# Patient Record
Sex: MALE | Race: BLACK OR AFRICAN AMERICAN | Employment: OTHER | URBAN - METROPOLITAN AREA
[De-identification: names, ages, dates, MRNs, and addresses within clinical notes are randomized per-mention and may not be internally consistent; named-entity substitution may affect disease eponyms.]

---

## 2017-12-13 ENCOUNTER — OFFICE VISIT (OUTPATIENT)
Dept: CARDIOLOGY CLINIC | Age: 77
End: 2017-12-13

## 2017-12-13 VITALS
OXYGEN SATURATION: 98 % | SYSTOLIC BLOOD PRESSURE: 135 MMHG | HEART RATE: 55 BPM | WEIGHT: 118 LBS | BODY MASS INDEX: 19.66 KG/M2 | HEIGHT: 65 IN | DIASTOLIC BLOOD PRESSURE: 75 MMHG

## 2017-12-13 DIAGNOSIS — Z95.1 S/P CABG X 5: ICD-10-CM

## 2017-12-13 DIAGNOSIS — I25.10 CORONARY ARTERY DISEASE INVOLVING NATIVE CORONARY ARTERY OF NATIVE HEART WITHOUT ANGINA PECTORIS: Primary | ICD-10-CM

## 2017-12-13 DIAGNOSIS — E78.00 HYPERCHOLESTEROLEMIA: ICD-10-CM

## 2017-12-13 DIAGNOSIS — I11.9 HYPERTENSIVE HEART DISEASE WITHOUT HEART FAILURE: ICD-10-CM

## 2017-12-13 RX ORDER — ATORVASTATIN CALCIUM 20 MG/1
20 TABLET, FILM COATED ORAL DAILY
COMMUNITY
Start: 2017-11-17 | End: 2019-09-24

## 2017-12-13 RX ORDER — LISINOPRIL 30 MG/1
TABLET ORAL
COMMUNITY
Start: 2017-10-30 | End: 2018-12-17

## 2017-12-13 RX ORDER — AMLODIPINE BESYLATE 5 MG/1
TABLET ORAL
COMMUNITY
Start: 2017-09-07 | End: 2019-01-17

## 2017-12-13 RX ORDER — METOPROLOL SUCCINATE 25 MG/1
25 TABLET, EXTENDED RELEASE ORAL DAILY
COMMUNITY
Start: 2017-09-13 | End: 2021-07-19 | Stop reason: SDUPTHER

## 2017-12-13 RX ORDER — CYANOCOBALAMIN/FOLIC AC/VIT B6 1-2.5-25MG
TABLET ORAL
Status: ON HOLD | COMMUNITY
Start: 2017-09-24 | End: 2019-02-01

## 2017-12-13 NOTE — PROGRESS NOTES
Review of Systems   Constitutional: Negative for chills, fever, malaise/fatigue and weight loss. Respiratory: Negative for cough, hemoptysis, shortness of breath and wheezing. Cardiovascular: Negative for chest pain, palpitations, orthopnea and leg swelling. Gastrointestinal: Negative. Musculoskeletal: Negative for falls, joint pain and myalgias. Neurological: Negative for dizziness.

## 2017-12-13 NOTE — PROGRESS NOTES
1. Have you been to the ER, urgent care clinic since your last visit? Hospitalized since your last visit?no    2. Have you seen or consulted any other health care providers outside of the [address] [place] [address] since your last visit? Include any pap smears or colon screening.  no

## 2017-12-13 NOTE — MR AVS SNAPSHOT
Visit Information Date & Time Provider Department Dept. Phone Encounter #  
 12/13/2017  2:20 PM Adore Martinez [facility] [id number] Follow-up Instructions Return in about 6 months (around 6/13/2018), or if symptoms worsen or fail to improve. Upcoming Health Maintenance Date Due DTaP/Tdap/Td series (1 - Tdap) 10/10/1962 ZOSTER VACCINE AGE 60> 8/10/2001 GLAUCOMA SCREENING Q2Y 10/10/2006 Pneumococcal 65+ Low/Medium Risk (1 of 2 - PCV13) 10/10/2006 MEDICARE YEARLY EXAM 10/10/2006 Influenza Age 5 to Adult 8/1/2017 Allergies as of 12/13/2017  Review Complete On: 12/13/2017 By: Adore Martinez DO Severity Noted Reaction Type Reactions Vicodin [Hydrocodone-acetaminophen]    Rash Current Immunizations  Never Reviewed No immunizations on file. Not reviewed this visit You Were Diagnosed With   
  
 Codes Comments Coronary artery disease involving native coronary artery of native heart without angina pectoris    -  Primary ICD-10-CM: I25.10 ICD-9-CM: 414.01 S/P CABG x 5     ICD-10-CM: Z95.1 ICD-9-CM: V45.81 Diastolic CHF, chronic (HCC)     ICD-10-CM: I50.32 
ICD-9-CM: 428.32, 428.0 Hypertensive heart disease with CHF ([facility] [address]     ICD-10-CM: I11.0 ICD-9-CM: 402.91, 428.0 Hypercholesterolemia     ICD-10-CM: E78.00 ICD-9-CM: 272.0 Vitals BP Pulse Height(growth percentile) Weight(growth percentile) SpO2 BMI  
 142/82 (!) 55 5' 5\" (1.651 m) 118 lb (53.5 kg) 98% 19.64 kg/m2 Smoking Status Former Smoker Vitals History BMI and BSA Data Body Mass Index Body Surface Area 19.64 kg/m 2 1.57 m 2 Preferred Pharmacy Pharmacy Name Phone [address], [address] [place] [address] [phone] Your Updated Medication List  
  
   
This list is accurate as of: 12/13/17  3:10 PM.  Always use your most recent med list. ADVIL -38 mg Tab Generic drug:  Ibuprofen-diphenhydrAMINE Take  by mouth as needed. ALLERGY AM-PM PO Take  by mouth. amLODIPine 5 mg tablet Commonly known as:  NORVASC  
  
 atorvastatin 20 mg tablet Commonly known as:  LIPITOR * FOLBEE PO Take 1 Tab by mouth daily. * FOLBEE 2.5-25-1 mg tablet Generic drug:  folic acid-vit R9-YWR Z52  
  
 lisinopril 30 mg tablet Commonly known as:  PRINIVIL, ZESTRIL  
  
 metoprolol succinate 25 mg XL tablet Commonly known as:  TOPROL-XL  
  
 multivitamin tablet Commonly known as:  ONE A DAY Take 1 Tab by mouth daily. SPIRIVA WITH HANDIHALER 18 mcg inhalation capsule Generic drug:  tiotropium Take 1 Cap by inhalation as needed. VENTOLIN HFA 90 mcg/actuation inhaler Generic drug:  albuterol Take 1 Puff by inhalation as needed. VITAMIN B-1 50 mg tablet Generic drug:  thiamine Take 50 mg by mouth daily. VITAMIN D 5,000 unit Tab tablet Generic drug:  cholecalciferol (VITAMIN D3) Take 1 Cap by mouth daily. 1,000 iu qd * Notice: This list has 2 medication(s) that are the same as other medications prescribed for you. Read the directions carefully, and ask your doctor or other care provider to review them with you. We Performed the Following AMB POC EKG ROUTINE W/ 12 LEADS, INTER & REP [39847 CPT(R)] Follow-up Instructions Return in about 6 months (around 6/13/2018), or if symptoms worsen or fail to improve. Introducing [facility]! Blanche Hutson introduces [employer] patient portal. Now you can access parts of your medical record, email your doctor's office, and request medication refills online. 1. In your internet browser, go to [email or web address] Paper. [employer]/[email or web address] Paper 2. Click on the First Time User? Click Here link in the Sign In box. You will see the New Member Sign Up page. 3. Enter your [employer] Access Code exactly as it appears below. You will not need to use this code after youve completed the sign-up process. If you do not sign up before the expiration date, you must request a new code. · [employer] Access Code: [email or web address] Expires: 3/13/2018  3:10 PM 
 
4. Enter the last four digits of your Social Security Number (xxxx) and Date of Birth (mm/dd/yyyy) as indicated and click Submit. You will be taken to the next sign-up page. 5. Create a [employer] ID. This will be your [employer] login ID and cannot be changed, so think of one that is secure and easy to remember. 6. Create a [employer] password. You can change your password at any time. 7. Enter your Password Reset Question and Answer. This can be used at a later time if you forget your password. 8. Enter your e-mail address. You will receive e-mail notification when new information is available in [address] 9. Click Sign Up. You can now view and download portions of your medical record. 10. Click the Download Summary menu link to download a portable copy of your medical information. If you have questions, please visit the Frequently Asked Questions section of the [employer] website. Remember, [employer] is NOT to be used for urgent needs. For medical emergencies, dial 911. Now available from your iPhone and Android! Please provide this summary of care documentation to your next provider. Your primary care clinician is listed as [address] [place] [address]. If you have any questions after today's visit, please call [phone].

## 2017-12-13 NOTE — PROGRESS NOTES
HPI:  I saw Marine Balbuena in my office today in cardiovascular evaluation regarding his chronic coronary artery disease. Mr. Ioana Snow is very pleasant small 26-year-old black male with history of hypertensive heart disease, hypercholesterolemia, chronic diastolic heart failure, and coronary artery disease status coronary bypass grafting surgery ×5 in April 2014 with the following bypasses:     1. LIMA to the LAD  2. Sequential saphenous vein graft to the diagonal and ramus intermedius. 3. Sequential saphenous vein graft to the right posterior descending artery and the right posterior left ventricular branch. He comes in today and relates that he is continued to do quite well. He is remaining quite active and denies any problems with chest pain, shortness of breath or any other cardiovascular complaints. He is unfortunately continuing to smoke about 8 or 9 cigarettes per day. Encounter Diagnoses   Name Primary?  Coronary artery disease involving native coronary artery of native heart without angina pectoris Yes    S/P CABG x 5 in April of 2014     Hypertensive heart disease without heart failure     Hypercholesterolemia        Discussion: This patient appears to be doing about as well as we could expect and I really have no recommendations for change at this time. He is not have any symptoms to suggest development of recurrent symptomatic obstructive coronary disease or any heart failure symptomatology. He is having his cholesterol checked through Dr. Robert Tran office and he tells me that his Lipitor was recently increased for better control and I am going to get a copy of his lipid profile for my records but I will leave management of that problem to Dr. Latosha Lipscomb. Certainly would like to see the patient's LDL under 70 and preferably under 60 moving forward.     His blood pressure was a little elevated when he came into the office with a little bit above 314 systolic and I checked it again and got 135/75 and since his EKG is stable and he is otherwise doing well I will simply plan to see him again in a year. PCP: [address], [place]      Past Medical History:   Diagnosis Date    Alcohol abuse     quit February 2011    CAD (coronary artery disease)     CAD (coronary artery disease), native coronary artery 1/3/05    LAD 70-80% mid; Cx-diffuse 80%; OM1-70% prox; RCA 40-50% mid; 100% RPLB with collaterals    Cardiac cath 08/19/2013    Severe, diffuse calcification. dLM 30%. mLAD 80%. o/pD1 80%. o/pRamus 80%. dCX 85%. dRCA 80%.  m-dRPLB 75%. EF 50%. Mod to severe inferobasal hypk.  Cardiac echocardiogram 01/19/2011    EF 50-55%. Gr 1 DDfx. RVSP 30 mmHg.  Cardiac nuclear imaging test 06/27/2011    No evidence of ischemia or previous infarction. EF 60%. Neg EKG on max EST. Ex time 6 mins. 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[facility])     PFTs March 2009; mild-mode obstruction, + bronchodil response; decreased DLCO    GERD (gastroesophageal reflux disease)     with erosive esophagitis history    Heart failure ([address]     History of renal failure     2 years ago, which completely resolved    Hypercholesterolemia     Hypertension     Hypertensive heart disease with CHF ([address]     MI (myocardial infarction)     Inferior wall    Tobacco abuse        Past Surgical History:   Procedure Laterality Date    HX HEART CATHETERIZATION  11/2013       Current Outpatient Prescriptions   Medication Sig    atorvastatin (LIPITOR) 20 mg tablet     lisinopril (PRINIVIL, ZESTRIL) 30 mg tablet     FOLBEE 2.5-25-1 mg tablet     metoprolol succinate (TOPROL-XL) 25 mg XL tablet     amLODIPine (NORVASC) 5 mg tablet     thiamine (VITAMIN B-1) 50 mg tablet Take 50 mg by mouth daily.  PSEUDOEPHEDRINE/CPM/METHYLSCOP (ALLERGY AM-PM PO) Take  by mouth.     Cholecalciferol, Vitamin D3, (VITAMIN D) 5,000 unit Tab Take 1 Cap by mouth daily. 1,000 iu qd    tiotropium (SPIRIVA WITH HANDIHALER) 18 mcg inhalation capsule Take 1 Cap by inhalation as needed.  Ibuprofen-Diphenhydramine (ADVIL PM) 200-38 mg Tab Take  by mouth as needed.  albuterol (VENTOLIN HFA) 90 mcg/Actuation inhaler Take 1 Puff by inhalation as needed.  CYANOCOBALAMIN/FA/PYRIDOXINE (FOLBEE PO) Take 1 Tab by mouth daily.  multivitamin (ONE A DAY) tablet Take 1 Tab by mouth daily. No current facility-administered medications for this visit. Allergies   Allergen Reactions    Vicodin [Hydrocodone-Acetaminophen] Rash       Social History:   Social History   Substance Use Topics    Smoking status: Former Smoker     Packs/day: 0.50     Years: 55.00     Start date: 3/13/2014     Quit date: 3/17/2014    Smokeless tobacco: Never Used      Comment: wearing nicotine patch    Alcohol use No      Comment: History of alcohol use, quit drinking Jan 2011         Family history: family history includes Diabetes in his father; Heart Disease in his maternal uncle; Hypertension in his father. Review of Systems:   Constitutional: Negative for chills, fever, malaise/fatigue and weight loss. Respiratory: Negative for cough, hemoptysis, shortness of breath and wheezing. Cardiovascular: Negative for chest pain, palpitations, orthopnea and leg swelling. Gastrointestinal: Negative. Musculoskeletal: Negative for falls, joint pain and myalgias. Neurological: Negative for dizziness. Physical Exam:   The patient is an alert, oriented, well developed, well nourished 69 y.o. male who was in no acute distress at the time of my examination.   Visit Vitals    /75 (BP 1 Location: Left arm, BP Patient Position: Sitting)    Pulse (!) 55    Ht 5' 5\" (1.651 m)    Wt 53.5 kg (118 lb)    SpO2 98%    BMI 19.64 kg/m2      BP Readings from Last 3 Encounters:   12/13/17 135/75   11/30/16 134/86   11/11/15 126/68      Wt Readings from Last 3 Encounters:   12/13/17 53.5 kg (118 lb)   11/30/16 54.3 kg (119 lb 12.8 oz)   11/11/15 58.1 kg (128 lb)     HEENT: Conjuctiva white, mucosa moist, no pallor or cyanosis. NECK: Supple without masses, tenderness or thyromegaly. There was no jugular venous distention. Carotid are full bilaterally without bruits. CHEST: Symmetrical with good excursion. Well healed mid sternotomy scar with some keloid formation on lower portion. LUNGS: Clear to auscultation in all fields. HEART: Regular rate and rhythm. The apex is not displaced. There were no lifts, thrills or heaves. There is a normal S1 and S2 without appreciable murmurs, rubs, clicks, or gallops auscultated. ABDOMEN: Soft without masses, tenderness or organomegaly. EXTREMITIES: 1 + peripheral pulses without peripheral edema. INTEGUMENT: Skin is warm and dry   NEUROLOGICAL: The patient was oriented x3 with motor function grossly intact. Review of Data: See PMH and Cardiology and Imaging sections for cardiac testing  Lab Results   Component Value Date/Time    Cholesterol, total 134 04/04/2014 02:38 PM    HDL Cholesterol 55 04/04/2014 02:38 PM    LDL, calculated 62 04/04/2014 02:38 PM    Triglyceride 85 04/04/2014 02:38 PM    CHOL/HDL Ratio 2.4 04/04/2014 02:38 PM       Results for orders placed or performed in visit on 12/13/17   AMB POC EKG ROUTINE W/ 12 LEADS, INTER & REP     Status: None    Narrative    Sinus bradycardia, rate 55. Old inferior wall myocardial infarction. Left ventricular hypertrophy by precordial voltage criteria. Compared to the EKG of November 30, 2016 there was little interval change. Capo Miller D.O., F.A.C.C. [facility]  [facility]  [address] [facility]  [phone]    PLEASE NOTE:  This document has been produced using voice recognition software.  Unrecognized errors in transcription may be present.

## 2017-12-22 ENCOUNTER — TELEPHONE (OUTPATIENT)
Dept: CARDIOLOGY CLINIC | Age: 77
End: 2017-12-22

## 2017-12-22 NOTE — TELEPHONE ENCOUNTER
Dr Opal Bueno reviewed lab form Dr Merino Carvajal office     Verbal order and read back per Taiwo Thacker, DO  See if he is taking lipitor consistently     Spoke with patient he is not taking Lipitior consistently he is going to do that now

## 2018-12-17 ENCOUNTER — OFFICE VISIT (OUTPATIENT)
Dept: CARDIOLOGY CLINIC | Age: 78
End: 2018-12-17

## 2018-12-17 VITALS
SYSTOLIC BLOOD PRESSURE: 166 MMHG | OXYGEN SATURATION: 98 % | BODY MASS INDEX: 18.78 KG/M2 | HEIGHT: 64 IN | HEART RATE: 62 BPM | DIASTOLIC BLOOD PRESSURE: 88 MMHG | WEIGHT: 110 LBS

## 2018-12-17 DIAGNOSIS — I25.10 CORONARY ARTERY DISEASE INVOLVING NATIVE CORONARY ARTERY OF NATIVE HEART WITHOUT ANGINA PECTORIS: Primary | ICD-10-CM

## 2018-12-17 DIAGNOSIS — E78.00 HYPERCHOLESTEROLEMIA: ICD-10-CM

## 2018-12-17 DIAGNOSIS — I11.9 HYPERTENSIVE HEART DISEASE WITHOUT HEART FAILURE: ICD-10-CM

## 2018-12-17 DIAGNOSIS — Z95.1 S/P CABG X 5: Chronic | ICD-10-CM

## 2018-12-17 RX ORDER — LOSARTAN POTASSIUM 50 MG/1
50 TABLET ORAL DAILY
Status: ON HOLD | COMMUNITY
Start: 2018-12-04 | End: 2019-10-16 | Stop reason: SDUPTHER

## 2018-12-17 NOTE — PROGRESS NOTES
HPI:   I saw Ray Marcelino in my office today in cardiovascular evaluation regarding his chronic coronary artery disease.  Mr. Micky Chan is very pleasant small 67-year-old black male with history of hypertensive heart disease, hypercholesterolemia, chronic diastolic heart failure, and coronary artery disease status coronary bypass grafting surgery × 5 in April 2014 with the following bypasses:     1.  LIMA to the LAD  2. Sequential saphenous vein graft to the diagonal and ramus intermedius. 3. Sequential saphenous vein graft to the right posterior descending artery and the right posterior left ventricular branch.     He comes to the office today and relates that he is doing reasonably well. He continues to remain quite active and denies any cardiovascular symptomatology at this time. Encounter Diagnoses   Name Primary?  Coronary artery disease involving native coronary artery of native heart without angina pectoris Yes    S/P CABG x 5     Hypertensive heart disease without heart failure     Hypercholesterolemia        Discussion: This gentleman appears to be doing about as well as we could expect and really have no recommendations for change at this time. However it has been 4-1/2 years since he had his bypass surgery and I do think a screening nuclear myocardial perfusion study should be considered I am going to get that ordered on him. He does have hypercholesterolemia for which he takes Lipitor 20 mg daily and he is going to be getting a lipid profile with Dr. Makayla Butler which I am going to get a copy of for my records. I would certainly like to see his total cholesterol under 150 and his LDL under 70 and preferably under 60 and if it significantly above those levels I would recommend that we increase his Lipitor or add Zetia. His blood pressure was elevated today at 166/88 no checked again myself and got 175/80.   He tells me he has not taken any of his medications for a couple days because he has been quite busy attending a sick friend is going to go home and take the medication now on he will be following up with Dr. Erasto Betancourt in the next month or so and I will leave it to Dr. Erasto Betancourt to adjust his blood pressure medication if his blood pressure is remaining elevated. Since the patient is otherwise doing well I will plan to see him again in several months. PCP: Jose Miguel Tsang MD      Past Medical History:   Diagnosis Date    Alcohol abuse     quit February 2011    CAD (coronary artery disease)     CAD (coronary artery disease), native coronary artery 1/3/05    LAD 70-80% mid; Cx-diffuse 80%; OM1-70% prox; RCA 40-50% mid; 100% RPLB with collaterals    Cardiac cath 08/19/2013    Severe, diffuse calcification. dLM 30%. mLAD 80%. o/pD1 80%. o/pRamus 80%. dCX 85%. dRCA 80%.  m-dRPLB 75%. EF 50%. Mod to severe inferobasal hypk.  Cardiac echocardiogram 01/19/2011    EF 50-55%. Gr 1 DDfx. RVSP 30 mmHg.  Cardiac nuclear imaging test 06/27/2011    No evidence of ischemia or previous infarction. EF 60%. Neg EKG on max EST. Ex time 6 mins.    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[facility])     PFTs March 2009; mild-mode obstruction, + bronchodil response; decreased DLCO    GERD (gastroesophageal reflux disease)     with erosive esophagitis history    Heart failure ([address]     History of renal failure     2 years ago, which completely resolved    Hypercholesterolemia     Hypertension     Hypertensive heart disease [facility]F ([facility] [address]     MI (myocardial infarction) ([facility] [address]     Inferior wall    Tobacco abuse        Past Surgical History:   Procedure Laterality Date    HX HEART CATHETERIZATION  11/2013     Current Outpatient Medications   Medication Sig    losartan (COZAAR) 50 mg tablet     atorvastatin (LIPITOR) 20 mg tablet     FOLBEE 2.5-25-1 mg tablet     metoprolol succinate (TOPROL-XL) 25 mg XL tablet     amLODIPine (NORVASC) 5 mg tablet     thiamine (VITAMIN B-1) 50 mg tablet Take 50 mg by mouth daily.  PSEUDOEPHEDRINE/CPM/METHYLSCOP (ALLERGY AM-PM PO) Take  by mouth.  Cholecalciferol, Vitamin D3, (VITAMIN D) 5,000 unit Tab Take 1 Cap by mouth daily. 1,000 iu qd    tiotropium (SPIRIVA WITH HANDIHALER) 18 mcg inhalation capsule Take 1 Cap by inhalation as needed.  Ibuprofen-Diphenhydramine (ADVIL PM) 200-38 mg Tab Take  by mouth as needed.  albuterol (VENTOLIN HFA) 90 mcg/Actuation inhaler Take 1 Puff by inhalation as needed.  CYANOCOBALAMIN/FA/PYRIDOXINE (FOLBEE PO) Take 1 Tab by mouth daily.  multivitamin (ONE A DAY) tablet Take 1 Tab by mouth daily. No current facility-administered medications for this visit. Allergies   Allergen Reactions    Vicodin [Hydrocodone-Acetaminophen] Rash       Social History:   Social History     Tobacco Use    Smoking status: Former Smoker     Packs/day: 0.50     Years: 55.00     Pack years: 27.50     Start date: 3/13/2014     Last attempt to quit: 3/17/2014     Years since quittin.7    Smokeless tobacco: Never Used    Tobacco comment: wearing nicotine patch   Substance Use Topics    Alcohol use: No     Comment: History of alcohol use, quit drinking 2011         Family history: family history includes Diabetes in his father; Heart Disease in his maternal uncle; Hypertension in his father. Review of Systems:   Constitutional: Negative for chills, fever, malaise/fatigue and weight loss. Respiratory: Negative for cough, hemoptysis, shortness of breath and wheezing. Cardiovascular: Negative for chest pain, palpitations, orthopnea and leg swelling. Gastrointestinal: Negative. Musculoskeletal: Negative for falls, joint pain and myalgias. Neurological: Negative for dizziness.      Physical Exam:   The patient is an alert, oriented, well developed, well nourished 69 y.o. male who was in no acute distress at the time of my examination. Visit Vitals  /88   Pulse 62   Ht 5' 4\" (1.626 m)   Wt 49.9 kg (110 lb)   SpO2 98%   BMI 18.88 kg/m²      BP Readings from Last 3 Encounters:   12/17/18 166/88   12/13/17 135/75   11/30/16 134/86      Wt Readings from Last 3 Encounters:   12/17/18 49.9 kg (110 lb)   12/13/17 53.5 kg (118 lb)   11/30/16 54.3 kg (119 lb 12.8 oz)     HEENT: Conjuctiva white, mucosa moist, no pallor or cyanosis. NECK: Supple without masses, tenderness or thyromegaly. There was no jugular venous distention. Carotid are full bilaterally without bruits. CHEST: Symmetrical with good excursion. Well healed mid sternotomy scar with some keloid formation on lower portion. LUNGS: Clear to auscultation in all fields. HEART: Regular rate and rhythm. The apex is not displaced. There were no lifts, thrills or heaves. There is a normal S1 and S2 without appreciable murmurs, rubs, clicks, or gallops auscultated. ABDOMEN: Soft without masses, tenderness or organomegaly. EXTREMITIES: 1 + peripheral pulses without peripheral edema. INTEGUMENT: Skin is warm and dry   NEUROLOGICAL: The patient was oriented x3 with motor function grossly intact. Review of Data: See PMH and Cardiology and Imaging sections for cardiac testing  Lab Results   Component Value Date/Time    Cholesterol, total 134 04/04/2014 02:38 PM    HDL Cholesterol 55 04/04/2014 02:38 PM    LDL, calculated 62 04/04/2014 02:38 PM    Triglyceride 85 04/04/2014 02:38 PM    CHOL/HDL Ratio 2.4 04/04/2014 02:38 PM       Results for orders placed or performed in visit on 12/17/18   AMB POC EKG ROUTINE W/ 12 LEADS, INTER & REP     Status: None    Narrative    Normal sinus rhythm, rate 62. Old inferior wall myocardial infarction. Left ventricular hypertrophy by precordial voltage criteria. Compared to the EKG of December 13, 2017 there was no significant interval change. [facility] ELENA Casiano., F.A.C.C.   [facility]  [facility] [place]  [address] [place] [address]. [address] [place]  [phone]    PLEASE NOTE:  This document has been produced using voice recognition software. Unrecognized errors in transcription may be present.

## 2018-12-18 ENCOUNTER — NURSE NAVIGATOR (OUTPATIENT)
Dept: OTHER | Age: 78
End: 2018-12-18

## 2018-12-18 NOTE — NURSE NAVIGATOR
Referring Provider: Freida Vazquez MD      Lung Cancer Risk Profile:   Age: 69  Gender: Male  Height: 65\"  Weight: 110#    Smoking History:  Smoking Status: current use  # years smokin  # years quit: 0  Packs/day: 0.75  Pack years: 50    Patient discussed smoking cessation with PCP: Yes, per patient report    Patient participated in shared decision making process with PCP: Yes, per provider report    Patient is currently experiencing symptoms: No, per patient report    If yes what symptoms:     Co-Morbidities:  CAD, CHF    Cancer History:      Additional Risk Factors:    Exposure to second hand smoke      Patient's smoking history discussed via phone. Patient meets LDCT lung cancer screening criteria.  Call transferred to central scheduling to schedule exam.      SYD YousifN, RN, [address] [facility] Nurse Navigator

## 2018-12-20 ENCOUNTER — HOSPITAL ENCOUNTER (OUTPATIENT)
Dept: NON INVASIVE DIAGNOSTICS | Age: 78
Discharge: HOME | End: 2018-12-20
Attending: INTERNAL MEDICINE
Payer: MEDICARE

## 2018-12-20 VITALS
DIASTOLIC BLOOD PRESSURE: 60 MMHG | HEIGHT: 64 IN | SYSTOLIC BLOOD PRESSURE: 140 MMHG | BODY MASS INDEX: 18.78 KG/M2 | WEIGHT: 110 LBS

## 2018-12-20 DIAGNOSIS — I25.10 CORONARY ARTERY DISEASE INVOLVING NATIVE CORONARY ARTERY OF NATIVE HEART WITHOUT ANGINA PECTORIS: ICD-10-CM

## 2018-12-20 LAB
STRESS BASELINE DIAS BP: 60 MMHG
STRESS BASELINE HR: 49 BPM
STRESS BASELINE SYS BP: 140 MMHG
STRESS ESTIMATED WORKLOAD: 1 METS
STRESS EXERCISE DUR MIN: NORMAL
STRESS PEAK DIAS BP: 60 MMHG
STRESS PEAK SYS BP: 120 MMHG
STRESS PERCENT HR ACHIEVED: 63 %
STRESS POST PEAK HR: 77 BPM
STRESS RATE PRESSURE PRODUCT: 9240 BPM*MMHG
STRESS ST DEPRESSION: 0 MM
STRESS ST ELEVATION: 0 MM
STRESS TARGET HR: 122 BPM

## 2018-12-20 PROCEDURE — 74011250636 HC RX REV CODE- 250/636: Performed by: INTERNAL MEDICINE

## 2018-12-20 PROCEDURE — A9500 TC99M SESTAMIBI: HCPCS

## 2018-12-20 RX ORDER — SODIUM CHLORIDE 9 MG/ML
250 INJECTION, SOLUTION INTRAVENOUS ONCE
Status: COMPLETED | OUTPATIENT
Start: 2018-12-20 | End: 2018-12-20

## 2018-12-20 RX ADMIN — REGADENOSON 0.4 MG: 0.08 INJECTION, SOLUTION INTRAVENOUS at 09:45

## 2018-12-20 RX ADMIN — SODIUM CHLORIDE 250 ML: 900 INJECTION, SOLUTION INTRAVENOUS at 09:45

## 2018-12-27 ENCOUNTER — HOSPITAL ENCOUNTER (OUTPATIENT)
Dept: CT IMAGING | Age: 78
Discharge: HOME | End: 2018-12-27
Attending: INTERNAL MEDICINE
Payer: MEDICARE

## 2018-12-27 VITALS — BODY MASS INDEX: 19.57 KG/M2 | WEIGHT: 114 LBS

## 2018-12-27 DIAGNOSIS — Z87.891 PERSONAL HISTORY OF NICOTINE DEPENDENCE: ICD-10-CM

## 2018-12-27 PROCEDURE — G0297 LDCT FOR LUNG CA SCREEN: HCPCS

## 2018-12-27 NOTE — PROGRESS NOTES
Per your last note \" This gentleman appears to be doing about as well as we could expect and really have no recommendations for change at this time. However it has been 4-1/2 years since he had his bypass surgery and I do think a screening nuclear myocardial perfusion study should be considered I am going to get that ordered on him.

## 2018-12-28 ENCOUNTER — TELEPHONE (OUTPATIENT)
Dept: CARDIOLOGY CLINIC | Age: 78
End: 2018-12-28

## 2018-12-28 NOTE — TELEPHONE ENCOUNTER
----- Message from Jama Ramos RN sent at 12/27/2018  8:51 AM EST ----- Per your last note \" This gentleman appears to be doing about as well as we could expect and really have no recommendations for change at this time. However it has been 4-1/2 years since he had his bypass surgery and I do think a screening nuclear myocardial perfusion study should be considered I am going to get that ordered on him.

## 2018-12-28 NOTE — TELEPHONE ENCOUNTER
I called and placed a message on the patient's answering machine for him to call the office for the results of his nuclear myocardial perfusion study.   His nuclear myocardial perfusion study is significantly abnormal and I am really going to need to speak to him to talk to him about exactly what we found and I think were going to need to consider doing a cardiac catheterization on this gentleman in the next couple of weeks as the study is quite abnormal. ES

## 2019-01-07 NOTE — TELEPHONE ENCOUNTER
I called returned the patient's call and got no answer so I left a message for him that I would call him early in the morning to go over the results of  his nuclear myocardial perfusion study with him.

## 2019-01-08 ENCOUNTER — TELEPHONE (OUTPATIENT)
Dept: CARDIOLOGY CLINIC | Age: 79
End: 2019-01-08

## 2019-01-08 NOTE — TELEPHONE ENCOUNTER
The patient returned my call and I discussed his nuclear myocardial perfusion study. His nuclear myocardial perfusion study is quite abnormal and talked to the patient it does not sound as if he is having much for symptomatology. I think I need to have him come into the office as an add-on sometime in the next week or so so that we can review everything and discuss a plan for his management moving forward.  ES

## 2019-01-08 NOTE — TELEPHONE ENCOUNTER
I called the patient again to see if I could discuss his nuclear myocardial perfusion study with him which was quite abnormal.  There was no answer and I left another message for the patient to call the office, so I can discuss it with him. Marcus Breaux  ES

## 2019-01-17 ENCOUNTER — OFFICE VISIT (OUTPATIENT)
Dept: CARDIOLOGY CLINIC | Age: 79
End: 2019-01-17

## 2019-01-17 ENCOUNTER — TELEPHONE (OUTPATIENT)
Dept: CARDIOLOGY CLINIC | Age: 79
End: 2019-01-17

## 2019-01-17 VITALS
SYSTOLIC BLOOD PRESSURE: 82 MMHG | WEIGHT: 108 LBS | OXYGEN SATURATION: 95 % | HEIGHT: 64 IN | BODY MASS INDEX: 18.44 KG/M2 | DIASTOLIC BLOOD PRESSURE: 48 MMHG | HEART RATE: 75 BPM

## 2019-01-17 DIAGNOSIS — Z95.1 S/P CABG X 5: ICD-10-CM

## 2019-01-17 DIAGNOSIS — I25.10 CORONARY ARTERY DISEASE INVOLVING NATIVE CORONARY ARTERY OF NATIVE HEART WITHOUT ANGINA PECTORIS: ICD-10-CM

## 2019-01-17 DIAGNOSIS — R94.30 ABNORMAL PHARMACOLOGIC MYOCARDIAL PERFUSION STUDY: ICD-10-CM

## 2019-01-17 DIAGNOSIS — I50.32 DIASTOLIC CHF, CHRONIC (HCC): Primary | ICD-10-CM

## 2019-01-17 DIAGNOSIS — R07.9 CHEST PAIN, UNSPECIFIED TYPE: ICD-10-CM

## 2019-01-17 DIAGNOSIS — R06.09 DOE (DYSPNEA ON EXERTION): ICD-10-CM

## 2019-01-17 NOTE — PATIENT INSTRUCTIONS
Instructions Patients Name:  Britt Allen 1. You are scheduled to have a LEFT HEART CATH on 2/1/2019  at 9:15AM Please check in at 8:15AM  . 2. Please go to [facility] and park in the outpatient parking lot that is located around to the back of the hospital and enter through the [facility] building. Once you enter through the [facility] check in with the  there. The  will either give you directions or assist you in getting to the cath holding area. 3. You are not to eat or drink anything after midnight the night before your procedure. Small sips of water to take your medications is ok. 4. If you are diabetic, do not take your insulin/sugar pill the morning of the procedure. 5. MEDICATION INSTRUCTIONS:   Please take your morning medications with the following special instructions: 
 
[x]          Please make sure to take your Blood pressure medication. 6. We encourage families to wait in the waiting room on the first floor while the procedure is being done. The Doctor will come out and talk with you as soon as the procedure is over. 7. There is the possibility that you may spend the night in the hospital, depending on the results of the procedure. This will be determined after the procedure is done. If angioplasty or stent is planned, you will stay at least one day. 8. If you or your family have any questions, please call our office Monday Friday, 9:00 a. m.4:30 p.m.,  At [phone], and ask to speak to one of the nurses.

## 2019-01-17 NOTE — PROGRESS NOTES
HPI:    I saw Tomasa Dill my office today in cardiovascular evaluation regarding his chronic coronary artery disease.  Mr. Mayte Nowak is very pleasant small 67-year-old black male with history of hypertensive heart disease, hypercholesterolemia, chronic diastolic heart failure, and coronary artery disease status coronary bypass grafting surgery × 5 in April 2014 with the following bypasses: 
  
1.  LIMA to the LAD 2. Sequential saphenous vein graft to the diagonal and ramus intermedius. 3. Sequential saphenous vein graft to the right posterior descending artery and the right posterior left ventricular branch. When I saw him last in the office on December 17, 2018 he was having some mild shortness of breath with exertion and some vague chest discomfort with exertion from time to time as well and we decided to do a screening nuclear myocardial perfusion study. That nuclear myocardial perfusion study was markedly abnormal showing 2 areas of possible ischemia as well as a positive transient ischemic dilatation at 1.28 and a calculated ejection fraction 33% making this a very high risk study. The patient comes in today and relates that he continues to have shortness of breath with exertion which is mild and some occasional chest discomfort with exertion such as mowing his lawn. Encounter Diagnoses Name Primary?  Coronary artery disease involving native coronary artery of native heart without angina pectoris  S/P CABG x 5  Chest pain on exertion, probably anginal   
 Abnormal pharmacologic myocardial perfusion study  Diastolic CHF, chronic (HCC) Yes  STEWARD (dyspnea on exertion) Discussion: In view of the above findings I am going to schedule him for an echocardiogram to evaluate his LV function and see if indeed his LV function is depressed as well as a cardiac catheterization to clarify his anatomy and help us to better assess how best to optimize therapy with possible angioplasty and stenting if it appears to be warranted angiographically. He was hypotensive today and is complaining of some dizziness so I am going to discontinue his amlodipine, but we will continue his Cozaar and Toprol as well as his Lipitor. His medications at the time of my dictation did not appear to include aspirin so were going to call him and be sure he is on aspirin 81 mg daily and we will plan to get his cardiac catheterization completed in the next few weeks. PCP: Kacey Car MD 
 
 
Past Medical History:  
Diagnosis Date  Alcohol abuse   
 quit February 2011  CAD (coronary artery disease)  CAD (coronary artery disease), native coronary artery 1/3/05 LAD 70-80% mid; Cx-diffuse 80%; OM1-70% prox; RCA 40-50% mid; 100% RPLB with collaterals  Cardiac cath 08/19/2013 Severe, diffuse calcification. dLM 30%. mLAD 80%. o/pD1 80%. o/pRamus 80%. dCX 85%. dRCA 80%.  m-dRPLB 75%. EF 50%. Mod to severe inferobasal hypk.  Cardiac echocardiogram 01/19/2011 EF 50-55%. Gr 1 DDfx. RVSP 30 mmHg.  Cardiac nuclear imaging test 06/27/2011 No evidence of ischemia or previous infarction. EF 60%. Neg EKG on max EST. Ex time 6 mins.  Chronic kidney disease  Chronic obstructive pulmonary disease ([address]  Diastolic CHF, chronic ([address] LV EF 55% (Echo Jan 2011)  Emphysematous COPD ([address] PFTs March 2009; mild-mode obstruction, + bronchodil response; decreased DLCO  
 GERD (gastroesophageal reflux disease)   
 with erosive esophagitis history  Heart failure ([address]  History of renal failure 2 years ago, which completely resolved  Hypercholesterolemia  Hypertension  Hypertensive heart disease with CHF ([address]  MI (myocardial infarction) ([address] Inferior wall  Tobacco abuse Past Surgical History:  
Procedure Laterality Date  HX HEART CATHETERIZATION  11/2013 Current Outpatient Medications Medication Sig  
  losartan (COZAAR) 50 mg tablet  atorvastatin (LIPITOR) 20 mg tablet  FOLBEE 2.5-25-1 mg tablet  metoprolol succinate (TOPROL-XL) 25 mg XL tablet  thiamine (VITAMIN B-1) 50 mg tablet Take 50 mg by mouth daily.  PSEUDOEPHEDRINE/CPM/METHYLSCOP (ALLERGY AM-PM PO) Take  by mouth.  Cholecalciferol, Vitamin D3, (VITAMIN D) 5,000 unit Tab Take 1 Cap by mouth daily. 1,000 iu qd  
 tiotropium (SPIRIVA WITH HANDIHALER) 18 mcg inhalation capsule Take 1 Cap by inhalation as needed.  Ibuprofen-Diphenhydramine (ADVIL PM) 200-38 mg Tab Take  by mouth as needed.  albuterol (VENTOLIN HFA) 90 mcg/Actuation inhaler Take 1 Puff by inhalation as needed.  CYANOCOBALAMIN/FA/PYRIDOXINE (FOLBEE PO) Take 1 Tab by mouth daily.  multivitamin (ONE A DAY) tablet Take 1 Tab by mouth daily. No current facility-administered medications for this visit. Allergies Allergen Reactions  Vicodin [Hydrocodone-Acetaminophen] Rash Social History:  
Social History Tobacco Use  Smoking status: Former Smoker Packs/day: 0.50 Years: 55.00 Pack years: 27.50 Start date: 3/13/2014 Last attempt to quit: 3/17/2014 Years since quittin.8  Smokeless tobacco: Never Used  Tobacco comment: wearing nicotine patch Substance Use Topics  Alcohol use: No  
  Comment: History of alcohol use, quit drinking 2011 Family history: family history includes Diabetes in his father; Heart Disease in his maternal uncle; Hypertension in his father. Review of Systems:  
Constitutional: Negative. Respiratory: Positive for shortness of breath. Negative for cough, hemoptysis, sputum production and wheezing. Cardiovascular: Negative. Gastrointestinal: Negative. Musculoskeletal: Negative for falls, joint pain and myalgias. Neurological: Negative for dizziness. Physical Exam:  
The patient is an alert, oriented, well developed, well nourished 69 y.o. male who was in no acute distress at the time of my examination. Visit Vitals BP (!) 82/48 Pulse 75 Ht 5' 4\" (1.626 m) Wt 49 kg (108 lb) SpO2 95% BMI 18.54 kg/m² BP Readings from Last 3 Encounters:  
01/17/19 (!) 82/48  
12/20/18 140/60  
12/17/18 166/88 Wt Readings from Last 3 Encounters:  
01/17/19 49 kg (108 lb) 12/27/18 51.7 kg (114 lb) 12/20/18 49.9 kg (110 lb) HEENT: Conjuctiva white, mucosa moist, no pallor or cyanosis. NECK: Supple without masses, tenderness or thyromegaly. There was no jugular venous distention. Carotid are full bilaterally without bruits. CHEST: Symmetrical with good excursion. Well healed mid sternotomy scar with some keloid formation on lower portion. LUNGS: Clear to auscultation in all fields. HEART: Regular rate and rhythm. The apex is not displaced. There were no lifts, thrills or heaves. There is a normal S1 and S2 without appreciable murmurs, rubs, clicks, or gallops auscultated. ABDOMEN: Soft without masses, tenderness or organomegaly. EXTREMITIES: 1 + peripheral pulses without peripheral edema. INTEGUMENT: Skin is warm and dry NEUROLOGICAL: The patient was oriented x3 with motor function grossly intact. Review of Data: See PMH and Cardiology and Imaging sections for cardiac testing Lab Results Component Value Date/Time Cholesterol, total 134 04/04/2014 02:38 PM  
 HDL Cholesterol 55 04/04/2014 02:38 PM  
 LDL, calculated 62 04/04/2014 02:38 PM  
 Triglyceride 85 04/04/2014 02:38 PM  
 CHOL/HDL Ratio 2.4 04/04/2014 02:38 PM  
 
 
Results for orders placed or performed in visit on 01/17/19 AMB POC EKG ROUTINE W/ 12 LEADS, INTER & REP     Status: None Narrative Normal sinus rhythm rate 75. Left atrial enlargement. Old inferior wall myocardial infarction. Some T wave inversions inferolaterally in the high lateral leads which could potentially suggest ischemia.   This EKG is different from the EKG of December 17, 2018 with some new T wave inversions anterolaterally. Margarita Moe D.O., FTerezaA.C.C. [facility] [place] [address] [place] [address] Josse Sidhu [address] [address] [facility] [phone] B  [phone] PLEASE NOTE:  This document has been produced using voice recognition software. Unrecognized errors in transcription may be present.

## 2019-01-17 NOTE — TELEPHONE ENCOUNTER
I called and talked to the patient about his medical therapy. We did stop amlodipine today but in reviewing his medications I do not see that he is on daily aspirin and I want to be sure that he is and if he is not please get him to start aspirin 81 mg daily. He should actually take a 4 initially and then 81 mg daily thereafter if he is not on that medication.  ES

## 2019-01-17 NOTE — LETTER
2019 11:49 AM 
 
 
 
Roverto Jackson 
[id number] 
1940 Insurance: Medicare/ For Life                  Auth # No Auth Needed Proc Date:                 Proc Time:  9:15am 
Performing MD : Dr. Edward Patel                      Procedure:Left Cath Hospital:  [facility]                                            PCP Dr. William Bowers Scheduled with:  Poly/EMail                                                        Date:2019 HP:       EK/17    Labs:______  CXR: _______  Orders:   Special Instructions:  _____________________________________________________ 
______________________________________________________________________ 
______________________________________________________________________ Date Faxed:   ______________   Pages Faxed: ___________________ The materials enclosed with this facsimile transmission are private and confidential and are the property of the sender. If you are not the intended recipient, be advised that any unauthorized use, disclosure, copying, distribution, or the taking of any action in reliance on the contents of this telecopied information is strictly prohibited. If you have received this in error, please immediately notify the sender via telephone to arrange for return of the forwarded documentation.

## 2019-01-18 RX ORDER — ISOSORBIDE MONONITRATE 30 MG/1
TABLET, EXTENDED RELEASE ORAL DAILY
COMMUNITY
End: 2019-05-10

## 2019-01-18 RX ORDER — ASPIRIN 81 MG/1
81 TABLET ORAL DAILY
COMMUNITY
End: 2021-07-19 | Stop reason: SDUPTHER

## 2019-01-22 ENCOUNTER — OFFICE VISIT (OUTPATIENT)
Dept: PULMONOLOGY | Age: 79
End: 2019-01-22

## 2019-01-22 VITALS
DIASTOLIC BLOOD PRESSURE: 66 MMHG | SYSTOLIC BLOOD PRESSURE: 120 MMHG | OXYGEN SATURATION: 98 % | HEIGHT: 64 IN | BODY MASS INDEX: 18.1 KG/M2 | HEART RATE: 81 BPM | RESPIRATION RATE: 16 BRPM | TEMPERATURE: 98.4 F | WEIGHT: 106 LBS

## 2019-01-22 DIAGNOSIS — J44.9 PULMONARY CACHEXIA DUE TO COPD (HCC): ICD-10-CM

## 2019-01-22 DIAGNOSIS — J47.9 BRONCHIECTASIS WITHOUT COMPLICATION (HCC): ICD-10-CM

## 2019-01-22 DIAGNOSIS — J43.2 CENTRILOBULAR EMPHYSEMA (HCC): ICD-10-CM

## 2019-01-22 DIAGNOSIS — R64 PULMONARY CACHEXIA DUE TO COPD (HCC): ICD-10-CM

## 2019-01-22 DIAGNOSIS — R91.1 LUNG NODULE: Primary | ICD-10-CM

## 2019-01-22 DIAGNOSIS — R06.09 DYSPNEA ON EXERTION: ICD-10-CM

## 2019-01-22 DIAGNOSIS — R05.9 COUGH: ICD-10-CM

## 2019-01-22 DIAGNOSIS — F17.210 CIGARETTE NICOTINE DEPENDENCE WITHOUT COMPLICATION: ICD-10-CM

## 2019-01-22 NOTE — PROGRESS NOTES
Mr. Beverly Chávez has a reminder for a \"due or due soon\" health maintenance. I have asked that he contact his primary care provider for follow-up on this health maintenance. Chief Complaint Patient presents with  Results   Low Dose Chest CT

## 2019-01-22 NOTE — PROGRESS NOTES
[facility] 
  [address] [facility], [address] [place], [address] [address] SIMPLE PULMONARY STRESS TEST - 6 MINUTE WALK PATIENT NAME: Omar Cruz    DATE: 1/22/2019 YOB: 1940     AGE: 69 y.o. DIAGNOSIS:  
Encounter Diagnoses Name Primary?  Lung nodule Yes  Centrilobular emphysema ([address]  Dyspnea on exertion  Cough  Bronchiectasis without complication ([address] TECHNICIAN: Irena Corrigan LPN      
 
PHYSICIAN: Dr Shon Alex Visit Vitals /66 (BP 1 Location: Left arm, BP Patient Position: Sitting) Pulse 81 Temp 98.4 °F (36.9 °C) (Oral) Resp 16 Ht 5' 4\" (1.626 m) Wt 48.1 kg (106 lb) SpO2 98% Comment: RA Rest  
BMI 18.19 kg/m² RESTING DATA:  Dyspnea Scale (1-10):    1 SOB EXERCISE DATA:   6 MINUTE WALK - HALLWAY (34 METERS) 1   RA    98 % SAT   65 HR   1 SOB    1 Laps x 34m = 34m 
2   RA    96 % SAT   66 HR   1 SOB    1 Laps x 34m = 34m 
3   RA    96 % SAT   71 HR   1 SOB    1 Laps x 34m = 34m 
4   RA    92 % SAT   71 HR   1 SOB    1 Laps x 34m = 34m 
5   RA    92 % SAT   72 HR   1 SOB    1 Laps x 34m = 34m 
6   RA    91 % SAT   76 HR   1 SOB    1 Laps x 34m = 34m TOTAL DISTANCE:   204M 
 
RECOVERY DATA:   
 
1   RA    98 % SAT   72 HR   18 RR   124/70 BP   1 SOB 
 
 
 
TECHNICIAN COMMENTS: Patient tolerated 6 minute walk very well. SOB remained 1/10 Scale.

## 2019-01-22 NOTE — LETTER
1/23/2019 6:24 PM 
 
Patient:  Shira Gatica YOB: 1940 Date of Visit: 1/22/2019 Dear Koffi Clemons, [address] [place] [address] VIA Facsimile: [phone] 
 : Thank you for referring Mr. Malena Genao to me for evaluation/treatment. Below are the relevant portions of my assessment and plan of care. If you have questions, please do not hesitate to call me. I look forward to following Mr. Linda William along with you. Sincerely, Juli Quezada MD/MPH Pulmonary, Critical Care Medicine [facility]

## 2019-01-23 ENCOUNTER — HOSPITAL ENCOUNTER (OUTPATIENT)
Dept: PREADMISSION TESTING | Age: 79
Discharge: HOME | End: 2019-01-23
Payer: MEDICARE

## 2019-01-23 ENCOUNTER — HOSPITAL ENCOUNTER (OUTPATIENT)
Dept: LAB | Age: 79
Discharge: HOME | End: 2019-01-23
Payer: MEDICARE

## 2019-01-23 ENCOUNTER — HOSPITAL ENCOUNTER (OUTPATIENT)
Dept: GENERAL RADIOLOGY | Age: 79
Discharge: HOME | End: 2019-01-23
Payer: MEDICARE

## 2019-01-23 DIAGNOSIS — I50.32 DIASTOLIC CHF, CHRONIC (HCC): ICD-10-CM

## 2019-01-23 DIAGNOSIS — R06.09 DOE (DYSPNEA ON EXERTION): ICD-10-CM

## 2019-01-23 DIAGNOSIS — R94.30 ABNORMAL PHARMACOLOGIC MYOCARDIAL PERFUSION STUDY: ICD-10-CM

## 2019-01-23 DIAGNOSIS — I25.10 CORONARY ARTERY DISEASE INVOLVING NATIVE CORONARY ARTERY OF NATIVE HEART WITHOUT ANGINA PECTORIS: ICD-10-CM

## 2019-01-23 DIAGNOSIS — R07.9 CHEST PAIN, UNSPECIFIED TYPE: ICD-10-CM

## 2019-01-23 LAB
ALBUMIN SERPL-MCNC: 2.9 G/DL (ref 3.4–5)
ALBUMIN/GLOB SERPL: 0.9 {RATIO} (ref 0.8–1.7)
ALP SERPL-CCNC: 106 U/L (ref 45–117)
ALT SERPL-CCNC: 35 U/L (ref 16–61)
ANION GAP SERPL CALC-SCNC: 5 MMOL/L (ref 3–18)
AST SERPL-CCNC: 29 U/L (ref 15–37)
BASOPHILS # BLD: 0 K/UL (ref 0–0.1)
BASOPHILS NFR BLD: 1 % (ref 0–2)
BILIRUB SERPL-MCNC: 0.5 MG/DL (ref 0.2–1)
BUN SERPL-MCNC: 17 MG/DL (ref 7–18)
BUN/CREAT SERPL: 15 (ref 12–20)
CALCIUM SERPL-MCNC: 8.7 MG/DL (ref 8.5–10.1)
CHLORIDE SERPL-SCNC: 102 MMOL/L (ref 100–108)
CO2 SERPL-SCNC: 31 MMOL/L (ref 21–32)
CREAT SERPL-MCNC: 1.12 MG/DL (ref 0.6–1.3)
DIFFERENTIAL METHOD BLD: ABNORMAL
EOSINOPHIL # BLD: 0.3 K/UL (ref 0–0.4)
EOSINOPHIL NFR BLD: 5 % (ref 0–5)
ERYTHROCYTE [DISTWIDTH] IN BLOOD BY AUTOMATED COUNT: 13.4 % (ref 11.6–14.5)
GLOBULIN SER CALC-MCNC: 3.4 G/DL (ref 2–4)
GLUCOSE SERPL-MCNC: 94 MG/DL (ref 74–99)
HCT VFR BLD AUTO: 33.9 % (ref 36–48)
HGB BLD-MCNC: 11.9 G/DL (ref 13–16)
INR PPP: 0.9 (ref 0.8–1.2)
LYMPHOCYTES # BLD: 2 K/UL (ref 0.9–3.6)
LYMPHOCYTES NFR BLD: 37 % (ref 21–52)
MCH RBC QN AUTO: 33.4 PG (ref 24–34)
MCHC RBC AUTO-ENTMCNC: 35.1 G/DL (ref 31–37)
MCV RBC AUTO: 95.2 FL (ref 74–97)
MONOCYTES # BLD: 0.6 K/UL (ref 0.05–1.2)
MONOCYTES NFR BLD: 11 % (ref 3–10)
NEUTS SEG # BLD: 2.5 K/UL (ref 1.8–8)
NEUTS SEG NFR BLD: 46 % (ref 40–73)
PLATELET # BLD AUTO: 238 K/UL (ref 135–420)
PMV BLD AUTO: 9.3 FL (ref 9.2–11.8)
POTASSIUM SERPL-SCNC: 4.7 MMOL/L (ref 3.5–5.5)
PROT SERPL-MCNC: 6.3 G/DL (ref 6.4–8.2)
PROTHROMBIN TIME: 12.3 SEC (ref 11.5–15.2)
RBC # BLD AUTO: 3.56 M/UL (ref 4.7–5.5)
SODIUM SERPL-SCNC: 138 MMOL/L (ref 136–145)
WBC # BLD AUTO: 5.4 K/UL (ref 4.6–13.2)

## 2019-01-23 PROCEDURE — 36415 COLL VENOUS BLD VENIPUNCTURE: CPT

## 2019-01-23 PROCEDURE — 71046 X-RAY EXAM CHEST 2 VIEWS: CPT

## 2019-01-23 PROCEDURE — 85610 PROTHROMBIN TIME: CPT

## 2019-01-23 PROCEDURE — 85025 COMPLETE CBC W/AUTO DIFF WBC: CPT

## 2019-01-23 PROCEDURE — 80053 COMPREHEN METABOLIC PANEL: CPT

## 2019-01-24 ENCOUNTER — HOSPITAL ENCOUNTER (OUTPATIENT)
Dept: NON INVASIVE DIAGNOSTICS | Age: 79
Discharge: HOME | End: 2019-01-24
Attending: INTERNAL MEDICINE
Payer: MEDICARE

## 2019-01-24 VITALS
SYSTOLIC BLOOD PRESSURE: 140 MMHG | HEIGHT: 64 IN | WEIGHT: 106 LBS | DIASTOLIC BLOOD PRESSURE: 60 MMHG | BODY MASS INDEX: 18.1 KG/M2

## 2019-01-24 DIAGNOSIS — R94.30 ABNORMAL PHARMACOLOGIC MYOCARDIAL PERFUSION STUDY: ICD-10-CM

## 2019-01-24 DIAGNOSIS — I25.10 CORONARY ARTERY DISEASE INVOLVING NATIVE CORONARY ARTERY OF NATIVE HEART WITHOUT ANGINA PECTORIS: ICD-10-CM

## 2019-01-24 DIAGNOSIS — R06.09 DOE (DYSPNEA ON EXERTION): ICD-10-CM

## 2019-01-24 DIAGNOSIS — R07.9 CHEST PAIN, UNSPECIFIED TYPE: ICD-10-CM

## 2019-01-24 DIAGNOSIS — I50.32 DIASTOLIC CHF, CHRONIC (HCC): ICD-10-CM

## 2019-01-24 LAB
ECHO AO ROOT DIAM: 3.07 CM
ECHO LA AREA 4C: 14.6 CM2
ECHO LA VOL 2C: 45.21 ML (ref 18–58)
ECHO LA VOL 4C: 42.15 ML (ref 18–58)
ECHO LA VOL BP: 49.11 ML (ref 18–58)
ECHO LA VOL/BSA BIPLANE: 32.88 ML/M2 (ref 16–28)
ECHO LA VOLUME INDEX A2C: 30.27 ML/M2 (ref 16–28)
ECHO LA VOLUME INDEX A4C: 28.22 ML/M2 (ref 16–28)
ECHO LV E' LATERAL VELOCITY: 8.05 CM/S
ECHO LV E' SEPTAL VELOCITY: 3.72 CM/S
ECHO LV INTERNAL DIMENSION DIASTOLIC: 4.58 CM (ref 4.2–5.9)
ECHO LV INTERNAL DIMENSION SYSTOLIC: 3.65 CM
ECHO LV IVSD: 0.87 CM (ref 0.6–1)
ECHO LV MASS 2D: 152.2 G (ref 88–224)
ECHO LV MASS INDEX 2D: 101.9 G/M2 (ref 49–115)
ECHO LV POSTERIOR WALL DIASTOLIC: 0.9 CM (ref 0.6–1)
ECHO LVOT DIAM: 1.91 CM
ECHO LVOT PEAK GRADIENT: 2.7 MMHG
ECHO LVOT PEAK VELOCITY: 81.4 CM/S
ECHO LVOT VTI: 17.66 CM
ECHO MV A VELOCITY: 70 CM/S
ECHO MV E DECELERATION TIME (DT): 170.7 MS
ECHO MV E VELOCITY: 1.1 CM/S
ECHO MV E/A RATIO: 0.02
ECHO MV E/E' LATERAL: 0.14
ECHO MV E/E' RATIO (AVERAGED): 0.22
ECHO MV E/E' SEPTAL: 0.3
ECHO RV TAPSE: 1.29 CM (ref 1.5–2)

## 2019-01-24 PROCEDURE — 93306 TTE W/DOPPLER COMPLETE: CPT

## 2019-01-28 NOTE — PROGRESS NOTES
Per your last note  In view of the above findings I am going to schedule him for an echocardiogram to evaluate his LV function and see if indeed his LV function is depressed as well as a cardiac catheterization to clarify his anatomy and help us to better assess how best to optimize therapy with possible angioplasty and stenting if it appears to be warranted angiographically

## 2019-02-01 ENCOUNTER — HOSPITAL ENCOUNTER (OUTPATIENT)
Age: 79
Setting detail: OUTPATIENT SURGERY
Discharge: HOME | End: 2019-02-01
Attending: INTERNAL MEDICINE | Admitting: INTERNAL MEDICINE
Payer: MEDICARE

## 2019-02-01 VITALS
OXYGEN SATURATION: 99 % | SYSTOLIC BLOOD PRESSURE: 144 MMHG | HEART RATE: 68 BPM | HEIGHT: 65 IN | DIASTOLIC BLOOD PRESSURE: 83 MMHG | WEIGHT: 105 LBS | BODY MASS INDEX: 17.49 KG/M2 | RESPIRATION RATE: 20 BRPM

## 2019-02-01 DIAGNOSIS — R93.1 ABNORMAL ECHOCARDIOGRAM: ICD-10-CM

## 2019-02-01 DIAGNOSIS — R06.09 DYSPNEA ON EXERTION: ICD-10-CM

## 2019-02-01 DIAGNOSIS — R07.9 CHEST PAIN, UNSPECIFIED: ICD-10-CM

## 2019-02-01 PROCEDURE — 77030013797 HC KT TRNSDUC PRSSR EDWD -A: Performed by: INTERNAL MEDICINE

## 2019-02-01 PROCEDURE — 74011250637 HC RX REV CODE- 250/637: Performed by: INTERNAL MEDICINE

## 2019-02-01 PROCEDURE — 99152 MOD SED SAME PHYS/QHP 5/>YRS: CPT | Performed by: INTERNAL MEDICINE

## 2019-02-01 PROCEDURE — 74011250636 HC RX REV CODE- 250/636: Performed by: INTERNAL MEDICINE

## 2019-02-01 PROCEDURE — 74011636320 HC RX REV CODE- 636/320: Performed by: INTERNAL MEDICINE

## 2019-02-01 PROCEDURE — 93455 CORONARY ART/GRFT ANGIO S&I: CPT | Performed by: INTERNAL MEDICINE

## 2019-02-01 PROCEDURE — 74011250636 HC RX REV CODE- 250/636

## 2019-02-01 PROCEDURE — 77030004558 HC CATH ANGI DX SUPR TORQ CARD -A: Performed by: INTERNAL MEDICINE

## 2019-02-01 PROCEDURE — C1894 INTRO/SHEATH, NON-LASER: HCPCS | Performed by: INTERNAL MEDICINE

## 2019-02-01 PROCEDURE — 99153 MOD SED SAME PHYS/QHP EA: CPT | Performed by: INTERNAL MEDICINE

## 2019-02-01 PROCEDURE — 74011000250 HC RX REV CODE- 250: Performed by: INTERNAL MEDICINE

## 2019-02-01 RX ORDER — NITROGLYCERIN 40 MG/100ML
INJECTION INTRAVENOUS
Status: COMPLETED | OUTPATIENT
Start: 2019-02-01 | End: 2019-02-01

## 2019-02-01 RX ORDER — SODIUM CHLORIDE 0.9 % (FLUSH) 0.9 %
5-40 SYRINGE (ML) INJECTION EVERY 8 HOURS
Status: DISCONTINUED | OUTPATIENT
Start: 2019-02-01 | End: 2019-02-01 | Stop reason: HOSPADM

## 2019-02-01 RX ORDER — IODIXANOL 320 MG/ML
INJECTION, SOLUTION INTRAVASCULAR AS NEEDED
Status: DISCONTINUED | OUTPATIENT
Start: 2019-02-01 | End: 2019-02-01 | Stop reason: HOSPADM

## 2019-02-01 RX ORDER — GUAIFENESIN 100 MG/5ML
81 LIQUID (ML) ORAL ONCE
Status: COMPLETED | OUTPATIENT
Start: 2019-02-01 | End: 2019-02-01

## 2019-02-01 RX ORDER — MIDAZOLAM HYDROCHLORIDE 1 MG/ML
INJECTION, SOLUTION INTRAMUSCULAR; INTRAVENOUS AS NEEDED
Status: DISCONTINUED | OUTPATIENT
Start: 2019-02-01 | End: 2019-02-01 | Stop reason: HOSPADM

## 2019-02-01 RX ORDER — SODIUM CHLORIDE 0.9 % (FLUSH) 0.9 %
5-40 SYRINGE (ML) INJECTION AS NEEDED
Status: DISCONTINUED | OUTPATIENT
Start: 2019-02-01 | End: 2019-02-01 | Stop reason: HOSPADM

## 2019-02-01 RX ORDER — FENTANYL CITRATE 50 UG/ML
INJECTION, SOLUTION INTRAMUSCULAR; INTRAVENOUS AS NEEDED
Status: DISCONTINUED | OUTPATIENT
Start: 2019-02-01 | End: 2019-02-01 | Stop reason: HOSPADM

## 2019-02-01 RX ORDER — LIDOCAINE HYDROCHLORIDE 10 MG/ML
INJECTION, SOLUTION EPIDURAL; INFILTRATION; INTRACAUDAL; PERINEURAL AS NEEDED
Status: DISCONTINUED | OUTPATIENT
Start: 2019-02-01 | End: 2019-02-01 | Stop reason: HOSPADM

## 2019-02-01 RX ORDER — HYDRALAZINE HYDROCHLORIDE 20 MG/ML
20 INJECTION INTRAMUSCULAR; INTRAVENOUS ONCE
Status: COMPLETED | OUTPATIENT
Start: 2019-02-01 | End: 2019-02-01

## 2019-02-01 RX ADMIN — HYDRALAZINE HYDROCHLORIDE 10 MG: 20 INJECTION INTRAMUSCULAR; INTRAVENOUS at 10:11

## 2019-02-01 RX ADMIN — ASPIRIN 81 MG 81 MG: 81 TABLET ORAL at 08:37

## 2019-02-01 RX ADMIN — HYDRALAZINE HYDROCHLORIDE 10 MG: 20 INJECTION INTRAMUSCULAR; INTRAVENOUS at 10:02

## 2019-02-01 NOTE — Clinical Note
TRANSFER - IN REPORT:  
 
Verbal report received from: Roseline Mann RN. Daiana Rebollar Report consisted of patient's Situation, Background, Assessment and  
Recommendations(SBAR). Opportunity for questions and clarification was provided. Assessment completed upon patient's arrival to unit and care assumed. Patient transported with a Cardiac Cath Tech / Patient Care Tech.

## 2019-02-01 NOTE — Clinical Note
Contrast Dose Calculator:  
Patient's age: 69.  
Patient's sex: Male. Patient weight (kg) = 47.6. Creatinine level (mg/dL) = 1.12. Creatinine clearance (mL/min): 37.19. Contrast concentration (mg/mL) = 320. MACD = 199.22 mL. Max Contrast dose per Creatinine Cl calculator = 83.67 mL.

## 2019-02-01 NOTE — Clinical Note
TRANSFER - OUT REPORT:  
 
Verbal report given to: Flaquita Bartlett RN. Justin Munoz Report consisted of patient's Situation, Background, Assessment and  
Recommendations(SBAR). Opportunity for questions and clarification was provided. Patient transported with a Cardiac Cath Tech / Patient Care Tech. Patient transported to: [address]

## 2019-02-01 NOTE — H&P
Addendum: 
 
Patient with known history of CAD status post bypass surgery in 2014 now having increased dyspnea on exertion and chest discomfort with abnormal nuclear scan as follows: 
December 17, 2018  nuclear myocardial perfusion study was markedly abnormal showing 2 areas of possible ischemia as well as a positive transient ischemic dilatation at 1.28 and a calculated ejection fraction 33% making this a very high risk study. We will proceed with coronary angiography. [facility] 
2/1/19 HPI:    I saw Elizabeth Olivo my office today in cardiovascular evaluation regarding his chronic coronary artery disease.  Mr. Beverly Chávez is very pleasant small 67-year-old black male with history of hypertensive heart disease, hypercholesterolemia, chronic diastolic heart failure, and coronary artery disease status coronary bypass grafting surgery × 5 in April 2014 with the following bypasses: 
  
1.  LIMA to the LAD 2. Sequential saphenous vein graft to the diagonal and ramus intermedius. 3. Sequential saphenous vein graft to the right posterior descending artery and the right posterior left ventricular branch. 
  
When I saw him last in the office on December 17, 2018 he was having some mild shortness of breath with exertion and some vague chest discomfort with exertion from time to time as well and we decided to do a screening nuclear myocardial perfusion study. That nuclear myocardial perfusion study was markedly abnormal showing 2 areas of possible ischemia as well as a positive transient ischemic dilatation at 1.28 and a calculated ejection fraction 33% making this a very high risk study. 
  
The patient comes in today and relates that he continues to have shortness of breath with exertion which is mild and some occasional chest discomfort with exertion such as mowing his lawn. 
  
    
Encounter Diagnoses Name Primary?   
 Coronary artery disease involving native coronary artery of native heart without angina pectoris    
 S/P CABG x 5    
 Chest pain on exertion, probably anginal    
 Abnormal pharmacologic myocardial perfusion study    
 Diastolic CHF, chronic (HCC) Yes  STEWARD (dyspnea on exertion)    
  
Discussion: In view of the above findings I am going to schedule him for an echocardiogram to evaluate his LV function and see if indeed his LV function is depressed as well as a cardiac catheterization to clarify his anatomy and help us to better assess how best to optimize therapy with possible angioplasty and stenting if it appears to be warranted angiographically. 
  
He was hypotensive today and is complaining of some dizziness so I am going to discontinue his amlodipine, but we will continue his Cozaar and Toprol as well as his Lipitor. His medications at the time of my dictation did not appear to include aspirin so were going to call him and be sure he is on aspirin 81 mg daily and we will plan to get his cardiac catheterization completed in the next few weeks. 
  
PCP: Juliet Goldman MD 
  
  
    
Past Medical History:  
Diagnosis Date  Alcohol abuse    
  quit February 2011  CAD (coronary artery disease)    
 CAD (coronary artery disease), native coronary artery 1/3/05  
  LAD 70-80% mid; Cx-diffuse 80%; OM1-70% prox; RCA 40-50% mid; 100% RPLB with collaterals  Cardiac cath 08/19/2013  
  Severe, diffuse calcification. dLM 30%. mLAD 80%. o/pD1 80%. o/pRamus 80%. dCX 85%. dRCA 80%.  m-dRPLB 75%. EF 50%. Mod to severe inferobasal hypk.  Cardiac echocardiogram 01/19/2011  
  EF 50-55%. Gr 1 DDfx. RVSP 30 mmHg.  Cardiac nuclear imaging test 06/27/2011  
  No evidence of ischemia or previous infarction. EF 60%. Neg EKG on max EST. Ex time 6 mins.  Chronic kidney disease    
 Chronic obstructive pulmonary disease (HCC)    
 Diastolic CHF, chronic (HCC)    
  LV EF 55% (Echo Jan 2011)  Emphysematous COPD ([address]    
   PFTs 2009; mild-mode obstruction, + bronchodil response; decreased DLCO  
 GERD (gastroesophageal reflux disease)    
  with erosive esophagitis history  Heart failure ([facility]    
 History of renal failure    
  2 years ago, which completely resolved  Hypercholesterolemia    
 Hypertension    
 Hypertensive heart disease with CHF ([facility]    
 MI (myocardial infarction) ([facility]    
  Inferior wall  Tobacco abuse    
  
  
     
Past Surgical History:  
Procedure Laterality Date  HX HEART CATHETERIZATION   2013  
  
    
Current Outpatient Medications Medication Sig  
 losartan (COZAAR) 50 mg tablet    
 atorvastatin (LIPITOR) 20 mg tablet    
 FOLBEE 2.5-25-1 mg tablet    
 metoprolol succinate (TOPROL-XL) 25 mg XL tablet    
 thiamine (VITAMIN B-1) 50 mg tablet Take 50 mg by mouth daily.  PSEUDOEPHEDRINE/CPM/METHYLSCOP (ALLERGY AM-PM PO) Take  by mouth.  Cholecalciferol, Vitamin D3, (VITAMIN D) 5,000 unit Tab Take 1 Cap by mouth daily. 1,000 iu qd  
 tiotropium (SPIRIVA WITH HANDIHALER) 18 mcg inhalation capsule Take 1 Cap by inhalation as needed.  Ibuprofen-Diphenhydramine (ADVIL PM) 200-38 mg Tab Take  by mouth as needed.  albuterol (VENTOLIN HFA) 90 mcg/Actuation inhaler Take 1 Puff by inhalation as needed.  CYANOCOBALAMIN/FA/PYRIDOXINE (FOLBEE PO) Take 1 Tab by mouth daily.  multivitamin (ONE A DAY) tablet Take 1 Tab by mouth daily.  
  
No current facility-administered medications for this visit.   
  
  
  
  
    
Allergies Allergen Reactions  Vicodin [Hydrocodone-Acetaminophen] Rash  
  
  
Social History:  
Social History  
  
     
Tobacco Use  Smoking status: Former Smoker  
    Packs/day: 0.50  
    Years: 55.00  
    Pack years: 27.50  
    Start date: 3/13/2014  
    Last attempt to quit: 3/17/2014  
    Years since quittin.8  Smokeless tobacco: Never Used  Tobacco comment: wearing nicotine patch Substance Use Topics  Alcohol use: No  
    Comment: History of alcohol use, quit drinking Jan 2011  
   
  
Family history: family history includes Diabetes in his father; Heart Disease in his maternal uncle; Hypertension in his father.   
  
Review of Systems:  
Constitutional: Negative. Respiratory: Positive for shortness of breath. Negative for cough, hemoptysis, sputum production and wheezing. Cardiovascular: Negative. Gastrointestinal: Negative. Musculoskeletal: Negative for falls, joint pain and myalgias. Neurological: Negative for dizziness.  
  
  
  
  
Physical Exam:  
The patient is an alert, oriented, well developed, well nourished 69 y.o. male who was in no acute distress at the time of my examination. Visit Vitals BP (!) 82/48 Pulse 75 Ht 5' 4\" (1.626 m) Wt 49 kg (108 lb) SpO2 95% BMI 18.54 kg/m² BP Readings from Last 3 Encounters:  
01/17/19 (!) 82/48  
12/20/18 140/60  
12/17/18 166/88 Wt Readings from Last 3 Encounters:  
01/17/19 49 kg (108 lb) 12/27/18 51.7 kg (114 lb) 12/20/18 49.9 kg (110 lb) HEENT: Conjuctiva white, mucosa moist, no pallor or cyanosis. NECK: Supple without masses, tenderness or thyromegaly. There was no jugular venous distention. Carotid are full bilaterally without bruits. CHEST: Symmetrical with good excursion. Well healed mid sternotomy scar with some keloid formation on lower portion. LUNGS: Clear to auscultation in all fields. HEART: Regular rate and rhythm. The apex is not displaced. There were no lifts, thrills or heaves. There is a normal S1 and S2 without appreciable murmurs, rubs, clicks, or gallops auscultated. ABDOMEN: Soft without masses, tenderness or organomegaly. EXTREMITIES: 1 + peripheral pulses without peripheral edema. INTEGUMENT: Skin is warm and dry NEUROLOGICAL: The patient was oriented x3 with motor function grossly intact. 
  
Review of Data: See PMH and Cardiology and Imaging sections for cardiac testing Lab Results Component Value Date/Time  
  Cholesterol, total 134 04/04/2014 02:38 PM  
  HDL Cholesterol 55 04/04/2014 02:38 PM  
  LDL, calculated 62 04/04/2014 02:38 PM  
  Triglyceride 85 04/04/2014 02:38 PM  
  CHOL/HDL Ratio 2.4 04/04/2014 02:38 PM  
  
  
   
Results for orders placed or performed in visit on 01/17/19 AMB POC EKG ROUTINE W/ 12 LEADS, INTER & REP     Status: None  
  Narrative  
  Normal sinus rhythm rate 75. Left atrial enlargement. Old inferior wall myocardial infarction. Some T wave inversions inferolaterally in the high lateral leads which could potentially suggest ischemia.   This EKG is different from the EKG of December 17, 2018 with some new T wave inversions anterolaterally.

## 2019-02-01 NOTE — Clinical Note
Aspirin Registry Question:  
Aspirin was given and discussed with physician prior to the procedure. PT RECEIVED 81MG ASA IN [facility], SEE MAR.

## 2019-02-01 NOTE — PROGRESS NOTES
02/01/19 1004 Vital Signs Pulse (Heart Rate) 64 Cardiac Rhythm NSR Resp Rate 17  
O2 Sat (%) 96 % Level of Consciousness Alert  
BP (!) 194/98 MAP (Monitor) 150 Oxygen Therapy Pulse via Oximetry 64 beats per minute Neuro Neuro (WDL) WDL Post-Procedure Site Assessment (1) Site Assessment No bleeding; No hematoma;Dry/intact  
received patient from cath lab. Pt stable. New orders for bp meds for manual pull.

## 2019-02-01 NOTE — PROGRESS NOTES
This was done along with cath and probably reviewed with him already, but we can review with cath findings when he has follow up which should be within a couple of weeks.  ES

## 2019-02-01 NOTE — Clinical Note
Multiple views of the internal mammary artery to the left anterior descending obtained using hand injection.

## 2019-02-07 NOTE — PROGRESS NOTES
HPI:   I saw Low Ribeiro my office today in cardiovascular evaluation regarding his chronic coronary artery disease.  Mr. Sheila Back is very pleasant small 71-year-old black male with history of hypertensive heart disease, hypercholesterolemia, chronic diastolic heart failure, and coronary artery disease status coronary bypass grafting surgery × 5 in April 2014 with the following bypasses: 
  
1.  LIMA to the LAD 2. Sequential saphenous vein graft to the diagonal and ramus intermedius. 3. Sequential saphenous vein graft to the right posterior descending artery and the right posterior left ventricular branch. 
  
When I saw him last in the office on December 17, 2018 he was having some mild shortness of breath with exertion and some vague chest discomfort with exertion from time to time as well and we decided to do a screening nuclear myocardial perfusion study. That nuclear myocardial perfusion study was markedly abnormal showing 2 areas of possible ischemia as well as a positive transient ischemic dilatation at 1.28 and a calculated ejection fraction 33% making this a very high risk study. 
  
In view of the above study I referred him to my associate Dr. Cricket Del Angel for cardiac catheterization 
    
· Severe native CAD with distal left main 90%, heavily calcified. · LIMA to LAD is patent. · 2 sequential vein grafts (All 4 vein grafts to diagonal, OM, RPDA, RPL branch) occluded at aortotomy site. · Subtotal mid RCA occlusion with NICO 0-I flow, some collaterals from left coronary system. · Native diagonal 85% stenosis, native OM 85% stenosis. It was felt by Dr. Cricket Del Angel that due to his severe diffuse native coronary artery disease and the only bypass remaining patent being the left internal mammary artery to the LAD that the best therapy was difficult to determine.   He could be considered simply for continued medical therapy, redo bypass surgery, or selective angioplasty although this would be high risk in view of his left main obstruction, heavy calcification, and the diffuseness of his disease. He comes in today and relates that he is doing reasonably well. He does get shortness of breath with overactivity but is having only some vague left-sided chest discomfort which is atypical for angina once in a while he is not having any rest or nocturnal episodes of chest discomfort or any other symptoms to suggest that his anginal discomfort is unstable. Encounter Diagnoses Name Primary?  Coronary artery disease involving native coronary artery of native heart without clear cut angina pectoris  Chest pain, unspecified type  S/P CABG x 5 with 4/5 grafts occluded at the time of recent cardiac catheterization with LIMA to LAD patent  Hypertensive heart disease without heart failure  Hypercholesterolemia Yes Discussion: This gentleman has been having some shortness of breath with exertion and some vague chest discomfort at a lot of which is a little atypical for angina, but he certainly had a very abnormal nuclear myocardial perfusion study but the reason for which was well documented in the cardiac catheterization as described above. I did talk with him about the potential for coronary bypass grafting surgery which I think could be considered although I do think he has poor targets and trying to do angioplasty in this gentleman is really problematic because he cannot be adequately revascularized with angioplasty. Consequently, despite the fact that he has a significant left main trunk obstruction he does have a patent left internal mammary artery to the LAD which is his most important vessel and I do not think is unreasonable to consider continued medical therapy which is what the patient would prefer to do.  
 
I am going to start him on Plavix 75 mg daily along with the aspirin 81 mg daily I am also going to increase his Toprol-XL from 25 mg a day to 50 mg a day and add isosorbide Mono 30 mg a day as well as be sure he has fresh sublingual nitroglycerin for administration should he have chest discomfort. If his chest discomforts become more frequent, more prolonged, or begin to occur at rest or nocturnally that I think were going to have to re-consider him for either high risk coronary bypass grafting surgery or high risk angioplasty and stenting. I am going to also get a lipid profile on him to see how well his Lipitor is working and plan to aggressively treat his cholesterol to be sure we get his LDL under 60 moving forward. I will plan to see him again in about 2 months or sooner if any new cardiovascular symptoms surface. PCP: Kerri Guerrero MD 
 
 
Past Medical History:  
Diagnosis Date  Alcohol abuse   
 quit February 2011  CAD (coronary artery disease)  CAD (coronary artery disease), native coronary artery 1/3/05 LAD 70-80% mid; Cx-diffuse 80%; OM1-70% prox; RCA 40-50% mid; 100% RPLB with collaterals  Cardiac cath 08/19/2013 Severe, diffuse calcification. dLM 30%. mLAD 80%. o/pD1 80%. o/pRamus 80%. dCX 85%. dRCA 80%.  m-dRPLB 75%. EF 50%. Mod to severe inferobasal hypk.  Cardiac echocardiogram 01/19/2011 EF 50-55%. Gr 1 DDfx. RVSP 30 mmHg.  Cardiac nuclear imaging test 06/27/2011 No evidence of ischemia or previous infarction. EF 60%. Neg EKG on max EST. Ex time 6 mins.  Chronic kidney disease  Chronic obstructive pulmonary disease ([address]  Diastolic CHF, chronic ([address] LV EF 55% (Echo Jan 2011)  Emphysematous COPD ([address] PFTs March 2009; mild-mode obstruction, + bronchodil response; decreased DLCO  
 GERD (gastroesophageal reflux disease)   
 with erosive esophagitis history  Heart failure ([address]  History of renal failure 2 years ago, which completely resolved  Hypercholesterolemia  Hypertension  Hypertensive heart disease with CHF ([address]  MI (myocardial infarction) ([facility] Inferior wall  Tobacco abuse Past Surgical History:  
Procedure Laterality Date  HX CORONARY ARTERY BYPASS GRAFT  about 2013  HX HEART CATHETERIZATION  11/2013 Current Outpatient Medications Medication Sig  clopidogrel (PLAVIX) 75 mg tab Take 1 Tab by mouth daily.  isosorbide mononitrate ER (IMDUR) 30 mg tablet Take 1 Tab by mouth every morning.  nitroglycerin (NITROSTAT) 0.4 mg SL tablet 1 Tab by SubLINGual route every five (5) minutes as needed for Chest Pain. Up to 3 doses.  fluticasone-umeclidinium-vilanterol (TRELEGY ELLIPTA) 100-62.5-25 mcg inhaler Take 1 Puff by inhalation daily. Rinse mouth and gargle thoroughly after use  aspirin delayed-release 81 mg tablet Take  by mouth daily.  isosorbide mononitrate ER (IMDUR) 30 mg tablet Take  by mouth daily.  losartan (COZAAR) 50 mg tablet 50 mg daily.  atorvastatin (LIPITOR) 20 mg tablet  metoprolol succinate (TOPROL-XL) 25 mg XL tablet 25 mg daily.  thiamine (VITAMIN B-1) 50 mg tablet Take 50 mg by mouth daily.  PSEUDOEPHEDRINE/CPM/METHYLSCOP (ALLERGY AM-PM PO) Take  by mouth.  Cholecalciferol, Vitamin D3, (VITAMIN D) 5,000 unit Tab Take 1 Cap by mouth daily. 1,000 iu qd  
 tiotropium (SPIRIVA WITH HANDIHALER) 18 mcg inhalation capsule Take 1 Cap by inhalation as needed.  Ibuprofen-Diphenhydramine (ADVIL PM) 200-38 mg Tab Take  by mouth as needed.  albuterol (VENTOLIN HFA) 90 mcg/Actuation inhaler Take 1 Puff by inhalation as needed.  CYANOCOBALAMIN/FA/PYRIDOXINE (FOLBEE PO) Take 1 Tab by mouth daily.  multivitamin (ONE A DAY) tablet Take 1 Tab by mouth daily. No current facility-administered medications for this visit. Allergies Allergen Reactions  Vicodin [Hydrocodone-Acetaminophen] Rash Social History:  
Social History Tobacco Use  Smoking status: Current Every Day Smoker Packs/day: 0.50   Years: 55.00  
 Pack years: 27.50 Types: Cigarettes Start date: 3/13/2014  Smokeless tobacco: Never Used  Tobacco comment: wearing nicotine patch Substance Use Topics  Alcohol use: No  
  Comment: History of alcohol use, quit drinking Jan 2011 Family history: family history includes Diabetes in his father; Heart Disease in his maternal uncle; Hypertension in his father. Review of Systems:  
Constitutional: Negative. Respiratory: Negative. Cardiovascular: Negative. Gastrointestinal: Positive for constipation and diarrhea. Negative for abdominal pain, blood in stool, heartburn, melena, nausea and vomiting. Musculoskeletal: Negative. Physical Exam:  
The patient is an alert, oriented, well developed, well nourished 69 y.o. male who was in no acute distress at the time of my examination. Visit Vitals /66 Pulse 68 Ht 5' 5\" (1.651 m) Wt 49.9 kg (110 lb) SpO2 96% BMI 18.30 kg/m² BP Readings from Last 3 Encounters:  
02/08/19 130/66  
02/01/19 144/83  
01/24/19 140/60 Wt Readings from Last 3 Encounters:  
02/08/19 49.9 kg (110 lb) 02/01/19 47.6 kg (105 lb)  
01/24/19 48.1 kg (106 lb) HEENT: Conjuctiva white, mucosa moist, no pallor or cyanosis. NECK: Supple without masses, tenderness or thyromegaly. There was no jugular venous distention. Carotid are full bilaterally without bruits. CHEST: Symmetrical with good excursion. Well healed mid sternotomy scar with some keloid formation on lower portion. LUNGS: Clear to auscultation in all fields. HEART: Regular rate and rhythm. The apex is not displaced. There were no lifts, thrills or heaves. There is a normal S1 and S2 without appreciable murmurs, rubs, clicks, or gallops auscultated. ABDOMEN: Soft without masses, tenderness or organomegaly. EXTREMITIES: 1 + peripheral pulses without peripheral edema. INTEGUMENT: Skin is warm and dry NEUROLOGICAL: The patient was oriented x3 with motor function grossly intact. Review of Data: See PMH and Cardiology and Imaging sections for cardiac testing Lab Results Component Value Date/Time Cholesterol, total 134 04/04/2014 02:38 PM  
 HDL Cholesterol 55 04/04/2014 02:38 PM  
 LDL, calculated 62 04/04/2014 02:38 PM  
 Triglyceride 85 04/04/2014 02:38 PM  
 CHOL/HDL Ratio 2.4 04/04/2014 02:38 PM  
 
 
Results for orders placed or performed in visit on 02/08/19 AMB POC EKG ROUTINE W/ 12 LEADS, INTER & REP     Status: None Narrative Normal sinus rhythm rate 68. Left atrial enlargement. Incomplete left bundle branch block. Old inferior wall myocardial infarction. Some mild T wave inversions inferolaterally which could reflect ischemia. Compared to the EKG of January 17, 2019 there was little interval change. Yamilet Awad D.O., F.A.C.C. [facility] [address] [facility] [place] [address] [place] [address] [address] [facility] [id number] Hernan Garsia [phone] JOSÉ MIGUEL  [phone] PLEASE NOTE:  This document has been produced using voice recognition software. Unrecognized errors in transcription may be present.

## 2019-02-08 ENCOUNTER — OFFICE VISIT (OUTPATIENT)
Dept: CARDIOLOGY CLINIC | Age: 79
End: 2019-02-08

## 2019-02-08 VITALS
DIASTOLIC BLOOD PRESSURE: 66 MMHG | SYSTOLIC BLOOD PRESSURE: 130 MMHG | OXYGEN SATURATION: 96 % | BODY MASS INDEX: 18.33 KG/M2 | HEART RATE: 68 BPM | HEIGHT: 65 IN | WEIGHT: 110 LBS

## 2019-02-08 DIAGNOSIS — I11.9 HYPERTENSIVE HEART DISEASE WITHOUT HEART FAILURE: ICD-10-CM

## 2019-02-08 DIAGNOSIS — E78.00 HYPERCHOLESTEROLEMIA: Primary | ICD-10-CM

## 2019-02-08 DIAGNOSIS — Z95.1 S/P CABG X 5: ICD-10-CM

## 2019-02-08 DIAGNOSIS — R07.9 CHEST PAIN, UNSPECIFIED TYPE: ICD-10-CM

## 2019-02-08 DIAGNOSIS — I25.10 CORONARY ARTERY DISEASE INVOLVING NATIVE CORONARY ARTERY OF NATIVE HEART WITHOUT ANGINA PECTORIS: ICD-10-CM

## 2019-02-08 RX ORDER — CLOPIDOGREL BISULFATE 75 MG/1
75 TABLET ORAL DAILY
Qty: 30 TAB | Refills: 3 | Status: SHIPPED | OUTPATIENT
Start: 2019-02-08

## 2019-02-08 RX ORDER — ISOSORBIDE MONONITRATE 30 MG/1
30 TABLET, EXTENDED RELEASE ORAL
Qty: 30 TAB | Refills: 3 | Status: SHIPPED | OUTPATIENT
Start: 2019-02-08 | End: 2021-07-19 | Stop reason: ALTCHOICE

## 2019-02-08 RX ORDER — NITROGLYCERIN 0.4 MG/1
0.4 TABLET SUBLINGUAL
Qty: 1 BOTTLE | Refills: 3 | Status: SHIPPED | OUTPATIENT
Start: 2019-02-08 | End: 2021-07-19 | Stop reason: SDUPTHER

## 2019-02-08 NOTE — PROGRESS NOTES
Review of Systems Constitutional: Negative. Respiratory: Negative. Cardiovascular: Negative. Gastrointestinal: Positive for constipation and diarrhea. Negative for abdominal pain, blood in stool, heartburn, melena, nausea and vomiting. Musculoskeletal: Negative.

## 2019-02-08 NOTE — PROGRESS NOTES
Meme Calloway presents today for Chief Complaint Patient presents with  Follow-up 10-day F/U Post coronary angiography - no cardiac complaints Meme Calloway preferred language for health care discussion is english/other. Is someone accompanying this pt? No  
 
Is the patient using any DME equipment during OV? No  
 
Depression Screening: PHQ over the last two weeks 12/17/2018 Little interest or pleasure in doing things Several days Feeling down, depressed, irritable, or hopeless Several days Total Score PHQ 2 2 Learning Assessment: 
Learning Assessment 3/12/2014 PRIMARY LEARNER Patient HIGHEST LEVEL OF EDUCATION - PRIMARY LEARNER  SOME COLLEGE  
CO-LEARNER CAREGIVER No  
PRIMARY LANGUAGE ENGLISH  NEED No  
LEARNER PREFERENCE PRIMARY OTHER (COMMENT) ANSWERED BY Patient RELATIONSHIP SELF Abuse Screening: No flowsheet data found. Fall Risk Fall Risk Assessment, last 12 mths 12/17/2018 Able to walk? Yes Fall in past 12 months? No  
 
 
Pt currently taking Antiplatelet therapy? ASA Coordination of Care: 1. Have you been to the ER, urgent care clinic since your last visit? Hospitalized since your last visit? No  
 
2. Have you seen or consulted any other health care providers outside of the [address] [place] [address] since your last visit? Include any pap smears or colon screening.  No

## 2019-05-03 ENCOUNTER — HOSPITAL ENCOUNTER (OUTPATIENT)
Dept: LAB | Age: 79
Discharge: HOME | End: 2019-05-03
Payer: MEDICARE

## 2019-05-03 DIAGNOSIS — E78.00 HYPERCHOLESTEROLEMIA: ICD-10-CM

## 2019-05-03 LAB
ALBUMIN SERPL-MCNC: 2.7 G/DL (ref 3.4–5)
ALBUMIN/GLOB SERPL: 0.8 {RATIO} (ref 0.8–1.7)
ALP SERPL-CCNC: 139 U/L (ref 45–117)
ALT SERPL-CCNC: 36 U/L (ref 16–61)
AST SERPL-CCNC: 44 U/L (ref 15–37)
BILIRUB DIRECT SERPL-MCNC: 0.2 MG/DL (ref 0–0.2)
BILIRUB SERPL-MCNC: 0.4 MG/DL (ref 0.2–1)
CHOLEST SERPL-MCNC: 144 MG/DL
GLOBULIN SER CALC-MCNC: 3.5 G/DL (ref 2–4)
HDLC SERPL-MCNC: 86 MG/DL (ref 40–60)
HDLC SERPL: 1.7 {RATIO} (ref 0–5)
LDLC SERPL CALC-MCNC: 18 MG/DL (ref 0–100)
LIPID PROFILE,FLP: ABNORMAL
PROT SERPL-MCNC: 6.2 G/DL (ref 6.4–8.2)
TRIGL SERPL-MCNC: 200 MG/DL (ref ?–150)
VLDLC SERPL CALC-MCNC: 40 MG/DL

## 2019-05-03 PROCEDURE — 80061 LIPID PANEL: CPT

## 2019-05-03 PROCEDURE — 80076 HEPATIC FUNCTION PANEL: CPT

## 2019-05-03 PROCEDURE — 36415 COLL VENOUS BLD VENIPUNCTURE: CPT

## 2019-05-06 NOTE — PROGRESS NOTES
Chely Chiu presents today for a 2 month check-up. He was last seen by Dr. Vira Quinteros on 2/8/19. During that visit, Dr. Vira Quinteros increase his Toprol-XL to 50 mg daily and added isosorbide mononitrate 30 mg daily and clopidogrel 75 mg daily. He is a 69year old male with history of HCVD, hypercholesterolemia, chronic diastolic heart failure, and CAD (s/p CABG x 5 in April 2014). His last echo was done in Jan. 2019 and it showed an EF of 46-50%, abnormal wall motion, and grade 2 diastolic dysfunction. His last stress test was performed in December 2018 and it was markedly abnormal.  It showed 2 areas of possible ischemia and he had positive TID at 1.28. His calculated ejection fraction at that time was 33%. He underwent cardiac catheterization on February 1, 2019 and it demonstrated the following: ·  Severe native CAD with distal left main 90%, heavily calcified. · LIMA to LAD is patent. · 2 sequential vein grafts (All 4 vein grafts to diagonal, OM, RPDA, RPL branch) occluded at aortotomy site. · Subtotal mid RCA occlusion with NICO 0-I flow, some collaterals from left coronary system. Native diagonal 85% stenosis, native OM 85% stenosis. Possible options included continued medical therapy, redo bypass surgery, or selective angioplasty. Angioplasty and stent will be difficult in light of diffuse disease, left main, and heavy calcification. Overall, he states that he has been feeling well. He has not had any chest pain or shortness of breath at rest.  He has mild occasional dyspnea on exertion. Denies chest pain, tightness, heaviness, and palpitations. Denies shortness of breath at rest, dyspnea on exertion, orthopnea and PND. Denies abdominal bloating. Denies lightheadedness, dizziness, and syncope. Denies lower extremity edema and claudication. Denies nausea, vomiting, diarrhea, melena, hematochezia. Denies hematuria, urgency, frequency. Denies fever, chills.    
 
 
PMH: 
 Past Medical History:  
Diagnosis Date  Alcohol abuse   
 quit February 2011  CAD (coronary artery disease)  CAD (coronary artery disease), native coronary artery 1/3/05 LAD 70-80% mid; Cx-diffuse 80%; OM1-70% prox; RCA 40-50% mid; 100% RPLB with collaterals  Cardiac cath 08/19/2013 Severe, diffuse calcification. dLM 30%. mLAD 80%. o/pD1 80%. o/pRamus 80%. dCX 85%. dRCA 80%.  m-dRPLB 75%. EF 50%. Mod to severe inferobasal hypk.  Cardiac echocardiogram 01/19/2011 EF 50-55%. Gr 1 DDfx. RVSP 30 mmHg.  Cardiac nuclear imaging test 06/27/2011 No evidence of ischemia or previous infarction. EF 60%. Neg EKG on max EST. Ex time 6 mins.  Chronic kidney disease  Chronic obstructive pulmonary disease ([address]  Diastolic CHF, chronic ([address] LV EF 55% (Echo Jan 2011)  Emphysematous COPD ([address] PFTs March 2009; mild-mode obstruction, + bronchodil response; decreased DLCO  
 GERD (gastroesophageal reflux disease)   
 with erosive esophagitis history  Heart failure ([address]  History of renal failure 2 years ago, which completely resolved  Hypercholesterolemia  Hypertension  Hypertensive heart disease with CHF ([address]  MI (myocardial infarction) ([address] Inferior wall  Tobacco abuse PSH: 
Past Surgical History:  
Procedure Laterality Date  HX CORONARY ARTERY BYPASS GRAFT  about 2013  HX HEART CATHETERIZATION  11/2013 MEDS: 
Current Outpatient Medications Medication Sig  cyanocobalamin 1,000 mcg tablet Take 1,000 mcg by mouth daily.  clopidogrel (PLAVIX) 75 mg tab Take 1 Tab by mouth daily.  isosorbide mononitrate ER (IMDUR) 30 mg tablet Take 1 Tab by mouth every morning.  nitroglycerin (NITROSTAT) 0.4 mg SL tablet 1 Tab by SubLINGual route every five (5) minutes as needed for Chest Pain. Up to 3 doses.  aspirin delayed-release 81 mg tablet Take  by mouth daily.  losartan (COZAAR) 50 mg tablet 50 mg daily.  atorvastatin (LIPITOR) 20 mg tablet  metoprolol succinate (TOPROL-XL) 25 mg XL tablet 25 mg daily.  thiamine (VITAMIN B-1) 50 mg tablet Take 50 mg by mouth daily.  PSEUDOEPHEDRINE/CPM/METHYLSCOP (ALLERGY AM-PM PO) Take  by mouth.  Cholecalciferol, Vitamin D3, (VITAMIN D) 5,000 unit Tab Take 1 Cap by mouth daily. 1,000 iu qd  
 tiotropium (SPIRIVA WITH HANDIHALER) 18 mcg inhalation capsule Take 1 Cap by inhalation as needed.  Ibuprofen-Diphenhydramine (ADVIL PM) 200-38 mg Tab Take  by mouth as needed.  albuterol (VENTOLIN HFA) 90 mcg/Actuation inhaler Take 1 Puff by inhalation as needed.  CYANOCOBALAMIN/FA/PYRIDOXINE (FOLBEE PO) Take 1 Tab by mouth daily.  multivitamin (ONE A DAY) tablet Take 1 Tab by mouth daily. No current facility-administered medications for this visit. Allergies and Sensitivities: 
Allergies Allergen Reactions  Vicodin [Hydrocodone-Acetaminophen] Rash Family History: 
Family History Problem Relation Age of Onset  Heart Disease Maternal Uncle  Diabetes Father  Hypertension Father Social History: He  reports that he has been smoking cigarettes. He started smoking about 5 years ago. He has a 27.50 pack-year smoking history. He has never used smokeless tobacco.  He  reports that he does not drink alcohol. Physical: 
Visit Vitals /82 Pulse 73 Ht 5' 5\" (1.651 m) Wt 46.7 kg (103 lb) SpO2 98% BMI 17.14 kg/m² He states that he has not taken his medications yet today. Exam: 
Neck:  Supple, no JVD, no carotid bruits CV:  Normal S1 and  S2, no murmurs, rubs, or gallops noted Lungs:  Clear to ausculation throughout, no wheezes or rales Abd:  Soft, non-tender, non-distended with good bowel sounds. No hepatosplenomegaly Extremities:  No edema Data: 
EKG:  Not done today LABS: 
Lab Results Component Value Date/Time Sodium 138 01/23/2019 10:34 AM  
 Potassium 4.7 01/23/2019 10:34 AM  
 Chloride 102 01/23/2019 10:34 AM  
 CO2 31 01/23/2019 10:34 AM  
 Glucose 94 01/23/2019 10:34 AM  
 BUN 17 01/23/2019 10:34 AM  
 Creatinine 1.12 01/23/2019 10:34 AM  
 
Lab Results Component Value Date/Time Cholesterol, total 144 05/03/2019 10:26 AM  
 HDL Cholesterol 86 (H) 05/03/2019 10:26 AM  
 LDL, calculated 18 05/03/2019 10:26 AM  
 Triglyceride 200 (H) 05/03/2019 10:26 AM  
 CHOL/HDL Ratio 1.7 05/03/2019 10:26 AM  
 
Lab Results Component Value Date/Time ALT (SGPT) 36 05/03/2019 10:26 AM  
 
 
 
Impression/Plan: 1.  CAD, s/p CABG x 5 in April 2014 2. Hypercholesterolemia, on atorvastatin 20 mg 
3. Chronic diastolic heart failure, appears compensated 4. HCVD, blood pressure elevated but has not taken medications yet today Mr. Mar England was seen today for a 2 month check-up. Medication changes were made during his last visit with Dr. Scott Menard in February. His Toprol-XL was increased to 50 mg daily and clopidogrel 75 mg and isosorbide mononitrate 30 mg daily was added. Since his last visit, he states that he has been feeling well. He has mild occasional dyspnea on exertion but denies any chest pain. His blood pressure is elevated today at 164/82 and he states that he has not taken any of his medications yet today as he has been \"running around. \"  He was advised to take his medications at least 2 hours prior to coming to our office for any appointments so that we can evaluate the effectiveness of his medications. At this time, no changes were made. He has follow-up with his primary care physician in the near future and his blood pressure can be be reevaluated at that time. He also states that he monitors his blood pressures at home and when compared to today's blood pressure reading in the office, he states that his blood pressure is much better controlled. He knows to contact us if his blood pressures are consistently above 140/80. He will follow-up with Dr. Ember Vidal as scheduled and as needed. Ricardo Saucedo MSN, FNP-BC Please note:  Portions of this chart were created with Dragon medical speech to text program.  Unrecognized errors may be present.

## 2019-05-08 ENCOUNTER — TELEPHONE (OUTPATIENT)
Dept: CARDIOLOGY CLINIC | Age: 79
End: 2019-05-08

## 2019-05-08 NOTE — TELEPHONE ENCOUNTER
----- Message from Capo Miller DO sent at 5/7/2019  9:17 AM EDT ----- This gentlemen's cholesterol looks fine. Please let him know.  ES

## 2019-05-10 ENCOUNTER — OFFICE VISIT (OUTPATIENT)
Dept: CARDIOLOGY CLINIC | Age: 79
End: 2019-05-10

## 2019-05-10 VITALS
DIASTOLIC BLOOD PRESSURE: 82 MMHG | BODY MASS INDEX: 17.16 KG/M2 | HEIGHT: 65 IN | SYSTOLIC BLOOD PRESSURE: 164 MMHG | OXYGEN SATURATION: 98 % | WEIGHT: 103 LBS | HEART RATE: 73 BPM

## 2019-05-10 DIAGNOSIS — I50.32 DIASTOLIC CHF, CHRONIC (HCC): ICD-10-CM

## 2019-05-10 DIAGNOSIS — I25.10 CORONARY ARTERY DISEASE INVOLVING NATIVE CORONARY ARTERY OF NATIVE HEART WITHOUT ANGINA PECTORIS: ICD-10-CM

## 2019-05-10 DIAGNOSIS — R06.09 DOE (DYSPNEA ON EXERTION): ICD-10-CM

## 2019-05-10 DIAGNOSIS — I11.9 HYPERTENSIVE HEART DISEASE WITHOUT HEART FAILURE: Primary | ICD-10-CM

## 2019-05-10 DIAGNOSIS — Z95.1 S/P CABG X 5: ICD-10-CM

## 2019-05-10 RX ORDER — LANOLIN ALCOHOL/MO/W.PET/CERES
1000 CREAM (GRAM) TOPICAL DAILY
COMMUNITY
End: 2019-10-13

## 2019-05-10 NOTE — PATIENT INSTRUCTIONS
Continue present medication regimen Please make sure that you take your medications at least 2 hours prior to coming to our office for appointments so that we can evaluate the effectiveness of your medications. Follow-up with Dr Luca Fan as scheduled and as needed. Please continue monitoring your blood pressures at home and call if your blood pressures are consistently elevated.

## 2019-05-14 ENCOUNTER — TELEPHONE (OUTPATIENT)
Dept: CARDIOLOGY CLINIC | Age: 79
End: 2019-05-14

## 2019-05-14 NOTE — TELEPHONE ENCOUNTER
Per Dr. Fuentes Dove, patient is high risk for GI procedure and d/t patient extensive cardiac history will not approve to hold Plavix at this time. Tried to call Dr. Wendy Young office, but office is closed.

## 2019-05-14 NOTE — TELEPHONE ENCOUNTER
Cherish from [facility] called, patient is having colonoscopy and endoscopy on 5/16/2019 and they want to know can he hold plavix starting today and proceed with procedure as scheduled? Will consult with Dr. Negar Kaba when he arrives in office this afternoon and forward to his nurse.

## 2019-05-15 ENCOUNTER — OFFICE VISIT (OUTPATIENT)
Dept: PULMONOLOGY | Age: 79
End: 2019-05-15

## 2019-05-15 VITALS
BODY MASS INDEX: 17.16 KG/M2 | SYSTOLIC BLOOD PRESSURE: 110 MMHG | RESPIRATION RATE: 20 BRPM | DIASTOLIC BLOOD PRESSURE: 60 MMHG | HEIGHT: 65 IN | OXYGEN SATURATION: 99 % | HEART RATE: 71 BPM | WEIGHT: 103 LBS | TEMPERATURE: 98 F

## 2019-05-15 DIAGNOSIS — J47.9 BRONCHIECTASIS WITHOUT COMPLICATION (HCC): ICD-10-CM

## 2019-05-15 DIAGNOSIS — R91.1 LUNG NODULE: ICD-10-CM

## 2019-05-15 DIAGNOSIS — J44.9 PULMONARY CACHEXIA DUE TO COPD (HCC): Primary | ICD-10-CM

## 2019-05-15 DIAGNOSIS — R64 PULMONARY CACHEXIA DUE TO COPD (HCC): Primary | ICD-10-CM

## 2019-05-15 DIAGNOSIS — R05.9 COUGH: ICD-10-CM

## 2019-05-15 DIAGNOSIS — R06.09 DYSPNEA ON EXERTION: ICD-10-CM

## 2019-05-15 DIAGNOSIS — J43.2 CENTRILOBULAR EMPHYSEMA (HCC): ICD-10-CM

## 2019-05-15 NOTE — PROGRESS NOTES
Saturnino Andersen presents today for Chief Complaint Patient presents with  COPD  Bronchiectasis  Cough  Lung Nodule Is someone accompanying this pt? No  
 
Is the patient using any DME equipment during OV? No   
 -DME Company n/a Depression Screening: 
3 most recent PHQ Screens 5/15/2019 Little interest or pleasure in doing things Not at all Feeling down, depressed, irritable, or hopeless Not at all Total Score PHQ 2 0 Learning Assessment: 
Learning Assessment 3/12/2014 PRIMARY LEARNER Patient HIGHEST LEVEL OF EDUCATION - PRIMARY LEARNER  SOME COLLEGE  
CO-LEARNER CAREGIVER No  
PRIMARY LANGUAGE ENGLISH  NEED No  
LEARNER PREFERENCE PRIMARY OTHER (COMMENT) ANSWERED BY Patient RELATIONSHIP SELF Abuse Screening: No flowsheet data found. Fall Risk Fall Risk Assessment, last 12 mths 5/15/2019 Able to walk? Yes Fall in past 12 months? Yes Fall with injury? No  
Number of falls in past 12 months 1 Fall Risk Score 1 Coordination of Care: 1. Have you been to the ER, urgent care clinic since your last visit? Hospitalized since your last visit? No 
 
2. Have you seen or consulted any other health care providers outside of the [address] [place] [address] since your last visit? Include any pap smears or colon screening.  Dr Moises Echols PCP

## 2019-05-15 NOTE — PROGRESS NOTES
[address] [place] [address], [address] [place] Alberto [address] 
[phone] [facility] Pulmonary, Critical Care, and Sleep Medicine Pulmonary Office Initial Consultation Name: Geovanni Flanagan 69 y.o. male MRN: [id number] : 1940 Service Date: 05/15/19 Referring Provider: No referring provider defined for this encounter. Chief Complaint:  
Chief Complaint Patient presents with  COPD History of Present Illness: 
Geovanni Flanagan is a 69 y.o. male, who presents to Pulmonary clinic for followup of COPD. Pt was last seen on 19. referred for abnormal CT scan by his PCP-Dr. Joanna Chow. Pt has a hx of COPD in the past.  Pt saw Dr. Teodoro Corral in , was diagnosed at that time. Patient was reporting dyspnea that was progressively worsening, occurring when he walked less than a quarter of a mile or when walking 2 flights of steps. 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 Pt uses albuterol PRN about 1-2x per month. Pt uses Spiriva \"as needed\" - about 1-2x month. Pt reports dyspnea on moderate exertion -- about 2-3 blocks. Pt able to go up 2 flights of steps before stopping. Pt reports it occurs with pain in his legs. Pt reports a chronic cough, dry, intermittent, no modifying factors. Pt reports hx of heartburn - occurs 2-3x per month. Pt is seeing Dr. Jackie Jc on . Pt denies any wt loss, appetite changes, hemoptysis, chest pain/angina, night sweats, N/V/D Smoking Hx:  3/4PPD, started in his teens -- likely 61 years of smoking - 50pack year hx Occ Hx: Past 22 years in the [employer] (supply [employer]), retired, no occupational exposures to coal/silica/asbestos Past Medical Hx: I have personally reviewed medical hx Past Medical History:  
Diagnosis Date  Alcohol abuse quit February 2011  CAD (coronary artery disease)  CAD (coronary artery disease), native coronary artery 1/3/05 LAD 70-80% mid; Cx-diffuse 80%; OM1-70% prox; RCA 40-50% mid; 100% RPLB with collaterals  Cardiac cath 08/19/2013 Severe, diffuse calcification. dLM 30%. mLAD 80%. o/pD1 80%. o/pRamus 80%. dCX 85%. dRCA 80%.  m-dRPLB 75%. EF 50%. Mod to severe inferobasal hypk.  Cardiac echocardiogram 01/19/2011 EF 50-55%. Gr 1 DDfx. RVSP 30 mmHg.  Cardiac nuclear imaging test 06/27/2011 No evidence of ischemia or previous infarction. EF 60%. Neg EKG on max EST. Ex time 6 mins.  Chronic kidney disease  Chronic lung disease  Chronic obstructive pulmonary disease ([address]  Coronary artery disease  Diastolic CHF, chronic ([address] LV EF 55% (Echo Jan 2011)  Emphysematous COPD ([address] PFTs March 2009; mild-mode obstruction, + bronchodil response; decreased DLCO  
 GERD (gastroesophageal reflux disease)   
 with erosive esophagitis history  Heart failure ([address]  History of renal failure 2 years ago, which completely resolved  Hypercholesterolemia  Hypertension  Hypertensive heart disease with CHF ([address]  MI (myocardial infarction) ([address] Inferior wall  Tobacco abuse Past Surgical Hx: I have personally reviewed surgical hx Past Surgical History:  
Procedure Laterality Date  HX CORONARY ARTERY BYPASS GRAFT  about 2013  HX HEART CATHETERIZATION  11/2013 Family Hx: I have personally reviewed the family hx. No family hx of cancer or hereditary lung disease with immediate family. Family History Problem Relation Age of Onset  Heart Disease Maternal Uncle  Diabetes Father  Hypertension Father Social Hx: I have personally reviewed the social hx. Social History Socioeconomic History  Marital status:  Spouse name: Not on file  Number of children: Not on file  Years of education: Not on file  Highest education level: Not on file Occupational History  Not on file Social Needs  Financial resource strain: Not on file  Food insecurity:  
  Worry: Not on file Inability: Not on file  Transportation needs:  
  Medical: Not on file Non-medical: Not on file Tobacco Use  Smoking status: Current Every Day Smoker Packs/day: 0.50 Years: 55.00 Pack years: 27.50 Types: Cigarettes Start date: 3/13/2014  Smokeless tobacco: Never Used Substance and Sexual Activity  Alcohol use: Yes Comment: every other day drinker  Drug use: No  
 Sexual activity: Not Currently Partners: Female Lifestyle  Physical activity:  
  Days per week: Not on file Minutes per session: Not on file  Stress: Not on file Relationships  Social connections:  
  Talks on phone: Not on file Gets together: Not on file Attends Advent service: Not on file Active member of club or organization: Not on file Attends meetings of clubs or organizations: Not on file Relationship status: Not on file  Intimate partner violence:  
  Fear of current or ex partner: Not on file Emotionally abused: Not on file Physically abused: Not on file Forced sexual activity: Not on file Other Topics Concern  Not on file Social History Narrative  Not on file Allergies: I have reviewed the allergy hx Allergies Allergen Reactions  Vicodin [Hydrocodone-Acetaminophen] Rash Medications:  I have reviewed the patient's medications Prior to Admission medications Medication Sig Start Date End Date Taking? Authorizing Provider  
doxycycline (ADOXA) 100 mg tablet Take 100 mg by mouth daily. Provider, Historical  
folic acid (FOLVITE) 1 mg tablet Take 1 mg by mouth two (2) times a day. Provider, Historical  
cyanocobalamin 1,000 mcg tablet Take 1,000 mcg by mouth daily.     Provider, Historical  
 clopidogrel (PLAVIX) 75 mg tab Take 1 Tab by mouth daily. 2/8/19   Dionisio Be, DO  
isosorbide mononitrate ER (IMDUR) 30 mg tablet Take 1 Tab by mouth every morning. 2/8/19   Dionisio Be, DO  
nitroglycerin (NITROSTAT) 0.4 mg SL tablet 1 Tab by SubLINGual route every five (5) minutes as needed for Chest Pain. Up to 3 doses. 2/8/19   Dionisio Be, DO  
aspirin delayed-release 81 mg tablet Take  by mouth daily. Provider, Historical  
losartan (COZAAR) 50 mg tablet 50 mg daily. 12/4/18   Provider, Historical  
atorvastatin (LIPITOR) 20 mg tablet 20 mg daily. 11/17/17   Provider, Historical  
metoprolol succinate (TOPROL-XL) 25 mg XL tablet 25 mg daily. 9/13/17   Provider, Historical  
thiamine (VITAMIN B-1) 50 mg tablet Take 50 mg by mouth daily. Provider, Historical  
PSEUDOEPHEDRINE/CPM/METHYLSCOP (ALLERGY AM-PM PO) Take  by mouth daily as needed. Provider, Historical  
Cholecalciferol, Vitamin D3, (VITAMIN D) 5,000 unit Tab Take 1 Cap by mouth daily. 1,000 iu qd    Provider, Historical  
Ibuprofen-Diphenhydramine (ADVIL PM) 200-38 mg Tab Take  by mouth as needed. 6/22/11   Provider, Historical  
albuterol (VENTOLIN HFA) 90 mcg/Actuation inhaler Take 1 Puff by inhalation as needed. Provider, Historical  
CYANOCOBALAMIN/FA/PYRIDOXINE (FOLBEE PO) Take 1 Tab by mouth daily. 3/23/11   Provider, Historical  
multivitamin (ONE A DAY) tablet Take 1 Tab by mouth daily. Provider, Historical  
 
 
Immunizations:  I have reviewed the patient's immunizations There is no immunization history on file for this patient. Review of Systems: A complete review of systems was performed as stated in the HPI, all others are negative. Objective: 
 
Physical Exam: 
/60 (BP 1 Location: Left arm, BP Patient Position: At rest)   Pulse 71   Temp 98 °F (36.7 °C) (Oral)   Resp 20   Ht 5' 5\" (1.651 m)   Wt 46.7 kg (103 lb)   SpO2 99%   BMI 17.14 kg/m² Vitals were personally reviewed Gen: no acute distress, thin, pleasant and cooperative, sitting up in chair, able to climb to exam table w/o difficulty HEENT: normocephalic/atraumatic, PERRLA, EOMI, no scleral icterus, nasal turbinates have no erythema, no nasal polyps, no oral lesions, poor dentition, Mallampati II Neck: supple, trachea midline, no JVD, no cervical and supraclavicular adenopathy Chest: no lesions, with even rise and fall, no pectus excavatum or flail chest 
CVS: regular rate rhythm, S1/S2, no murmurs/rubs/gallops Lungs: Distant breath sounds bilaterally, CTABL, no wheezes/rales/rhonchi Back: no kyphosis or scoliosis Abdomen: soft, nontender, bowel sounds present, no hepatosplenomegaly Ext: no pitting edema B/L, no peripheral cyanosis or clubbing Neuro: grossly normal, AAOx3, normal strength and coordination grossly, no focal deficits Skin: no rashes, erythema, lesions Psych: normal memory, thought content, and processing Labs: I have reviewed the patient's available labs Lab Results Component Value Date/Time WBC 5.4 01/23/2019 10:34 AM  
 Hemoglobin, POC 8.8 (L) 04/10/2014 03:35 AM  
 HGB 11.9 (L) 01/23/2019 10:34 AM  
 Hematocrit, POC 26 (L) 04/10/2014 03:35 AM  
 HCT 33.9 (L) 01/23/2019 10:34 AM  
 PLATELET 708 54/69/3676 10:34 AM  
 MCV 95.2 01/23/2019 10:34 AM  
 
Lab Results Component Value Date/Time Sodium 138 01/23/2019 10:34 AM  
 Potassium 4.7 01/23/2019 10:34 AM  
 Chloride 102 01/23/2019 10:34 AM  
 CO2 31 01/23/2019 10:34 AM  
 Anion gap 5 01/23/2019 10:34 AM  
 Glucose 94 01/23/2019 10:34 AM  
 BUN 17 01/23/2019 10:34 AM  
 Creatinine 1.12 01/23/2019 10:34 AM  
 BUN/Creatinine ratio 15 01/23/2019 10:34 AM  
 GFR est AA >60 01/23/2019 10:34 AM  
 GFR est non-AA >60 01/23/2019 10:34 AM  
 Calcium 8.7 01/23/2019 10:34 AM  
 Bilirubin, total 0.4 05/03/2019 10:26 AM  
 AST (SGOT) 44 (H) 05/03/2019 10:26 AM  
 Alk.  phosphatase 139 (H) 05/03/2019 10:26 AM  
 Protein, total 6.2 (L) 05/03/2019 10:26 AM  
 Albumin 2.7 (L) 05/03/2019 10:26 AM  
 Globulin 3.5 05/03/2019 10:26 AM  
 A-G Ratio 0.8 05/03/2019 10:26 AM  
 ALT (SGPT) 36 05/03/2019 10:26 AM  
 
 
Outside records reviewed as follows received from PCP-Dr. Deena Hooks, date of last visit 1/9/19, patient presented for recheck of COPD. Patient reported dyspnea on exertion which was stable, intermittent cough, cough was nonproductive. Patient reported that his symptoms were unchanged. Patient also had GERD. Patient reportedly had a CT scan was noted to have a lung nodule and referred to pulmonary medicine on low-dose CT from 12/27/18, patient was also counseled on smoking cessation Lab/checked on 12/26/18, shows CBC within normal limits with white blood count of 4.6, hemoglobin 14.3, CMP shows creatinine of 1.18 with normal electrolytes, mildly elevated AST at 62. Imaging:  I have personally reviewed patient's imaging as follows, low-dose CT chest for lung cancer screening from 12/27/18 shows diffuse emphysematous changes, worse in the bases and in the left lower lobe along with bronchiectasis and increased bronchial secretions/bronchitis as well as scattered nodules, subcentimeter. Patient has no lymphadenopathy or masses or effusions Official report per radiology: CT Results (most recent): 
[place] [facility] [place]18 CT LOW DOSE LUNG CANCER SCREENING Narrative EXAM:  CT Chest without Contrast - Low Dose Screening CT. CLINICAL INDICATION:  
 
- Screening. 
- Meets the criteria, i.e. 30 pack year smoking history. Current every day 
smoker. Asymptomatic. COMPARISON:  02/14/11, 03/31/14. TECHNIQUE:  Low dose unenhanced multislice helical CT is performed from the 
thoracic inlet to the adrenal glands without intravenous contrast 
administration. Contiguous axial images were reconstructed and lung and soft tissue windows were generated. Coronal and sagittal reformations were also 
generated. - Radiation dose:   mGy-cm 
- Dose reduction techniques are used, including automated exposure control, 
adjustment of the mAs and/or kVp according to patient size, standardized 
low-dose protocol, and/or iterative reconstruction technique. FINDINGS: 
 
Technique Assessment:  The overall quality of the study is adequate for 
diagnostic review. Lung Nodules: - Tiny relatively dense appearing 3 mm nodule is identified in the left upper 
lobe (axial #96). - Multiple foci of scarring are identified, especially in the left upper lobe, 
with associated bronchiectatic changes. - The above findings are stable dating back to at least 02/14/11. 
- In the right lung base region, a focal area of nodular density is observed 
measuring 4 mm (axial #175, sagittal #43). This density is not as clearly 
stable as the other findings. 
- A questionable groundglass density is noted in the superior segment of the 
left lower lobe measuring 4 mm (axial #32, sagittal #153). Again this density 
was not as clearly demonstrated on the previous studies. Chest: 
 
- Emphysematous changes bilaterally, most pronounced at the left lung base 
region. Stable pattern. - No airspace opacities. - No significant pleural effusion. 
- Pronounced atheromatous plaque calcifications in the coronary arteries. - Possible calcifications involving the aortic valve leaflets (axial #131, 
coronal #69). - Shotty mediastinal nodes. No definite pathologic enlargement. Miscellaneous: 
 
- No supraclavicular or axillary adenopathy. 
- Mild distal esophageal thickening just above the small hiatal hernia. 
- No acute findings in the visualized portions of the upper abdomen. Mild 
enlargement of both adrenal glands. Impression IMPRESSION:  
 
1.   Questionable irregular 4 mm nodular density at the right lower lobe near the 
 basal region. Not apparent on prior studies. Left lower lobe superior segment 
extremely subtle ground glass density 2. Stable densities in the left upper lobe. 3.  Pronounced coronary artery calcifications. 4.  Distal esophageal thickening. Recommend barium swallow esophagram for 
further delineation. 
 
- Lung-RADS Category:  2. 
- Management recommendation:  12 month low dose scan. - Modifier S:  Recommend barium swallow esophagram or endoscopy. PFTs:  I have personally reviewed the patient's PFTs from 3/31/14 as follows: Spirometry shows possible mild obstructive defect but likely normal based on lower limit of normal given advanced age; lung volumes are normal, diffusion capacity is moderately reduced TTE:  I have reviewed the patient's TTE results No results found for this or any previous visit. Assessment and Plan: 
69 y.o. male with: 
 
Impression: 1. Lung nodules: Seen on lung cancer screening CT from 12/27/18, subcentimeter, largest was noted to be 4 mm. Patient is an active smoker and is thus high risk 2. COPD/emphysema: Likely Gold risk category B. Emphysema is extensive in the lower zones which is a little uncommon given extensive smoking history 3. Dyspnea on exertion/shortness of breath: Etiology most likely due to above 4. Chronic cough etiology most likely due to above but also has a component of bronchiectasis 5. Non-CF bronchiectasis 6. COPD cachexia: Body mass index is 17.14 kg/m². with albumin of 2.9 Plan: 
-Spent time going over recent CT scan with the patient including showing him the images. I showed the patient the lung nodule as well as his emphysema and chronic bronchitis with bronchiectasis. Advised patient per Lung RADS criteria patient will need repeat CT chest in 12 months 
-Discontinue Spiriva 
-Start patient on Trelegy Ellipta 1 puff once daily (counseled patient to use daily instead of as needed), samples given in clinic.   Advised patient to rinse mouth thoroughly after each use. Prescription handed to patient since he did not know which pharmacy he would like to use, he stated he would contact the [facility] pharmacy himself 
-Continue albuterol HFA as needed 
-Full PFTs at next visit -6-minute walk test performed in clinic today, patient ambulated 204 m, tolerated well. Patient had significant desaturation from 98% at rest on room air down to 91% at 6 minutes of walking. Due to lowest SPO2 of 91% patient does not qualify for supplemental oxygen at this time per Medicare criteria 
-Advised patient to remain active 
-Advised patient to continue to eat, may follow-up with PCP for nutrition referral 
-Immunizations reviewed 
-I spent 6 minutes with patient regarding cessation of smoking cigarettes. I reviewed health risks of tobacco use including increased risk of MI, stroke, cancer, etc.  We reviewed various approaches to cessation including pills, patches, inhaler, gum, weaning self, \"cold turkey\", and smoking cessation classes. Pt declined cessation assistance at this time. RTC:  
 
 
No orders of the defined types were placed in this encounter. Kamilla Carrillo MD/MPH Pulmonary, Critical Care Medicine [facility]

## 2019-05-15 NOTE — PROGRESS NOTES
[address] [place] [address], [address] [facility] [address] 
[phone] [facility] Pulmonary, Critical Care, and Sleep Medicine Pulmonary Office Follow-Up Name: Aviva Lozano 69 y.o. male MRN: [id number] : 1940 Service Date: 05/15/19 Chief Complaint:  
Chief Complaint Patient presents with  COPD  Bronchiectasis  Cough  Lung Nodule History of Present Illness: 
Aviva Lozano is a 69 y.o. male, who presents to Pulmonary clinic for follow-up of COPD. Patient was last seen in our clinic on 2019. In the interval, pt used Trelegy for about 5 days and stopped - complaining that he felt like \"stuff was getting caught in the back of my throat and in my chest\". Pt reports no exacerbations Pt using PRN albuterol 2-3x per week. Pt reports continued chronic cough, unchanged. No modifying factors Patient reports no change to his chronic dyspnea, occurs with moderate exertion about 2-3 blocks. Pt able to go up 2 flights of steps before stopping. Pt reports it occurs with pain in his legs. Patient reports only alleviating factors are rest and PRN albuterol. No other modifying factors. Pt saw Gi in the interval, was planning an EGD and colonscopy, but was cancelled because the pt was not cleared by Cardiology. Pt smoking 3/4PPD currently. Pt denies any wt loss, appetite changes, hemoptysis, chest pain/angina, night sweats, N/V/D Past Medical History:  
Diagnosis Date  Alcohol abuse   
 quit 2011  CAD (coronary artery disease)  CAD (coronary artery disease), native coronary artery 1/3/05 LAD 70-80% mid; Cx-diffuse 80%; OM1-70% prox; RCA 40-50% mid; 100% RPLB with collaterals  Cardiac cath 2013 Severe, diffuse calcification. dLM 30%. mLAD 80%. o/pD1 80%. o/pRamus 80%. dCX 85%. dRCA 80%.  m-dRPLB 75%. EF 50%. Mod to severe inferobasal hypk.  Cardiac echocardiogram 2011 EF 50-55%. Gr 1 DDfx. RVSP 30 mmHg.  Cardiac nuclear imaging test 06/27/2011 No evidence of ischemia or previous infarction. EF 60%. Neg EKG on max EST. Ex time 6 mins.  Chronic kidney disease  Chronic lung disease  Chronic obstructive pulmonary disease ([facility]  Coronary artery disease  Diastolic CHF, chronic ([facility] LV EF 55% (Echo Jan 2011)  Emphysematous COPD ([facility] PFTs March 2009; mild-mode obstruction, + bronchodil response; decreased DLCO  
 GERD (gastroesophageal reflux disease)   
 with erosive esophagitis history  Heart failure ([facility]  History of renal failure 2 years ago, which completely resolved  Hypercholesterolemia  Hypertension  Hypertensive heart disease with CHF ([facility]  [facility] infarction[facility] Inferior wall  Tobacco abuse Past Surgical History:  
Procedure Laterality Date  HX CORONARY ARTERY BYPASS GRAFT  about 2013  HX HEART CATHETERIZATION  11/2013 Family History Problem Relation Age of Onset  Heart Disease Maternal Uncle  Diabetes Father  Hypertension Father Social History Socioeconomic History  Marital status:  Spouse name: Not on file  Number of children: Not on file  Years of education: Not on file  Highest education level: Not on file Occupational History  Not on file Social Needs  Financial resource strain: Not on file  Food insecurity:  
  Worry: Not on file Inability: Not on file  Transportation needs:  
  Medical: Not on file Non-medical: Not on file Tobacco Use  Smoking status: Current Every Day Smoker Packs/day: 0.50 Years: 55.00 Pack years: 27.50 Types: Cigarettes Start date: 3/13/2014  Smokeless tobacco: Never Used Substance and Sexual Activity  Alcohol use: Yes Comment: every other day drinker  Drug use: No  
 Sexual activity: Not Currently Partners: Female Lifestyle  Physical activity:  
  Days per week: Not on file Minutes per session: Not on file  Stress: Not on file Relationships  Social connections:  
  Talks on phone: Not on file Gets together: Not on file Attends [employer] service: Not on file Active member of club or organization: Not on file Attends meetings of clubs or organizations: Not on file Relationship status: Not on file  Intimate partner violence:  
  Fear of current or ex partner: Not on file Emotionally abused: Not on file Physically abused: Not on file Forced sexual activity: Not on file Other Topics Concern  Not on file Social History Narrative  Not on file Allergies: I have reviewed the allergy hx Allergies Allergen Reactions  Vicodin [Hydrocodone-Acetaminophen] Rash Medications:  I have reviewed the patient's medications Prior to Admission medications Medication Sig Start Date End Date Taking? Authorizing Provider  
doxycycline (ADOXA) 100 mg tablet Take 100 mg by mouth daily. Yes Provider, Historical  
folic acid (FOLVITE) 1 mg tablet Take 1 mg by mouth two (2) times a day. Yes Provider, Historical  
cyanocobalamin 1,000 mcg tablet Take 1,000 mcg by mouth daily. Yes Provider, Historical  
clopidogrel (PLAVIX) 75 mg tab Take 1 Tab by mouth daily. 2/8/19  Yes Lillian Redmond, DO  
isosorbide mononitrate ER (IMDUR) 30 mg tablet Take 1 Tab by mouth every morning. 2/8/19  Yes Lillian Redmond, DO  
nitroglycerin (NITROSTAT) 0.4 mg SL tablet 1 Tab by SubLINGual route every five (5) minutes as needed for Chest Pain. Up to 3 doses. 2/8/19  Yes Lillian Redmond, DO  
aspirin delayed-release 81 mg tablet Take  by mouth daily. Yes Provider, Historical  
losartan (COZAAR) 50 mg tablet 50 mg daily. 12/4/18  Yes Provider, Historical  
atorvastatin (LIPITOR) 20 mg tablet 20 mg daily.  11/17/17  Yes Provider, Historical  
 metoprolol succinate (TOPROL-XL) 25 mg XL tablet 25 mg daily. 9/13/17  Yes Provider, Historical  
thiamine (VITAMIN B-1) 50 mg tablet Take 50 mg by mouth daily. Yes Provider, Historical  
PSEUDOEPHEDRINE/CPM/METHYLSCOP (ALLERGY AM-PM PO) Take  by mouth daily as needed. Yes Provider, Historical  
Cholecalciferol, Vitamin D3, (VITAMIN D) 5,000 unit Tab Take 1 Cap by mouth daily. 1,000 iu qd   Yes Provider, Historical  
Ibuprofen-Diphenhydramine (ADVIL PM) 200-38 mg Tab Take  by mouth as needed. 6/22/11  Yes Provider, Historical  
albuterol (VENTOLIN HFA) 90 mcg/Actuation inhaler Take 1 Puff by inhalation as needed. Yes Provider, Historical  
CYANOCOBALAMIN/FA/PYRIDOXINE (FOLBEE PO) Take 1 Tab by mouth daily. 3/23/11  Yes Provider, Historical  
multivitamin (ONE A DAY) tablet Take 1 Tab by mouth daily. Yes Provider, Historical  
 
 
Immunizations:  I have reviewed the patient's immunizations There is no immunization history on file for this patient. Review of Systems: A complete review of systems was performed as stated in the HPI, all others are negative. Objective: 
 
Physical Exam: 
/60 (BP 1 Location: Left arm, BP Patient Position: At rest)   Pulse 71   Temp 98 °F (36.7 °C) (Oral)   Resp 20   Ht 5' 5\" (1.651 m)   Wt 46.7 kg (103 lb)   SpO2 99%   BMI 17.14 kg/m² Vitals were personally reviewed Gen: no acute distress, thin, pleasant and cooperative, sitting up in chair, able to climb to exam table w/o difficulty HEENT: normocephalic/atraumatic, PERRLA, EOMI, no scleral icterus, no oral lesions, poor dentition, Mallampati II Neck: supple, trachea midline, no JVD, no cervical and supraclavicular adenopathy Chest: no lesions, with even rise and fall, no pectus excavatum or flail chest 
CVS: regular rate rhythm, S1/S2, no murmurs/rubs/gallops Lungs: Distant breath sounds bilaterally, CTABL, no wheezes/rales/rhonchi Back: no kyphosis or scoliosis Abdomen: soft, nontender, bowel sounds present, no hepatosplenomegaly Ext: no pitting edema B/L, no peripheral cyanosis or clubbing Neuro: grossly normal, AAOx3, normal strength and coordination grossly, no focal deficits Skin: no rashes, erythema, lesions Psych: normal memory, thought content, and processing Labs: I have reviewed the patient's available labs Lab Results Component Value Date/Time WBC 5.4 01/23/2019 10:34 AM  
 Hemoglobin, POC 8.8 (L) 04/10/2014 03:35 AM  
 HGB 11.9 (L) 01/23/2019 10:34 AM  
 Hematocrit, POC 26 (L) 04/10/2014 03:35 AM  
 HCT 33.9 (L) 01/23/2019 10:34 AM  
 PLATELET 161 62/50/6152 10:34 AM  
 MCV 95.2 01/23/2019 10:34 AM  
 
Lab Results Component Value Date/Time Sodium 138 01/23/2019 10:34 AM  
 Potassium 4.7 01/23/2019 10:34 AM  
 Chloride 102 01/23/2019 10:34 AM  
 CO2 31 01/23/2019 10:34 AM  
 Anion gap 5 01/23/2019 10:34 AM  
 Glucose 94 01/23/2019 10:34 AM  
 BUN 17 01/23/2019 10:34 AM  
 Creatinine 1.12 01/23/2019 10:34 AM  
 BUN/Creatinine ratio 15 01/23/2019 10:34 AM  
 GFR est AA >60 01/23/2019 10:34 AM  
 GFR est non-AA >60 01/23/2019 10:34 AM  
 Calcium 8.7 01/23/2019 10:34 AM  
 Bilirubin, total 0.4 05/03/2019 10:26 AM  
 AST (SGOT) 44 (H) 05/03/2019 10:26 AM  
 Alk. phosphatase 139 (H) 05/03/2019 10:26 AM  
 Protein, total 6.2 (L) 05/03/2019 10:26 AM  
 Albumin 2.7 (L) 05/03/2019 10:26 AM  
 Globulin 3.5 05/03/2019 10:26 AM  
 A-G Ratio 0.8 05/03/2019 10:26 AM  
 ALT (SGPT) 36 05/03/2019 10:26 AM  
 
 
Imaging:  I have personally reviewed patient's imaging --- no new imaging in the interval 
CT Results (most recent): 
Results from Hospital Encounter encounter on 12/27/18 CT LOW DOSE LUNG CANCER SCREENING Narrative EXAM:  CT Chest without Contrast - Low Dose Screening CT. CLINICAL INDICATION:  
 
- Screening. 
- Meets the criteria, i.e. 30 pack year smoking history. Current every day 
smoker. Asymptomatic. COMPARISON:  02/14/11, 03/31/14. TECHNIQUE:  Low dose unenhanced multislice helical CT is performed from the 
thoracic inlet to the adrenal glands without intravenous contrast 
administration. Contiguous axial images were reconstructed and lung and soft 
tissue windows were generated. Coronal and sagittal reformations were also 
generated. - Radiation dose:   mGy-cm 
- Dose reduction techniques are used, including automated exposure control, 
adjustment of the mAs and/or kVp according to patient size, standardized 
low-dose protocol, and/or iterative reconstruction technique. FINDINGS: 
 
Technique Assessment:  The overall quality of the study is adequate for 
diagnostic review. Lung Nodules: - Tiny relatively dense appearing 3 mm nodule is identified in the left upper 
lobe (axial #96). - Multiple foci of scarring are identified, especially in the left upper lobe, 
with associated bronchiectatic changes. - The above findings are stable dating back to at least 02/14/11. 
- In the right lung base region, a focal area of nodular density is observed 
measuring 4 mm (axial #175, sagittal #43). This density is not as clearly 
stable as the other findings. 
- A questionable groundglass density is noted in the superior segment of the 
left lower lobe measuring 4 mm (axial #32, sagittal #153). Again this density 
was not as clearly demonstrated on the previous studies. Chest: 
 
- Emphysematous changes bilaterally, most pronounced at the left lung base 
region. Stable pattern. - No airspace opacities. - No significant pleural effusion. 
- Pronounced atheromatous plaque calcifications in the coronary arteries. - Possible calcifications involving the aortic valve leaflets (axial #131, 
coronal #69). - Shotty mediastinal nodes. No definite pathologic enlargement. Miscellaneous: 
 
- No supraclavicular or axillary adenopathy. 
- Mild distal esophageal thickening just above the small hiatal hernia. - No acute findings in the visualized portions of the upper abdomen. Mild 
enlargement of both adrenal glands. Impression IMPRESSION:  
 
1. Questionable irregular 4 mm nodular density at the right lower lobe near the 
basal region. Not apparent on prior studies. Left lower lobe superior segment 
extremely subtle ground glass density 2. Stable densities in the left upper lobe. 3.  Pronounced coronary artery calcifications. 4.  Distal esophageal thickening. Recommend barium swallow esophagram for 
further delineation. 
 
- Lung-RADS Category:  2. 
- Management recommendation:  12 month low dose scan. - Modifier S:  Recommend barium swallow esophagram or endoscopy. PFTs: Personally reviewed from today, spirometry is normal based on age, lung volumes show mild restriction, diffusion capacity is moderately reduced. Of note, patient had limited effort. TTE:  I have reviewed the patient's TTE results No results found for this or any previous visit. Assessment and Plan: 
69 y.o. male with: 
 
Impression: 1. COPD, gold risk category B, gold stage I: No exacerbations in the interval 
2. Lung nodules: Seen on lung cancer screening CT from 12/27/18, subcentimeter, largest was noted to be 4 mm. Patient is an active smoker and is thus high risk. Repeat CT scan planned for 12/2019 3. Non-CF bronchiectasis 4  Chronic cough etiology most likely due to above but also has a component of bronchiectasis 5. Dyspnea on exertion/shortness of breath: Etiology most likely due to above 6. COPD cachexia: Body mass index is 17.14 kg/m². with albumin of 2.7 7. Hx of CAD s/p CABG 8. Chronic GERD with esophageal thickening on last CT scan:  Pt seen by GI, planned for EGD but cancelled since pt not cleared to come off of Plavix per Cardiology Plan: 
-Repeat CT scan planned for December 2019 
-Patient unable to tolerate Trelegy Ellipta, will switch to [employer] Respimat 2 puffs once daily, samples given in clinic. Checked patient's formulary, this is on patient's  formulary. 
-Continue albuterol HFA as needed 
-Advised patient to remain active 
-Advised patient to continue to eat, may follow-up with PCP for nutrition referral 
-Immunizations reviewed -Management CAD per Cardiology 
-Management of GERD and esophageal narrowing per GI 
-Counseled the patient regarding cessation of smoking. I reviewed health risks of tobacco use including increased risk of MI, stroke, cancer, etc.  We reviewed various approaches to cessation. Pt declined cessation assistance at this time. Follow-up and Dispositions · Return in about 5 months (around 10/15/2019). Orders Placed This Encounter  tiotropium-olodaterol (STIOLTO RESPIMAT) 2.5-2.5 mcg/actuation inhaler  tiotropium-olodaterol (STIOLTO RESPIMAT) 2.5-2.5 mcg/actuation inhaler Rai Frey MD/MP[facility] Pulmonary, Critical Care Medicine [facility]

## 2019-05-24 ENCOUNTER — HOSPITAL ENCOUNTER (OUTPATIENT)
Dept: GENERAL RADIOLOGY | Age: 79
Discharge: HOME | End: 2019-05-24
Attending: INTERNAL MEDICINE
Payer: MEDICARE

## 2019-05-24 DIAGNOSIS — K21.00 GASTROESOPHAGEAL REFLUX DISEASE WITH ESOPHAGITIS: ICD-10-CM

## 2019-05-24 DIAGNOSIS — R63.4 WEIGHT LOSS: ICD-10-CM

## 2019-05-24 DIAGNOSIS — R93.89 ABNORMAL CT SCAN: ICD-10-CM

## 2019-05-24 DIAGNOSIS — F17.200 SMOKER: ICD-10-CM

## 2019-05-24 PROCEDURE — 74011000255 HC RX REV CODE- 255: Performed by: INTERNAL MEDICINE

## 2019-05-24 PROCEDURE — 74220 X-RAY XM ESOPHAGUS 1CNTRST: CPT

## 2019-05-24 PROCEDURE — 74011000250 HC RX REV CODE- 250: Performed by: INTERNAL MEDICINE

## 2019-05-24 RX ADMIN — ANTACID/ANTIFLATULENT 4 G: 380; 550; 10; 10 GRANULE, EFFERVESCENT ORAL at 08:58

## 2019-05-24 RX ADMIN — BARIUM SULFATE 700 MG: 700 TABLET ORAL at 08:58

## 2019-05-24 RX ADMIN — BARIUM SULFATE 176 G: 960 POWDER, FOR SUSPENSION ORAL at 08:58

## 2019-05-24 RX ADMIN — BARIUM SULFATE 135 ML: 980 POWDER, FOR SUSPENSION ORAL at 08:58

## 2019-09-24 ENCOUNTER — HOSPITAL ENCOUNTER (INPATIENT)
Age: 79
LOS: 7 days | Discharge: HOME HEALTH | DRG: 226 | End: 2019-10-01
Attending: EMERGENCY MEDICINE | Admitting: INTERNAL MEDICINE
Payer: MEDICARE

## 2019-09-24 ENCOUNTER — APPOINTMENT (OUTPATIENT)
Dept: GENERAL RADIOLOGY | Age: 79
DRG: 226 | End: 2019-09-24
Attending: EMERGENCY MEDICINE
Payer: MEDICARE

## 2019-09-24 ENCOUNTER — APPOINTMENT (OUTPATIENT)
Dept: CT IMAGING | Age: 79
DRG: 226 | End: 2019-09-24
Attending: EMERGENCY MEDICINE
Payer: MEDICARE

## 2019-09-24 DIAGNOSIS — J44.1 CHRONIC OBSTRUCTIVE PULMONARY DISEASE WITH ACUTE EXACERBATION (HCC): ICD-10-CM

## 2019-09-24 DIAGNOSIS — I50.811 ACUTE RIGHT-SIDED CONGESTIVE HEART FAILURE (HCC): Primary | ICD-10-CM

## 2019-09-24 DIAGNOSIS — I50.9 HEART FAILURE, UNSPECIFIED (HCC): ICD-10-CM

## 2019-09-24 DIAGNOSIS — I50.9 ACUTE ON CHRONIC CONGESTIVE HEART FAILURE, UNSPECIFIED HEART FAILURE TYPE (HCC): ICD-10-CM

## 2019-09-24 DIAGNOSIS — I25.10 CORONARY ARTERY DISEASE INVOLVING NATIVE CORONARY ARTERY OF NATIVE HEART WITHOUT ANGINA PECTORIS: ICD-10-CM

## 2019-09-24 DIAGNOSIS — Z71.89 GOALS OF CARE, COUNSELING/DISCUSSION: ICD-10-CM

## 2019-09-24 PROBLEM — I50.43 ACUTE ON CHRONIC COMBINED SYSTOLIC AND DIASTOLIC ACC/AHA STAGE C CONGESTIVE HEART FAILURE (HCC): Status: ACTIVE | Noted: 2019-09-24

## 2019-09-24 PROBLEM — R00.0 WIDE-COMPLEX TACHYCARDIA: Status: ACTIVE | Noted: 2019-09-24

## 2019-09-24 LAB
ALBUMIN SERPL-MCNC: 2.5 G/DL (ref 3.4–5)
ALBUMIN/GLOB SERPL: 0.7 {RATIO} (ref 0.8–1.7)
ALP SERPL-CCNC: 246 U/L (ref 45–117)
ALT SERPL-CCNC: 41 U/L (ref 16–61)
ANION GAP SERPL CALC-SCNC: 9 MMOL/L (ref 3–18)
ARTERIAL PATENCY WRIST A: YES
AST SERPL-CCNC: 170 U/L (ref 10–38)
ATRIAL RATE: 107 BPM
ATRIAL RATE: 110 BPM
ATRIAL RATE: 115 BPM
ATRIAL RATE: 151 BPM
BASE DEFICIT BLD-SCNC: 10 MMOL/L
BASOPHILS # BLD: 0 K/UL (ref 0–0.1)
BASOPHILS NFR BLD: 1 % (ref 0–2)
BDY SITE: ABNORMAL
BILIRUB DIRECT SERPL-MCNC: 0.2 MG/DL (ref 0–0.2)
BILIRUB SERPL-MCNC: 0.3 MG/DL (ref 0.2–1)
BNP SERPL-MCNC: 5612 PG/ML (ref 0–1800)
BODY TEMPERATURE: 37.2
BUN SERPL-MCNC: 13 MG/DL (ref 7–18)
BUN/CREAT SERPL: 11 (ref 12–20)
CALCIUM SERPL-MCNC: 7.5 MG/DL (ref 8.5–10.1)
CALCULATED P AXIS, ECG09: 67 DEGREES
CALCULATED P AXIS, ECG09: 69 DEGREES
CALCULATED P AXIS, ECG09: 71 DEGREES
CALCULATED R AXIS, ECG10: -16 DEGREES
CALCULATED R AXIS, ECG10: -19 DEGREES
CALCULATED R AXIS, ECG10: -57 DEGREES
CALCULATED R AXIS, ECG10: -8 DEGREES
CALCULATED T AXIS, ECG11: 111 DEGREES
CALCULATED T AXIS, ECG11: 118 DEGREES
CALCULATED T AXIS, ECG11: 133 DEGREES
CALCULATED T AXIS, ECG11: 154 DEGREES
CHLORIDE SERPL-SCNC: 110 MMOL/L (ref 100–111)
CK MB CFR SERPL CALC: 7.1 % (ref 0–4)
CK MB SERPL-MCNC: 5.5 NG/ML (ref 5–25)
CK SERPL-CCNC: 78 U/L (ref 39–308)
CO2 SERPL-SCNC: 22 MMOL/L (ref 21–32)
CREAT SERPL-MCNC: 1.15 MG/DL (ref 0.6–1.3)
D DIMER PPP FEU-MCNC: 0.82 UG/ML(FEU)
DIAGNOSIS, 93000: NORMAL
DIFFERENTIAL METHOD BLD: ABNORMAL
EOSINOPHIL # BLD: 0.1 K/UL (ref 0–0.4)
EOSINOPHIL NFR BLD: 1 % (ref 0–5)
ERYTHROCYTE [DISTWIDTH] IN BLOOD BY AUTOMATED COUNT: 15.1 % (ref 11.6–14.5)
ETHANOL SERPL-MCNC: 25 MG/DL (ref 0–3)
GAS FLOW.O2 O2 DELIVERY SYS: ABNORMAL L/MIN
GLOBULIN SER CALC-MCNC: 3.8 G/DL (ref 2–4)
GLUCOSE SERPL-MCNC: 229 MG/DL (ref 74–99)
HCO3 BLD-SCNC: 14.4 MMOL/L (ref 22–26)
HCT VFR BLD AUTO: 30.4 % (ref 36–48)
HGB BLD-MCNC: 10.4 G/DL (ref 13–16)
LYMPHOCYTES # BLD: 3.6 K/UL (ref 0.9–3.6)
LYMPHOCYTES NFR BLD: 50 % (ref 21–52)
MAGNESIUM SERPL-MCNC: 1.3 MG/DL (ref 1.6–2.6)
MCH RBC QN AUTO: 35.6 PG (ref 24–34)
MCHC RBC AUTO-ENTMCNC: 34.2 G/DL (ref 31–37)
MCV RBC AUTO: 104.1 FL (ref 74–97)
MONOCYTES # BLD: 0.4 K/UL (ref 0.05–1.2)
MONOCYTES NFR BLD: 6 % (ref 3–10)
NEUTS SEG # BLD: 3.1 K/UL (ref 1.8–8)
NEUTS SEG NFR BLD: 42 % (ref 40–73)
O2/TOTAL GAS SETTING VFR VENT: 21 %
P-R INTERVAL, ECG05: 166 MS
P-R INTERVAL, ECG05: 174 MS
P-R INTERVAL, ECG05: 174 MS
PCO2 BLD: 28.1 MMHG (ref 35–45)
PH BLD: 7.32 [PH] (ref 7.35–7.45)
PLATELET # BLD AUTO: 306 K/UL (ref 135–420)
PMV BLD AUTO: 9 FL (ref 9.2–11.8)
PO2 BLD: 84 MMHG (ref 80–100)
POTASSIUM SERPL-SCNC: 3.5 MMOL/L (ref 3.5–5.5)
PROT SERPL-MCNC: 6.3 G/DL (ref 6.4–8.2)
Q-T INTERVAL, ECG07: 272 MS
Q-T INTERVAL, ECG07: 338 MS
Q-T INTERVAL, ECG07: 340 MS
Q-T INTERVAL, ECG07: 352 MS
QRS DURATION, ECG06: 106 MS
QRS DURATION, ECG06: 110 MS
QRS DURATION, ECG06: 112 MS
QRS DURATION, ECG06: 122 MS
QTC CALCULATION (BEZET), ECG08: 457 MS
QTC CALCULATION (BEZET), ECG08: 469 MS
QTC CALCULATION (BEZET), ECG08: 470 MS
QTC CALCULATION (BEZET), ECG08: 477 MS
RBC # BLD AUTO: 2.92 M/UL (ref 4.7–5.5)
SAO2 % BLD: 95 % (ref 92–97)
SERVICE CMNT-IMP: ABNORMAL
SODIUM SERPL-SCNC: 141 MMOL/L (ref 136–145)
SPECIMEN TYPE: ABNORMAL
TOTAL RESP. RATE, ITRR: 41
TROPONIN I SERPL-MCNC: 0.02 NG/ML (ref 0–0.04)
TROPONIN I SERPL-MCNC: 0.39 NG/ML (ref 0–0.04)
TSH SERPL DL<=0.05 MIU/L-ACNC: 4.31 UIU/ML (ref 0.36–3.74)
VENTRICULAR RATE, ECG03: 107 BPM
VENTRICULAR RATE, ECG03: 110 BPM
VENTRICULAR RATE, ECG03: 115 BPM
VENTRICULAR RATE, ECG03: 185 BPM
WBC # BLD AUTO: 7.3 K/UL (ref 4.6–13.2)

## 2019-09-24 PROCEDURE — 93005 ELECTROCARDIOGRAM TRACING: CPT

## 2019-09-24 PROCEDURE — 71275 CT ANGIOGRAPHY CHEST: CPT

## 2019-09-24 PROCEDURE — 74011000250 HC RX REV CODE- 250: Performed by: STUDENT IN AN ORGANIZED HEALTH CARE EDUCATION/TRAINING PROGRAM

## 2019-09-24 PROCEDURE — 74011250637 HC RX REV CODE- 250/637: Performed by: INTERNAL MEDICINE

## 2019-09-24 PROCEDURE — 74011250636 HC RX REV CODE- 250/636: Performed by: PHYSICIAN ASSISTANT

## 2019-09-24 PROCEDURE — 36600 WITHDRAWAL OF ARTERIAL BLOOD: CPT

## 2019-09-24 PROCEDURE — 84484 ASSAY OF TROPONIN QUANT: CPT

## 2019-09-24 PROCEDURE — 99285 EMERGENCY DEPT VISIT HI MDM: CPT

## 2019-09-24 PROCEDURE — 96374 THER/PROPH/DIAG INJ IV PUSH: CPT

## 2019-09-24 PROCEDURE — 36415 COLL VENOUS BLD VENIPUNCTURE: CPT

## 2019-09-24 PROCEDURE — 74011250636 HC RX REV CODE- 250/636: Performed by: STUDENT IN AN ORGANIZED HEALTH CARE EDUCATION/TRAINING PROGRAM

## 2019-09-24 PROCEDURE — 71045 X-RAY EXAM CHEST 1 VIEW: CPT

## 2019-09-24 PROCEDURE — 85025 COMPLETE CBC W/AUTO DIFF WBC: CPT

## 2019-09-24 PROCEDURE — 74011000250 HC RX REV CODE- 250: Performed by: EMERGENCY MEDICINE

## 2019-09-24 PROCEDURE — 82550 ASSAY OF CK (CPK): CPT

## 2019-09-24 PROCEDURE — 77030010545

## 2019-09-24 PROCEDURE — 80048 BASIC METABOLIC PNL TOTAL CA: CPT

## 2019-09-24 PROCEDURE — 85379 FIBRIN DEGRADATION QUANT: CPT

## 2019-09-24 PROCEDURE — 82803 BLOOD GASES ANY COMBINATION: CPT

## 2019-09-24 PROCEDURE — 84443 ASSAY THYROID STIM HORMONE: CPT

## 2019-09-24 PROCEDURE — 96375 TX/PRO/DX INJ NEW DRUG ADDON: CPT

## 2019-09-24 PROCEDURE — 65660000000 HC RM CCU STEPDOWN

## 2019-09-24 PROCEDURE — 83880 ASSAY OF NATRIURETIC PEPTIDE: CPT

## 2019-09-24 PROCEDURE — 77010033678 HC OXYGEN DAILY

## 2019-09-24 PROCEDURE — 74011000250 HC RX REV CODE- 250

## 2019-09-24 PROCEDURE — 74011000250 HC RX REV CODE- 250: Performed by: INTERNAL MEDICINE

## 2019-09-24 PROCEDURE — 83735 ASSAY OF MAGNESIUM: CPT

## 2019-09-24 PROCEDURE — 80307 DRUG TEST PRSMV CHEM ANLYZR: CPT

## 2019-09-24 PROCEDURE — 94762 N-INVAS EAR/PLS OXIMTRY CONT: CPT

## 2019-09-24 PROCEDURE — 74011636320 HC RX REV CODE- 636/320: Performed by: EMERGENCY MEDICINE

## 2019-09-24 PROCEDURE — 94640 AIRWAY INHALATION TREATMENT: CPT

## 2019-09-24 PROCEDURE — 80076 HEPATIC FUNCTION PANEL: CPT

## 2019-09-24 PROCEDURE — 96365 THER/PROPH/DIAG IV INF INIT: CPT

## 2019-09-24 PROCEDURE — 74011250637 HC RX REV CODE- 250/637: Performed by: EMERGENCY MEDICINE

## 2019-09-24 RX ORDER — ATORVASTATIN CALCIUM 40 MG/1
40 TABLET, FILM COATED ORAL
COMMUNITY
End: 2021-07-19 | Stop reason: SDUPTHER

## 2019-09-24 RX ORDER — LORAZEPAM 1 MG/1
2 TABLET ORAL
Status: DISCONTINUED | OUTPATIENT
Start: 2019-09-24 | End: 2019-09-27

## 2019-09-24 RX ORDER — FUROSEMIDE 10 MG/ML
20 INJECTION INTRAMUSCULAR; INTRAVENOUS 2 TIMES DAILY
Status: DISCONTINUED | OUTPATIENT
Start: 2019-09-24 | End: 2019-09-26

## 2019-09-24 RX ORDER — ASPIRIN 81 MG/1
81 TABLET ORAL DAILY
Status: DISCONTINUED | OUTPATIENT
Start: 2019-09-25 | End: 2019-10-01 | Stop reason: HOSPADM

## 2019-09-24 RX ORDER — SODIUM CHLORIDE 0.9 % (FLUSH) 0.9 %
5-40 SYRINGE (ML) INJECTION EVERY 8 HOURS
Status: DISCONTINUED | OUTPATIENT
Start: 2019-09-24 | End: 2019-10-01 | Stop reason: HOSPADM

## 2019-09-24 RX ORDER — LANOLIN ALCOHOL/MO/W.PET/CERES
100 CREAM (GRAM) TOPICAL DAILY
Status: DISCONTINUED | OUTPATIENT
Start: 2019-09-25 | End: 2019-10-01 | Stop reason: HOSPADM

## 2019-09-24 RX ORDER — MAGNESIUM SULFATE HEPTAHYDRATE 40 MG/ML
2 INJECTION, SOLUTION INTRAVENOUS
Status: COMPLETED | OUTPATIENT
Start: 2019-09-24 | End: 2019-09-24

## 2019-09-24 RX ORDER — BUDESONIDE 0.5 MG/2ML
500 INHALANT ORAL
Status: DISCONTINUED | OUTPATIENT
Start: 2019-09-24 | End: 2019-10-01 | Stop reason: HOSPADM

## 2019-09-24 RX ORDER — LOSARTAN POTASSIUM 50 MG/1
25 TABLET ORAL DAILY
Status: DISCONTINUED | OUTPATIENT
Start: 2019-09-25 | End: 2019-09-26

## 2019-09-24 RX ORDER — SODIUM CHLORIDE 0.9 % (FLUSH) 0.9 %
5-40 SYRINGE (ML) INJECTION AS NEEDED
Status: DISCONTINUED | OUTPATIENT
Start: 2019-09-24 | End: 2019-10-01 | Stop reason: HOSPADM

## 2019-09-24 RX ORDER — CLOPIDOGREL BISULFATE 75 MG/1
75 TABLET ORAL DAILY
Status: DISCONTINUED | OUTPATIENT
Start: 2019-09-25 | End: 2019-10-01 | Stop reason: HOSPADM

## 2019-09-24 RX ORDER — FUROSEMIDE 10 MG/ML
20 INJECTION INTRAMUSCULAR; INTRAVENOUS
Status: COMPLETED | OUTPATIENT
Start: 2019-09-24 | End: 2019-09-24

## 2019-09-24 RX ORDER — ISOSORBIDE MONONITRATE 30 MG/1
30 TABLET, EXTENDED RELEASE ORAL
Status: DISCONTINUED | OUTPATIENT
Start: 2019-09-25 | End: 2019-10-01 | Stop reason: HOSPADM

## 2019-09-24 RX ORDER — ASPIRIN 325 MG/1
100 TABLET, FILM COATED ORAL DAILY
Status: DISCONTINUED | OUTPATIENT
Start: 2019-09-25 | End: 2019-09-24 | Stop reason: CLARIF

## 2019-09-24 RX ORDER — NITROGLYCERIN 0.4 MG/1
0.4 TABLET SUBLINGUAL
Status: COMPLETED | OUTPATIENT
Start: 2019-09-24 | End: 2019-09-24

## 2019-09-24 RX ORDER — THERA TABS 400 MCG
1 TAB ORAL DAILY
Status: DISCONTINUED | OUTPATIENT
Start: 2019-09-25 | End: 2019-10-01 | Stop reason: HOSPADM

## 2019-09-24 RX ORDER — ALBUTEROL SULFATE 1.25 MG/3ML
1.25 SOLUTION RESPIRATORY (INHALATION)
Status: DISCONTINUED | OUTPATIENT
Start: 2019-09-24 | End: 2019-09-25

## 2019-09-24 RX ORDER — DOXYCYCLINE 100 MG/1
100 TABLET ORAL DAILY
Status: DISCONTINUED | OUTPATIENT
Start: 2019-09-25 | End: 2019-09-24 | Stop reason: RX

## 2019-09-24 RX ORDER — DOXYCYCLINE 100 MG/1
100 CAPSULE ORAL DAILY
Status: DISCONTINUED | OUTPATIENT
Start: 2019-09-25 | End: 2019-09-25

## 2019-09-24 RX ORDER — METOPROLOL SUCCINATE 25 MG/1
25 TABLET, EXTENDED RELEASE ORAL DAILY
Status: DISCONTINUED | OUTPATIENT
Start: 2019-09-25 | End: 2019-10-01 | Stop reason: HOSPADM

## 2019-09-24 RX ORDER — FOLIC ACID 1 MG/1
1 TABLET ORAL DAILY
Status: DISCONTINUED | OUTPATIENT
Start: 2019-09-25 | End: 2019-10-01 | Stop reason: HOSPADM

## 2019-09-24 RX ORDER — IPRATROPIUM BROMIDE AND ALBUTEROL SULFATE 2.5; .5 MG/3ML; MG/3ML
3 SOLUTION RESPIRATORY (INHALATION)
Status: COMPLETED | OUTPATIENT
Start: 2019-09-24 | End: 2019-09-24

## 2019-09-24 RX ORDER — LORAZEPAM 2 MG/ML
1 INJECTION INTRAMUSCULAR
Status: DISCONTINUED | OUTPATIENT
Start: 2019-09-24 | End: 2019-09-27

## 2019-09-24 RX ORDER — LORAZEPAM 2 MG/ML
2 INJECTION INTRAMUSCULAR
Status: DISCONTINUED | OUTPATIENT
Start: 2019-09-24 | End: 2019-09-27

## 2019-09-24 RX ORDER — ALBUTEROL SULFATE 2.5 MG/.5ML
1.25 SOLUTION RESPIRATORY (INHALATION)
Status: COMPLETED | OUTPATIENT
Start: 2019-09-24 | End: 2019-09-24

## 2019-09-24 RX ORDER — METOPROLOL TARTRATE 5 MG/5ML
INJECTION INTRAVENOUS
Status: COMPLETED
Start: 2019-09-24 | End: 2019-09-24

## 2019-09-24 RX ORDER — METOPROLOL TARTRATE 5 MG/5ML
5 INJECTION INTRAVENOUS
Status: COMPLETED | OUTPATIENT
Start: 2019-09-24 | End: 2019-09-24

## 2019-09-24 RX ORDER — LORAZEPAM 1 MG/1
1 TABLET ORAL
Status: DISCONTINUED | OUTPATIENT
Start: 2019-09-24 | End: 2019-09-27

## 2019-09-24 RX ORDER — ATORVASTATIN CALCIUM 40 MG/1
40 TABLET, FILM COATED ORAL
Status: DISCONTINUED | OUTPATIENT
Start: 2019-09-24 | End: 2019-10-01 | Stop reason: HOSPADM

## 2019-09-24 RX ADMIN — METOPROLOL TARTRATE 5 MG: 5 INJECTION INTRAVENOUS at 11:50

## 2019-09-24 RX ADMIN — ALBUTEROL SULFATE 1.25 MG: 2.5 SOLUTION RESPIRATORY (INHALATION) at 11:31

## 2019-09-24 RX ADMIN — IPRATROPIUM BROMIDE AND ALBUTEROL SULFATE 3 ML: .5; 3 SOLUTION RESPIRATORY (INHALATION) at 10:04

## 2019-09-24 RX ADMIN — IPRATROPIUM BROMIDE AND ALBUTEROL SULFATE 3 ML: .5; 3 SOLUTION RESPIRATORY (INHALATION) at 10:40

## 2019-09-24 RX ADMIN — Medication 10 ML: at 18:36

## 2019-09-24 RX ADMIN — MAGNESIUM SULFATE 2 G: 2 INJECTION INTRAVENOUS at 11:31

## 2019-09-24 RX ADMIN — AMIODARONE HYDROCHLORIDE 150 MG: 1.5 INJECTION, SOLUTION INTRAVENOUS at 18:28

## 2019-09-24 RX ADMIN — BUDESONIDE 500 MCG: 0.5 INHALANT RESPIRATORY (INHALATION) at 20:00

## 2019-09-24 RX ADMIN — FUROSEMIDE 20 MG: 10 INJECTION, SOLUTION INTRAMUSCULAR; INTRAVENOUS at 11:26

## 2019-09-24 RX ADMIN — NITROGLYCERIN 0.4 MG: 0.4 TABLET SUBLINGUAL at 10:40

## 2019-09-24 RX ADMIN — IOPAMIDOL 80 ML: 755 INJECTION, SOLUTION INTRAVENOUS at 11:05

## 2019-09-24 RX ADMIN — ALBUTEROL SULFATE 1.25 MG: 1.25 SOLUTION RESPIRATORY (INHALATION) at 20:12

## 2019-09-24 RX ADMIN — METHYLPREDNISOLONE SODIUM SUCCINATE 125 MG: 125 INJECTION, POWDER, FOR SOLUTION INTRAMUSCULAR; INTRAVENOUS at 10:04

## 2019-09-24 RX ADMIN — AMIODARONE HYDROCHLORIDE 1 MG/MIN: 1.8 INJECTION, SOLUTION INTRAVENOUS at 18:45

## 2019-09-24 RX ADMIN — METOPROLOL TARTRATE 5 MG: 5 INJECTION, SOLUTION INTRAVENOUS at 11:50

## 2019-09-24 RX ADMIN — ATORVASTATIN CALCIUM 40 MG: 40 TABLET, FILM COATED ORAL at 23:29

## 2019-09-24 RX ADMIN — FUROSEMIDE 20 MG: 10 INJECTION, SOLUTION INTRAMUSCULAR; INTRAVENOUS at 17:50

## 2019-09-24 RX ADMIN — Medication 10 ML: at 23:32

## 2019-09-24 NOTE — Clinical Note
TRANSFER - OUT REPORT:  
 
Verbal report given to: Emma Farmer RN. Report consisted of patient's Situation, Background, Assessment and  
Recommendations(SBAR). Opportunity for questions and clarification was provided. Patient transported with a Cardiac Cath Tech / Patient Care Tech. Patient transported to: [facility].   
With anesthesia

## 2019-09-24 NOTE — ED NOTES
Returned from [address] [place] [address] via stretcher, accompanied by hospital transport, in stable condition.

## 2019-09-24 NOTE — H&P
History & Physical    Patient: Sedalia Goltz MRN: [id number]  CSN: [id number]    YOB: 1940  Age: 69 y.o. Sex: male      DOA: 9/24/2019  CC: worsened shortness of breath and right-sided chest pressure     PCP: Brooks Harper MD       HPI:     Sedalia Goltz is a 69 y.o. male with medical co-morbidities including HTN, CAD with CABG, chronic systolic, diastolic heart failure, COPD, active tobacco smoker, hyperlipidemia, chronic alcoholism, on chronic doxycycline for eye infection, presented with shortness of breath and right-sided chest pressure. He has had this symptom for a few days and worsened this AM when he ambulated to the restroom. He felt he could not take a deep breath. No chest palpitation, no dizziness. No nausea/vomiting. No fever. He has tried respiratory treatment at home without much help. In the ER, he was found to have pulmonary congestion on chest xray with elevated proBNP, he was given neb treatment with lasix with some relief. He was also observed to have SVT vs VT on EKG, he received dosing of bblocker to help with rate control. Cardiac enzyme was negative. Since he had mild elevated D-dimer, CTA chest was done to show no PE. Cardiology evaluated and suspected that he had wide-complex tachycardia with bundle branch VT in the setting of severe CAD and COPD. He expressed that if he heart stopped, he does not want to be resuscitated or to be intubated when he can no longer breath. His sons/family at bedside agreed to his signing DNR form . Review of Systems  GENERAL: No fever, No chill, + malaise   HEENT: No change in vision,  no sore throat or sinus congestion. NECK: No pain or stiffness. PULMONARY: ++ shortness of breath, no cough, +mild wheeze. Cardiovascular: no pnd / orthopnea, + Chest Pain/pressure   GASTROINTESTINAL: No abd pain, No nausea/vomiting, No diarrhea, No  bright red blood per rectum.    GENITOURINARY: No urinary frequency, No urgency or pain with urination. MUSCULOSKELETAL: No joint or muscle pain, no back pain, no recent trauma. DERMATOLOGIC: No rash, no itching, no lesions. ENDOCRINE: No polyuria, polydipsia, NO heat or cold intolerance. No recent change in weight. HEMATOLOGICAL: No easy bruising or bleeding. NEUROLOGIC: No headache, No seizures, No generalized weakness         Past Medical History:   Diagnosis Date    Alcohol abuse     quit February 2011    Binocular visual disturbance 10/31/2013    CAD (coronary artery disease)     CAD (coronary artery disease), native coronary artery 1/3/05    LAD 70-80% mid; Cx-diffuse 80%; OM1-70% prox; RCA 40-50% mid; 100% RPLB with collaterals    Cardiac cath 08/19/2013    Severe, diffuse calcification. dLM 30%. mLAD 80%. o/pD1 80%. o/pRamus 80%. dCX 85%. dRCA 80%.  m-dRPLB 75%. EF 50%. Mod to severe inferobasal hypk.  Cardiac echocardiogram 01/19/2011    EF 50-55%. Gr 1 DDfx. RVSP 30 mmHg.  Cardiac nuclear imaging test 06/27/2011    No evidence of ischemia or previous infarction. EF 60%. Neg EKG on max EST. Ex time 6 mins.     Chronic kidney disease     Chronic lung disease     Chronic obstructive pulmonary disease (HCC)     Coronary artery disease     Diastolic CHF, chronic (HCC)     LV EF 55% (Echo Jan 2011)    Diverticula of colon 12/17/2013    Emphysematous COPD ([address]     PFTs March 2009; mild-mode obstruction, + bronchodil response; decreased DLCO    GERD (gastroesophageal reflux disease)     with erosive esophagitis history    Heart failure (HCC)     History of renal failure     2 years ago, which completely resolved    Hypercholesterolemia     Hypertension     Hypertensive heart disease with CHF ([address]     MI (myocardial infarction) ([address]     Inferior wall    Paresthesia and pain of both upper extremities 10/31/2013    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 Jackie Miller, 4/9/14.  Tobacco abuse     Unspecified hereditary and idiopathic peripheral neuropathy 12/4/2013       Past Surgical History:   Procedure Laterality Date    HX CORONARY ARTERY BYPASS GRAFT  about 2013    HX HEART CATHETERIZATION  11/2013       Family History   Problem Relation Age of Onset    Heart Disease Maternal Uncle     Diabetes Father     Hypertension Father        Social History     Socioeconomic History    Marital status:      Spouse name: Not on file    Number of children: Not on file    Years of education: Not on file    Highest education level: Not on file   Tobacco Use    Smoking status: Current Every Day Smoker     Packs/day: 0.50     Years: 55.00     Pack years: 27.50     Types: Cigarettes     Start date: 3/13/2014    Smokeless tobacco: Never Used   Substance and Sexual Activity    Alcohol use: Yes     Comment: every other day drinker    Drug use: No    Sexual activity: Not Currently     Partners: Female       Prior to Admission medications    Medication Sig Start Date End Date Taking? Authorizing Provider   atorvastatin (LIPITOR) 40 mg tablet Take 40 mg by mouth nightly. Yes Provider, Historical   tiotropium-olodaterol (STIOLTO RESPIMAT) 2.5-2.5 mcg/actuation inhaler Take 2 Puffs by inhalation daily. 5/15/19  Yes Monroe Feldman MD   doxycycline (ADOXA) 100 mg tablet Take 100 mg by mouth daily. Yes Provider, Historical   folic acid (FOLVITE) 1 mg tablet Take 1 mg by mouth two (2) times a day. Yes Provider, Historical   cyanocobalamin 1,000 mcg tablet Take 1,000 mcg by mouth daily. Yes Provider, Historical   clopidogrel (PLAVIX) 75 mg tab Take 1 Tab by mouth daily. 2/8/19  Yes Jin Ferreira,    isosorbide mononitrate ER (IMDUR) 30 mg tablet Take 1 Tab by mouth every morning.  2/8/19  Yes Jin Ferreira DO aspirin delayed-release 81 mg tablet Take  by mouth daily. Yes Provider, Historical   losartan (COZAAR) 50 mg tablet 50 mg daily. 12/4/18  Yes Provider, Historical   metoprolol succinate (TOPROL-XL) 25 mg XL tablet 25 mg daily. 9/13/17  Yes Provider, Historical   thiamine (VITAMIN B-1) 50 mg tablet Take 50 mg by mouth daily. Yes Provider, Historical   PSEUDOEPHEDRINE/CPM/METHYLSCOP (ALLERGY AM-PM PO) Take  by mouth daily as needed. Yes Provider, Historical   Cholecalciferol, Vitamin D3, (VITAMIN D) 5,000 unit Tab Take 1 Cap by mouth daily. 1,000 iu qd   Yes Provider, Historical   Ibuprofen-Diphenhydramine (ADVIL PM) 200-38 mg Tab Take  by mouth as needed. 6/22/11  Yes Provider, Historical   albuterol (VENTOLIN HFA) 90 mcg/Actuation inhaler Take 1 Puff by inhalation as needed. Yes Provider, Historical   CYANOCOBALAMIN/FA/PYRIDOXINE (FOLBEE PO) Take 1 Tab by mouth daily. 3/23/11  Yes Provider, Historical   multivitamin (ONE A DAY) tablet Take 1 Tab by mouth daily. Yes Provider, Historical   nitroglycerin (NITROSTAT) 0.4 mg SL tablet 1 Tab by SubLINGual route every five (5) minutes as needed for Chest Pain. Up to 3 doses.  2/8/19   Squire Vivienne, DO       Allergies   Allergen Reactions    Vicodin [Hydrocodone-Acetaminophen] Rash              Physical Exam:      Visit Vitals  BP (!) 152/98 (BP 1 Location: Right arm, BP Patient Position: At rest)   Pulse 88   Temp 97.7 °F (36.5 °C)   Resp 22   Ht 5' 5\" (1.651 m)   Wt 46 kg (101 lb 8 oz)   SpO2 98%   BMI 16.89 kg/m²       Physical Exam:  Tele: tachycardia   General:  Cooperative, in acute distress, speaks in short sentence while in bed  HEENT: PERRL, EOMI, supple neck, no JVD, dry oral mucosa  Cardiovascular: S1S2 tachycardia, no rub/gallop   Pulmonary: air entry bilaterally, no wheezing, ++ crackle  GI:  Soft, non tender, non distended, +bs, no guarding   Extremities:  No pedal edema, +distal pulses appreciated   Neuro: AOx3, moving all extremities, no gross deficit. Lab/Data Review:  Labs: Results:       Chemistry Recent Labs     09/24/19  0957   *      K 3.5      CO2 22   BUN 13   CREA 1.15   CA 7.5*   AGAP 9   BUCR 11*   *   TP 6.3*   ALB 2.5*   GLOB 3.8   AGRAT 0.7*      CBC w/Diff Recent Labs     09/24/19  0957   WBC 7.3   RBC 2.92*   HGB 10.4*   HCT 30.4*      GRANS 42   LYMPH 50   EOS 1      Coagulation No results for input(s): PTP, INR, APTT, INREXT, INREXT in the last 72 hours. Iron/Ferritin No results for input(s): IRON in the last 72 hours. No lab exists for component: TIBCCALC   BNP No results for input(s): BNPP in the last 72 hours. Cardiac Enzymes Recent Labs     09/24/19  1827   CPK 78   CKND1 7.1*      Liver Enzymes Recent Labs     09/24/19  0957   TP 6.3*   ALB 2.5*   *   SGOT 170*      Thyroid Studies Lab Results   Component Value Date/Time    TSH 4.31 (H) 09/24/2019 09:57 AM          All Micro Results     None          Imaging Reviewed:  CT Results (most recent):  Results from [place] encounter on 09/24/19   CTA CHEST W OR W WO CONT    Narrative CTA CHEST PULMONARY EMBOLISM PROTOCOL      INDICATION: Shortness of breath, chest pain. Question pulmonary embolism. TECHNIQUE: Thin collimation axial images obtained through the level of the  pulmonary arteries with additional imaging through the chest following the  uneventful administration of 80 cc Isovue-370 nonionic intravenous contrast.   Images reconstructed into three dimensional coronal and sagittal projections for  complete evaluation of the tortuous and overlapping pulmonary vascular  structures and to reduce patient radiation dose.      All CT scans at this facility are performed using dose optimization technique as  appropriate to a performed exam, to include automated exposure control,  adjustment of the mA and/or kV according to patient size (including appropriate  matching first site-specific examinations), or use of iterative reconstruction  technique. COMPARISON: 12/27/2018. FINDINGS:    No filling defects are appreciated within the main, left, right, lobar or  visualized segmental pulmonary arteries to suggest embolism. Thyroid: Unremarkable in its visualized aspects. Pericardium/ Heart: Increased multichamber cardiac enlargement. Severe coronary  arteriosclerosis throughout but most significant in the LAD. Delayed  opacification of aorta which is suboptimally evaluated. Moderate  atherosclerosis. There is mediastinal edema. Lymph Nodes: Unremarkable. .    Lungs: Lungs are hypoinflated with respiratory motion which limits assessment. Mild to moderate bronchial wall thickening. Mild bronchovascular and dependent  groundglass. Unchanged spiculated scar in the left upper lobe with architectural  distortion. Similar cystic changes greatest dependently with honeycombing. Pleura: New small Bilateral pleural effusions, mildly loculated on the left. No  pneumothorax. Upper Abdomen: Reflux of contrast into the IVC and hepatic veins. Similar left  greater than right adrenal thickening. Possible esophageal wall thickening. Periportal edema. Similar gastrohepatic lymph nodes which measure up to 0.8 cm. Tight stenosis at the origin of the celiac and SMA. Bones/soft tissues: Mild anasarca. Mild symmetric bilateral gynecomastia. Osteopenia. Status post median sternotomy. Impression IMPRESSION:  1. No evidence for pulmonary emboli. 2.  Likely heart failure with increased multichamber cardiac enlargement, new  small bilateral pleural effusions, mildly loculated on the left and suspected  pulmonary edema. 3.   Unchanged scarring at the left lung apex. 4.  Unchanged cystic changes left greater than right lung bases, favoring UIP. 5.  Possible esophageal wall thickening.      EKG Results     Procedure 720 Value Units Date/Time    EKG, 12 LEAD, INITIAL [[id number]] Collected:  09/24/19 1141    Order Status: Completed Updated:  09/24/19 1634     Ventricular Rate 185 BPM      Atrial Rate 151 BPM      QRS Duration 106 ms      Q-T Interval 272 ms      QTC Calculation (Bezet) 477 ms      Calculated R Axis -57 degrees      Calculated T Axis 118 degrees      Diagnosis --     Supraventricular tachycardia  vs Sinus tachycardia  Left axis deviation  Left ventricular hypertrophy with repolarization abnormality  Inferior-posterior infarct Lateral infarct (cited on or before 20-JAN-2011)  ** ** ACUTE MI / STEMI ** **  Consider right ventricular involvement in acute inferior infarct  Abnormal ECG  When compared with ECG of 24-SEP-2019 10:34,  Significant changes have occurred  Confirmed by Dali Barnhart MD, ----- ([id number]) on 9/24/2019 4:34:02 PM      EKG, 12 LEAD, REPEAT [[id number]] Collected:  09/24/19 1149    Order Status:  Completed Updated:  09/24/19 1630     Ventricular Rate 107 BPM      Atrial Rate 107 BPM      P-R Interval 174 ms      QRS Duration 122 ms      Q-T Interval 352 ms      QTC Calculation (Bezet) 469 ms      Calculated P Axis 69 degrees      Calculated R Axis -19 degrees      Calculated T Axis 154 degrees      Diagnosis --     Sinus tachycardia  Possible Inferior infarct (cited on or before 20-JAN-2011)  Anterior infarct (cited on or before 20-JAN-2011)  ST & T wave abnormality, consider lateral ischemia  Abnormal ECG  When compared with ECG of 24-SEP-2019 11:41,  Significant changes have occurred  Confirmed by Dali Barnhart MD, ----- [id number]) on 9/24/2019 4:30:23 PM      EKG, 12 LEAD, INITIAL [[id number]] Collected:  09/24/19 0954    Order Status:  Completed Updated:  09/24/19 1127     Ventricular Rate 110 BPM      Atrial Rate 110 BPM      P-R Interval 174 ms      QRS Duration 110 ms      Q-T Interval 338 ms      QTC Calculation (Bezet) 457 ms      Calculated P Axis 67 degrees      Calculated R Axis -8 degrees      Calculated T Axis 133 degrees      Diagnosis --     Sinus tachycardia  Possible Left atrial enlargement  Possible Inferior infarct (cited on or before 20-JAN-2011)  Cannot rule out Anterior infarct , age undetermined  ST & T wave abnormality, consider lateral ischemia  Abnormal ECG  When compared with ECG of 20-DEC-2018 10:17,  Significant changes have occurred  Confirmed by Bret Valencia MD, ----- [id number]) on 9/24/2019 11:27:44 AM      EKG, 12 LEAD, SUBSEQUENT [[id number]] Collected:  09/24/19 1034    Order Status:  Completed Updated:  09/24/19 1126     Ventricular Rate 115 BPM      Atrial Rate 115 BPM      P-R Interval 166 ms      QRS Duration 112 ms      Q-T Interval 340 ms      QTC Calculation (Bezet) 470 ms      Calculated P Axis 71 degrees      Calculated R Axis -16 degrees      Calculated T Axis 111 degrees      Diagnosis --     Sinus tachycardia  Possible Left atrial enlargement  Cannot rule out Inferior infarct (cited on or before 20-JAN-2011)  Cannot rule out Anterior infarct (cited on or before 20-JAN-2011)  ST & T wave abnormality, consider lateral ischemia  Abnormal ECG  When compared with ECG of 24-SEP-2019 09:54,  No significant change was found  Confirmed by Bret Valencia MD, ----- ([id number]) on 9/24/2019 11:26:08 AM                Assessment:   Active Problems:  1) Acute on chronic combined systolic and diastolic ACC/AHA stage C congestive heart failure   2) Wide-complex tachycardia   3) CAD (coronary artery disease), with ELEVATED TROPONIN (demand ischemia)       S/p CABG x5 (LIMA->LAD, Seq SVG-> D &ramus, Seq SVG-> RPDA & RPLB; April 2014)     S/P CABG x 5 (4/10/2014)      Overview: LIMA to LAD, and a sequential saphenous vein graft to the 1st diagonal of       the LAD and to the ramus intermedius, and another sequential saphenous       vein graft to the posterior descending branch and the posterolateral       branch of the RCA, Dr. Herman Sidhu, 4/9/14.         4) Alcohol abuse, last drink was this morning   5) Hypomagnesemia   6) Tobacco abuse, still smoking half pack daily  7) Emphysematous COPD       Overview: PFTs March 2009: mild-moderate obstruction; + bronchodil. Response;       decreased DLCO  8)  Hypoalbuminemia due to moderate protein-calorie malnutrition, cachexia  9)  Elevated LFT, chronic alcoholism     10)  Chronic macrocytic anemia, alcoholism related. Plan:     Admitted to telemetry with IV lasix BID, I&O monitoring, limited salt and fluid  Cardiology has started on Amiodarone gtt  Check Echo. Cardiology follow up. Electrolyte replacement and BMP check daily. Resume his cardiac medications, ASA, statin. Will follow up with his bronchodilator RX, continue oxygen as needed   Educated to quit smoking  Educated to quit alcohol abuse. Replace thiamine, folate, multivitamin. Needs CIWA protocol for persistent distress. DVT prophylaxis: scd    Discussed with him and his family for advance directive. Continue with DNR/DNI for now.        Nanette Reid MD  9/24/2019, 3:14 PM

## 2019-09-24 NOTE — Clinical Note
Left chest prepped with ChloraPrep Wet prep solution applied at: 1306. Wet prep solution dried at: 1309. Wet prep elapsed drying time: 3 mins.

## 2019-09-24 NOTE — ROUTINE PROCESS
[facility] Report included SBAR, Kardex, MAR, Recent Results, ED Summary, and Cardiac Rhythm (Sinus with PVCs). Pt in bed and family member at bedside. Pt complained of SOB while trying to stand to use the urinal.  and 02 sat at 98% on 2L 02 via NC. Will apply condom cath and have respiratory give nebulizer treatments. Will continue to monitor Pt for any more changes.

## 2019-09-24 NOTE — Clinical Note
A Bovie was used. Blend setting: low. Mode: monopolar. Coagulation Settin. Cut Settin. Site (pad location): lateral thigh. Laterality: right.

## 2019-09-24 NOTE — CONSULTS
[facility] - Consult Note    Consultation request by Dr. Faraz Marquez for advice/opinion related to evaluating CHF    Date of  Admission: 9/24/2019  9:44 AM   Primary Care Physician:  Cecile Greer MD     Assessment:     -Presented with shortness of breath, likely primarilly CHF exacerbation with BNP 5612 and CT with small bilateral pleural effusions and suspected pulmonary edema but also with history of COPD as well  -Echo 1/24/2019: EF 46-50% with hypokinetic basal inferior, basal inferolateral, mid inferior, mid inferolateral and apical inferior wall segments, grade 2 diastolic dysfunction  -CAD, Cardiac cath 2/1/19 with following findings:   · Severe native CAD with distal left main heavily calcified lesion   · -Ppatent LIMA-LAD  · 2 sequential vein grafts (all 4 vein grafts to diagonal, OM, RPDA, RPL branch) occluded at aortotomy site  · Subtotal mid RCA occludion with NICO 0-I flow, some collaterals from left coronary system  · -Nnative diagonal 85% stenosis  · Native OM 85% stenosis  -Wide-complex tachycardia, most consistent with bundle branch VT  -Hypomagnesemia, Mg 1.3 on presentation, given 2 gm Mg in ER  -HTN  -Hypercholesterolemia  -Hx tobacco and alcohol abuse  -DNR status    Primary cardiologist is Dr. Macho Melgoza:     -Will start on Amiodarone protocol with 150 mg Amiodarone bolus and Amiodarone infusion starting at 1 mg/min infusion x 6 hours then 0.5 mg/min.  -Will check limited echocardiogram.  -Will start on scheduled Lasix. Will need strict I/O's (ordered) and close monitoring of renal indices.  -Mg 1.3 on presentation, given 2 gm IV Mg in ER. Would recommend to keep Mg > 2 and K > 4.  -Will continue to follow along. Further recommendations to follow based on test results and hospital course. History of Present Illness: This is a 69 y.o. male admitted for Congestive heart failure (CHF) ([facility] [address] [I50.9].     Patient complains of:  Shortness of breath    Brando Dryer Ca Butler is a 69 y.o. male with PMHx as described above, who presented to the hospital due to shortness of breath. Pt reports chronic shortness of breath with exertion that became worse today. He states that when he got up today he became short of breath but states that it did not improve with rest as it usually does. He states he is most comfortable when lying down. He also noted some right-sided chest \"discomfort\" today as well. He denies palpitations. No lower extremity edema. He denies any bleeding or BRBPR. Cardiac risk factors: smoking/ tobacco exposure, dyslipidemia, male gender, hypertension, known cardiomyopathy and CAD      Review of Symptoms:  Except as stated above include:  Constitutional:  negative  Respiratory:  negative  Cardiovascular:  Exertional dyspnea, right-sided chest \"discomfort\"  Gastrointestinal: negative  Genitourinary:  negative  Musculoskeletal:  Negative  Neurological:  Negative  Dermatological:  Negative  Endocrinological: Negative  Psychological:  Negative       Past Medical History:     Past Medical History:   Diagnosis Date    Alcohol abuse     quit February 2011    Binocular visual disturbance 10/31/2013    CAD (coronary artery disease)     CAD (coronary artery disease), native coronary artery 1/3/05    LAD 70-80% mid; Cx-diffuse 80%; OM1-70% prox; RCA 40-50% mid; 100% RPLB with collaterals    Cardiac cath 08/19/2013    Severe, diffuse calcification. dLM 30%. mLAD 80%. o/pD1 80%. o/pRamus 80%. dCX 85%. dRCA 80%.  m-dRPLB 75%. EF 50%. Mod to severe inferobasal hypk.  Cardiac echocardiogram 01/19/2011    EF 50-55%. Gr 1 DDfx. RVSP 30 mmHg.  Cardiac nuclear imaging test 06/27/2011    No evidence of ischemia or previous infarction. EF 60%. Neg EKG on max EST. Ex time 6 mins.     Chronic kidney disease     Chronic lung disease     Chronic obstructive pulmonary disease (HCC)     Coronary artery disease     Diastolic CHF, chronic (HCC)     LV EF 55% (Echo Jan 2011)    Diverticula of colon 12/17/2013    Emphysematous COPD ([facility] [address]     PFTs March 2009; mild-mode obstruction, + bronchodil response; decreased DLCO    GERD (gastroesophageal reflux disease)     with erosive esophagitis history    Heart failure ([address]     History of renal failure     2 years ago, which completely resolved    Hypercholesterolemia     Hypertension     Hypertensive heart disease with CHF ([facility] [address]     MI (myocardial infarction) ([facility] [address]     Inferior wall    Paresthesia and pain of both upper extremities 10/31/2013    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 Jonn Ruiz, 4/9/14.  Tobacco abuse     Unspecified hereditary and idiopathic peripheral neuropathy 12/4/2013         Social History:     Social History     Socioeconomic History    Marital status:      Spouse name: Not on file    Number of children: Not on file    Years of education: Not on file    Highest education level: Not on file   Tobacco Use    Smoking status: Current Every Day Smoker     Packs/day: 0.50     Years: 55.00     Pack years: 27.50     Types: Cigarettes     Start date: 3/13/2014    Smokeless tobacco: Never Used   Substance and Sexual Activity    Alcohol use: Yes     Comment: every other day drinker    Drug use: No    Sexual activity: Not Currently     Partners: Female        Family History:     Family History   Problem Relation Age of Onset    Heart Disease Maternal Uncle     Diabetes Father     Hypertension Father         Medications: Allergies   Allergen Reactions    Vicodin [Hydrocodone-Acetaminophen] Rash        No current facility-administered medications for this encounter.       Current Outpatient Medications   Medication Sig    tiotropium-olodaterol (STIOLTO RESPIMAT) 2.5-2.5 mcg/actuation inhaler Take 2 Puffs by inhalation daily.  tiotropium-olodaterol (STIOLTO RESPIMAT) 2.5-2.5 mcg/actuation inhaler Take 2 Puffs by inhalation daily.  doxycycline (ADOXA) 100 mg tablet Take 100 mg by mouth daily.  folic acid (FOLVITE) 1 mg tablet Take 1 mg by mouth two (2) times a day.  cyanocobalamin 1,000 mcg tablet Take 1,000 mcg by mouth daily.  clopidogrel (PLAVIX) 75 mg tab Take 1 Tab by mouth daily.  isosorbide mononitrate ER (IMDUR) 30 mg tablet Take 1 Tab by mouth every morning.  nitroglycerin (NITROSTAT) 0.4 mg SL tablet 1 Tab by SubLINGual route every five (5) minutes as needed for Chest Pain. Up to 3 doses.  aspirin delayed-release 81 mg tablet Take  by mouth daily.  losartan (COZAAR) 50 mg tablet 50 mg daily.  atorvastatin (LIPITOR) 20 mg tablet 20 mg daily.  metoprolol succinate (TOPROL-XL) 25 mg XL tablet 25 mg daily.  thiamine (VITAMIN B-1) 50 mg tablet Take 50 mg by mouth daily.  PSEUDOEPHEDRINE/CPM/METHYLSCOP (ALLERGY AM-PM PO) Take  by mouth daily as needed.  Cholecalciferol, Vitamin D3, (VITAMIN D) 5,000 unit Tab Take 1 Cap by mouth daily. 1,000 iu qd    Ibuprofen-Diphenhydramine (ADVIL PM) 200-38 mg Tab Take  by mouth as needed.  albuterol (VENTOLIN HFA) 90 mcg/Actuation inhaler Take 1 Puff by inhalation as needed.  CYANOCOBALAMIN/FA/PYRIDOXINE (FOLBEE PO) Take 1 Tab by mouth daily.  multivitamin (ONE A DAY) tablet Take 1 Tab by mouth daily.          Physical Exam:     Visit Vitals  /82   Pulse (!) 103   Temp 99 °F (37.2 °C)   Resp 28   Ht 5' 5\" (1.651 m)   Wt 125 lb (56.7 kg)   SpO2 97%   BMI 20.80 kg/m²     BP Readings from Last 3 Encounters:   09/24/19 122/82   05/15/19 110/60   05/10/19 164/82     Pulse Readings from Last 3 Encounters:   09/24/19 (!) 103   05/15/19 71   05/10/19 73     Wt Readings from Last 3 Encounters:   09/24/19 125 lb (56.7 kg)   05/15/19 103 lb (46.7 kg)   05/10/19 103 lb (46.7 kg) General:  alert, cooperative, no distress, appears stated age  Neck:  supple  Lungs:  Expiratory rhonchi  Heart:  Tachycardic regular rhythm  Abdomen:  abdomen is soft   Extremities:  Atraumatic, no edema  Skin: Warm and dry. Neuro: alert, oriented x3, affect appropriate, no focal neurological deficits, moves all extremities well, no involuntary movements  Psych: non focal     Data Review:     Recent Labs     09/24/19  0957   WBC 7.3   HGB 10.4*   HCT 30.4*        Recent Labs     09/24/19  0957      K 3.5      CO2 22   *   BUN 13   CREA 1.15   CA 7.5*   MG 1.3*   ALB 2.5*   SGOT 170*   ALT 41       Results for orders placed or performed during the hospital encounter of 09/24/19   EKG, 12 LEAD, INITIAL   Result Value Ref Range    Ventricular Rate 185 BPM    Atrial Rate 151 BPM    QRS Duration 106 ms    Q-T Interval 272 ms    QTC Calculation (Bezet) 477 ms    Calculated R Axis -57 degrees    Calculated T Axis 118 degrees    Diagnosis       Supraventricular tachycardia  Left axis deviation  Left ventricular hypertrophy with repolarization abnormality  Inferior-posterior infarct (cited on or before 20-JAN-2011)  Anterolateral infarct (cited on or before 20-JAN-2011)  ** ** ACUTE MI / STEMI ** **  Consider right ventricular involvement in acute inferior infarct  Abnormal ECG  When compared with ECG of 24-SEP-2019 10:34,  Significant changes have occurred     Results for orders placed or performed in visit on 02/08/19   AMB POC EKG ROUTINE W/ 12 LEADS, INTER & REP    Narrative    Normal sinus rhythm rate 68. Left atrial enlargement. Incomplete left bundle branch block. Old inferior wall myocardial infarction. Some mild T wave inversions inferolaterally which could reflect ischemia. Compared to the EKG of January 17, 2019 there was little interval change.        All Cardiac Markers in the last 24 hours:    Lab Results   Component Value Date/Time    TROIQ 0.02 09/24/2019 09:57 AM Last Lipid:    Lab Results   Component Value Date/Time    Cholesterol, total 144 05/03/2019 10:26 AM    HDL Cholesterol 86 (H) 05/03/2019 10:26 AM    LDL, calculated 18 05/03/2019 10:26 AM    Triglyceride 200 (H) 05/03/2019 10:26 AM    CHOL/HDL Ratio 1.7 05/03/2019 10:26 AM       Signed By: Fouzia Muñoz PA-C     September 24, 2019

## 2019-09-24 NOTE — ED PROVIDER NOTES
10:43 AM   Pt is a 68 y/o M W/ history of COPD and Quadruple bypass with prior inferior MI who who presents with tachycardia and difficulty breathing that began suddenly this morning. Patient states that he was in his usual state of health until this morning when he began to have shortness of breath that began suddenly. He called EMS and took his home albuterol and inhaled steroids. He denies any history of fevers or chills, endorses some chest discomfort without radiation, but denies any abdominal pain, nausea, vomiting diarrhea. Past Medical History:   Diagnosis Date    Alcohol abuse     quit February 2011    CAD (coronary artery disease)     CAD (coronary artery disease), native coronary artery 1/3/05    LAD 70-80% mid; Cx-diffuse 80%; OM1-70% prox; RCA 40-50% mid; 100% RPLB with collaterals    Cardiac cath 08/19/2013    Severe, diffuse calcification. dLM 30%. mLAD 80%. o/pD1 80%. o/pRamus 80%. dCX 85%. dRCA 80%.  m-dRPLB 75%. EF 50%. Mod to severe inferobasal hypk.  Cardiac echocardiogram 01/19/2011    EF 50-55%. Gr 1 DDfx. RVSP 30 mmHg.  Cardiac nuclear imaging test 06/27/2011    No evidence of ischemia or previous infarction. EF 60%. Neg EKG on max EST. Ex time 6 mins.     Chronic kidney disease     Chronic lung disease     Chronic obstructive pulmonary disease ([facility] [address]     Coronary artery disease     Diastolic CHF, chronic (HCC)     LV EF 55% (Echo Jan 2011)    Emphysematous COPD [facility])     PFTs March 2009; mild-mode obstruction, + bronchodil response; decreased DLCO    GERD (gastroesophageal reflux disease)     with erosive esophagitis history    Heart failure (HCC)     History of renal failure     2 years ago, which completely resolved    Hypercholesterolemia     Hypertension     Hypertensive heart disease with CHF ([facility] [address]     MI (myocardial infarction) ([facility] [address]     Inferior wall    Tobacco abuse        Past Surgical History:   Procedure Laterality Date    HX CORONARY ARTERY BYPASS GRAFT  about 2013    HX HEART CATHETERIZATION  11/2013         Family History:   Problem Relation Age of Onset    Heart Disease Maternal Uncle     Diabetes Father     Hypertension Father        Social History     Socioeconomic History    Marital status:      Spouse name: Not on file    Number of children: Not on file    Years of education: Not on file    Highest education level: Not on file   Occupational History    Not on file   Social Needs    Financial resource strain: Not on file    Food insecurity:     Worry: Not on file     Inability: Not on file    Transportation needs:     Medical: Not on file     Non-medical: Not on file   Tobacco Use    Smoking status: Current Every Day Smoker     Packs/day: 0.50     Years: 55.00     Pack years: 27.50     Types: Cigarettes     Start date: 3/13/2014    Smokeless tobacco: Never Used   Substance and Sexual Activity    Alcohol use: Yes     Comment: every other day drinker    Drug use: No    Sexual activity: Not Currently     Partners: Female   Lifestyle    Physical activity:     Days per week: Not on file     Minutes per session: Not on file    Stress: Not on file   Relationships    Social connections:     Talks on phone: Not on file     Gets together: Not on file     Attends Gnosticist service: Not on file     Active member of club or organization: Not on file     Attends meetings of clubs or organizations: Not on file     Relationship status: Not on file    Intimate partner violence:     Fear of current or ex partner: Not on file     Emotionally abused: Not on file     Physically abused: Not on file     Forced sexual activity: Not on file   Other Topics Concern    Not on file   Social History Narrative    Not on file         ALLERGIES: Vicodin [hydrocodone-acetaminophen]    Review of Systems   Constitutional: Negative for chills, diaphoresis and fatigue. HENT: Negative for sinus pressure.     Eyes: Negative for photophobia. Respiratory: Positive for chest tightness, shortness of breath and wheezing. Negative for cough. Cardiovascular: Positive for chest pain. Negative for palpitations. Gastrointestinal: Negative for abdominal pain, constipation, diarrhea and nausea. Genitourinary: Negative for hematuria. Musculoskeletal: Negative for back pain. Skin: Negative for rash. Neurological: Negative for syncope, light-headedness and headaches. Psychiatric/Behavioral: Negative for confusion. Vitals:    09/24/19 0950 09/24/19 1000 09/24/19 1040   BP: (!) 164/110 (!) 138/99 (!) 158/13   Pulse: (!) 119 (!) 107 (!) 114   Resp: (!) 32 30    Temp: 99 °F (37.2 °C)     SpO2: 96% 100%    Weight: 56.7 kg (125 lb)     Height: 5' 5\" (1.651 m)              Physical Exam   Constitutional: He is oriented to person, place, and time. He appears well-developed and well-nourished. HENT:   Head: Normocephalic and atraumatic. Mouth/Throat: Oropharynx is clear and moist.   Eyes: Pupils are equal, round, and reactive to light. EOM are normal.   Neck: Normal range of motion. Neck supple. Cardiovascular: Normal rate and normal heart sounds. Tachycardic    Pulmonary/Chest: Tachypnea noted. He is in respiratory distress. He has wheezes in the right lower field and the left lower field. He has no rhonchi. He has no rales. Mild respiratory distress, tachypneic to RR 25, Bilateral wheezes on exam.    Abdominal: Soft. Bowel sounds are normal. He exhibits no distension. There is no rebound and no guarding. Musculoskeletal: Normal range of motion. Right lower leg: Normal.        Left lower leg: Normal.   Neurological: He is alert and oriented to person, place, and time. Skin: Skin is warm and dry. Capillary refill takes less than 2 seconds.         MDM  Number of Diagnoses or Management Options  Diagnosis management comments: 11:33 AM  Pt is a 70 y/o M with COPD, prior inferior MI with quadruple bypass who presents with new onset shortness of breath and tachypnea. Elevated BNP, bedside ultrasound shows B-line and Left>Right pleural effusion. No prior diagnosis of CHF, could be new diagnosis. PE is also on the differential given slightly elevated d-dimer, but patient refused CT scan because he didn't want to lie flat without another breathing treatment. EKG shows no ischemic changes and troponin has been negative. 2:34 PM  Pt has significant symptomatic improvement, however still dyspnic with exertion when he ambulates to use the restroom. Patient's tachycardia is downtrending, now 112 (of note patient does endorse 4 small 0.5 oz shots a day. ). CTA negative for PE, further supports CHF. Pt admitted to Dr. Sg Bush, internal medicine, in stable condition, admitted to telemetry floor. Critical care time 30 minutes, system at risk: cardiac. Arti Moffett MD        ED Course as of Sep 27 1652   Tue Sep 24, 2019   1040 10:40 AM  Patient states that chest \"pressure\" has gotten worse, EKG unchanged, troponin <0.02. D-dimer positive. Will move to CTA     [TL]   1125 Patient refused CTA because he doesn't want to lie flat. [TL]   9100 11:56 AM  Pt went into episode of SVT on monitor. Patient had some right sided discomfort during episode. Converted with 5 mg of lopressor and returned to baseline. [TL]   1252 Pt feels symptomatically much improved. Will now consent to CTA     [TL]   1529 3:29 PM  Pt remains symptomatically improved, CTA negative for PE  new onset heart failure most likely diagnosis. Patient admitted internal medicine. Patient remains in stable condition. [TL]      ED Course User Index  [TL] Laina Thomas MD         I personally saw and examined the patient. I have reviewed and agree with the residents findings, including all diagnostic interpretations, and plans as written. I was present during the key portions of separately billed procedures.   Silva Santana MD

## 2019-09-24 NOTE — PROGRESS NOTES
TRANSFER - IN REPORT:    Verbal report received from Mirtha(name) on Laural Marking  being received from ER(unit) for routine progression of care      Report consisted of patients Situation, Background, Assessment and   Recommendations(SBAR). Information from the following report(s) ED Summary, Recent Results and Cardiac Rhythm ST was reviewed with the receiving nurse. Opportunity for questions and clarification was provided. 1645 received pt from ER via bed to room 218. Alert and oriented x 4. Call bell at side. Denies c/p pain at this time. Still c/o SOB w any activity. Instructed not to get OOB  w/o help. 1940 Bedside and Verbal shift change report given to [address] [place] [address] (oncoming nurse) by Octavio Shin RN (offgoing nurse). Report given with SBAR, Addy and MAR.

## 2019-09-24 NOTE — Clinical Note
TRANSFER - IN REPORT:  
 
Verbal report received from: Nolvia Loco RN. Report consisted of patient's Situation, Background, Assessment and  
Recommendations(SBAR). Opportunity for questions and clarification was provided. Assessment completed upon patient's arrival to unit and care assumed.

## 2019-09-25 ENCOUNTER — APPOINTMENT (OUTPATIENT)
Dept: NON INVASIVE DIAGNOSTICS | Age: 79
DRG: 226 | End: 2019-09-25
Attending: PHYSICIAN ASSISTANT
Payer: MEDICARE

## 2019-09-25 ENCOUNTER — HOSPICE ADMISSION (OUTPATIENT)
Dept: HOSPICE | Facility: HOSPICE | Age: 79
End: 2019-09-25

## 2019-09-25 LAB
ANION GAP SERPL CALC-SCNC: 20 MMOL/L (ref 3–18)
APTT PPP: 28.7 SEC (ref 23–36.4)
APTT PPP: 31.8 SEC (ref 23–36.4)
APTT PPP: >180 SEC (ref 23–36.4)
BASOPHILS # BLD: 0 K/UL (ref 0–0.1)
BASOPHILS NFR BLD: 0 % (ref 0–2)
BUN SERPL-MCNC: 16 MG/DL (ref 7–18)
BUN/CREAT SERPL: 10 (ref 12–20)
CALCIUM SERPL-MCNC: 7.5 MG/DL (ref 8.5–10.1)
CHLORIDE SERPL-SCNC: 103 MMOL/L (ref 100–111)
CK MB CFR SERPL CALC: 11.1 % (ref 0–4)
CK MB CFR SERPL CALC: 7.2 % (ref 0–4)
CK MB CFR SERPL CALC: 9.3 % (ref 0–4)
CK MB CFR SERPL CALC: 9.8 % (ref 0–4)
CK MB SERPL-MCNC: 11.6 NG/ML (ref 5–25)
CK MB SERPL-MCNC: 13 NG/ML (ref 5–25)
CK MB SERPL-MCNC: 8.3 NG/ML (ref 5–25)
CK MB SERPL-MCNC: 9.3 NG/ML (ref 5–25)
CK SERPL-CCNC: 100 U/L (ref 39–308)
CK SERPL-CCNC: 115 U/L (ref 39–308)
CK SERPL-CCNC: 117 U/L (ref 39–308)
CK SERPL-CCNC: 118 U/L (ref 39–308)
CO2 SERPL-SCNC: 15 MMOL/L (ref 21–32)
CREAT SERPL-MCNC: 1.57 MG/DL (ref 0.6–1.3)
DIFFERENTIAL METHOD BLD: ABNORMAL
ECHO IVC SNIFF: 1.98 CM
ECHO LV GLOBAL LONGITUDINAL STRAIN (GLS): -5.4 %
ECHO LV INTERNAL DIMENSION DIASTOLIC: 3.96 CM (ref 4.2–5.9)
ECHO LV INTERNAL DIMENSION SYSTOLIC: 3.89 CM
ECHO LV IVSD: 0.96 CM (ref 0.6–1)
ECHO LV MASS 2D: 112.2 G (ref 88–224)
ECHO LV MASS INDEX 2D: 75.5 G/M2 (ref 49–115)
ECHO LV POSTERIOR WALL DIASTOLIC: 0.75 CM (ref 0.6–1)
ECHO TV REGURGITANT MAX VELOCITY: 251 CM/S
ECHO TV REGURGITANT PEAK GRADIENT: 25.2 MMHG
EOSINOPHIL # BLD: 0 K/UL (ref 0–0.4)
EOSINOPHIL NFR BLD: 0 % (ref 0–5)
ERYTHROCYTE [DISTWIDTH] IN BLOOD BY AUTOMATED COUNT: 15.1 % (ref 11.6–14.5)
ERYTHROCYTE [DISTWIDTH] IN BLOOD BY AUTOMATED COUNT: 15.2 % (ref 11.6–14.5)
FERRITIN SERPL-MCNC: 1367 NG/ML (ref 8–388)
FOLATE SERPL-MCNC: >20 NG/ML (ref 3.1–17.5)
GLUCOSE SERPL-MCNC: 126 MG/DL (ref 74–99)
HCT VFR BLD AUTO: 33.1 % (ref 36–48)
HCT VFR BLD AUTO: 33.8 % (ref 36–48)
HGB BLD-MCNC: 11.2 G/DL (ref 13–16)
HGB BLD-MCNC: 11.3 G/DL (ref 13–16)
IRON SATN MFR SERPL: 36 %
IRON SERPL-MCNC: 62 UG/DL (ref 50–175)
LYMPHOCYTES # BLD: 1.8 K/UL (ref 0.9–3.6)
LYMPHOCYTES NFR BLD: 21 % (ref 21–52)
MAGNESIUM SERPL-MCNC: 1.8 MG/DL (ref 1.6–2.6)
MCH RBC QN AUTO: 35 PG (ref 24–34)
MCH RBC QN AUTO: 35.1 PG (ref 24–34)
MCHC RBC AUTO-ENTMCNC: 33.4 G/DL (ref 31–37)
MCHC RBC AUTO-ENTMCNC: 33.8 G/DL (ref 31–37)
MCV RBC AUTO: 103.8 FL (ref 74–97)
MCV RBC AUTO: 104.6 FL (ref 74–97)
MONOCYTES # BLD: 0.6 K/UL (ref 0.05–1.2)
MONOCYTES NFR BLD: 7 % (ref 3–10)
NEUTS SEG # BLD: 6.3 K/UL (ref 1.8–8)
NEUTS SEG NFR BLD: 72 % (ref 40–73)
PLATELET # BLD AUTO: 299 K/UL (ref 135–420)
PLATELET # BLD AUTO: 343 K/UL (ref 135–420)
PMV BLD AUTO: 9.5 FL (ref 9.2–11.8)
PMV BLD AUTO: 9.6 FL (ref 9.2–11.8)
POTASSIUM SERPL-SCNC: 4.3 MMOL/L (ref 3.5–5.5)
RBC # BLD AUTO: 3.19 M/UL (ref 4.7–5.5)
RBC # BLD AUTO: 3.23 M/UL (ref 4.7–5.5)
SODIUM SERPL-SCNC: 138 MMOL/L (ref 136–145)
TIBC SERPL-MCNC: 174 UG/DL (ref 250–450)
TROPONIN I SERPL-MCNC: 1.04 NG/ML (ref 0–0.04)
TROPONIN I SERPL-MCNC: 1.14 NG/ML (ref 0–0.04)
TROPONIN I SERPL-MCNC: 1.26 NG/ML (ref 0–0.04)
TROPONIN I SERPL-MCNC: 1.29 NG/ML (ref 0–0.04)
VIT B12 SERPL-MCNC: >2000 PG/ML (ref 211–911)
WBC # BLD AUTO: 6.1 K/UL (ref 4.6–13.2)
WBC # BLD AUTO: 8.7 K/UL (ref 4.6–13.2)

## 2019-09-25 PROCEDURE — 74011250636 HC RX REV CODE- 250/636: Performed by: INTERNAL MEDICINE

## 2019-09-25 PROCEDURE — 77010033678 HC OXYGEN DAILY

## 2019-09-25 PROCEDURE — 83540 ASSAY OF IRON: CPT

## 2019-09-25 PROCEDURE — 76450000000

## 2019-09-25 PROCEDURE — 82607 VITAMIN B-12: CPT

## 2019-09-25 PROCEDURE — 85730 THROMBOPLASTIN TIME PARTIAL: CPT

## 2019-09-25 PROCEDURE — 82550 ASSAY OF CK (CPK): CPT

## 2019-09-25 PROCEDURE — 74011000250 HC RX REV CODE- 250: Performed by: INTERNAL MEDICINE

## 2019-09-25 PROCEDURE — 94640 AIRWAY INHALATION TREATMENT: CPT

## 2019-09-25 PROCEDURE — 83735 ASSAY OF MAGNESIUM: CPT

## 2019-09-25 PROCEDURE — 36415 COLL VENOUS BLD VENIPUNCTURE: CPT

## 2019-09-25 PROCEDURE — 85025 COMPLETE CBC W/AUTO DIFF WBC: CPT

## 2019-09-25 PROCEDURE — 82728 ASSAY OF FERRITIN: CPT

## 2019-09-25 PROCEDURE — 85027 COMPLETE CBC AUTOMATED: CPT

## 2019-09-25 PROCEDURE — 93308 TTE F-UP OR LMTD: CPT

## 2019-09-25 PROCEDURE — 80048 BASIC METABOLIC PNL TOTAL CA: CPT

## 2019-09-25 PROCEDURE — 94761 N-INVAS EAR/PLS OXIMETRY MLT: CPT

## 2019-09-25 PROCEDURE — 74011250637 HC RX REV CODE- 250/637: Performed by: INTERNAL MEDICINE

## 2019-09-25 PROCEDURE — 65660000000 HC RM CCU STEPDOWN

## 2019-09-25 PROCEDURE — 74011250636 HC RX REV CODE- 250/636: Performed by: PHYSICIAN ASSISTANT

## 2019-09-25 RX ORDER — IPRATROPIUM BROMIDE AND ALBUTEROL SULFATE 2.5; .5 MG/3ML; MG/3ML
3 SOLUTION RESPIRATORY (INHALATION)
Status: DISCONTINUED | OUTPATIENT
Start: 2019-09-25 | End: 2019-09-25

## 2019-09-25 RX ORDER — HEPARIN SODIUM 1000 [USP'U]/ML
60 INJECTION, SOLUTION INTRAVENOUS; SUBCUTANEOUS ONCE
Status: COMPLETED | OUTPATIENT
Start: 2019-09-25 | End: 2019-09-25

## 2019-09-25 RX ORDER — IPRATROPIUM BROMIDE AND ALBUTEROL SULFATE 2.5; .5 MG/3ML; MG/3ML
3 SOLUTION RESPIRATORY (INHALATION)
Status: DISCONTINUED | OUTPATIENT
Start: 2019-09-26 | End: 2019-09-26

## 2019-09-25 RX ORDER — HEPARIN SODIUM 10000 [USP'U]/100ML
12-25 INJECTION, SOLUTION INTRAVENOUS
Status: DISCONTINUED | OUTPATIENT
Start: 2019-09-25 | End: 2019-09-26

## 2019-09-25 RX ORDER — ONDANSETRON 2 MG/ML
4 INJECTION INTRAMUSCULAR; INTRAVENOUS
Status: DISCONTINUED | OUTPATIENT
Start: 2019-09-25 | End: 2019-10-01 | Stop reason: HOSPADM

## 2019-09-25 RX ADMIN — AMIODARONE HYDROCHLORIDE 0.5 MG/MIN: 1.8 INJECTION, SOLUTION INTRAVENOUS at 12:05

## 2019-09-25 RX ADMIN — ALBUTEROL SULFATE 1.25 MG: 1.25 SOLUTION RESPIRATORY (INHALATION) at 02:14

## 2019-09-25 RX ADMIN — FUROSEMIDE 20 MG: 10 INJECTION, SOLUTION INTRAMUSCULAR; INTRAVENOUS at 10:03

## 2019-09-25 RX ADMIN — HEPARIN SODIUM 2780 UNITS: 1000 INJECTION, SOLUTION INTRAVENOUS; SUBCUTANEOUS at 08:07

## 2019-09-25 RX ADMIN — FUROSEMIDE 20 MG: 10 INJECTION, SOLUTION INTRAMUSCULAR; INTRAVENOUS at 17:59

## 2019-09-25 RX ADMIN — Medication 10 ML: at 07:56

## 2019-09-25 RX ADMIN — METHYLPREDNISOLONE SODIUM SUCCINATE 40 MG: 40 INJECTION, POWDER, FOR SOLUTION INTRAMUSCULAR; INTRAVENOUS at 07:55

## 2019-09-25 RX ADMIN — METOPROLOL SUCCINATE 25 MG: 25 TABLET, EXTENDED RELEASE ORAL at 10:01

## 2019-09-25 RX ADMIN — HEPARIN SODIUM 556.8 UNITS/HR: 10000 INJECTION, SOLUTION INTRAVENOUS at 08:11

## 2019-09-25 RX ADMIN — Medication 10 ML: at 17:59

## 2019-09-25 RX ADMIN — ASPIRIN 81 MG: 81 TABLET, COATED ORAL at 10:01

## 2019-09-25 RX ADMIN — FOLIC ACID 1 MG: 1 TABLET ORAL at 10:02

## 2019-09-25 RX ADMIN — CLOPIDOGREL BISULFATE 75 MG: 75 TABLET ORAL at 10:02

## 2019-09-25 RX ADMIN — THERA TABS 1 TABLET: TAB at 10:02

## 2019-09-25 RX ADMIN — ISOSORBIDE MONONITRATE 30 MG: 30 TABLET, EXTENDED RELEASE ORAL at 10:02

## 2019-09-25 RX ADMIN — Medication 100 MG: at 10:02

## 2019-09-25 RX ADMIN — AMIODARONE HYDROCHLORIDE 0.5 MG/MIN: 1.8 INJECTION, SOLUTION INTRAVENOUS at 00:55

## 2019-09-25 RX ADMIN — LOSARTAN POTASSIUM 25 MG: 50 TABLET, FILM COATED ORAL at 10:02

## 2019-09-25 RX ADMIN — IPRATROPIUM BROMIDE AND ALBUTEROL SULFATE 3 ML: .5; 3 SOLUTION RESPIRATORY (INHALATION) at 20:46

## 2019-09-25 RX ADMIN — Medication 10 ML: at 22:29

## 2019-09-25 RX ADMIN — DOXYCYCLINE HYCLATE 100 MG: 100 CAPSULE ORAL at 10:02

## 2019-09-25 RX ADMIN — IPRATROPIUM BROMIDE AND ALBUTEROL SULFATE 3 ML: .5; 3 SOLUTION RESPIRATORY (INHALATION) at 08:56

## 2019-09-25 RX ADMIN — BUDESONIDE 500 MCG: 0.5 INHALANT RESPIRATORY (INHALATION) at 08:56

## 2019-09-25 RX ADMIN — IPRATROPIUM BROMIDE AND ALBUTEROL SULFATE 3 ML: .5; 3 SOLUTION RESPIRATORY (INHALATION) at 13:21

## 2019-09-25 RX ADMIN — ATORVASTATIN CALCIUM 40 MG: 40 TABLET, FILM COATED ORAL at 22:27

## 2019-09-25 RX ADMIN — BUDESONIDE 500 MCG: 0.5 INHALANT RESPIRATORY (INHALATION) at 20:46

## 2019-09-25 NOTE — PROGRESS NOTES
[facility]  Progress Note    Patient: Jourdan Church Age: 69 y.o. : 1940 MR#: [id number] SSN: [id number]  Date: 2019     Subjective:   Alert and oriented X 3. NAD. Laying flat in bed. No chest pain / chest discomfort. No n/v/d/c or abd pain     Overnight troponin jumped to 1.27. He was initiated on hep gtt. Assessment/Plan:     1. Acute on chronic combined systolic and diastolic HF. 2. Elevated troponin concerning for NSTEMI  3. Wide complex tachycardia  4. Alcohol abuse  5. Hypomagnesemia   6. Tobacco abuse. Smokes 0.5ppd   7. COPD / Emphysema  8. Hypoalbuminemia d/t moderate protein calorie malnutrition   9. Cachexia   10. Chronic macrocytic anemia -  Alcoholism related. PLAN  1. Cardiology following. Cont amiodarone, hep gtt. Cont IV lasix. Echo pending. Per cards, patient with poor prognosis- patient would like to discuss goals of care with daughter first, who lives in [place]. Per cards, if patient and daughter want aggressive care, \"  can consider AICD, and potential PCI to left main as outpatient, however, this is unlikely to change EF significantly. More realistically, I would recommend conservative therapy. \"   2. Cessation counseling provided. Cont CIWA. Folic acid, thiamine, multivitamin. No nicotine patch given concern for NSTEMI. 3. Cont scheduled nebs, Pulmicort. Will hold off on doxy, IV steroids for now. 4. Nutrition / dietician to follow. 5. Palliative care following. Will re-consult hospice once patient has had conversation with daughter.       Additional Notes:      Case discussed with:  [x]Patient  [x]Family  [x]Nursing  [x]Case Management  DVT Prophylaxis:  []Lovenox  []Hep SQ  []SCDs  []Coumadin   [x]On Heparin gtt    Objective:   VS:   Visit Vitals  /78   Pulse 73   Temp 97.6 °F (36.4 °C)   Resp 20   Ht 5' 5\" (1.651 m)   Wt 46.3 kg (102 lb)   SpO2 100%   BMI 16.97 kg/m²      Tmax/24hrs: Temp (24hrs), Av.6 °F (36.4 °C), Min:97.3 °F (36.3 °C), Max:97.7 °F (36.5 °C)      Intake/Output Summary (Last 24 hours) at 9/25/2019 1229  Last data filed at 9/24/2019 1231  Gross per 24 hour   Intake 50 ml   Output    Net 50 ml       General:  Alert, NAD  Cardiovascular:  RRR  Pulmonary:  LSC throughout; respiratory effort WNL  GI:  +BS in all four quadrants, soft, non-tender  Extremities:  No edema; 2+ dorsalis pedis pulses bilaterally  Neuro: alert and oriented X 3.      Labs:    Recent Results (from the past 24 hour(s))   CARDIAC PANEL,(CK, CKMB & TROPONIN)    Collection Time: 09/24/19  6:27 PM   Result Value Ref Range    CK 78 39 - 308 U/L    CK - MB 5.5 (H) <3.6 ng/ml    CK-MB Index 7.1 (H) 0.0 - 4.0 %    Troponin-I, QT 0.39 (H) 0.0 - 0.045 NG/ML   CARDIAC PANEL,(CK, CKMB & TROPONIN)    Collection Time: 09/24/19 11:05 PM   Result Value Ref Range     39 - 308 U/L    CK - MB 9.3 (H) <3.6 ng/ml    CK-MB Index 9.3 (H) 0.0 - 4.0 %    Troponin-I, QT 1.04 (H) 0.0 - 3.769 NG/ML   METABOLIC PANEL, BASIC    Collection Time: 09/25/19  3:58 AM   Result Value Ref Range    Sodium 138 136 - 145 mmol/L    Potassium 4.3 3.5 - 5.5 mmol/L    Chloride 103 100 - 111 mmol/L    CO2 15 (L) 21 - 32 mmol/L    Anion gap 20 (H) 3.0 - 18 mmol/L    Glucose 126 (H) 74 - 99 mg/dL    BUN 16 7.0 - 18 MG/DL    Creatinine 1.57 (H) 0.6 - 1.3 MG/DL    BUN/Creatinine ratio 10 (L) 12 - 20      GFR est AA 52 (L) >60 ml/min/1.73m2    GFR est non-AA 43 (L) >60 ml/min/1.73m2    Calcium 7.5 (L) 8.5 - 10.1 MG/DL   CBC W/O DIFF    Collection Time: 09/25/19  3:58 AM   Result Value Ref Range    WBC 6.1 4.6 - 13.2 K/uL    RBC 3.23 (L) 4.70 - 5.50 M/uL    HGB 11.3 (L) 13.0 - 16.0 g/dL    HCT 33.8 (L) 36.0 - 48.0 %    .6 (H) 74.0 - 97.0 FL    MCH 35.0 (H) 24.0 - 34.0 PG    MCHC 33.4 31.0 - 37.0 g/dL    RDW 15.1 (H) 11.6 - 14.5 %    PLATELET 087 511 - 898 K/uL    MPV 9.6 9.2 - 11.8 FL   CARDIAC PANEL,(CK, CKMB & TROPONIN)    Collection Time: 09/25/19  7:03 AM Result Value Ref Range     39 - 308 U/L    CK - MB 13.0 (H) <3.6 ng/ml    CK-MB Index 11.1 (H) 0.0 - 4.0 %    Troponin-I, QT 1.29 (H) 0.0 - 0.045 NG/ML   CBC WITH AUTOMATED DIFF    Collection Time: 09/25/19  7:03 AM   Result Value Ref Range    WBC 8.7 4.6 - 13.2 K/uL    RBC 3.19 (L) 4.70 - 5.50 M/uL    HGB 11.2 (L) 13.0 - 16.0 g/dL    HCT 33.1 (L) 36.0 - 48.0 %    .8 (H) 74.0 - 97.0 FL    MCH 35.1 (H) 24.0 - 34.0 PG    MCHC 33.8 31.0 - 37.0 g/dL    RDW 15.2 (H) 11.6 - 14.5 %    PLATELET 647 366 - 594 K/uL    MPV 9.5 9.2 - 11.8 FL    NEUTROPHILS 72 40 - 73 %    LYMPHOCYTES 21 21 - 52 %    MONOCYTES 7 3 - 10 %    EOSINOPHILS 0 0 - 5 %    BASOPHILS 0 0 - 2 %    ABS. NEUTROPHILS 6.3 1.8 - 8.0 K/UL    ABS. LYMPHOCYTES 1.8 0.9 - 3.6 K/UL    ABS. MONOCYTES 0.6 0.05 - 1.2 K/UL    ABS. EOSINOPHILS 0.0 0.0 - 0.4 K/UL    ABS.  BASOPHILS 0.0 0.0 - 0.1 K/UL    DF AUTOMATED     PTT    Collection Time: 09/25/19  7:03 AM   Result Value Ref Range    aPTT 28.7 23.0 - 36.4 SEC   VITAMIN B12 & FOLATE    Collection Time: 09/25/19  7:03 AM   Result Value Ref Range    Vitamin B12 >2,000 (H) 211 - 911 pg/mL    Folate >20.0 (H) 3.10 - 17.50 ng/mL   FERRITIN    Collection Time: 09/25/19  7:03 AM   Result Value Ref Range    Ferritin 1,367 (H) 8 - 388 NG/ML   IRON PROFILE    Collection Time: 09/25/19  7:03 AM   Result Value Ref Range    Iron 62 50 - 175 ug/dL    TIBC 174 (L) 250 - 450 ug/dL    Iron % saturation 36 %   MAGNESIUM    Collection Time: 09/25/19  7:03 AM   Result Value Ref Range    Magnesium 1.8 1.6 - 2.6 mg/dL   ECHO ADULT FOLLOW-UP OR LIMITED    Collection Time: 09/25/19 11:36 AM   Result Value Ref Range    LVIDd 3.96 (A) 4.2 - 5.9 cm    LVPWd 0.75 0.6 - 1.0 cm    LVIDs 3.89 cm    IVSd 0.96 0.6 - 1.0 cm    Global Longitudinal Strain -5.40 %    Inferior Vena Cava Diameter Sniffing 1.98 cm    LV Mass .2 88 - 224 g    LV Mass AL Index 62.8 49 - 115 g/m2    Triscuspid Valve Regurgitation Peak Gradient 25.2 mmHg TR Max Velocity 251.00 cm/s       Signed By: Chase Saab NP     September 25, 2019

## 2019-09-25 NOTE — PROGRESS NOTES
conducted an initial consultation and Spiritual Assessment for Delisa Macias, who is a 69 y. o.,male. Patient's Primary Language is: [place]. According to the patient's EMR Moravian Affiliation is: [facility].     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    Diverticula of colon 12/17/2013    Unspecified hereditary and idiopathic peripheral neuropathy 12/04/2013    Paresthesia and pain of both upper extremities 10/31/2013    Binocular visual disturbance 10/31/2013    Hypertensive heart disease with CHF ([facility] [address]     Emphysematous COPD ([facility] [address]     Alcohol abuse     Tobacco abuse     Hypercholesterolemia     CAD (coronary artery disease), native coronary artery 01/03/2005        The  provided the following Interventions:  Initiated a relationship of care and support. Explored issues of jose, belief, spirituality and Samaritan/ritual needs while hospitalized. Listened empathically. Provided chaplaincy education. Provided information about Spiritual Care Services. Offered prayer and assurance of continued prayers on patient's behalf. Chart reviewed. The following outcomes where achieved:  Patient shared limited information about both their medical narrative and spiritual journey/beliefs.  confirmed Patient's Moravian Affiliation. Patient processed feeling about current hospitalization. Patient expressed gratitude for 's visit. Assessment:  Patient does not have any Samaritan/cultural needs that will affect patient's preferences in health care. There are no spiritual or Samaritan issues which require intervention at this time.      Plan:  Chaplains will continue to follow and will provide pastoral care on an as needed/requested basis.  recommends bedside caregivers page  on duty if patient shows signs of acute spiritual or emotional distress.     [id number]   [phone]

## 2019-09-25 NOTE — ACP (ADVANCE CARE PLANNING)
Palliative Medicine    Pt does not have an Advance Directive on file in EMR. He is thinking about completing one. Palliative team provided a copy of the [address] Directive to the pt for his review. Encouraged pt to talk with his family about appointing a medical decision maker in the event that he becomes incapacitated. For now legal NOK would remain as medical decision maker in the event that pt is incapacitated:    2 adult children (1 son - Keshia Chin  [phone];  1 daughter - Jasmin Nolen [phone])    ACP documents you currently have include:  [] Advance Directive or Living Will  [x] Durable Do Not Resuscitate  [] Physician Orders for Scope of Treatment (POST)  [] [address]  [] Other    Pt is a DNR. DDNR is on file in EMR. Potential dispo plan will be home when medically stable. Thank you for the Palliative Medicine consult and allowing us to participate in the care of Mr. Yash Hays. Will continue to monitor and provide support.     Agustín Patten RN, BSN  Palliative Medicine Inpatient RN  [facility]  Palliative COPE Line: [phone]

## 2019-09-25 NOTE — PROGRESS NOTES
NUTRITION    Nursing Referral: [facility]  Nutrition Consult: General Nutrition Management & Supplements     RECOMMENDATIONS / PLAN:     - Add supplements: Ensure Enlive BID & Magic Cup once daily.  - Monitor and encourage po/supplement intake. - Continue RD inpatient monitoring and evaluation. NUTRITION INTERVENTIONS & DIAGNOSIS:     - Meals/snacks: modify composition  - Medical food supplement therapy: initiate  - Vitamin and mineral supplement therapy: folic acid, theragran, thiamine     Nutrition Diagnosis: Underweight related to inadequate oral intake with excessive alcohol intake as evidenced by BMI of 17 kg/(m^2) upon admisison. ASSESSMENT:     Admitted for SOB 2/2 acute on chronic CHF. Per cardiology, poor prognosis, palliative following. Admits to ETOH use PTA with poor appetite and a few lb weight loss. States \"I have been skinny all of my life. \" Sometimes consuming Ensure supplements PTA when he can afford them. Nutritional intake adequate to meet patients estimated nutritional needs:  No    Diet: DIET CARDIAC Mechanical Soft      Food Allergies: NKFA  Current Appetite: Poor  Appetite/meal intake prior to admission: Poor 1-2 small meals/day x months  Feeding Limitations:  [] Swallowing difficulty    [x] Chewing difficulty: poor dentition    [] Other:  Current Meal Intake: No data found.     BM: 9/23- per pt report  Skin Integrity: WDL  Edema:   [x] No     [] Yes   Pertinent Medications: Reviewed: lasix, zofran    Recent Labs     09/25/19  0703 09/25/19  0358 09/24/19  0957   NA  --  138 141   K  --  4.3 3.5   CL  --  103 110   CO2  --  15* 22   GLU  --  126* 229*   BUN  --  16 13   CREA  --  1.57* 1.15   CA  --  7.5* 7.5*   MG 1.8  --  1.3*   ALB  --   --  2.5*   SGOT  --   --  170*   ALT  --   --  41     No intake or output data in the 24 hours ending 09/25/19 1525    Anthropometrics:  Ht Readings from Last 1 Encounters:   09/25/19 5' 5\" (1.651 m)     Last 3 Recorded Weights in this Encounter 09/24/19 1649 09/25/19 0343 09/25/19 1115   Weight: 46 kg (101 lb 8 oz) 46.4 kg (102 lb 3.2 oz) 46.3 kg (102 lb)     Body mass index is 16.97 kg/m². Underweight    Weight History: Pt reports a few lb weight loss PTA. Per chart -8 lbs, 7.2% x 7 months PTA. Weight Metrics 9/25/2019 5/16/2019 5/15/2019 5/14/2019 5/10/2019 2/8/2019 2/1/2019   Weight 102 lb - 103 lb 103 lb 103 lb 110 lb 105 lb   BMI 16.97 kg/m2 17.14 kg/m2 17.14 kg/m2 - 17.14 kg/m2 18.3 kg/m2 17.47 kg/m2        Admitting Diagnosis: Congestive heart failure (CHF) (HCC) [I50.9]  Pertinent PMHx: alcohol abuse, CAD with CABG x5, CKD, COPD, CHF, GERD, HTN, hypercholesterolemia    Education Needs:        [x] None identified  [] Identified - Not appropriate at this time  []  Identified and addressed - refer to education log  Learning Limitations:   [x] None identified  [] Identified    Cultural, Yarsanism & ethnic food preferences:  [x] None identified    [] Identified and addressed     ESTIMATED NUTRITION NEEDS:     Calories: 5014-9661 kcal (25-35 kcal/kg) based on  [] Actual BW      [x] IBW: 62 kg   Protein: 50-74 gm (0.8-1.2 gm/kg) based on  [] Actual BW      [x] IBW   Fluid: 1 mL/kcal     MONITORING & EVALUATION:     Nutrition Goal(s):   - PO nutrition intake will meet >75% of patient estimated nutritional needs within the next 7 days. - Weight gain of 1-2 lbs over the next 7 days. Outcome: New/Initial goal        Monitoring:   [x] Food and beverage intake   [x] Diet order   [x] Nutrition-focused physical findings   [x] Treatment/therapy   [] Weight   [] Enteral nutrition intake        Previous Recommendations (for follow-up assessments only):    Not Applicable     Discharge Planning: No nutritional discharge needs at this time.   [x] Participated in care planning, discharge planning, & interdisciplinary rounds as appropriate      Shanon Javier RD   Pager: [phone]

## 2019-09-25 NOTE — ROUTINE PROCESS
Bedside verbal shift change report given to Patrick Park RN. Report included the following information: SBAR, Kardex, Intake/Output, MAR and Recent Results. Pt in bed with call light in reach. Amiodarone drip rate verified with oncoming nurse. Will start Heparin drip.

## 2019-09-25 NOTE — CONSULTS
Palliative Medicine Consult    Patient Name: Sedalia Goltz  YOB: 1940    Date of Initial Consult: 9/25/2019  Reason for Consult: Goals of care discussion  Requesting Provider:  Shailesh Garner NP  Primary Care Physician: Brooks Harper MD      SUMMARY:   Sedalia Goltz is a 69 y.o. with a past history of HTN, severe CAD with CABG x 5, systolic and diastolic heart failure, COPD, tobacco abuse, alcoholism, and chronic doxy for eye infection, who was admitted on 9/24/2019 from home with a diagnosis of CHF exacerbation, wide complex tachycardia, and SOB. Current medical issues leading to Palliative Medicine involvement include: Goals of care discussions for a 69year old male who has significant cardiac history. PALLIATIVE DIAGNOSES:   1. Goals of care discussions  2. CHF exacerbation  3. CAD  4. COPD       PLAN:   1. Goals of care discussion: Met with patient and patient's son Patito Betancourt at bedside. Patient is alert and oriented x 4 and states he needs to talk to his daughter about his heart and decide whether he would be accepting of an AICD. Patient does not have an AMD on file, but states he would want his daughter Geronimo Mijares to make medical decisions for him in the event he were unable to communicate. Encouraged and offered help with AMD completion, but patient does not wish to complete at this time. Blank copy left with patient along with instructions for completion. Patient has a DDNR on file, and did not wish for further discussion when introducing the POST form. Full aggressive measures up until cardiac or pulmonary arrest. Supportive listening offered to patient and family. Will continue to follow for continued goals of care discussions and support. 2. CHF exacerbation: Cardiology following. Echo result pending. Per cardiology note, \"drop in EF consistent with worsening heart failure/cardiomyopathy. \" Patient admits to dyspnea at rest and with exertion.  Just finished receiving a breathing nebulizer treatment. 3. CAD: Cardiology following. Reviewed note. Per cardiology, \"can consider AICD, and potential PCI to left main as outpatient, however, this is unlikely to change EF significantly. \"  Patient is discussing this with daughter versus conservative management. 4. COPD: On nebulizer treatments. Nicotine dependence. 5. Initial consult note routed to primary continuity provider  6.  Communicated plan of care with: Palliative IDT, patient, family       GOALS OF CARE / TREATMENT PREFERENCES:   [====Goals of Care====]  GOALS OF CARE: DNR  Patient/Health Care Proxy Stated Goals: Prolong life      TREATMENT PREFERENCES: full aggressive measures up until cardiac or pulmonary arrest.   Code Status: DNR    Advance Care Planning:  Advance Care Planning 9/25/2019   Patient's Healthcare Decision Maker is: Legal Next of Kin   Confirm Advance Directive None   Patient Would Like to Complete Advance Directive -   Does the patient have other document types Do Not Resuscitate           The palliative care team has discussed with patient / health care proxy about goals of care / treatment preferences for patient.  [====Goals of Care====]         HISTORY:     History obtained from: patient, chart    CHIEF COMPLAINT: dyspnea    HPI/SUBJECTIVE:    The patient is:   [x] Verbal and participatory  [] Non-participatory due to:   Pleasant, oriented x 4    Clinical Pain Assessment (nonverbal scale for severity on nonverbal patients):   Clinical Pain Assessment  Severity: 0            FUNCTIONAL ASSESSMENT:     Palliative Performance Scale (PPS):  PPS: 60       PSYCHOSOCIAL/SPIRITUAL SCREENING:     Advance Care Planning:  Advance Care Planning 9/25/2019   Patient's Healthcare Decision Maker is: Legal Next of Kin   Confirm Advance Directive None   Patient Would Like to Complete Advance Directive -   Does the patient have other document types Do Not Resuscitate        Any spiritual / Holiness concerns:  [] Yes /  [x] No    Caregiver Burnout:  [] Yes /  [x] No /  [] No Caregiver Present      Anticipatory grief assessment:   [x] Normal  / [] Maladaptive        REVIEW OF SYSTEMS:     Positive and pertinent negative findings in ROS are noted above in HPI. The following systems were [x] reviewed / [] unable to be reviewed as noted in HPI  Other findings are noted below. Systems: constitutional, ears/nose/mouth/throat, respiratory, gastrointestinal, genitourinary, musculoskeletal, integumentary, neurologic, psychiatric, endocrine. Positive findings noted below. Modified ESAS Completed by: provider   Fatigue: 3     Depression: 0 Pain: 0   Anxiety: 0 Nausea: 0   Anorexia: 0 Dyspnea: 3     Constipation: No              PHYSICAL EXAM:     From RN flowsheet:  Wt Readings from Last 3 Encounters:   09/25/19 46.3 kg (102 lb)   05/15/19 46.7 kg (103 lb)   05/10/19 46.7 kg (103 lb)     Blood pressure 125/76, pulse 89, temperature 98 °F (36.7 °C), resp. rate 20, height 5' 5\" (1.651 m), weight 46.3 kg (102 lb), SpO2 95 %. Pain Scale 1: Numeric (0 - 10)  Pain Intensity 1: 0                 Last bowel movement, if known: unknown    Constitutional: Awake, alert, pleasant  Eyes: pupils equal, anicteric  ENMT: no nasal discharge, moist mucous membranes  Respiratory: breathing mildly labored, symmetric, completing neb treatment, on oxygen via NC  Musculoskeletal: no deformity, no tenderness to palpation  Skin: warm, dry  Neurologic: following commands, moving all extremities  Psychiatric: full affect, no hallucinations       HISTORY:     Active Problems:    Tobacco abuse ()      CAD (coronary artery disease), native coronary artery (1/3/2005)      Overview: LAD 70-80% mid; Cx-diffuse 80%; OM1-70% prox; RCA 40-50% mid; 100% RPLB       with collaterals. Repeat cor angio (Aug 2013): 30% dLM, 70%pLAD, 80%mLAD,       60%dLAD, 80%D1 (large), 80% ramus, 85%dLCx, 80%dRCA, 75%RPLB. LV EF 50%       with inferobasal hypokinesis. S/p CABG x5 (LIMA->LAD, Seq SVG-> D &ramus, Seq SVG-> RPDA & RPLB; April 2014)      Emphysematous COPD [facility]) ()      Overview: PFTs March 2009: mild-moderate obstruction; + bronchodil. Response;       decreased DLCO      Alcohol abuse ()      Overview: quit February 2011      S/P CABG x 5 (4/10/2014)      Overview: LIMA to LAD, and a sequential saphenous vein graft to the 1st diagonal of       the LAD and to the ramus intermedius, and another sequential saphenous       vein graft to the posterior descending branch and the posterolateral       branch of the RCA, Dr. Hilario Mai, 4/9/14. Congestive heart failure (CHF) ([address] (9/24/2019)      Wide-complex tachycardia ([address] (9/24/2019)      Acute on chronic combined systolic and diastolic ACC/AHA stage C congestive heart failure ([address] (9/24/2019)      Past Medical History:   Diagnosis Date    Alcohol abuse     quit February 2011    Binocular visual disturbance 10/31/2013    CAD (coronary artery disease)     CAD (coronary artery disease), native coronary artery 1/3/05    LAD 70-80% mid; Cx-diffuse 80%; OM1-70% prox; RCA 40-50% mid; 100% RPLB with collaterals    Cardiac cath 08/19/2013    Severe, diffuse calcification. dLM 30%. mLAD 80%. o/pD1 80%. o/pRamus 80%. dCX 85%. dRCA 80%.  m-dRPLB 75%. EF 50%. Mod to severe inferobasal hypk.  Cardiac echocardiogram 01/19/2011    EF 50-55%. Gr 1 DDfx. RVSP 30 mmHg.  Cardiac nuclear imaging test 06/27/2011    No evidence of ischemia or previous infarction. EF 60%. Neg EKG on max EST. Ex time 6 mins.     Chronic kidney disease     Chronic lung disease     Chronic obstructive pulmonary disease ([address]     Coronary artery disease     Diastolic CHF, chronic (HCC)     LV EF 55% (Echo Jan 2011)    Diverticula of colon 12/17/2013    Emphysematous COPD ([address]     PFTs March 2009; mild-mode obstruction, + bronchodil response; decreased DLCO    GERD (gastroesophageal reflux disease)     with erosive esophagitis history    Heart failure (HCC)     History of renal failure     2 years ago, which completely resolved    Hypercholesterolemia     Hypertension     [facility]     MI (myocardial infarction) ([facility]     Inferior wall    Paresthesia and pain of both upper extremities 10/31/2013    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 Sloan Cogan, 4/9/14.  Tobacco abuse     Unspecified hereditary and idiopathic peripheral neuropathy 12/4/2013      Past Surgical History:   Procedure Laterality Date    HX CORONARY ARTERY BYPASS GRAFT  about 2013    HX HEART CATHETERIZATION  11/2013      Family History   Problem Relation Age of Onset    Heart Disease Maternal Uncle     Diabetes Father     Hypertension Father       History reviewed, no pertinent family history.   Social History     Tobacco Use    Smoking status: Current Every Day Smoker     Packs/day: 0.50     Years: 55.00     Pack years: 27.50     Types: Cigarettes     Start date: 3/13/2014    Smokeless tobacco: Never Used   Substance Use Topics    Alcohol use: Yes     Comment: every other day drinker     Allergies   Allergen Reactions    Vicodin [Hydrocodone-Acetaminophen] Rash      Current Facility-Administered Medications   Medication Dose Route Frequency    albuterol-ipratropium (DUO-NEB) 2.5 MG-0.5 MG/3 ML  3 mL Nebulization Q6H RT    ondansetron (ZOFRAN) injection 4 mg  4 mg IntraVENous Q6H PRN    heparin 25,000 units in D5W 250 ml infusion  12-25 Units/kg/hr IntraVENous TITRATE    aspirin delayed-release tablet 81 mg  81 mg Oral DAILY    atorvastatin (LIPITOR) tablet 40 mg  40 mg Oral QHS    clopidogrel (PLAVIX) tablet 75 mg  75 mg Oral DAILY    isosorbide mononitrate ER (IMDUR) tablet 30 mg  30 mg Oral 7am    losartan (COZAAR) tablet 25 mg  25 mg Oral DAILY    metoprolol succinate (TOPROL-XL) XL tablet 25 mg  25 mg Oral DAILY    budesonide (PULMICORT) 500 mcg/2 ml nebulizer suspension  500 mcg Nebulization BID RT    sodium chloride (NS) flush 5-40 mL  5-40 mL IntraVENous Q8H    sodium chloride (NS) flush 5-40 mL  5-40 mL IntraVENous PRN    LORazepam (ATIVAN) tablet 1 mg  1 mg Oral Q1H PRN    Or    LORazepam (ATIVAN) injection 1 mg  1 mg IntraVENous Q1H PRN    LORazepam (ATIVAN) tablet 2 mg  2 mg Oral Q1H PRN    Or    LORazepam (ATIVAN) injection 2 mg  2 mg IntraVENous S4Y PRN    folic acid (FOLVITE) tablet 1 mg  1 mg Oral DAILY    therapeutic multivitamin (THERAGRAN) tablet 1 Tab  1 Tab Oral DAILY    amiodarone (NEXTERONE) 360 mg in dextrose 200 mL (1.8 mg/mL) infusion  0.5 mg/min IntraVENous TITRATE    furosemide (LASIX) injection 20 mg  20 mg IntraVENous BID    thiamine HCL (B-1) tablet 100 mg  100 mg Oral DAILY          LAB AND IMAGING FINDINGS:     Lab Results   Component Value Date/Time    WBC 8.7 09/25/2019 07:03 AM    HGB 11.2 (L) 09/25/2019 07:03 AM    PLATELET 916 18/48/9629 07:03 AM     Lab Results   Component Value Date/Time    Sodium 138 09/25/2019 03:58 AM    Potassium 4.3 09/25/2019 03:58 AM    Chloride 103 09/25/2019 03:58 AM    CO2 15 (L) 09/25/2019 03:58 AM    BUN 16 09/25/2019 03:58 AM    Creatinine 1.57 (H) 09/25/2019 03:58 AM    Calcium 7.5 (L) 09/25/2019 03:58 AM    Magnesium 1.8 09/25/2019 07:03 AM      Lab Results   Component Value Date/Time    AST (SGOT) 170 (H) 09/24/2019 09:57 AM    Alk.  phosphatase 246 (H) 09/24/2019 09:57 AM    Protein, total 6.3 (L) 09/24/2019 09:57 AM    Albumin 2.5 (L) 09/24/2019 09:57 AM    Globulin 3.8 09/24/2019 09:57 AM     (H) 01/19/2011 02:45 AM     Lab Results   Component Value Date/Time    INR 0.9 01/23/2019 10:34 AM    Prothrombin time 12.3 01/23/2019 10:34 AM    aPTT >180.0 (HH) 09/25/2019 12:45 PM      Lab Results Component Value Date/Time    Iron 62 09/25/2019 07:03 AM    TIBC 174 (L) 09/25/2019 07:03 AM    Iron % saturation 36 09/25/2019 07:03 AM    Ferritin 1,367 (H) 09/25/2019 07:03 AM      No results found for: PH, PCO2, PO2  No components found for: [place]   Lab Results   Component Value Date/Time     09/25/2019 12:45 PM    CK - MB 11.6 (H) 09/25/2019 12:45 PM                Total time: 50 minutes  Counseling / coordination time, spent as noted above: 40 minutes  > 50% counseling / coordination?: yes, patient, family    Prolonged service was provided for  []30 min   []75 min in face to face time in the presence of the patient, spent as noted above. Time Start:   Time End:   Note: this can only be billed with 19886 (initial) or 53001 (follow up). If multiple start / stop times, list each separately.

## 2019-09-25 NOTE — PROGRESS NOTES
[facility]  -  Progress Note      Patient: Cyn Llamas MRN: [id number]  SSN: [id number]    YOB: 1940  Age: 69 y.o. Sex: male      Admit Date: 9/24/2019    Assessment:     -Presented with shortness of breath, at least partially associated wtih CHF exacerbation with BNP 5612 and CT with small bilateral pleural effusions and suspected pulmonary edema but also with history of COPD as well  -Echo 1/24/2019: EF 46-50% with hypokinetic basal inferior, basal inferolateral, mid inferior, mid inferolateral and apical inferior wall segments, grade 2 diastolic dysfunction  -CAD, Cardiac cath 2/1/19 with following findings:   · Severe native CAD with distal left main heavily calcified lesion   · Patent LIMA-LAD  · 2 sequential vein grafts (all 4 vein grafts to diagonal, OM, RPDA, RPL branch) occluded at aortotomy site  · Subtotal mid RCA occludion with NICO 0-I flow, some collaterals from left coronary system  · Native diagonal 85% stenosis  · Native OM 85% stenosis  -Elevated troponin  -Wide-complex tachycardia, most consistent with bundle branch VT  -Hypomagnesemia, Mg 1.3 on presentation, given 2 gm Mg in ER  -HTN  -Hypercholesterolemia  -Hx tobacco and alcohol abuse  -DNR status     Primary cardiologist is Dr. Pierre Middleton:     -Continue IV Lasix with close monitoring of renal indices, Cr increased to 1.57 this AM.  Strict I/O's as previously ordered, condom cath placed by RN prior to Echo this AM.  No output recorded so far.   -May need to d/c Losartan if creatinine continues to rise, current dose of 25 mg is decreased from home dose 50 mg daily.  -Heparin infusion started this morning by primary team due to increasing troponin, agree with continued medical management.    -Continued on Amiodarone infusion.  -Continue Asa, Plavix, Lipitor, Toprol, Imdur.  -Echocardiogram being completed this morning.  -Further recommendations to follow based on test results, hospital course. Subjective:     No new complaints. Continues to have shortness of breath, slightly improved. Only urinating approximately 2 times after each Lasix dose, reports used to take 40 mg Lasix at home but this was previously discontinued. He reports intermittent lower sternal chest discomfort with position changes, chronic and unchanged x several months, lasting 5-10 minutes each episode.     Objective:      Patient Vitals for the past 8 hrs:   Temp Pulse Resp BP SpO2   09/25/19 0819 97.6 °F (36.4 °C) 73 20 130/83 100 %         Patient Vitals for the past 96 hrs:   Weight   09/25/19 0343 102 lb 3.2 oz (46.4 kg)   09/24/19 1649 101 lb 8 oz (46 kg)   09/24/19 0950 125 lb (56.7 kg)         Intake/Output Summary (Last 24 hours) at 9/25/2019 1110  Last data filed at 9/24/2019 1231  Gross per 24 hour   Intake 50 ml   Output    Net 50 ml       Physical Exam:  General:  alert, cooperative, no distress, appears stated age  Neck:  supple  Lungs:  clear to auscultation bilaterally  Heart:  Regular rate and rhythm  Abdomen:  abdomen is soft without significant tenderness, masses, organomegaly or guarding  Extremities:  Atraumatic, no edema     Data Review:     Labs: Results:       Chemistry Recent Labs     09/25/19 0703 09/25/19 0358 09/24/19 0957   GLU  --  126* 229*   NA  --  138 141   K  --  4.3 3.5   CL  --  103 110   CO2  --  15* 22   BUN  --  16 13   CREA  --  1.57* 1.15   CA  --  7.5* 7.5*   MG 1.8  --  1.3*   AGAP  --  20* 9   BUCR  --  10* 11*   AP  --   --  246*   TP  --   --  6.3*   ALB  --   --  2.5*   GLOB  --   --  3.8   AGRAT  --   --  0.7*      CBC w/Diff Recent Labs     09/25/19 0703 09/25/19 0358 09/24/19 0957   WBC 8.7 6.1 7.3   RBC 3.19* 3.23* 2.92*   HGB 11.2* 11.3* 10.4*   HCT 33.1* 33.8* 30.4*    343 306   GRANS 72  --  42   LYMPH 21  --  50   EOS 0  --  1      Cardiac Enzymes Lab Results   Component Value Date/Time     09/25/2019 07:03 AM     09/24/2019 11:05 PM    CPK 78 09/24/2019 06:27 PM    CKMB 13.0 (H) 09/25/2019 07:03 AM    CKMB 9.3 (H) 09/24/2019 11:05 PM    CKMB 5.5 (H) 09/24/2019 06:27 PM    CKND1 11.1 (H) 09/25/2019 07:03 AM    CKND1 9.3 (H) 09/24/2019 11:05 PM    CKND1 7.1 (H) 09/24/2019 06:27 PM    TROIQ 1.29 (H) 09/25/2019 07:03 AM    TROIQ 1.04 (H) 09/24/2019 11:05 PM    TROIQ 0.39 (H) 09/24/2019 06:27 PM      Coagulation Recent Labs     09/25/19  0703   APTT 28.7       Lipid Panel Lab Results   Component Value Date/Time    Cholesterol, total 144 05/03/2019 10:26 AM    HDL Cholesterol 86 (H) 05/03/2019 10:26 AM    LDL, calculated 18 05/03/2019 10:26 AM    VLDL, calculated 40 05/03/2019 10:26 AM    Triglyceride 200 (H) 05/03/2019 10:26 AM    CHOL/HDL Ratio 1.7 05/03/2019 10:26 AM      Liver Enzymes Recent Labs     09/24/19  0957   TP 6.3*   ALB 2.5*   *   SGOT 170*      Thyroid Studies Lab Results   Component Value Date/Time    TSH 4.31 (H) 09/24/2019 09:57 AM

## 2019-09-25 NOTE — PROGRESS NOTES
Spoke with nursing this morning. Patient remains hemodynamically stable. He continue to have elevated troponin, initially thought to be related to demand ischemia in the setting of CHF. Since he continues to be symptomatic and h/o with severe CAD, agreed to start on Hep gtt and repeat Cardiac enzyme.    Will notify cardiology team.     Ese Gerard MD.

## 2019-09-25 NOTE — PROGRESS NOTES
Reason for Admission:  Congestive heart failure (CHF) ([facility] [I50.9]                 RRAT Score:    16            Plan for utilizing home health:    IF NEEDED                      Likelihood of Readmission:   Moderate                         Do you (patient/family) have any concerns for transition/discharge?  no    Transition of Care Plan:       Initial assessment completed with patient. Cognitive status of patient: oriented to time, place, person and situation. Face sheet information confirmed:  yes. The patient designates Praveen Villegas [phone] to participate in his discharge plan and to receive any needed information. This patient lives in a single family home with patient and other:  niece. Patient is able to navigate steps as needed. Prior to hospitalization, patient was considered to be independent with ADLs/IADLS : yes . Patient has a current ACP document on file: no  The patient and other:  niece will be available to transport patient home upon discharge. The patient already has [employer] ,  medical equipment available in the home. Patient is not currently active with home health. Patient has stayed in a skilled nursing facility or rehab. Was  stay within last 60 days : no.  2014    This patient is on dialysis :no    List of available Home Health agencies were provided and reviewed with the patient prior to discharge. Freedom of choice signed: yes, for [facility]. Currently, the discharge plan is [address] [place] [address]    The patient states that he can obtain his medications from the pharmacy, and take his medications as directed. Patient's current insurance is Medicare and       Care Management Interventions  PCP Verified by CM:  Yes  Last Visit to PCP: 08/25/19  Mode of Transport at Discharge: Self  Physical Therapy Consult: Yes  Occupational Therapy Consult: Yes  Current Support Network: Relative's Home(Niece)  Confirm Follow Up Transport: Family  Plan discussed with Pt/Family/Caregiver: Yes  Freedom of Choice Offered: Yes  Discharge Location  Discharge Placement: Home([facility])        Shanda Mccormack, [address]  [phone]

## 2019-09-25 NOTE — HOSPICE
Referral received. Chart review in process. Writer requested to not meet with patient/family until after cardiology consult. [facility]. Please contact [phone] with any questions or concerns.         SYD GironN, RN  Hospice Liaison  [facility] [address] [facility], [address] [place] [address] Str.  [phone]

## 2019-09-26 ENCOUNTER — HOME CARE VISIT (OUTPATIENT)
Dept: HOSPICE | Facility: HOSPICE | Age: 79
End: 2019-09-26

## 2019-09-26 LAB
ANION GAP SERPL CALC-SCNC: 12 MMOL/L (ref 3–18)
APTT PPP: 100.5 SEC (ref 23–36.4)
BASOPHILS # BLD: 0 K/UL (ref 0–0.1)
BASOPHILS NFR BLD: 0 % (ref 0–2)
BUN SERPL-MCNC: 26 MG/DL (ref 7–18)
BUN/CREAT SERPL: 14 (ref 12–20)
CALCIUM SERPL-MCNC: 7.6 MG/DL (ref 8.5–10.1)
CHLORIDE SERPL-SCNC: 98 MMOL/L (ref 100–111)
CK MB CFR SERPL CALC: 6.8 % (ref 0–4)
CK MB SERPL-MCNC: 7.3 NG/ML (ref 5–25)
CK SERPL-CCNC: 108 U/L (ref 39–308)
CO2 SERPL-SCNC: 20 MMOL/L (ref 21–32)
CREAT SERPL-MCNC: 1.85 MG/DL (ref 0.6–1.3)
DIFFERENTIAL METHOD BLD: ABNORMAL
EOSINOPHIL # BLD: 0 K/UL (ref 0–0.4)
EOSINOPHIL NFR BLD: 0 % (ref 0–5)
ERYTHROCYTE [DISTWIDTH] IN BLOOD BY AUTOMATED COUNT: 15.3 % (ref 11.6–14.5)
GLUCOSE SERPL-MCNC: 96 MG/DL (ref 74–99)
HCT VFR BLD AUTO: 31.5 % (ref 36–48)
HGB BLD-MCNC: 11 G/DL (ref 13–16)
LYMPHOCYTES # BLD: 1.7 K/UL (ref 0.9–3.6)
LYMPHOCYTES NFR BLD: 16 % (ref 21–52)
MCH RBC QN AUTO: 35.7 PG (ref 24–34)
MCHC RBC AUTO-ENTMCNC: 34.9 G/DL (ref 31–37)
MCV RBC AUTO: 102.3 FL (ref 74–97)
MONOCYTES # BLD: 1.1 K/UL (ref 0.05–1.2)
MONOCYTES NFR BLD: 10 % (ref 3–10)
NEUTS SEG # BLD: 7.9 K/UL (ref 1.8–8)
NEUTS SEG NFR BLD: 74 % (ref 40–73)
PLATELET # BLD AUTO: 283 K/UL (ref 135–420)
PMV BLD AUTO: 10.4 FL (ref 9.2–11.8)
POTASSIUM SERPL-SCNC: 4.3 MMOL/L (ref 3.5–5.5)
RBC # BLD AUTO: 3.08 M/UL (ref 4.7–5.5)
SODIUM SERPL-SCNC: 130 MMOL/L (ref 136–145)
TROPONIN I SERPL-MCNC: 0.79 NG/ML (ref 0–0.04)
WBC # BLD AUTO: 10.7 K/UL (ref 4.6–13.2)

## 2019-09-26 PROCEDURE — 80048 BASIC METABOLIC PNL TOTAL CA: CPT

## 2019-09-26 PROCEDURE — 82550 ASSAY OF CK (CPK): CPT

## 2019-09-26 PROCEDURE — 97110 THERAPEUTIC EXERCISES: CPT

## 2019-09-26 PROCEDURE — 74011250637 HC RX REV CODE- 250/637: Performed by: INTERNAL MEDICINE

## 2019-09-26 PROCEDURE — 85025 COMPLETE CBC W/AUTO DIFF WBC: CPT

## 2019-09-26 PROCEDURE — 74011250637 HC RX REV CODE- 250/637: Performed by: PHYSICIAN ASSISTANT

## 2019-09-26 PROCEDURE — 85730 THROMBOPLASTIN TIME PARTIAL: CPT

## 2019-09-26 PROCEDURE — 97165 OT EVAL LOW COMPLEX 30 MIN: CPT

## 2019-09-26 PROCEDURE — 77010033678 HC OXYGEN DAILY

## 2019-09-26 PROCEDURE — 74011250636 HC RX REV CODE- 250/636: Performed by: INTERNAL MEDICINE

## 2019-09-26 PROCEDURE — 74011250636 HC RX REV CODE- 250/636: Performed by: PHYSICIAN ASSISTANT

## 2019-09-26 PROCEDURE — 97530 THERAPEUTIC ACTIVITIES: CPT

## 2019-09-26 PROCEDURE — 97161 PT EVAL LOW COMPLEX 20 MIN: CPT

## 2019-09-26 PROCEDURE — 65660000000 HC RM CCU STEPDOWN

## 2019-09-26 PROCEDURE — 36415 COLL VENOUS BLD VENIPUNCTURE: CPT

## 2019-09-26 RX ORDER — ENOXAPARIN SODIUM 100 MG/ML
1 INJECTION SUBCUTANEOUS EVERY 24 HOURS
Status: DISCONTINUED | OUTPATIENT
Start: 2019-09-26 | End: 2019-09-29

## 2019-09-26 RX ORDER — AMIODARONE HYDROCHLORIDE 200 MG/1
200 TABLET ORAL EVERY 12 HOURS
Status: DISCONTINUED | OUTPATIENT
Start: 2019-09-26 | End: 2019-10-01 | Stop reason: HOSPADM

## 2019-09-26 RX ADMIN — FUROSEMIDE 20 MG: 10 INJECTION, SOLUTION INTRAMUSCULAR; INTRAVENOUS at 10:48

## 2019-09-26 RX ADMIN — ATORVASTATIN CALCIUM 40 MG: 40 TABLET, FILM COATED ORAL at 21:34

## 2019-09-26 RX ADMIN — CLOPIDOGREL BISULFATE 75 MG: 75 TABLET ORAL at 10:46

## 2019-09-26 RX ADMIN — ENOXAPARIN SODIUM 50 MG: 60 INJECTION SUBCUTANEOUS at 16:15

## 2019-09-26 RX ADMIN — AMIODARONE HYDROCHLORIDE 0.5 MG/MIN: 1.8 INJECTION, SOLUTION INTRAVENOUS at 00:44

## 2019-09-26 RX ADMIN — LOSARTAN POTASSIUM 25 MG: 50 TABLET, FILM COATED ORAL at 10:44

## 2019-09-26 RX ADMIN — Medication 10 ML: at 21:37

## 2019-09-26 RX ADMIN — Medication 10 ML: at 10:50

## 2019-09-26 RX ADMIN — AMIODARONE HYDROCHLORIDE 200 MG: 200 TABLET ORAL at 12:58

## 2019-09-26 RX ADMIN — AMIODARONE HYDROCHLORIDE 200 MG: 200 TABLET ORAL at 21:34

## 2019-09-26 RX ADMIN — Medication 100 MG: at 10:45

## 2019-09-26 RX ADMIN — ASPIRIN 81 MG: 81 TABLET, COATED ORAL at 10:45

## 2019-09-26 RX ADMIN — THERA TABS 1 TABLET: TAB at 10:47

## 2019-09-26 RX ADMIN — FOLIC ACID 1 MG: 1 TABLET ORAL at 10:45

## 2019-09-26 RX ADMIN — METOPROLOL SUCCINATE 25 MG: 25 TABLET, EXTENDED RELEASE ORAL at 10:47

## 2019-09-26 RX ADMIN — ISOSORBIDE MONONITRATE 30 MG: 30 TABLET, EXTENDED RELEASE ORAL at 10:46

## 2019-09-26 RX ADMIN — Medication 10 ML: at 16:16

## 2019-09-26 NOTE — CDMP QUERY
After further study, do you concur with the findings of Nstemi  noted in the Cardiology  Consultation note? ? Non-ST elevation myocardial infarction ? Other Explanation of the clinical findings ? Clinically Undetermined (no explanation for clinical findings) The medical record reflects the following clinical findings, risk factors and treatment:  
 
Clinical Indicators:   Cardiology documenting non-ST elevation myocardial infarction and acute on chronic heart failure Treatment:Continued on ASA, Plavix, Imdur, Toprol, Lipitor. Heparin infusion to therapeutic Lovenox Please clarify and document your clinical opinion in the progress notes and discharge summary including the definitive and/or presumptive diagnosis, (suspected or probable), related to the above clinical findings. Please include clinical findings supporting your diagnosis. Thank you BUDDY HERNANDEZ  ext [phone]

## 2019-09-26 NOTE — PROGRESS NOTES
[facility] Hospitalist Group  Progress Note    Patient: Nisha Trent Age: 69 y.o. : 1940 MR#: [id number] SSN: [id number]  Date: 2019     Subjective: Intermittent chest tightness and discomfort more with laying down. Denies sharp CP symptoms. SOB and STEWARD. Assessment/Plan:   1. Acute on chronic combined HF  2. Elevated troponin concerning for NSTEMI  3. Wide complex tachycardia  4. Hyponatremia, hypochloremia, hypocalcemia  5. ETOH abuse  6. Tobacco abuse  7. 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 Moderate protein calorie malnutrition  9. Chronic macrocytic anemia secondary to ETOH   10. CHRISTIANO on CKD3  11. HTN  12. HLD    Plan  1. Patient rescind DNR status to full code. Palliative aware. AMD established today  2. Cardiology consult. Recommendations for Dr. Yohan Lynn to evaluate coronary anatomy, if he is not a candidate for PCI will need referral to hospice therapy, AICD is only indicated if life expectancy is greater than 12 mos. Continue to adjust cardiac medications. Discontinue lasix and ARB d/t worsening creatinine levels. PO amiodarone and initiate Lovenox. Continue ASA, Plavix, Imdur, BB, statinCardiology discussed case with patient's daughter via phone. 3. Monitor metabolic panel and replete. 4. Monitor I/O's.   5. Monitor renal function. 6. Continue CIWA, folic acid, thiamine, mvi. No nicotine patch, concern for NSTEMI   7. Patient refuses scheduled nebs. Continue to monitor   8. Nutrition consult. Supplements  9.  Palliative care consult  Additional Notes:      Case discussed with:  []Patient  []Family  [x]Nursing  [x]Case Management  DVT Prophylaxis:  [x]Lovenox  []Hep SQ  []SCDs  []Coumadin   []On Heparin gtt    Objective:   VS:   Visit Vitals  /71 (BP 1 Location: Right arm, BP Patient Position: At rest)   Pulse 74   Temp 98.1 °F (36.7 °C)   Resp 18   Ht 5' 5\" (1.651 m)   Wt 47.5 kg (104 lb 11.2 oz)   SpO2 97%   BMI 17.42 kg/m²      Tmax/24hrs: Temp (24hrs), Av.7 °F (36.5 °C), Min:97.3 °F (36.3 °C), Max:98.1 °F (36.7 °C)      Intake/Output Summary (Last 24 hours) at 2019 1347  Last data filed at 2019 0842  Gross per 24 hour   Intake 480 ml   Output 100 ml   Net 380 ml       General:  Alert, NAD, cachexic  Cardiovascular:  RRR  Pulmonary:  LSC throughout; respiratory effort WNL  GI:  +BS in all four quadrants, soft, non-tender  Extremities:  No edema; 2+ dorsalis pedis pulses bilaterally  Neuro: alert and oriented        Labs:    Recent Results (from the past 24 hour(s))   PTT    Collection Time: 19  5:26 PM   Result Value Ref Range    aPTT 31.8 23.0 - 36.4 SEC   CARDIAC PANEL,(CK, CKMB & TROPONIN)    Collection Time: 19  9:02 PM   Result Value Ref Range     39 - 308 U/L    CK - MB 8.3 (H) <3.6 ng/ml    CK-MB Index 7.2 (H) 0.0 - 4.0 %    Troponin-I, QT 1.14 (H) 0.0 - 0.045 NG/ML   CARDIAC PANEL,(CK, CKMB & TROPONIN)    Collection Time: 19  3:20 AM   Result Value Ref Range     39 - 308 U/L    CK - MB 7.3 (H) <3.6 ng/ml    CK-MB Index 6.8 (H) 0.0 - 4.0 %    Troponin-I, QT 0.79 (H) 0.0 - 0.045 NG/ML   CBC WITH AUTOMATED DIFF    Collection Time: 19  3:20 AM   Result Value Ref Range    WBC 10.7 4.6 - 13.2 K/uL    RBC 3.08 (L) 4.70 - 5.50 M/uL    HGB 11.0 (L) 13.0 - 16.0 g/dL    HCT 31.5 (L) 36.0 - 48.0 %    .3 (H) 74.0 - 97.0 FL    MCH 35.7 (H) 24.0 - 34.0 PG    MCHC 34.9 31.0 - 37.0 g/dL    RDW 15.3 (H) 11.6 - 14.5 %    PLATELET 361 801 - 832 K/uL    MPV 10.4 9.2 - 11.8 FL    NEUTROPHILS 74 (H) 40 - 73 %    LYMPHOCYTES 16 (L) 21 - 52 %    MONOCYTES 10 3 - 10 %    EOSINOPHILS 0 0 - 5 %    BASOPHILS 0 0 - 2 %    ABS. NEUTROPHILS 7.9 1.8 - 8.0 K/UL    ABS. LYMPHOCYTES 1.7 0.9 - 3.6 K/UL    ABS. MONOCYTES 1.1 0.05 - 1.2 K/UL    ABS. EOSINOPHILS 0.0 0.0 - 0.4 K/UL    ABS.  BASOPHILS 0.0 0.0 - 0.1 K/UL    DF AUTOMATED     METABOLIC PANEL, BASIC    Collection Time: 19  3:20 AM   Result Value Ref Range    Sodium 130 (L) 136 - 145 mmol/L    Potassium 4.3 3.5 - 5.5 mmol/L    Chloride 98 (L) 100 - 111 mmol/L    CO2 20 (L) 21 - 32 mmol/L    Anion gap 12 3.0 - 18 mmol/L    Glucose 96 74 - 99 mg/dL    BUN 26 (H) 7.0 - 18 MG/DL    Creatinine 1.85 (H) 0.6 - 1.3 MG/DL    BUN/Creatinine ratio 14 12 - 20      GFR est AA 43 (L) >60 ml/min/1.73m2    GFR est non-AA 36 (L) >60 ml/min/1.73m2    Calcium 7.6 (L) 8.5 - 10.1 MG/DL   PTT    Collection Time: 09/26/19  3:20 AM   Result Value Ref Range    aPTT 100.5 (H) 23.0 - 36.4 SEC       Signed By: Connie Ambrose NP     September 26, 2019

## 2019-09-26 NOTE — ROUTINE PROCESS
Bedside and Verbal shift change report given to Josh Kirstie  (oncoming nurse) by Sanford Munoz RN (offgoing nurse).   Report given with Addy STACY and MAR.  
 
'

## 2019-09-26 NOTE — PROGRESS NOTES
OCCUPATIONAL THERAPY EVALUATION/DISCHARGE    Patient: Jae Manzanares (89 y.o. male)  Date: 9/26/2019  Primary Diagnosis: Congestive heart failure (CHF) ([address] [I50.9]        Precautions:   Fall  PLOF: Pt was independent with basic self care tasks and functional mobility PTA. ASSESSMENT AND RECOMMENDATIONS:  Based on the objective data described below, the patient is able to perform basic self care tasks without assistance. Supervision given for functional standing and transfers. Will defer to PT for mobility training. Patient was given BUE therapeutic exercises and completed them without difficulty. He was educated on energy conservation techniques during daily activities and verbalized understanding. Patient has a supportive family at home to assist him in the evenings prn and all needed DME (shower chair) for bathroom safety. Skilled occupational therapy is not indicated at this time. Discharge Recommendations: None  Further Equipment Recommendations for Discharge: N/A      SUBJECTIVE:   Patient stated I would like some exercises.     OBJECTIVE DATA SUMMARY:     Past Medical History:   Diagnosis Date    Alcohol abuse     quit February 2011    Binocular visual disturbance 10/31/2013    CAD (coronary artery disease)     CAD (coronary artery disease), native coronary artery 1/3/05    LAD 70-80% mid; Cx-diffuse 80%; OM1-70% prox; RCA 40-50% mid; 100% RPLB with collaterals    Cardiac cath 08/19/2013    Severe, diffuse calcification. dLM 30%. mLAD 80%. o/pD1 80%. o/pRamus 80%. dCX 85%. dRCA 80%.  m-dRPLB 75%. EF 50%. Mod to severe inferobasal hypk. Cardiac echocardiogram 01/19/2011    EF 50-55%. Gr 1 DDfx. RVSP 30 mmHg. Cardiac nuclear imaging test 06/27/2011    No evidence of ischemia or previous infarction. EF 60%. Neg EKG on max EST. Ex time 6 mins.     Chronic kidney disease     Chronic lung disease     Chronic obstructive pulmonary disease (HCC)     Coronary artery disease Diastolic CHF, chronic (HCC)     LV EF 55% (Echo Jan 2011)    Diverticula of colon 12/17/2013    Emphysematous COPD ([facility] [address]     PFTs March 2009; mild-mode obstruction, + bronchodil response; decreased DLCO    GERD (gastroesophageal reflux disease)     with erosive esophagitis history    Heart failure ([address]     History of renal failure     2 years ago, which completely resolved    Hypercholesterolemia     Hypertension     Hypertensive heart disease with CHF ([address]     MI (myocardial infarction) ([address]     Inferior wall    Paresthesia and pain of both upper extremities 10/31/2013    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 Nilson Cohn, 4/9/14. Tobacco abuse     Unspecified hereditary and idiopathic peripheral neuropathy 12/4/2013     Past Surgical History:   Procedure Laterality Date    HX CORONARY ARTERY BYPASS GRAFT  about 2013    HX HEART CATHETERIZATION  11/2013     Barriers to Learning/Limitations: None  Compensate with: visual, verbal, tactile, kinesthetic cues/model    Home Situation:   Home Situation  Home Environment: Private residence  One/Two Story Residence: One story  Living Alone: No  Support Systems: Friends \ neighbors, Family member(s)  Patient Expects to be Discharged to[de-identified] Private residence  Current DME Used/Available at Home: Blood pressure cuff, Shower chair  Tub or Shower Type: Shower(with seat)  ? Right hand dominant   ? Left hand dominant    Cognitive/Behavioral Status:  Neurologic State: Alert  Orientation Level: Oriented X4  Cognition: Appropriate decision making; Follows commands  Safety/Judgement: Awareness of environment; Fall prevention    Skin: Intact on UEs  Edema: None noted in UEs    Vision/Perceptual:    Acuity: Within Defined Limits      Coordination: BUE  Coordination: Within functional limits(BLE)  Fine Motor Skills-Upper: Left Intact; Right Intact    Gross Motor Skills-Upper: Left Intact; Right Intact    Balance:  Sitting: Intact  Standing: Impaired; With support  Standing - Static: Good  Standing - Dynamic : Fair    Strength: BUE  Strength: Generally decreased, functional    Tone & Sensation: BUE  Tone: Normal  Sensation: Intact    Range of Motion: BUE  AROM: Within functional limits    Functional Mobility and Transfers for ADLs:  Bed Mobility:  Supine to Sit: Contact guard assistance  Scooting: Supervision  Transfers:  Sit to Stand: Supervision  Stand to Sit: Supervision   Toilet Transfer : Supervision    ADL Assessment:  Feeding: Independent    Oral Facial Hygiene/Grooming: Modified Independent    Bathing: Supervision    Upper Body Dressing: Modified independent    Lower Body Dressing: Supervision    Toileting: Modified independent(for BM)    Therapeutic Exercise:  Patient performed BUE therapeutic exercises in all planes x10 reps each without resistance while seated edge of bed. No complaints of pain and no difficulty noted with completing exercises. Pain:  Pain level pre-treatment: 0/10   Pain level post-treatment: 0/10   Pain Intervention(s): NA  Response to intervention: NA    Activity Tolerance:   Good  Please refer to the flowsheet for vital signs taken during this treatment. After treatment:   ?  Patient left in no apparent distress sitting up in chair  ? Patient left in no apparent distress in bed  ? Call bell left within reach  ? Nursing notified  ? Caregiver present  ? Bed alarm activated    COMMUNICATION/EDUCATION:   ?      Role of Occupational Therapy in the acute care setting  ? Home safety education was provided and the patient/caregiver indicated understanding. ? Patient/family have participated as able and agree with findings and recommendations. ?      Patient is unable to participate in plan of care at this time.     Thank you for this referral.  Sandra Castillo MS OTR/L   Time Calculation: 25 mins      Eval Complexity: History: LOW Complexity : Brief history review ; Examination: LOW Complexity : 1-3 performance deficits relating to physical, cognitive , or psychosocial skils that result in activity limitations and / or participation restrictions ;    Decision Making:LOW Complexity : No comorbidities that affect functional and no verbal or physical assistance needed to complete eval tasks

## 2019-09-26 NOTE — PROGRESS NOTES
Received report on pt.from off going RN. Resting quietly in bed on rounds. Denies c/o pain or SOB at this time. No acute distress noted. Call bell at side. Will cont to monitor for any changes in status. 1340 PTT > 180. Heparin held per protocol for 1 hour. 1440 Heparin protocol calls for heparin to be reduced by 600 units. Unable to do so due to pts base rate only at 586 units. Spoke with pharmacist Darlyn Carrillo who recommended calling MD's for orders. 1160 [facility] PA paged in regards to heparin protocol. 1515 spoke with both PA's and order obtained to hold heparin for 4 hours, repeat PTT and restart heparin according to PTT.     1807 Heparin restarted at baseline of 556.8 units  per hour. 1920 Bedside and Verbal shift change report given to Yoli Roth (oncoming nurse) by Shmuel Cody RN (offgoing nurse). Report given with TAWANNA, Addy and MAR.

## 2019-09-26 NOTE — PROGRESS NOTES
[facility]  -  Progress Note      Patient: Jourdan Church MRN: [id number]  SSN: [id number]    YOB: 1940  Age: 69 y.o. Sex: male      Admit Date: 9/24/2019    Assessment:     -Presented with shortness of breath, at least partially associated wtih CHF exacerbation with BNP 5612 and CT with small bilateral pleural effusions and suspected pulmonary edema but also with history of COPD as well  -Echo 9/25/2019: EF 16-20% with akinetic basal inferior, basal inferolateral, mid inferior, mid inferolateral, apical anterior, apical septal, apical inferior, apical lateral and apical walls  -Echo 1/24/2019: EF 46-50% with hypokinetic basal inferior, basal inferolateral, mid inferior, mid inferolateral and apical inferior wall segments, grade 2 diastolic dysfunction  -CAD, Cardiac cath 2/1/19 with following findings:   · Severe native CAD with distal left main heavily calcified lesion   · Patent LIMA-LAD  · 2 sequential vein grafts (all 4 vein grafts to diagonal, OM, RPDA, RPL branch) occluded at aortotomy site  · Subtotal mid RCA occludion with NICO 0-I flow, some collaterals from left coronary system  · Native diagonal 85% stenosis  · Native OM 85% stenosis  -Elevated troponin  -Wide-complex tachycardia, most consistent with bundle branch VT  -Hypomagnesemia, Mg 1.3 on presentation, given 2 gm Mg in ER  -HTN  -Hypercholesterolemia  -Hx tobacco and alcohol abuse  -DNR status     Primary cardiologist is Dr. Nathaniel Chavez:     -Cr continues to increase, will d/c IV Lasix and Losartan, continue to monitor renal indices closely.    -Noted that DNR has been rescinded, will tentatively plan for cardiac catheterization on Monday as long as renal function has stabilized. -Continued on ASA, Plavix, Imdur, Toprol, Lipitor.  -Will switch from Heparin infusion to therapeutic Lovenox. Discussed with pharmacy, will start at 1 mg/kg once daily due to CrCl < 30.     Subjective:     Continues to feel short of breath.     Objective:      Patient Vitals for the past 8 hrs:   Temp Pulse Resp BP SpO2   09/26/19 0837 97.8 °F (36.6 °C) 76 22 118/75 100 %   09/26/19 0403 97.4 °F (36.3 °C) 63 18 145/82 100 %         Patient Vitals for the past 96 hrs:   Weight   09/26/19 0424 104 lb 11.2 oz (47.5 kg)   09/25/19 1115 102 lb (46.3 kg)   09/25/19 0343 102 lb 3.2 oz (46.4 kg)   09/24/19 1649 101 lb 8 oz (46 kg)   09/24/19 0950 125 lb (56.7 kg)         Intake/Output Summary (Last 24 hours) at 9/26/2019 1100  Last data filed at 9/26/2019 0842  Gross per 24 hour   Intake 480 ml   Output 100 ml   Net 380 ml       Physical Exam:  General:  alert, cooperative, no distress, currently sitting in chair, appears stated age  Neck:  supple  Lungs:  Basilar inspiratory rales  Heart:  Regular rate and rhythm  Abdomen:  abdomen is soft without significant tenderness, masses, organomegaly or guarding  Extremities:  Atraumatic, no edema     Data Review:     Labs: Results:       Chemistry Recent Labs     09/26/19 0320 09/25/19 0703 09/25/19 0358 09/24/19  0957   GLU 96  --  126* 229*   *  --  138 141   K 4.3  --  4.3 3.5   CL 98*  --  103 110   CO2 20*  --  15* 22   BUN 26*  --  16 13   CREA 1.85*  --  1.57* 1.15   CA 7.6*  --  7.5* 7.5*   MG  --  1.8  --  1.3*   AGAP 12  --  20* 9   BUCR 14  --  10* 11*   AP  --   --   --  246*   TP  --   --   --  6.3*   ALB  --   --   --  2.5*   GLOB  --   --   --  3.8   AGRAT  --   --   --  0.7*      CBC w/Diff Recent Labs     09/26/19  0320 09/25/19  0703 09/25/19  0358 09/24/19  0957   WBC 10.7 8.7 6.1 7.3   RBC 3.08* 3.19* 3.23* 2.92*   HGB 11.0* 11.2* 11.3* 10.4*   HCT 31.5* 33.1* 33.8* 30.4*    299 343 306   GRANS 74* 72  --  42   LYMPH 16* 21  --  50   EOS 0 0  --  1      Cardiac Enzymes Lab Results   Component Value Date/Time    K 108 09/26/2019 03:20 AM     09/25/2019 09:02 PM     09/25/2019 12:45 PM    CKMB 7.3 (H) 09/26/2019 03:20 AM    CKMB 8.3 (H) 09/25/2019 09:02 PM    CKMB 11.6 (H) 09/25/2019 12:45 PM    CKND1 6.8 (H) 09/26/2019 03:20 AM    CKND1 7.2 (H) 09/25/2019 09:02 PM    CKND1 9.8 (H) 09/25/2019 12:45 PM    TROIQ 0.79 (H) 09/26/2019 03:20 AM    TROIQ 1.14 (H) 09/25/2019 09:02 PM    TROIQ 1.26 (H) 09/25/2019 12:45 PM      Coagulation Recent Labs     09/26/19  0320 09/25/19  1726   APTT 100.5* 31.8       Lipid Panel Lab Results   Component Value Date/Time    Cholesterol, total 144 05/03/2019 10:26 AM    HDL Cholesterol 86 (H) 05/03/2019 10:26 AM    LDL, calculated 18 05/03/2019 10:26 AM    VLDL, calculated 40 05/03/2019 10:26 AM    Triglyceride 200 (H) 05/03/2019 10:26 AM    CHOL/HDL Ratio 1.7 05/03/2019 10:26 AM      Liver Enzymes Recent Labs     09/24/19  0957   TP 6.3*   ALB 2.5*   *   SGOT 170*      Thyroid Studies Lab Results   Component Value Date/Time    TSH 4.31 (H) 09/24/2019 09:57 AM

## 2019-09-26 NOTE — PROGRESS NOTES
Problem: Mobility Impaired (Adult and Pediatric)  Goal: *Acute Goals and Plan of Care (Insert Text)  Description  Physical Therapy Goals  Initiated 9/26/2019 and to be accomplished within 7 day(s)  1. Patient will move from supine to sit and sit to supine , scoot up and down and roll side to side in bed with modified independence. 2.  Patient will transfer from bed to chair and chair to bed with modified independence using the least restrictive device. 3.  Patient will perform sit to stand with modified independence. 4.  Patient will ambulate with modified independence for >50 feet with the least restrictive device. PLOF: Patient lives with his niece in a 1 story home with 0STE and has SC and walk in shower and was independent with mobility. Patient has Rollator and SPC at home. Outcome: Progressing Towards Goal   PHYSICAL THERAPY EVALUATION    Patient: Pamella Perez (51 y.o. male)  Date: 9/26/2019  Primary Diagnosis: Congest[facility] heart failure (CHF) ([facility] [I50.9]  Precautions:  Fall    ASSESSMENT :  Patient is 67yo M admitted to hospital for CHF and presents today alert and agreeable to therapy and was supine in bed upon arrival. Patient required additional time and energy conservation education as patient SOB with first attempt to sit EOB. Patient sat EOB for objective assessment and educated on deep breathing. Patient able to stand and ambulated 10ft to locked recliner. Educated on benefits of OOB and sitting up in recliner as well as role and goals of therapy. Patient tolerated activity well and and was left sitting in recliner with chair alarm activated and oriented to call bell; educated not to get up without assist. Patient mobility steady with greatest limitation being SOB; will continue to follow for increased distance mobility and endurance for home. Patient will benefit from skilled intervention to address the above impairments.   Patient's rehabilitation potential is considered to be Good  Factors which may influence rehabilitation potential include:   ? None noted  ? Mental ability/status  ? Medical condition  ? Home/family situation and support systems  ? Safety awareness  ? Pain tolerance/management  ? Other:      PLAN :  Recommendations and Planned Interventions:   ?           Bed Mobility Training             ? Neuromuscular Re-Education  ? Transfer Training                   ? Orthotic/Prosthetic Training  ? Gait Training                          ? Modalities  ? Therapeutic Exercises           ? Edema Management/Control  ? Therapeutic Activities            ? Family Training/Education  ? Patient Education  ? Other (comment):    Frequency/Duration: Patient will be followed by physical therapy 1-2 times per day/4-7 days per week to address goals. Discharge Recommendations: Home Health with supervision  Further Equipment Recommendations for Discharge: N/A     SUBJECTIVE:   Patient stated It's just my breathing I got to get under control.     OBJECTIVE DATA SUMMARY:     Past Medical History:   Diagnosis Date    Alcohol abuse     quit February 2011    Binocular visual disturbance 10/31/2013    CAD (coronary artery disease)     CAD (coronary artery disease), native coronary artery 1/3/05    LAD 70-80% mid; Cx-diffuse 80%; OM1-70% prox; RCA 40-50% mid; 100% RPLB with collaterals    Cardiac cath 08/19/2013    Severe, diffuse calcification. dLM 30%. mLAD 80%. o/pD1 80%. o/pRamus 80%. dCX 85%. dRCA 80%.  m-dRPLB 75%. EF 50%. Mod to severe inferobasal hypk. Cardiac echocardiogram 01/19/2011    EF 50-55%. Gr 1 DDfx. RVSP 30 mmHg. Cardiac nuclear imaging test 06/27/2011    No evidence of ischemia or previous infarction. EF 60%. Neg EKG on max EST. Ex time 6 mins.     Chronic kidney disease     Chronic lung disease     Chronic obstructive pulmonary disease [facility])     Coronary artery disease     Diastolic CHF, chronic (HCC)     LV EF 55% (Echo Jan 2011)    Diverticula of colon 12/17/2013    Emphysematous COPD ([facility]     PFTs March 2009; mild-mode obstruction, + bronchodil response; decreased DLCO    GERD (gastroesophageal reflux disease)     with erosive esophagitis history    Heart failure ([address]     History of renal failure     2 years ago, which completely resolved    Hypercholesterolemia     Hypertension     Hypertensive heart disease with CHF ([facility]     MI (myocardial infarction) ([facility]     Inferior wall    Paresthesia and pain of both upper extremities 10/31/2013    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 Beckie Acosta, 4/9/14. Tobacco abuse     Unspecified hereditary and idiopathic peripheral neuropathy 12/4/2013     Past Surgical History:   Procedure Laterality Date    HX CORONARY ARTERY BYPASS GRAFT  about 2013    HX HEART CATHETERIZATION  11/2013     Barriers to Learning/Limitations: None  Compensate with: N/A  Home Situation:  Home Situation  Home Environment: Private residence  One/Two Story Residence: One story  Living Alone: No  Support Systems: Friends \ neighbors, Family member(s)  Patient Expects to be Discharged to[de-identified] Private residence  Current DME Used/Available at Home: Blood pressure cuff, Shower chair  Critical Behavior:     A&Ox4  Strength:    Strength: Generally decreased, functional(BLE)   Tone & Sensation:   Tone: Normal(BLE)   Sensation: Intact(BLE)   Range Of Motion:  AROM: Within functional limits(BLE)   Functional Mobility:  Bed Mobility:   Supine to Sit: Contact guard assistance   Scooting: Supervision  Transfers:  Sit to Stand: Supervision  Stand to Sit: Supervision  Balance:   Sitting: Intact  Standing: Impaired; With support  Standing - Static: Good  Standing - Dynamic : Fair  Ambulation/Gait Training:  Distance (ft): 10 Feet (ft)   Ambulation - Level of Assistance: Supervision   Gait Abnormalities: Decreased step clearance    Base of Support: Narrowed   Speed/Kathleen: Slow  Step Length: Left shortened;Right shortened  Pain:  Pain level pre-treatment: 0/10   Pain level post-treatment: 0/10     Activity Tolerance:   Patient tolerated activity well; greatest barrier is SOB. Patient wearing NC O2 during session and was education on activity pacing and deep breathing with good carryover. Please refer to the flowsheet for vital signs taken during this treatment. After treatment:   ?         Patient left in no apparent distress sitting up in chair  ? Patient left in no apparent distress in bed  ? Call bell left within reach  ? Nursing notified  ? Caregiver present  ? Chair alarm activated  ? SCDs applied    COMMUNICATION/EDUCATION:   ?         Role of Physical Therapy in the acute care setting. ?         Fall prevention education was provided and the patient/caregiver indicated understanding. ? Patient/family have participated as able in goal setting and plan of care. ?         Patient/family agree to work toward stated goals and plan of care. ?         Patient understands intent and goals of therapy, but is neutral about his/her participation. ? Patient is unable to participate in goal setting/plan of care: ongoing with therapy staff. ?         Other:     Thank you for this referral.  Catrina Castillo, PT   Time Calculation: 23 mins      Eval Complexity: History: LOW Complexity : Zero comorbidities / personal factors that will impact the outcome / POCExam:LOW Complexity : 1-2 Standardized tests and measures addressing body structure, function, activity limitation and / or participation in recreation  Presentation: LOW Complexity : Stable, uncomplicated  Clinical Decision Making:Low Complexity    Overall Complexity:LOW

## 2019-09-26 NOTE — PROGRESS NOTES
[facility] [place] [place]: [phone] ([phone])  [facility]: [phone]    Time In: 1000  Time Out: 80 First  Team Diandra Marie NP, Savannah Dennis RN, and this [facility] met with patient in room, where he was sitting up in his recliner, wearing O2. The reason for this palliative consult is clarification of goals of care and establishment of an AMD.    Patient has PMH of COPD, tobacco use, ETOH abuse, systolic and diastolic heart failure, chronic doxy for eye infection, and severe CAD w/ CABG x 5. He was admitted 09/24/2019 from home w/ dx CHF exacerbation, wide complex tachycardia, and SOB. Patient was born in the [facility]. His daughter Brigido resides in [place], [place], and retired from [employer] last Friday with 29 years of service. Patient was in the [employer] for 22 years. Patient shared that he has 5 grandchildren, all of whom live locally. Patient status is FULL CODE; full interventions. Patient rescinded his DNR (Rescinded copy signed and dated by patient, placed on chart for scan into EMR). PM Team ensured that patient understood the benefits and burdens of resuscitation and intubation. He reported that at this time, he would like to pursue all life-prolonging measures. Patient also stated that he was awaiting AICD placement. Patient's goal is to return home. Patient completed an Advance Medical Directive naming the following decision-makers:    Primary Healthcare Agent: Ninfa Morton  Relationship: Daughter  Phone Number: [phone]    Secondary Healthcare Agent: Erika Diaz  Relationship: Ex-wife  Phone Number: [phone]    Five copies of AMD provided to patient for dissemination to family/his own records; original placed on chart for scan into EMR. PM Team to f/u as appropriate.

## 2019-09-26 NOTE — ROUTINE PROCESS
ADULT PROTOCOL: JET AEROSOL ASSESSMENT Patient  Rolene Coad     69 y.o.   male     9/25/2019  8:55 PM 
 
Breath Sounds Pre Procedure:  Breath Sounds Bilateral: Diminished Breath Sounds Post Procedure: Breath Sounds Bilateral: Clear, Diminished Breathing pattern: Pre procedure  Breathing Pattern: Regular Post procedure  Breathing Pattern: Regular Cough: Pre procedure  Cough: Non-productive Post procedure Cough: Non-productive Heart Rate: Pre procedure Pulse: 56 Post procedure Pulse: 68 Resp Rate: Pre procedure  Respirations: 18 Post procedure Nebulizer Therapy: Current medications Aerosolized Medications: Pulmicort Problem List:  
Patient Active Problem List  
Diagnosis Code  Tobacco abuse Z72.0  Hypercholesterolemia E78.00  CAD (coronary artery disease), native coronary artery I25.10  Hypertensive heart disease with CHF ([address] I11.0  [facility] J43.9  Alcohol abuse F10.10  Paresthesia and pain of both upper extremities R20.2, M79.601, M79.602  Binocular visual disturbance H53.30  Unspecified hereditary and idiopathic peripheral neuropathy G60.9  Diverticula of colon K57.30  
 S/P CABG x 5 Z95.1  Congestive heart failure (CHF) (HCC) I50.9  Wide-complex tachycardia (HCC) I47.2  Acute on chronic combined systolic and diastolic ACC/AHA stage C congestive heart failure (HCC) I50.43 Patient alert and cooperative to use MDI: Yes 
 
Home Respiratory Therapy Regimen/Frequency:  YES Medication Albuterol Device MDI Frequency Prn SEVERITY INDEX: 
 
ITEM 0 1 2 3 4 Score Respiratory Pattern and or Rate Regular 10-19 Regular 20-24  
24-30   
30-34 Severe SOB or  
Greater than 35 1 Breath Sounds Clear Occasional Wheeze Mild Wheezing Moderate Wheezing  wheezing/Absent breath sounds 0  
 Shortness of Breath None Dyspnea on Exertion Dyspnea at Rest Moderate Shortness of Breath at Rest Severe Shortness of Breath - Limited Speech 1 Total Score:  2 * Scoring Guidelines 0-4 pts:  PRN-BID  
5-7 pts:  BID, TID, QID 
8-9 pts:  TID, QID, Q6 
10-12 pts:  Q4-Q6 * - Guidelines used with clinical judgement. PRN Treatments can be ordered to supplement scheduled treatments. Regardless of score, frequency should not be less than normal home regimen. Recommended Order/Frequency:  Change to TID Comments:   
 
 
 
Respiratory Therapist: Hanh Lechuga, RT

## 2019-09-26 NOTE — ACP (ADVANCE CARE PLANNING)
Palliative Medicine    Pt completed an Advance Directive today. A copy was placed on the chart for scanning into EMR. 4 additional copies were given to the pt. Primary Decision Maker (Health Care Agent): Harper Fulton  Relationship to patient: Adult daughter  Phone number: [phone]  [x] Named in a scanned document   [] Legal Next of Kin  [] Guardian    Secondary Decision Maker ([address]): Anca Mitchell  Relationship to patient: Ex-wife  Phone number: [phone]  [x] Named in a scanned document   [] Legal Next of Kin  [] Guardian    ACP documents you currently have include:  [x] Advance Directive or Living Will  [] Durable Do Not Resuscitate  [] Physician Orders for Scope of Treatment (POST)  [] Medical Power of   [] Other    Pt REVOKED his DDNR today. He states that he wants to be a FULL code. Benefits and burdens of CPR were discussed again with the pt. He is still agreeable to undergo CPR in the event of cardiac or respiratory arrest.  Pt is a FULL code with FULL aggressive medical management. He is agreeable to AICD placement tomorrow as well. Potential dispo plan will be home vs home with home health when medically stable. Thank you for the Palliative Medicine consult and allowing us to participate in the care of Mr. Ros Crawford. Will continue to monitor and provide support.     Jennie Wick RN, BSN  Palliative Medicine Inpatient RN  [facility]  Palliative COPE Line: [phone] ([phone])

## 2019-09-26 NOTE — PROGRESS NOTES
Received information from [facility] that Mr. Anahi Adams wants to be a full code. He wants to rescind his DNR status. This writer will verify once at the bedside.

## 2019-09-26 NOTE — PROGRESS NOTES
Received report on pt.from off going RN. Resting quietly in bed on rounds. Denies c/o pain or SOB at this time. No acute distress noted. Amiodarone and heparin drips still infusing per guardrail. Will cont to monitor for any changes in status. [address] to room by pt. Stated he wants to rescind his DNR status and be a full code. Douglas Calderon NP notified. 1200 drips d/c'd. Pt sitting up in chair. 1600 visitors at bedside. Denies distress. 1930 Bedside and Verbal shift change report given to [address] [place] [address] (oncoming nurse) by Christal Rossi RN (offgoing nurse). Report given with SBAR, Addy and MAR.

## 2019-09-26 NOTE — CONSULTS
Palliative Medicine Consult    Patient Name: Manuel May  YOB: 1940    Date of Initial Consult: 9/25/2019, Follow up 9/26/2019  Reason for Consult: Goals of care discussion  Requesting Provider:  Bartolome Messina NP  Primary Care Physician: Triston Soto MD      SUMMARY:   Manuel May is a 69 y.o. with a past history of HTN, severe CAD with CABG x 5, systolic and diastolic heart failure, COPD, tobacco abuse, alcoholism, and chronic doxy for eye infection, who was admitted on 9/24/2019 from home with a diagnosis of CHF exacerbation, wide complex tachycardia, and SOB. Current medical issues leading to Palliative Medicine involvement include: Goals of care discussions for a 69year old male who has significant cardiac history. 9/26/2019: Patient still with some dyspnea, mild with rest. Denies chest pain. Patient talked with family, he is moving forward with aggressive therapies including AICD placement. He shares that he just revoked his DNR status, and the nurse came to cut off his band. Explained the benefits and burdens of CPR and patient wants Full code. AMD completed with patient. See \"Plan\" section for thorough details. PALLIATIVE DIAGNOSES:   1. Goals of care discussions  2. CHF exacerbation  3. CAD  4. COPD       PLAN:   9/26/2019: Patient still with some dyspnea, mild with rest. Denies chest pain. Patient talked with family, he is moving forward with aggressive therapies including AICD placement. He shares that he just revoked his DNR status, and the nurse came to cut off his band. Explained the benefits and burdens of CPR including the low success rates with history of heart disease, and possibilities of hypoxic brain injury, inability to extubate, long term ventilator needs, and potential inability to eat/perfrom ADL care if CPR were successful at reviving. Patient states it is a lot to think about but for now his goals remain Full code with full aggressive measures. DDNR form revoked. Patient states he has thought about doing the AMD we left for him yesterday, and we helped him complete it. AMD completed naming daugther Tien Mendez as primary MPOA and ex-spouse Luis Dodd as secondary MPOA. Supportive listening offered. Will continue to follow for support and goals of care discussions. See previous visits below:    9/25/2019  1. Goals of care discussion: Met with patient and patient's son Aria You at bedside. Patient is alert and oriented x 4 and states he needs to talk to his daughter about his heart and decide whether he would be accepting of an AICD. Patient does not have an AMD on file, but states he would want his daughter Tien Mendez to make medical decisions for him in the event he were unable to communicate. Encouraged and offered help with AMD completion, but patient does not wish to complete at this time. Blank copy left with patient along with instructions for completion. Patient has a DDNR on file, and did not wish for further discussion when introducing the POST form. Full aggressive measures up until cardiac or pulmonary arrest. Supportive listening offered to patient and family. Will continue to follow for continued goals of care discussions and support. 1. CHF exacerbation: Cardiology following. Echo result pending. Per cardiology note, \"drop in EF consistent with worsening heart failure/cardiomyopathy. \" Patient admits to dyspnea at rest and with exertion. Just finished receiving a breathing nebulizer treatment. 2. CAD: Cardiology following. Reviewed note. Per cardiology, \"can consider AICD, and potential PCI to left main as outpatient, however, this is unlikely to change EF significantly. \"  Patient is discussing this with daughter versus conservative management. 3. COPD: On nebulizer treatments. Nicotine dependence. 4. Initial consult note routed to primary continuity provider  5.  Communicated plan of care with: Palliative IDT, patient, family       GOALS OF CARE / TREATMENT PREFERENCES:   [====Goals of Care====]  GOALS OF CARE: Full code with full aggressive measures  Patient/Health Care Proxy Stated Goals: Prolong life      TREATMENT PREFERENCES: full aggressive measures  Code Status: Full Code    Advance Care Planning:  Advance Care Planning 9/26/2019   Patient's Healthcare Decision Maker is: Named in scanned ACP document   Confirm Advance Directive Yes, on file   Patient Would Like to Complete Advance Directive No   Does the patient have other document types -           The palliative care team has discussed with patient / health care proxy about goals of care / treatment preferences for patient.  [====Goals of Care====]         HISTORY:     History obtained from: patient, chart    CHIEF COMPLAINT: dyspnea    HPI/SUBJECTIVE:    The patient is:   [x] Verbal and participatory  [] Non-participatory due to:   Pleasant, oriented x 4    Clinical Pain Assessment (nonverbal scale for severity on nonverbal patients):   Clinical Pain Assessment  Severity: 0            FUNCTIONAL ASSESSMENT:     Palliative Performance Scale (PPS):  PPS: 60       PSYCHOSOCIAL/SPIRITUAL SCREENING:     Advance Care Planning:  Advance Care Planning 9/26/2019   Patient's Healthcare Decision Maker is: Named in scanned ACP document   Confirm Advance Directive Yes, on file   Patient Would Like to Complete Advance Directive No   Does the patient have other document types -        Any spiritual / Mandaeism concerns:  [] Yes /  [x] No    Caregiver Burnout:  [] Yes /  [x] No /  [] No Caregiver Present      Anticipatory grief assessment:   [] Normal  / [x] Maladaptive        REVIEW OF SYSTEMS:     Positive and pertinent negative findings in ROS are noted above in HPI. The following systems were [x] reviewed / [] unable to be reviewed as noted in HPI  Other findings are noted below.   Systems: constitutional, ears/nose/mouth/throat, respiratory, gastrointestinal, genitourinary, musculoskeletal, integumentary, neurologic, psychiatric, endocrine. Positive findings noted below. Modified ESAS Completed by: provider   Fatigue: 0 Drowsiness: 0   Depression: 0 Pain: 0   Anxiety: 0 Nausea: 0   Anorexia: 0 Dyspnea: 3     Constipation: No              PHYSICAL EXAM:     From RN flowsheet:  Wt Readings from Last 3 Encounters:   09/26/19 47.5 kg (104 lb 11.2 oz)   05/15/19 46.7 kg (103 lb)   05/10/19 46.7 kg (103 lb)     Blood pressure 118/75, pulse 76, temperature 97.8 °F (36.6 °C), resp. rate 22, height 5' 5\" (1.651 m), weight 47.5 kg (104 lb 11.2 oz), SpO2 100 %. Pain Scale 1: Numeric (0 - 10)  Pain Intensity 1: 0                 Last bowel movement, if known: unknown    Constitutional: Awake, alert, pleasant  Eyes: pupils equal, anicteric  ENMT: no nasal discharge, moist mucous membranes  Respiratory: breathing mildly labored, symmetric, completing neb treatment, on oxygen via NC  Musculoskeletal: no deformity, no tenderness to palpation  Skin: warm, dry  Neurologic: following commands, moving all extremities  Psychiatric: full affect, no hallucinations       HISTORY:     Active Problems:    Tobacco abuse ()      CAD (coronary artery disease), native coronary artery (1/3/2005)      Overview: LAD 70-80% mid; Cx-diffuse 80%; OM1-70% prox; RCA 40-50% mid; 100% RPLB       with collaterals. Repeat cor angio (Aug 2013): 30% dLM, 70%pLAD, 80%mLAD,       60%dLAD, 80%D1 (large), 80% ramus, 85%dLCx, 80%dRCA, 75%RPLB. LV EF 50%       with inferobasal hypokinesis. S/p CABG x5 (LIMA->LAD, Seq SVG-> D &ramus, Seq SVG-> RPDA & RPLB; April 2014)      Emphysematous COPD [facility]) ()      Overview: PFTs March 2009: mild-moderate obstruction; + bronchodil.  Response;       decreased DLCO      Alcohol abuse ()      Overview: quit February 2011      S/P CABG x 5 (4/10/2014)      Overview: LIMA to LAD, and a sequential saphenous vein graft to the 1st diagonal of       the LAD and to the ramus intermedius, and another sequential saphenous       vein graft to the posterior descending branch and the posterolateral       branch of the RCA, Dr. Dallas Becerra, 4/9/14. Congestive heart failure (CHF) ([address] (9/24/2019)      Wide-complex tachycardia ([address] (9/24/2019)      Acute on chronic combined systolic and diastolic ACC/AHA stage C congestive heart failure ([address] (9/24/2019)      Past Medical History:   Diagnosis Date    Alcohol abuse     quit February 2011    Binocular visual disturbance 10/31/2013    CAD (coronary artery disease)     CAD (coronary artery disease), native coronary artery 1/3/05    LAD 70-80% mid; Cx-diffuse 80%; OM1-70% prox; RCA 40-50% mid; 100% RPLB with collaterals    Cardiac cath 08/19/2013    Severe, diffuse calcification. dLM 30%. mLAD 80%. o/pD1 80%. o/pRamus 80%. dCX 85%. dRCA 80%.  m-dRPLB 75%. EF 50%. Mod to severe inferobasal hypk.  Cardiac echocardiogram 01/19/2011    EF 50-55%. Gr 1 DDfx. RVSP 30 mmHg.  Cardiac nuclear imaging test 06/27/2011    No evidence of ischemia or previous infarction. EF 60%. Neg EKG on max EST. Ex time 6 mins.     Chronic kidney disease     Chronic lung disease     Chronic obstructive pulmonary disease (HCC)     Coronary artery disease     Diastolic CHF, chronic (HCC)     LV EF 55% (Echo Jan 2011)    Diverticula of colon 12/17/2013    Emphysematous COPD ([address]     PFTs March 2009; mild-mode obstruction, + bronchodil response; decreased DLCO    GERD (gastroesophageal reflux disease)     with erosive esophagitis history    Heart failure (HCC)     History of renal failure     2 years ago, which completely resolved    Hypercholesterolemia     Hypertension     Hypertensive heart disease with CHF ([address]     MI (myocardial infarction) ([address]     Inferior wall    Paresthesia and pain of both upper extremities 10/31/2013    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 Rissa Santacruz, 4/9/14.  Tobacco abuse     Unspecified hereditary and idiopathic peripheral neuropathy 12/4/2013      Past Surgical History:   Procedure Laterality Date    HX CORONARY ARTERY BYPASS GRAFT  about 2013    HX HEART CATHETERIZATION  11/2013      Family History   Problem Relation Age of Onset    Heart Disease Maternal Uncle     Diabetes Father     Hypertension Father       History reviewed, no pertinent family history.   Social History     Tobacco Use    Smoking status: Current Every Day Smoker     Packs/day: 0.50     Years: 55.00     Pack years: 27.50     Types: Cigarettes     Start date: 3/13/2014    Smokeless tobacco: Never Used   Substance Use Topics    Alcohol use: Yes     Comment: every other day drinker     Allergies   Allergen Reactions    Vicodin [Hydrocodone-Acetaminophen] Rash      Current Facility-Administered Medications   Medication Dose Route Frequency    ondansetron (ZOFRAN) injection 4 mg  4 mg IntraVENous Q6H PRN    heparin 25,000 units in D5W 250 ml infusion  12-25 Units/kg/hr IntraVENous TITRATE    albuterol-ipratropium (DUO-NEB) 2.5 MG-0.5 MG/3 ML  3 mL Nebulization TID RT    aspirin delayed-release tablet 81 mg  81 mg Oral DAILY    atorvastatin (LIPITOR) tablet 40 mg  40 mg Oral QHS    clopidogrel (PLAVIX) tablet 75 mg  75 mg Oral DAILY    isosorbide mononitrate ER (IMDUR) tablet 30 mg  30 mg Oral 7am    metoprolol succinate (TOPROL-XL) XL tablet 25 mg  25 mg Oral DAILY    budesonide (PULMICORT) 500 mcg/2 ml nebulizer suspension  500 mcg Nebulization BID RT    sodium chloride (NS) flush 5-40 mL  5-40 mL IntraVENous Q8H    sodium chloride (NS) flush 5-40 mL  5-40 mL IntraVENous PRN    LORazepam (ATIVAN) tablet 1 mg  1 mg Oral Q1H PRN    Or    LORazepam (ATIVAN) injection 1 mg  1 mg IntraVENous Q1H PRN  LORazepam (ATIVAN) tablet 2 mg  2 mg Oral Q1H PRN    Or    LORazepam (ATIVAN) injection 2 mg  2 mg IntraVENous I5O PRN    folic acid (FOLVITE) tablet 1 mg  1 mg Oral DAILY    therapeutic multivitamin (THERAGRAN) tablet 1 Tab  1 Tab Oral DAILY    amiodarone (NEXTERONE) 360 mg in dextrose 200 mL (1.8 mg/mL) infusion  0.5 mg/min IntraVENous TITRATE    thiamine HCL (B-1) tablet 100 mg  100 mg Oral DAILY          LAB AND IMAGING FINDINGS:     Lab Results   Component Value Date/Time    WBC 10.7 09/26/2019 03:20 AM    HGB 11.0 (L) 09/26/2019 03:20 AM    PLATELET 821 05/14/1641 03:20 AM     Lab Results   Component Value Date/Time    Sodium 130 (L) 09/26/2019 03:20 AM    Potassium 4.3 09/26/2019 03:20 AM    Chloride 98 (L) 09/26/2019 03:20 AM    CO2 20 (L) 09/26/2019 03:20 AM    BUN 26 (H) 09/26/2019 03:20 AM    Creatinine 1.85 (H) 09/26/2019 03:20 AM    Calcium 7.6 (L) 09/26/2019 03:20 AM    Magnesium 1.8 09/25/2019 07:03 AM      Lab Results   Component Value Date/Time    AST (SGOT) 170 (H) 09/24/2019 09:57 AM    Alk.  phosphatase 246 (H) 09/24/2019 09:57 AM    Protein, total 6.3 (L) 09/24/2019 09:57 AM    Albumin 2.5 (L) 09/24/2019 09:57 AM    Globulin 3.8 09/24/2019 09:57 AM     (H) 01/19/2011 02:45 AM     Lab Results   Component Value Date/Time    INR 0.9 01/23/2019 10:34 AM    Prothrombin time 12.3 01/23/2019 10:34 AM    aPTT 100.5 (H) 09/26/2019 03:20 AM      Lab Results   Component Value Date/Time    Iron 62 09/25/2019 07:03 AM    TIBC 174 (L) 09/25/2019 07:03 AM    Iron % saturation 36 09/25/2019 07:03 AM    Ferritin 1,367 (H) 09/25/2019 07:03 AM      No results found for: PH, PCO2, PO2  No components found for: [place]   Lab Results   Component Value Date/Time     09/26/2019 03:20 AM    CK - MB 7.3 (H) 09/26/2019 03:20 AM                Total time: 35 minutes  Counseling / coordination time, spent as noted above: 25 minutes  > 50% counseling / coordination?: yes, patient, RN    Prolonged service was provided for  []30 min   []75 min in face to face time in the presence of the patient, spent as noted above. Time Start:   Time End:   Note: this can only be billed with 35960 (initial) or 54953 (follow up). If multiple start / stop times, list each separately.

## 2019-09-26 NOTE — ROUTINE PROCESS
Bedside verbal shift change report given to Patrick Park RN. By Nupur Morillo Report included the following information: SBAR, Kardex, Intake/Output, MAR and Recent Results.

## 2019-09-27 LAB
ANION GAP SERPL CALC-SCNC: 10 MMOL/L (ref 3–18)
BUN SERPL-MCNC: 33 MG/DL (ref 7–18)
BUN/CREAT SERPL: 18 (ref 12–20)
CALCIUM SERPL-MCNC: 7.6 MG/DL (ref 8.5–10.1)
CHLORIDE SERPL-SCNC: 93 MMOL/L (ref 100–111)
CO2 SERPL-SCNC: 23 MMOL/L (ref 21–32)
CREAT SERPL-MCNC: 1.87 MG/DL (ref 0.6–1.3)
GLUCOSE SERPL-MCNC: 92 MG/DL (ref 74–99)
POTASSIUM SERPL-SCNC: 4.2 MMOL/L (ref 3.5–5.5)
SODIUM SERPL-SCNC: 126 MMOL/L (ref 136–145)

## 2019-09-27 PROCEDURE — 74011250637 HC RX REV CODE- 250/637: Performed by: INTERNAL MEDICINE

## 2019-09-27 PROCEDURE — 74011000250 HC RX REV CODE- 250: Performed by: INTERNAL MEDICINE

## 2019-09-27 PROCEDURE — 36415 COLL VENOUS BLD VENIPUNCTURE: CPT

## 2019-09-27 PROCEDURE — 97116 GAIT TRAINING THERAPY: CPT

## 2019-09-27 PROCEDURE — 74011250637 HC RX REV CODE- 250/637: Performed by: PHYSICIAN ASSISTANT

## 2019-09-27 PROCEDURE — 74011250636 HC RX REV CODE- 250/636: Performed by: PHYSICIAN ASSISTANT

## 2019-09-27 PROCEDURE — 80048 BASIC METABOLIC PNL TOTAL CA: CPT

## 2019-09-27 PROCEDURE — 65660000000 HC RM CCU STEPDOWN

## 2019-09-27 PROCEDURE — 94640 AIRWAY INHALATION TREATMENT: CPT

## 2019-09-27 RX ADMIN — AMIODARONE HYDROCHLORIDE 200 MG: 200 TABLET ORAL at 21:01

## 2019-09-27 RX ADMIN — ENOXAPARIN SODIUM 50 MG: 60 INJECTION SUBCUTANEOUS at 14:54

## 2019-09-27 RX ADMIN — Medication 10 ML: at 14:00

## 2019-09-27 RX ADMIN — ISOSORBIDE MONONITRATE 30 MG: 30 TABLET, EXTENDED RELEASE ORAL at 10:27

## 2019-09-27 RX ADMIN — FOLIC ACID 1 MG: 1 TABLET ORAL at 10:28

## 2019-09-27 RX ADMIN — ATORVASTATIN CALCIUM 40 MG: 40 TABLET, FILM COATED ORAL at 21:04

## 2019-09-27 RX ADMIN — AMIODARONE HYDROCHLORIDE 200 MG: 200 TABLET ORAL at 10:28

## 2019-09-27 RX ADMIN — THERA TABS 1 TABLET: TAB at 10:28

## 2019-09-27 RX ADMIN — CLOPIDOGREL BISULFATE 75 MG: 75 TABLET ORAL at 10:27

## 2019-09-27 RX ADMIN — Medication 100 MG: at 10:27

## 2019-09-27 RX ADMIN — ASPIRIN 81 MG: 81 TABLET, COATED ORAL at 10:28

## 2019-09-27 RX ADMIN — METOPROLOL SUCCINATE 25 MG: 25 TABLET, EXTENDED RELEASE ORAL at 10:28

## 2019-09-27 RX ADMIN — BUDESONIDE 500 MCG: 0.5 INHALANT RESPIRATORY (INHALATION) at 20:47

## 2019-09-27 RX ADMIN — Medication 10 ML: at 07:06

## 2019-09-27 NOTE — PROGRESS NOTES
[facility]  [facility]    Patient: Nisha Trent Age: 69 y.o. : 1940 MR#: [id number] SSN: [id number]  Date: 2019     Subjective:     Denies SOB or CP at this time; he states he feels better with stopping nebulizers. Denies any other complaints including no n/v/constipation or voiding difficulties     Assessment/Plan:   1. Acute on chronic combined HF  2. Elevated troponin concerning for NSTEMI  3. Wide complex tachycardia  4. Hyponatremia, hypochloremia, hypocalcemia  5. ETOH abuse  6. Tobacco abuse  7. 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 Moderate protein calorie malnutrition  9. Chronic macrocytic anemia secondary to ETOH   10. CHRISTIANO on CKD3  11. HTN  12. HLD    Plan  1. Patient rescind DNR status to full code. Palliative aware. AMD has been completed  2. Cardiology consult appreciated. Anticipate tentative AICD placement on  with plan for interventional cardiologist to review films on . Cont ASA, plavix, statin, BB and imdur. PO amiodarone and initiate Lovenox. Continue ASA, Plavix, amiodarone, Imdur, BB, statin. 3. Monitor metabolic panel and replete. 4. Monitor I/O's.   5. Monitor renal function. 6. Off CIWA, folic acid, thiamine, mvi. No nicotine patch, concern for NSTEMI   7. Patient refuses scheduled nebs. Continue to monitor   8. Nutrition consult. Supplements  9.  Palliative care consult appreciated    Additional Notes:      Case discussed with:  [x]Patient  []Family  [x]Nursing  [x]Case Management  DVT Prophylaxis:  [x]Lovenox  []Hep SQ  []SCDs  []Coumadin   []On Heparin gtt    Objective:   VS:   Visit Vitals  /79 (BP 1 Location: Right arm, BP Patient Position: At rest)   Pulse 63   Temp 97.4 °F (36.3 °C)   Resp 19   Ht 5' 5\" (1.651 m)   Wt 47.5 kg (104 lb 11.2 oz)   SpO2 97%   BMI 17.42 kg/m²      Tmax/24hrs: Temp (24hrs), Av.9 °F (36.6 °C), Min:97.4 °F (36.3 °C), Max:98.3 °F (36.8 °C)      Intake/Output Summary (Last 24 hours) at 9/27/2019 1032  Last data filed at 9/27/2019 0907  Gross per 24 hour   Intake 240 ml   Output 550 ml   Net -310 ml     General:  Alert, NAD, cachexic  Cardiovascular:  RRR  Pulmonary:  LSC throughout; respiratory effort WNL  GI:  +BS in all four quadrants, soft, non-tender  Extremities:  No edema; 2+ dorsalis pedis pulses bilaterally  Neuro: alert and oriented x 4     Labs:    Recent Results (from the past 24 hour(s))   METABOLIC PANEL, BASIC    Collection Time: 09/27/19  4:11 AM   Result Value Ref Range    Sodium 126 (L) 136 - 145 mmol/L    Potassium 4.2 3.5 - 5.5 mmol/L    Chloride 93 (L) 100 - 111 mmol/L    CO2 23 21 - 32 mmol/L    Anion gap 10 3.0 - 18 mmol/L    Glucose 92 74 - 99 mg/dL    BUN 33 (H) 7.0 - 18 MG/DL    Creatinine 1.87 (H) 0.6 - 1.3 MG/DL    BUN/Creatinine ratio 18 12 - 20      GFR est AA 43 (L) >60 ml/min/1.73m2    GFR est non-AA 35 (L) >60 ml/min/1.73m2    Calcium 7.6 (L) 8.5 - 10.1 MG/DL     Signed By: Solange Man NP     September 27, 2019

## 2019-09-27 NOTE — ROUTINE PROCESS
Bedside and Verbal shift change report given to Hema Flaherty RN (oncoming nurse) by Esme Ayers RN 
 (offgoing nurse). Report included the following information SBAR, Kardex, Intake/Output, MAR, Recent Results, Med Rec Status and Cardiac Rhythm NSR Tele box # [id number]. Shift change: Per pervious RN Pt refuses Duo-Nebs. He states it makes him feel worse. Echo Plan for AICD on Monday per MD notes

## 2019-09-27 NOTE — PROGRESS NOTES
Problem: Mobility Impaired (Adult and Pediatric)  Goal: *Acute Goals and Plan of Care (Insert Text)  Description  Physical Therapy Goals  Initiated 9/26/2019 and to be accomplished within 7 day(s)  1. Patient will move from supine to sit and sit to supine , scoot up and down and roll side to side in bed with modified independence. 2.  Patient will transfer from bed to chair and chair to bed with modified independence using the least restrictive device. 3.  Patient will perform sit to stand with modified independence. 4.  Patient will ambulate with modified independence for >150 feet with the least restrictive device. PLOF: Patient lives with his niece in a 1 story home with 0STE and has SC and walk in shower and was independent with mobility. Patient has Rollator and SPC at home. Outcome: Progressing Towards Goal   PHYSICAL THERAPY TREATMENT    Patient: Cecil Castillo (82 y.o. male)  Date: 9/27/2019  Diagnosis: Congestive heart failure (CHF) ([facility]) [I50.9] <principal problem not specified>  Procedure(s) (LRB):  INSERT ICD DUAL (N/A)    Precautions: Fall    ASSESSMENT:  Patient presents today alert and agreeable to therapy and was supine in bed upon arrival. Patient reports less SOB and was able to increase ambulation distance this session. Patient took seated rest break between trial and was educated on TE to perform throughout the day. Also educated on role and goals of therapy and on recommendations for D/C. Encouraged patient to continue ambulation with nursing assist 2-3 times/day in addition to therapy. Patient left resting in locked recliner with call bell by his side and acknowledged understanding; RN notified and chair alarm activated. Progression toward goals:   ?      Improving appropriately and progressing toward goals  ? Improving slowly and progressing toward goals  ?       Not making progress toward goals and plan of care will be adjusted     PLAN:  Patient continues to benefit from skilled intervention to address the above impairments. Continue treatment per established plan of care. Discharge Recommendations:  Home Health  Further Equipment Recommendations for Discharge:  N/A     SUBJECTIVE:   Patient stated I feel a little better today.     OBJECTIVE DATA SUMMARY:   Critical Behavior:  Neurologic State: Alert  Orientation Level: Oriented X4  Cognition: Appropriate for age attention/concentration, Appropriate safety awareness, Follows commands  Safety/Judgement: Awareness of environment, Fall prevention  Functional Mobility Training:  Bed Mobility:   Supine to Sit: Stand-by assistance   Scooting: Modified independent   Transfers:  Sit to Stand: Modified independent  Stand to Sit: Modified independent      Balance:  Sitting: Intact  Standing: Impaired; With support  Standing - Static: Good  Standing - Dynamic : Fair   Ambulation/Gait Training:  Distance (ft): 200 Feet (ft)(120ft + 80ft)   Ambulation - Level of Assistance: Supervision   Gait Abnormalities: Decreased step clearance   Speed/Kathleen: Slow   Interventions: Verbal cues; Visual/Demos  Education:  ?         Bed mobility  ? Transfers  ? Ambulation  ? Assistive device management  ? Stairs  ? Body mechanics  ? Position change  ? Activity pacing/energy conservation  ? Other:   Pain:  Pain level pre-treatment: 0/10  Pain level post-treatment: 0/10     Activity Tolerance:   Patient demos improved tolerance for mobility with decreased SOB this session. Please refer to the flowsheet for vital signs taken during this treatment. After treatment:   ? Patient left in no apparent distress sitting up in chair  ? Patient left in no apparent distress in bed  ? Call bell left within reach  ? Nursing notified  ? Caregiver present  ? Chair alarm activated  ? SCDs applied      COMMUNICATION/EDUCATION:   ?         Role of Physical Therapy in the acute care setting.   ?         Fall prevention education was provided and the patient/caregiver indicated understanding. ? Patient/family have participated as able in working toward goals and plan of care. ?         Patient/family agree to work toward stated goals and plan of care. ?         Patient understands intent and goals of therapy, but is neutral about his/her participation. ? Patient is unable to participate in stated goals/plan of care: ongoing with therapy staff.   ?         Other:        Inell Chad, PT   Time Calculation: 23 mins

## 2019-09-27 NOTE — CONSULTS
Palliative Medicine Consult    Patient Name: Marilu Gross  YOB: 1940    Date of Initial Consult: 9/25/2019, Follow up 9/26/2019  Reason for Consult: Goals of care discussion  Requesting Provider:  Russ Ashton NP   Primary Care Physician: Sulaiman Trotter MD      SUMMARY:   Marilu Gross is a 69 y.o. with a past history of HTN, severe CAD with CABG x 5, systolic and diastolic heart failure, COPD, tobacco abuse, alcoholism, and chronic doxy for eye infection, who was admitted on 9/24/2019 from home with a diagnosis of CHF exacerbation, wide complex tachycardia, and SOB. Current medical issues leading to Palliative Medicine involvement include: Goals of care discussions for a 69year old male who has significant cardiac history. 9/26/2019: Patient still with some dyspnea, mild with rest. Denies chest pain. Patient talked with family, he is moving forward with aggressive therapies including AICD placement. He shares that he just revoked his DNR status, and the nurse came to cut off his band. Explained the benefits and burdens of CPR and patient wants Full code. AMD completed with patient. See \"Plan\" section for thorough details. 9/27/2019: Patient breathing improved today. Denies pain. Sitting up in chair. Possible cath and AICD placement on Monday. No change in goals of care. Continue Full code with full aggressive measures. PALLIATIVE DIAGNOSES:   1. Goals of care discussions  2. CHF exacerbation  3. CAD  4. COPD       PLAN:   9/27/2019: Palliative medicine team including WINSTON Kenny RN and myself met with patient at patient's bedside. No family at bedside. Patient is alert and oriented x 4, denies SOB or pain today. He is sitting up in chair, no concerns. Support offered. No changes in goals of care: Continue  Full code with full aggressive measures. See previous visits below:    9/26/2019: Patient still with some dyspnea, mild with rest. Denies chest pain.  Patient talked with family, he is moving forward with aggressive therapies including AICD placement. He shares that he just revoked his DNR status, and the nurse came to cut off his band. Explained the benefits and burdens of CPR including the low success rates with history of heart disease, and possibilities of hypoxic brain injury, inability to extubate, long term ventilator needs, and potential inability to eat/perfrom ADL care if CPR were successful at reviving. Patient states it is a lot to think about but for now his goals remain Full code with full aggressive measures. DDNR form revoked. Patient states he has thought about doing the AMD we left for him yesterday, and we helped him complete it. AMD completed naming daugther Nehal Sal as primary MPOA and ex-spouse Naga Tono as secondary MPOA. Supportive listening offered. Will continue to follow for support and goals of care discussions. 9/25/2019  1. Goals of care discussion: Met with patient and patient's son Savanna Diaz at bedside. Patient is alert and oriented x 4 and states he needs to talk to his daughter about his heart and decide whether he would be accepting of an AICD. Patient does not have an AMD on file, but states he would want his daughter Nehal Sal to make medical decisions for him in the event he were unable to communicate. Encouraged and offered help with AMD completion, but patient does not wish to complete at this time. Blank copy left with patient along with instructions for completion. Patient has a DDNR on file, and did not wish for further discussion when introducing the POST form. Full aggressive measures up until cardiac or pulmonary arrest. Supportive listening offered to patient and family. Will continue to follow for continued goals of care discussions and support. 1. CHF exacerbation: Cardiology following. Echo result pending. Per cardiology note, \"drop in EF consistent with worsening heart failure/cardiomyopathy.  \" Patient admits to dyspnea at rest and with exertion. Just finished receiving a breathing nebulizer treatment. 2. CAD: Cardiology following. Reviewed note. Per cardiology, \"can consider AICD, and potential PCI to left main as outpatient, however, this is unlikely to change EF significantly. \"  Patient is discussing this with daughter versus conservative management. 3. COPD: On nebulizer treatments. Nicotine dependence. 4. Initial consult note routed to primary continuity provider  5.  Communicated plan of care with: Palliative IDT, patient, family       GOALS OF CARE / TREATMENT PREFERENCES:   [====Goals of Care====]  GOALS OF CARE: Full code with full aggressive measures  Patient/Health Care Proxy Stated Goals: Prolong life      TREATMENT PREFERENCES: full aggressive measures  Code Status: Full Code    Advance Care Planning:  Advance Care Planning 9/26/2019   Patient's Healthcare Decision Maker is: Named in scanned ACP document   Confirm Advance Directive Yes, on file   Patient Would Like to Complete Advance Directive No   Does the patient have other document types -           The palliative care team has discussed with patient / health care proxy about goals of care / treatment preferences for patient.  [====Goals of Care====]         HISTORY:     History obtained from: patient, chart    CHIEF COMPLAINT: dyspnea    HPI/SUBJECTIVE:    The patient is:   [x] Verbal and participatory  [] Non-participatory due to:   Pleasant, oriented x 4    Clinical Pain Assessment (nonverbal scale for severity on nonverbal patients):   Clinical Pain Assessment  Severity: 0            FUNCTIONAL ASSESSMENT:     Palliative Performance Scale (PPS):  PPS: 60       PSYCHOSOCIAL/SPIRITUAL SCREENING:     Advance Care Planning:  Advance Care Planning 9/26/2019   Patient's Healthcare Decision Maker is: Named in scanned ACP document   Confirm Advance Directive Yes, on file   Patient Would Like to Complete Advance Directive No   Does the patient have other document types -        Any spiritual / Sikh concerns:  [] Yes /  [x] No    Caregiver Burnout:  [] Yes /  [x] No /  [] No Caregiver Present      Anticipatory grief assessment:   [] Normal  / [x] Maladaptive        REVIEW OF SYSTEMS:     Positive and pertinent negative findings in ROS are noted above in HPI. The following systems were [x] reviewed / [] unable to be reviewed as noted in HPI  Other findings are noted below. Systems: constitutional, ears/nose/mouth/throat, respiratory, gastrointestinal, genitourinary, musculoskeletal, integumentary, neurologic, psychiatric, endocrine. Positive findings noted below. Modified ESAS Completed by: provider   Fatigue: 0 Drowsiness: 0   Depression: 0 Pain: 0   Anxiety: 0 Nausea: 0   Anorexia: 0 Dyspnea: 1     Constipation: No              PHYSICAL EXAM:     From RN flowsheet:  Wt Readings from Last 3 Encounters:   09/26/19 47.5 kg (104 lb 11.2 oz)   05/15/19 46.7 kg (103 lb)   05/10/19 46.7 kg (103 lb)     Blood pressure 114/72, pulse 63, temperature 98.3 °F (36.8 °C), resp. rate 22, height 5' 5\" (1.651 m), weight 47.5 kg (104 lb 11.2 oz), SpO2 100 %. Pain Scale 1: Numeric (0 - 10)  Pain Intensity 1: 0                 Last bowel movement, if known: unknown    Constitutional: Awake, alert, pleasant  Eyes: pupils equal, anicteric  ENMT: no nasal discharge, moist mucous membranes  Respiratory: breathing mildly labored, symmetric, completing neb treatment, on oxygen via NC  Musculoskeletal: no deformity, no tenderness to palpation  Skin: warm, dry  Neurologic: following commands, moving all extremities  Psychiatric: full affect, no hallucinations       HISTORY:     Active Problems:    Tobacco abuse ()      CAD (coronary artery disease), native coronary artery (1/3/2005)      Overview: LAD 70-80% mid; Cx-diffuse 80%; OM1-70% prox; RCA 40-50% mid; 100% RPLB       with collaterals.  Repeat cor angio (Aug 2013): 30% dLM, 70%pLAD, 80%mLAD,       60%dLAD, 80%D1 (large), 80% ramus, 85%dLCx, 80%dRCA, 75%RPLB. LV EF 50%       with inferobasal hypokinesis. S/p CABG x5 (LIMA->LAD, Seq SVG-> D &ramus, Seq SVG-> RPDA & RPLB; April 2014)      Emphysematous COPD [facility]) ()      Overview: PFTs March 2009: mild-moderate obstruction; + bronchodil. Response;       decreased DLCO      Alcohol abuse ()      Overview: quit February 2011      S/P CABG x 5 (4/10/2014)      Overview: LIMA to LAD, and a sequential saphenous vein graft to the 1st diagonal of       the LAD and to the ramus intermedius, and another sequential saphenous       vein graft to the posterior descending branch and the posterolateral       branch of the RCA, Dr. Jonn Ruzi, 4/9/14. Congestive heart failure (CHF) ([address] (9/24/2019)      Wide-complex tachycardia ([address] (9/24/2019)      Acute on chronic combined systolic and diastolic ACC/AHA stage C congestive heart failure ([address] (9/24/2019)      Past Medical History:   Diagnosis Date    Alcohol abuse     quit February 2011    Binocular visual disturbance 10/31/2013    CAD (coronary artery disease)     CAD (coronary artery disease), native coronary artery 1/3/05    LAD 70-80% mid; Cx-diffuse 80%; OM1-70% prox; RCA 40-50% mid; 100% RPLB with collaterals    Cardiac cath 08/19/2013    Severe, diffuse calcification. dLM 30%. mLAD 80%. o/pD1 80%. o/pRamus 80%. dCX 85%. dRCA 80%.  m-dRPLB 75%. EF 50%. Mod to severe inferobasal hypk.  Cardiac echocardiogram 01/19/2011    EF 50-55%. Gr 1 DDfx. RVSP 30 mmHg.  Cardiac nuclear imaging test 06/27/2011    No evidence of ischemia or previous infarction. EF 60%. Neg EKG on max EST. Ex time 6 mins.     Chronic kidney disease     Chronic lung disease     Chronic obstructive pulmonary disease ([address]     Coronary artery disease     Diastolic CHF, chronic (HCC)     LV EF 55% (Echo Jan 2011)    Diverticula of colon 12/17/2013    Emphysematous COPD [facility])     PFTs March 2009; mild-mode obstruction, + bronchodil response; decreased DLCO    GERD (gastroesophageal reflux disease)     with erosive esophagitis history    Heart failure (HCC)     History of renal failure     2 years ago, which completely resolved    Hypercholesterolemia     Hypertension     Hy[facility] heart [facility] with [facility]     MI (myocardial infarction) ([facility]     Inferior wall    Paresthesia and pain of both upper extremities 10/31/2013    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 Cheko Grace, 4/9/14.  Tobacco abuse     Unspecified hereditary and idiopathic peripheral neuropathy 12/4/2013      Past Surgical History:   Procedure Laterality Date    HX CORONARY ARTERY BYPASS GRAFT  about 2013    HX HEART CATHETERIZATION  11/2013      Family History   Problem Relation Age of Onset    Heart Disease Maternal Uncle     Diabetes Father     Hypertension Father       History reviewed, no pertinent family history.   Social History     Tobacco Use    Smoking status: Current Every Day Smoker     Packs/day: 0.50     Years: 55.00     Pack years: 27.50     Types: Cigarettes     Start date: 3/13/2014    Smokeless tobacco: Never Used   Substance Use Topics    Alcohol use: Yes     Comment: every other day drinker     Allergies   Allergen Reactions    Vicodin [Hydrocodone-Acetaminophen] Rash      Current Facility-Administered Medications   Medication Dose Route Frequency    amiodarone (CORDARONE) tablet 200 mg  200 mg Oral Q12H    enoxaparin (LOVENOX) injection 50 mg  1 mg/kg SubCUTAneous Q24H    ondansetron (ZOFRAN) injection 4 mg  4 mg IntraVENous Q6H PRN    aspirin delayed-release tablet 81 mg  81 mg Oral DAILY    atorvastatin (LIPITOR) tablet 40 mg  40 mg Oral QHS    clopidogrel (PLAVIX) tablet 75 mg  75 mg Oral DAILY    isosorbide mononitrate ER (IMDUR) tablet 30 mg  30 mg Oral 7am    metoprolol succinate (TOPROL-XL) XL tablet 25 mg  25 mg Oral DAILY    budesonide (PULMICORT) 500 mcg/2 ml nebulizer suspension  500 mcg Nebulization BID RT    sodium chloride (NS) flush 5-40 mL  5-40 mL IntraVENous Q8H    sodium chloride (NS) flush 5-40 mL  5-40 mL IntraVENous PRN    LORazepam (ATIVAN) tablet 1 mg  1 mg Oral Q1H PRN    Or    LORazepam (ATIVAN) injection 1 mg  1 mg IntraVENous Q1H PRN    LORazepam (ATIVAN) tablet 2 mg  2 mg Oral Q1H PRN    Or    LORazepam (ATIVAN) injection 2 mg  2 mg IntraVENous L9L PRN    folic acid (FOLVITE) tablet 1 mg  1 mg Oral DAILY    therapeutic multivitamin (THERAGRAN) tablet 1 Tab  1 Tab Oral DAILY    thiamine HCL (B-1) tablet 100 mg  100 mg Oral DAILY          LAB AND IMAGING FINDINGS:     Lab Results   Component Value Date/Time    WBC 10.7 09/26/2019 03:20 AM    HGB 11.0 (L) 09/26/2019 03:20 AM    PLATELET 635 57/77/4656 03:20 AM     Lab Results   Component Value Date/Time    Sodium 126 (L) 09/27/2019 04:11 AM    Potassium 4.2 09/27/2019 04:11 AM    Chloride 93 (L) 09/27/2019 04:11 AM    CO2 23 09/27/2019 04:11 AM    BUN 33 (H) 09/27/2019 04:11 AM    Creatinine 1.87 (H) 09/27/2019 04:11 AM    Calcium 7.6 (L) 09/27/2019 04:11 AM    Magnesium 1.8 09/25/2019 07:03 AM      Lab Results   Component Value Date/Time    AST (SGOT) 170 (H) 09/24/2019 09:57 AM    Alk.  phosphatase 246 (H) 09/24/2019 09:57 AM    Protein, total 6.3 (L) 09/24/2019 09:57 AM    Albumin 2.5 (L) 09/24/2019 09:57 AM    Globulin 3.8 09/24/2019 09:57 AM     (H) 01/19/2011 02:45 AM     Lab Results   Component Value Date/Time    INR 0.9 01/23/2019 10:34 AM    Prothrombin time 12.3 01/23/2019 10:34 AM    aPTT 100.5 (H) 09/26/2019 03:20 AM      Lab Results   Component Value Date/Time    Iron 62 09/25/2019 07:03 AM    TIBC 174 (L) 09/25/2019 07:03 AM    Iron % saturation 36 09/25/2019 07:03 AM    Ferritin 1,367 (H) 09/25/2019 07:03 AM      No results found for: PH, PCO2, PO2  No components found for: [place]   Lab Results   Component Value Date/Time     09/26/2019 03:20 AM    CK - MB 7.3 (H) 09/26/2019 03:20 AM                Total time: 15 minutes  Counseling / coordination time, spent as noted above: 10 minutes  > 50% counseling / coordination?: yes, patient, RN    Prolonged service was provided for  []30 min   []75 min in face to face time in the presence of the patient, spent as noted above. Time Start:   Time End:   Note: this can only be billed with 17584 (initial) or 49180 (follow up). If multiple start / stop times, list each separately.

## 2019-09-27 NOTE — PROGRESS NOTES
Not medically cleared for D/C.  Plan remains home vs home health    Kamille Talbot RN BSN  Care Manager  [phone]

## 2019-09-27 NOTE — PROGRESS NOTES
Palliative Medicine    Palliative Medicine team Coreen Espinosa NP and Cheli White RN met briefly with pt at his bedside. Pt was sitting up in chair. He stated that he was \"feeling much better today\". He also stated, \"I'm not doing those breathing treatments anymore. That's what is making me sick! \". The pt denies any pain. He said he still occasionally gets SOB. Per chart, pt will tentatively go for AICD placement on Monday and then possible PCI to left main with Cardiology. Pt wants full aggressive medical management at this time. Pt remains FULL code. Potential dispo plan is home with home health when medically stable. Thank you for the Palliative Medicine consult and allowing us to participate in the care of Mr. Bebe Ojeda. Will continue to monitor and provide support.     Cheli White RN, BSN  Palliative Medicine Inpatient RN  [facility]  Palliative COPE Line: [phone])

## 2019-09-27 NOTE — PROGRESS NOTES
[facility] - Progress Note  Admit Date: 9/24/2019    Assessment:     Hospital Problems  Date Reviewed: 5/15/2019          Codes Class Noted POA    Congestive heart failure (CHF) ([facility] ICD-10-CM: I50.9  ICD-9-CM: 428.0  9/24/2019 Yes        Wide-complex tachycardia ([facility] ICD-10-CM: I47.2  ICD-9-CM: 427.1  9/24/2019 Unknown        Acute on chronic combined systolic and diastolic ACC/AHA stage C congestive heart failure ([facility] ICD-10-CM: I50.43  ICD-9-CM: 428.43, 428.0  9/24/2019 Unknown        S/P CABG x 5 (Chronic) ICD-10-CM: Z95.1  ICD-9-CM: V45.81  4/10/2014 Yes    Overview Signed 4/10/2014  8:08 AM by ROBIN Comer     LANCASTER to LAD, and a sequential saphenous vein graft to the 1st diagonal of the LAD and to the ramus intermedius, and another sequential saphenous vein graft to the posterior descending branch and the posterolateral branch of the RCA, Dr. Didier Alex, 4/9/14. Emphysematous COPD ([facility] ICD-10-CM: J43.9  ICD-9-CM: 492.8  Unknown Yes    Overview Addendum 6/22/2011 12:34 PM by Santa Lipscomb V     PFTs March 2009: mild-moderate obstruction; + bronchodil. Response; decreased DLCO             Alcohol abuse ICD-10-CM: F10.10  ICD-9-CM: 305.00  Unknown Yes    Overview Signed 3/23/2011 11:24 AM by Alfonzo Somers     quit February 2011             Tobacco abuse ICD-10-CM: Z72.0  ICD-9-CM: 305.1  Unknown Yes        CAD (coronary artery disease), native coronary artery ICD-10-CM: I25.10  ICD-9-CM: 414.01  1/3/2005 Yes    Overview Addendum 5/11/2014  3:27 PM by Alfonzo Farnsworth     LAD 70-80% mid; Cx-diffuse 80%; OM1-70% prox; RCA 40-50% mid; 100% RPLB with collaterals. Repeat cor angio (Aug 2013): 30% dLM, 70%pLAD, 80%mLAD, 60%dLAD, 80%D1 (large), 80% ramus, 85%dLCx, 80%dRCA, 75%RPLB. LV EF 50% with inferobasal hypokinesis. S/p CABG x5 (LIMA->LAD, Seq SVG-> D &ramus, Seq SVG-> RPDA & RPLB;  April 2014)                   -Presented with shortness of breath, at least partially associated wt CHF exacerbation with BNP 5612 and CT with small bilateral pleural effusions and suspected pulmonary edema but also with history of COPD as well  -Echo 9/25/2019: EF 16-20% with akinetic basal inferior, basal inferolateral, mid inferior, mid inferolateral, apical anterior, apical septal, apical inferior, apical lateral and apical walls  -Echo 1/24/2019: EF 46-50% with hypokinetic basal inferior, basal inferolateral, mid inferior, mid inferolateral and apical inferior wall segments, grade 2 diastolic dysfunction  -CAD, Cardiac cath 2/1/19 with following findings:   · Severe native CAD with distal left main heavily calcified lesion   · Patent LIMA-LAD  · 2 sequential vein grafts (all 4 vein grafts to diagonal, OM, RPDA, RPL branch) occluded at aortotomy site  · Subtotal mid RCA occludion with NICO 0-I flow, some collaterals from left coronary system  · Native diagonal 85% stenosis  · Native OM 85% stenosis  -Elevated troponin  -Wide-complex tachycardia, most consistent with bundle branch VT  -Hypomagnesemia, Mg 1.3 on presentation, given 2 gm Mg in ER  -HTN  -Hypercholesterolemia  -Hx tobacco and alcohol abuse  -Previously DNR status, now full code.     Primary cardiologist is Dr. José Mckay:     Patient wishes to continue to aggressive care. Will tentatively plan AICD placement on Monday. Will plan for interventional cardiologist to review his films on Monday. Continue maintenance amio. Continued on ASA, plavix, statin, BB, imdur. Patient is continued on full dose lovenox. Follow renal function off diuretics and ace. Subjective:     Breathing improved. No CP.      Objective:      Patient Vitals for the past 8 hrs:   Temp Pulse Resp BP SpO2   09/27/19 0749 97.4 °F (36.3 °C) 63 19 133/79 97 %   09/27/19 0430 98.1 °F (36.7 °C) 61 18 132/77 97 %         Patient Vitals for the past 96 hrs:   Weight   09/26/19 0424 47.5 kg (104 lb 11.2 oz)   09/25/19 1115 46.3 kg (102 lb)   09/25/19 0343 46.4 kg (102 lb 3.2 oz)   09/24/19 1649 46 kg (101 lb 8 oz)   09/24/19 0950 56.7 kg (125 lb)                    Intake/Output Summary (Last 24 hours) at 9/27/2019 0935  Last data filed at 9/27/2019 9209  Gross per 24 hour   Intake 240 ml   Output 550 ml   Net -310 ml       Physical Exam:  General:  alert, cooperative, no distress, appears stated age  Neck:  no JVD  Lungs:  clear to auscultation bilaterally  Heart:  regular rate and rhythm  Abdomen:  abdomen is soft without significant tenderness, masses, organomegaly or guarding  Extremities:  extremities normal, atraumatic, no cyanosis or edema    Data Review:     Labs: Results:       Chemistry Recent Labs     09/27/19  0411 09/26/19  0320 09/25/19  0703 09/25/19  0358 09/24/19  0957   GLU 92 96  --  126* 229*   * 130*  --  138 141   K 4.2 4.3  --  4.3 3.5   CL 93* 98*  --  103 110   CO2 23 20*  --  15* 22   BUN 33* 26*  --  16 13   CREA 1.87* 1.85*  --  1.57* 1.15   CA 7.6* 7.6*  --  7.5* 7.5*   MG  --   --  1.8  --  1.3*   AGAP 10 12  --  20* 9   BUCR 18 14  --  10* 11*   AP  --   --   --   --  246*   TP  --   --   --   --  6.3*   ALB  --   --   --   --  2.5*   GLOB  --   --   --   --  3.8   AGRAT  --   --   --   --  0.7*      CBC w/Diff Recent Labs     09/26/19  0320 09/25/19  0703 09/25/19  0358 09/24/19  0957   WBC 10.7 8.7 6.1 7.3   RBC 3.08* 3.19* 3.23* 2.92*   HGB 11.0* 11.2* 11.3* 10.4*   HCT 31.5* 33.1* 33.8* 30.4*    299 343 306   GRANS 74* 72  --  42   LYMPH 16* 21  --  50   EOS 0 0  --  1      Cardiac Enzymes No results found for: CPK, CK, CKMMB, CKMB, RCK3, CKMBT, CKNDX, CKND1, EDWARD, TROPT, TROIQ, ALLAN, TROPT, TNIPOC, BNP, BNPP   Coagulation Recent Labs     09/26/19  0320 09/25/19  1726   APTT 100.5* 31.8       Lipid Panel Lab Results   Component Value Date/Time    Cholesterol, total 144 05/03/2019 10:26 AM    HDL Cholesterol 86 (H) 05/03/2019 10:26 AM    LDL, calculated 18 05/03/2019 10:26 AM    VLDL, calculated 40 05/03/2019 10:26 AM    Triglyceride 200 (H) 05/03/2019 10:26 AM    CHOL/HDL Ratio 1.7 05/03/2019 10:26 AM      BNP No results found for: BNP, BNPP, XBNPT   Liver Enzymes Recent Labs     09/24/19  0957   TP 6.3*   ALB 2.5*   *   SGOT 170*      Digoxin    Thyroid Studies Lab Results   Component Value Date/Time    TSH 4.31 (H) 09/24/2019 09:57 AM          Signed By: ROBIN Simons Che     September 27, 2019

## 2019-09-28 ENCOUNTER — ANESTHESIA EVENT (OUTPATIENT)
Dept: CARDIAC CATH/INVASIVE PROCEDURES | Age: 79
DRG: 226 | End: 2019-09-28
Payer: MEDICARE

## 2019-09-28 LAB
ANION GAP SERPL CALC-SCNC: 7 MMOL/L (ref 3–18)
BUN SERPL-MCNC: 34 MG/DL (ref 7–18)
BUN/CREAT SERPL: 22 (ref 12–20)
CALCIUM SERPL-MCNC: 8.3 MG/DL (ref 8.5–10.1)
CHLORIDE SERPL-SCNC: 97 MMOL/L (ref 100–111)
CO2 SERPL-SCNC: 27 MMOL/L (ref 21–32)
CREAT SERPL-MCNC: 1.54 MG/DL (ref 0.6–1.3)
ERYTHROCYTE [DISTWIDTH] IN BLOOD BY AUTOMATED COUNT: 15.2 % (ref 11.6–14.5)
GLUCOSE SERPL-MCNC: 85 MG/DL (ref 74–99)
HCT VFR BLD AUTO: 28.3 % (ref 36–48)
HGB BLD-MCNC: 9.6 G/DL (ref 13–16)
MCH RBC QN AUTO: 34.9 PG (ref 24–34)
MCHC RBC AUTO-ENTMCNC: 33.9 G/DL (ref 31–37)
MCV RBC AUTO: 102.9 FL (ref 74–97)
PLATELET # BLD AUTO: 193 K/UL (ref 135–420)
PMV BLD AUTO: 10.8 FL (ref 9.2–11.8)
POTASSIUM SERPL-SCNC: 4.4 MMOL/L (ref 3.5–5.5)
RBC # BLD AUTO: 2.75 M/UL (ref 4.7–5.5)
SODIUM SERPL-SCNC: 131 MMOL/L (ref 136–145)
WBC # BLD AUTO: 8.8 K/UL (ref 4.6–13.2)

## 2019-09-28 PROCEDURE — 74011000250 HC RX REV CODE- 250: Performed by: INTERNAL MEDICINE

## 2019-09-28 PROCEDURE — 85027 COMPLETE CBC AUTOMATED: CPT

## 2019-09-28 PROCEDURE — 94761 N-INVAS EAR/PLS OXIMETRY MLT: CPT

## 2019-09-28 PROCEDURE — 65660000000 HC RM CCU STEPDOWN

## 2019-09-28 PROCEDURE — 36415 COLL VENOUS BLD VENIPUNCTURE: CPT

## 2019-09-28 PROCEDURE — 77010033678 HC OXYGEN DAILY

## 2019-09-28 PROCEDURE — 80048 BASIC METABOLIC PNL TOTAL CA: CPT

## 2019-09-28 PROCEDURE — 74011250637 HC RX REV CODE- 250/637: Performed by: INTERNAL MEDICINE

## 2019-09-28 PROCEDURE — 74011250637 HC RX REV CODE- 250/637: Performed by: PHYSICIAN ASSISTANT

## 2019-09-28 PROCEDURE — 94640 AIRWAY INHALATION TREATMENT: CPT

## 2019-09-28 PROCEDURE — 74011250636 HC RX REV CODE- 250/636: Performed by: PHYSICIAN ASSISTANT

## 2019-09-28 RX ADMIN — FOLIC ACID 1 MG: 1 TABLET ORAL at 09:22

## 2019-09-28 RX ADMIN — Medication 10 ML: at 14:00

## 2019-09-28 RX ADMIN — ISOSORBIDE MONONITRATE 30 MG: 30 TABLET, EXTENDED RELEASE ORAL at 09:21

## 2019-09-28 RX ADMIN — METOPROLOL SUCCINATE 25 MG: 25 TABLET, EXTENDED RELEASE ORAL at 09:22

## 2019-09-28 RX ADMIN — BUDESONIDE 500 MCG: 0.5 INHALANT RESPIRATORY (INHALATION) at 20:07

## 2019-09-28 RX ADMIN — Medication 10 ML: at 22:26

## 2019-09-28 RX ADMIN — AMIODARONE HYDROCHLORIDE 200 MG: 200 TABLET ORAL at 22:25

## 2019-09-28 RX ADMIN — ATORVASTATIN CALCIUM 40 MG: 40 TABLET, FILM COATED ORAL at 22:25

## 2019-09-28 RX ADMIN — BUDESONIDE 500 MCG: 0.5 INHALANT RESPIRATORY (INHALATION) at 08:00

## 2019-09-28 RX ADMIN — ENOXAPARIN SODIUM 50 MG: 60 INJECTION SUBCUTANEOUS at 14:25

## 2019-09-28 RX ADMIN — THERA TABS 1 TABLET: TAB at 09:22

## 2019-09-28 RX ADMIN — ASPIRIN 81 MG: 81 TABLET, COATED ORAL at 09:21

## 2019-09-28 RX ADMIN — AMIODARONE HYDROCHLORIDE 200 MG: 200 TABLET ORAL at 09:22

## 2019-09-28 RX ADMIN — Medication 100 MG: at 09:21

## 2019-09-28 NOTE — ROUTINE PROCESS
Bedside and verbal report received from Haven Nageotte RN(offgoing nurse). Report included the following information, SBAR, MAR, Intake/output, LABS and summary of care. Patient resting in bed watching TV. Will continue to monitor.

## 2019-09-28 NOTE — PROGRESS NOTES
[facility] Hospitalist Group  Progress Note    Patient: Delisa Macias Age: 69 y.o. : 1940 MR#: [id number] SSN: [id number]  Date: 2019     Subjective:     Denies SOB or CP at this time; he states he feels better with stopping nebulizers / medication changes. Denies any other complaints including no n/v/constipation or voiding difficulties     Assessment/Plan:   1. Acute on chronic combined HF  2. Elevated troponin concerning for NSTEMI  3. Wide complex tachycardia  4. Hyponatremia, hypochloremia, hypocalcemia  5. ETOH abuse  6. Tobacco abuse  7. 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 Moderate protein calorie malnutrition  9. Chronic macrocytic anemia secondary to ETOH   10. CHRISTIANO on CKD3  11. HTN  12. HLD    Plan  1. Patient rescind DNR status to full code. Palliative aware. AMD has been completed  2. Cardiology consult appreciated. Anticipate tentative AICD placement on  with plan for interventional cardiologist to review films on . Cont ASA, plavix, statin, BB and imdur. PO amiodarone and initiate Lovenox. Continue ASA, Plavix, amiodarone, Imdur, BB, statin. 3. Monitor metabolic panel and replete. 4. Monitor I/O's.   5. Monitor renal function. 6. No evidence of withdrawal, cont folic acid, thiamine, mvi. No nicotine patch, concern for NSTEMI   7. Patient refuses scheduled nebs. Continue to monitor   8. Nutrition consult. Supplements  9.  Palliative care consult appreciated    Additional Notes:      Case discussed with:  [x]Patient  []Family  [x]Nursing  [x]Case Management  DVT Prophylaxis:  [x]Lovenox  []Hep SQ  []SCDs  []Coumadin   []On Heparin gtt    Objective:   VS:   Visit Vitals  /67 (BP 1 Location: Right arm, BP Patient Position: At rest)   Pulse 60   Temp 97.7 °F (36.5 °C)   Resp 18   Ht 5' 5\" (1.651 m)   Wt 50.5 kg (111 lb 4.8 oz)   SpO2 99%   BMI 18.52 kg/m²      Tmax/24hrs: Temp (24hrs), Av.2 °F (36.8 °C), Min:97.7 °F (36.5 °C), Max:98.6 °F (37 °C)      Intake/Output Summary (Last 24 hours) at 9/28/2019 0918  Last data filed at 9/28/2019 0715  Gross per 24 hour   Intake 240 ml   Output 600 ml   Net -360 ml     General:  Alert, NAD, cachexic  Cardiovascular:  RRR  Pulmonary:  LSC throughout; respiratory effort WNL  GI:  +BS in all four quadrants, soft, non-tender  Extremities:  No edema; 2+ dorsalis pedis pulses bilaterally  Neuro: alert and oriented x 4     Labs:    Recent Results (from the past 24 hour(s))   METABOLIC PANEL, BASIC    Collection Time: 09/28/19  5:13 AM   Result Value Ref Range    Sodium 131 (L) 136 - 145 mmol/L    Potassium 4.4 3.5 - 5.5 mmol/L    Chloride 97 (L) 100 - 111 mmol/L    CO2 27 21 - 32 mmol/L    Anion gap 7 3.0 - 18 mmol/L    Glucose 85 74 - 99 mg/dL    BUN 34 (H) 7.0 - 18 MG/DL    Creatinine 1.54 (H) 0.6 - 1.3 MG/DL    BUN/Creatinine ratio 22 (H) 12 - 20      GFR est AA 53 (L) >60 ml/min/1.73m2    GFR est non-AA 44 (L) >60 ml/min/1.73m2    Calcium 8.3 (L) 8.5 - 10.1 MG/DL   CBC W/O DIFF    Collection Time: 09/28/19  5:13 AM   Result Value Ref Range    WBC 8.8 4.6 - 13.2 K/uL    RBC 2.75 (L) 4.70 - 5.50 M/uL    HGB 9.6 (L) 13.0 - 16.0 g/dL    HCT 28.3 (L) 36.0 - 48.0 %    .9 (H) 74.0 - 97.0 FL    MCH 34.9 (H) 24.0 - 34.0 PG    MCHC 33.9 31.0 - 37.0 g/dL    RDW 15.2 (H) 11.6 - 14.5 %    PLATELET 449 324 - 127 K/uL    MPV 10.8 9.2 - 11.8 FL     Signed By: Concepción Painter NP     September 28, 2019

## 2019-09-28 NOTE — ROUTINE PROCESS
Bedside and verbal report received from [address] [place] [address] (offgoing nurse). Report included the following information, SBAR, Intake/output, Kardex, MAR and summary of care. Patient sitting in chair with family member at the bedside. No complaints of pain or sob. Will continue to monitor.

## 2019-09-28 NOTE — PROGRESS NOTES
Problem: Pressure Injury - Risk of  Goal: *Prevention of pressure injury  Description  Document Rodrigue Scale and appropriate interventions in the flowsheet.   Note:   Pressure Injury Interventions:       Moisture Interventions: Internal/External urinary devices    Activity Interventions: Increase time out of bed    Mobility Interventions: HOB 30 degrees or less, Pressure redistribution bed/mattress (bed type)    Nutrition Interventions: Document food/fluid/supplement intake

## 2019-09-28 NOTE — ROUTINE PROCESS
Bedside and Verbal shift change report given to Iman Bateman RN (oncoming nurse) by Kali Clark RN (offgoing nurse). Report included the following information SBAR, Kardex, Intake/Output, MAR and Recent Results.

## 2019-09-28 NOTE — PROGRESS NOTES
Bedside shift change report given to DAVID Andino (oncoming nurse) by Flash Hu RN (offgoing nurse). Report included the following information SBAR, Kardex and MAR.

## 2019-09-28 NOTE — PROGRESS NOTES
[facility]  -  Progress Note      Patient: Petra Dillon MRN: [id number]  SSN: [id number]    YOB: 1940  Age: 69 y.o. Sex: male      Admit Date: 9/24/2019    Assessment:     -Presented with shortness of breath, at least partially associated wtih CHF exacerbation with BNP 5612 and CT with small bilateral pleural effusions and suspected pulmonary edema but also with history of COPD as well  -Echo 9/25/2019: EF 16-20% with akinetic basal inferior, basal inferolateral, mid inferior, mid inferolateral, apical anterior, apical septal, apical inferior, apical lateral and apical walls  -Echo 1/24/2019: EF 46-50% with hypokinetic basal inferior, basal inferolateral, mid inferior, mid inferolateral and apical inferior wall segments, grade 2 diastolic dysfunction  -CAD, Cardiac cath 2/1/19 with following findings:   · Severe native CAD with distal left main heavily calcified lesion   · Patent LIMA-LAD  · 2 sequential vein grafts (all 4 vein grafts to diagonal, OM, RPDA, RPL branch) occluded at aortotomy site  · Subtotal mid RCA occludion with NICO 0-I flow, some collaterals from left coronary system  · Native diagonal 85% stenosis  · Native OM 85% stenosis  -Elevated troponin  -Wide-complex tachycardia, most consistent with bundle branch VT  -Hypomagnesemia, Mg 1.3 on presentation, given 2 gm Mg in ER  -HTN  -Hypercholesterolemia  -Hx tobacco and alcohol abuse  -Previously DNR status, now full code.     Primary cardiologist is Dr. José Mckay:     -Plan for AICD on Monday, pt informed of NPO after midnight tomorrow night.  -Continued on PO Amiodarone, no significant telemetry events overnight.  -Continue ASA, Lipitor, Imdur, Toprol.  -Renal function improving, continue to monitor. Subjective:     No new complaints. Breathing is slightly improved.     Objective:      Patient Vitals for the past 8 hrs:   Temp Pulse Resp BP SpO2   09/28/19 0816 97.7 °F (36.5 °C) 60 18 118/67 99 % 09/28/19 0330 98.2 °F (36.8 °C) 60 18 123/67 100 %         Patient Vitals for the past 96 hrs:   Weight   09/28/19 0417 111 lb 4.8 oz (50.5 kg)   09/26/19 0424 104 lb 11.2 oz (47.5 kg)   09/25/19 1115 102 lb (46.3 kg)   09/25/19 0343 102 lb 3.2 oz (46.4 kg)   09/24/19 1649 101 lb 8 oz (46 kg)   09/24/19 0950 125 lb (56.7 kg)         Intake/Output Summary (Last 24 hours) at 9/28/2019 0902  Last data filed at 9/28/2019 0715  Gross per 24 hour   Intake 480 ml   Output 600 ml   Net -120 ml       Physical Exam:  General:  alert, cooperative, no distress, appears stated age  Neck:  supple  Lungs:  clear to auscultation bilaterally  Heart:  Regular rate and rhythm  Abdomen:  abdomen is soft without significant tenderness, masses, organomegaly or guarding  Extremities:  Atraumatic, no edema     Data Review:     Labs: Results:       Chemistry Recent Labs     09/28/19  0513 09/27/19  0411 09/26/19  0320   GLU 85 92 96   * 126* 130*   K 4.4 4.2 4.3   CL 97* 93* 98*   CO2 27 23 20*   BUN 34* 33* 26*   CREA 1.54* 1.87* 1.85*   CA 8.3* 7.6* 7.6*   AGAP 7 10 12   BUCR 22* 18 14      CBC w/Diff Recent Labs     09/28/19  0513 09/26/19  0320   WBC 8.8 10.7   RBC 2.75* 3.08*   HGB 9.6* 11.0*   HCT 28.3* 31.5*    283   GRANS  --  74*   LYMPH  --  16*   EOS  --  0      Coagulation Recent Labs     09/26/19  0320 09/25/19  1726   APTT 100.5* 31.8       Lipid Panel Lab Results   Component Value Date/Time    Cholesterol, total 144 05/03/2019 10:26 AM    HDL Cholesterol 86 (H) 05/03/2019 10:26 AM    LDL, calculated 18 05/03/2019 10:26 AM    VLDL, calculated 40 05/03/2019 10:26 AM    Triglyceride 200 (H) 05/03/2019 10:26 AM    CHOL/HDL Ratio 1.7 05/03/2019 10:26 AM      Thyroid Studies Lab Results   Component Value Date/Time    TSH 4.31 (H) 09/24/2019 09:57 AM

## 2019-09-28 NOTE — PROGRESS NOTES
Bedside shift change report given to DAVID Andino (oncoming nurse) by Jorge Alberto Bocanegra RN (offgoing nurse). Report included the following information SBAR, Kardex and MAR.

## 2019-09-29 LAB
ANION GAP SERPL CALC-SCNC: 5 MMOL/L (ref 3–18)
BUN SERPL-MCNC: 25 MG/DL (ref 7–18)
BUN/CREAT SERPL: 19 (ref 12–20)
CALCIUM SERPL-MCNC: 8.6 MG/DL (ref 8.5–10.1)
CHLORIDE SERPL-SCNC: 97 MMOL/L (ref 100–111)
CO2 SERPL-SCNC: 31 MMOL/L (ref 21–32)
CREAT SERPL-MCNC: 1.3 MG/DL (ref 0.6–1.3)
GLUCOSE SERPL-MCNC: 86 MG/DL (ref 74–99)
HCT VFR BLD AUTO: 27 % (ref 36–48)
HGB BLD-MCNC: 9.2 G/DL (ref 13–16)
POTASSIUM SERPL-SCNC: 4.2 MMOL/L (ref 3.5–5.5)
SODIUM SERPL-SCNC: 133 MMOL/L (ref 136–145)

## 2019-09-29 PROCEDURE — 94640 AIRWAY INHALATION TREATMENT: CPT

## 2019-09-29 PROCEDURE — 36415 COLL VENOUS BLD VENIPUNCTURE: CPT

## 2019-09-29 PROCEDURE — 77010033678 HC OXYGEN DAILY

## 2019-09-29 PROCEDURE — 94760 N-INVAS EAR/PLS OXIMETRY 1: CPT

## 2019-09-29 PROCEDURE — 74011250637 HC RX REV CODE- 250/637: Performed by: PHYSICIAN ASSISTANT

## 2019-09-29 PROCEDURE — 65660000000 HC RM CCU STEPDOWN

## 2019-09-29 PROCEDURE — 74011250637 HC RX REV CODE- 250/637: Performed by: INTERNAL MEDICINE

## 2019-09-29 PROCEDURE — 74011250636 HC RX REV CODE- 250/636: Performed by: PHYSICIAN ASSISTANT

## 2019-09-29 PROCEDURE — 80048 BASIC METABOLIC PNL TOTAL CA: CPT

## 2019-09-29 PROCEDURE — 74011000250 HC RX REV CODE- 250: Performed by: INTERNAL MEDICINE

## 2019-09-29 PROCEDURE — 85018 HEMOGLOBIN: CPT

## 2019-09-29 RX ORDER — ENOXAPARIN SODIUM 100 MG/ML
1 INJECTION SUBCUTANEOUS EVERY 12 HOURS
Status: DISCONTINUED | OUTPATIENT
Start: 2019-09-30 | End: 2019-10-01 | Stop reason: HOSPADM

## 2019-09-29 RX ADMIN — METOPROLOL SUCCINATE 25 MG: 25 TABLET, EXTENDED RELEASE ORAL at 09:38

## 2019-09-29 RX ADMIN — ATORVASTATIN CALCIUM 40 MG: 40 TABLET, FILM COATED ORAL at 21:49

## 2019-09-29 RX ADMIN — BUDESONIDE 500 MCG: 0.5 INHALANT RESPIRATORY (INHALATION) at 19:36

## 2019-09-29 RX ADMIN — Medication 100 MG: at 09:38

## 2019-09-29 RX ADMIN — ASPIRIN 81 MG: 81 TABLET, COATED ORAL at 09:38

## 2019-09-29 RX ADMIN — AMIODARONE HYDROCHLORIDE 200 MG: 200 TABLET ORAL at 09:38

## 2019-09-29 RX ADMIN — ENOXAPARIN SODIUM 50 MG: 60 INJECTION SUBCUTANEOUS at 12:36

## 2019-09-29 RX ADMIN — FOLIC ACID 1 MG: 1 TABLET ORAL at 09:38

## 2019-09-29 RX ADMIN — AMIODARONE HYDROCHLORIDE 200 MG: 200 TABLET ORAL at 21:49

## 2019-09-29 RX ADMIN — ISOSORBIDE MONONITRATE 30 MG: 30 TABLET, EXTENDED RELEASE ORAL at 09:38

## 2019-09-29 RX ADMIN — Medication 10 ML: at 21:51

## 2019-09-29 RX ADMIN — Medication 10 ML: at 14:00

## 2019-09-29 RX ADMIN — THERA TABS 1 TABLET: TAB at 09:38

## 2019-09-29 NOTE — PROGRESS NOTES
[facility] Hospitalist Group  Progress Note    Patient: Nguyen Metcalf Age: 69 y.o. : 1940 MR#: [id number] SSN: [id number]  Date: 2019     Subjective:     Feels well, no SOB/CP at rest or with ambulation. Denies any other complaints including no n/v/constipation or voiding difficulties     Assessment/Plan:   1. Acute on chronic combined HF  2. Elevated troponin concerning for NSTEMI  3. Wide complex tachycardia  4. Hyponatremia, hypochloremia, hypocalcemia  5. ETOH abuse  6. Tobacco abuse  7. 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 Moderate protein calorie malnutrition  9. Chronic macrocytic anemia secondary to ETOH   10. CHRISTIANO on CKD3  11. HTN  12. HLD    Plan  1. Patient rescind DNR status to full code. Palliative aware. AMD has been completed  2. Cardiology consult appreciated. Anticipate tentative AICD placement on  (patient NPO after midnight) with plan for interventional cardiologist to review films on . Cont ASA, plavix, statin, BB and imdur. PO amiodarone and initiate Lovenox. Continue ASA, Plavix, amiodarone, Imdur, BB, statin. 3. Monitor metabolic panel and replete. 4. Monitor I/O's.   5. Monitor renal function. 6. No evidence of withdrawal, cont folic acid, thiamine, mvi. No nicotine patch, concern for NSTEMI   7. Patient refuses scheduled nebs. Continue to monitor   8. Nutrition consult. Supplements  9.  Palliative care consult appreciated    Additional Notes:      Case discussed with:  [x]Patient  []Family - pt defers call to family  [x]Nursing  [x]Case Management  DVT Prophylaxis:  [x]Lovenox  []Hep SQ  []SCDs  []Coumadin   []On Heparin gtt    Objective:   VS:   Visit Vitals  /64 (BP 1 Location: Right arm, BP Patient Position: At rest)   Pulse 60   Temp 98.5 °F (36.9 °C)   Resp 18   Ht 5' 5\" (1.651 m)   Wt 49.8 kg (109 lb 12.8 oz)   SpO2 100%   BMI 18.27 kg/m²      Tmax/24hrs: Temp (24hrs), Av °F (36.7 °C), Min:97.3 °F (36.3 °C), Max:98.5 °F (36.9 °C)      Intake/Output Summary (Last 24 hours) at 9/29/2019 0847  Last data filed at 9/29/2019 0601  Gross per 24 hour   Intake    Output 500 ml   Net -500 ml     General:  Alert, NAD, cachexic  Cardiovascular:  RRR  Pulmonary:  LSC throughout; respiratory effort WNL  GI:  +BS in all four quadrants, soft, non-tender  Extremities:  No edema; 2+ dorsalis pedis pulses bilaterally  Neuro: alert and oriented x 4     Labs:    Recent Results (from the past 24 hour(s))   METABOLIC PANEL, BASIC    Collection Time: 09/29/19  4:12 AM   Result Value Ref Range    Sodium 133 (L) 136 - 145 mmol/L    Potassium 4.2 3.5 - 5.5 mmol/L    Chloride 97 (L) 100 - 111 mmol/L    CO2 31 21 - 32 mmol/L    Anion gap 5 3.0 - 18 mmol/L    Glucose 86 74 - 99 mg/dL    BUN 25 (H) 7.0 - 18 MG/DL    Creatinine 1.30 0.6 - 1.3 MG/DL    BUN/Creatinine ratio 19 12 - 20      GFR est AA >60 >60 ml/min/1.73m2    GFR est non-AA 54 (L) >60 ml/min/1.73m2    Calcium 8.6 8.5 - 10.1 MG/DL   HGB & HCT    Collection Time: 09/29/19  4:12 AM   Result Value Ref Range    HGB 9.2 (L) 13.0 - 16.0 g/dL    HCT 27.0 (L) 36.0 - 48.0 %     Signed By: Ernesto Nice NP     September 29, 2019

## 2019-09-29 NOTE — ROUTINE PROCESS
Bedside and Verbal shift change report given to Brian Palomino (oncoming nurse) by Alice Mclaughlin RN (offgoing nurse). Report included the following information SBAR, Kardex, Intake/Output, MAR and Recent Results.

## 2019-09-29 NOTE — PROGRESS NOTES
[facility]  -  Progress Note      Patient: Haydee Wood MRN: [id number]  SSN: [id number]    YOB: 1940  Age: 69 y.o. Sex: male      Admit Date: 9/24/2019    Assessment:     -Presented with shortness of breath, at least partially associated wtih CHF exacerbation with BNP 5612 and CT with small bilateral pleural effusions and suspected pulmonary edema but also with history of COPD as well  -Echo 9/25/2019: EF 16-20% with akinetic basal inferior, basal inferolateral, mid inferior, mid inferolateral, apical anterior, apical septal, apical inferior, apical lateral and apical walls  -Echo 1/24/2019: EF 46-50% with hypokinetic basal inferior, basal inferolateral, mid inferior, mid inferolateral and apical inferior wall segments, grade 2 diastolic dysfunction  -CAD, Cardiac cath 2/1/19 with following findings:   · Severe native CAD with distal left main heavily calcified lesion   · Patent LIMA-LAD  · 2 sequential vein grafts (all 4 vein grafts to diagonal, OM, RPDA, RPL branch) occluded at aortotomy site  · Subtotal mid RCA occludion with NICO 0-I flow, some collaterals from left coronary system  · Native diagonal 85% stenosis  · Native OM 85% stenosis  -Elevated troponin  -Wide-complex tachycardia, most consistent with bundle branch VT  -Hypomagnesemia, Mg 1.3 on presentation, given 2 gm Mg in ER  -HTN  -Hypercholesterolemia  -Hx tobacco and alcohol abuse  -Previously DNR status, now full code.     Primary cardiologist is Dr. Addi Das:     -Diuretics remain on hold, Cr improving, SOB improving. -NPO after midnight tonight for AICD tomorrow morning. Plavix on hold for upcoming procedure. Subjective:     No new complaints. States breathing is improving. Notes dry cough.     Objective:      Patient Vitals for the past 8 hrs:   Temp Pulse Resp BP SpO2   09/29/19 0856 97.9 °F (36.6 °C) 60 18 133/74 100 %   09/29/19 0341 98.5 °F (36.9 °C) 60 18 122/64 100 %         Patient Vitals for the past 96 hrs:   Weight   09/29/19 0443 109 lb 12.8 oz (49.8 kg)   09/28/19 0417 111 lb 4.8 oz (50.5 kg)   09/26/19 0424 104 lb 11.2 oz (47.5 kg)   09/25/19 1115 102 lb (46.3 kg)         Intake/Output Summary (Last 24 hours) at 9/29/2019 0957  Last data filed at 9/29/2019 0601  Gross per 24 hour   Intake    Output 500 ml   Net -500 ml       Physical Exam:  General:  alert, cooperative, no distress, appears stated age  Neck:  supple  Lungs:  clear to auscultation bilaterally  Heart:  Regular rate and rhythm  Abdomen:  abdomen is soft without significant tenderness, masses, organomegaly or guarding  Extremities:  Atraumatic, no edema     Data Review:     Labs: Results:       Chemistry Recent Labs     09/29/19 0412 09/28/19  0513 09/27/19  0411   GLU 86 85 92   * 131* 126*   K 4.2 4.4 4.2   CL 97* 97* 93*   CO2 31 27 23   BUN 25* 34* 33*   CREA 1.30 1.54* 1.87*   CA 8.6 8.3* 7.6*   AGAP 5 7 10   BUCR 19 22* 18      CBC w/Diff Recent Labs     09/29/19 0412 09/28/19 0513   WBC  --  8.8   RBC  --  2.75*   HGB 9.2* 9.6*   HCT 27.0* 28.3*   PLT  --  193      Lipid Panel Lab Results   Component Value Date/Time    Cholesterol, total 144 05/03/2019 10:26 AM    HDL Cholesterol 86 (H) 05/03/2019 10:26 AM    LDL, calculated 18 05/03/2019 10:26 AM    VLDL, calculated 40 05/03/2019 10:26 AM    Triglyceride 200 (H) 05/03/2019 10:26 AM    CHOL/HDL Ratio 1.7 05/03/2019 10:26 AM      Thyroid Studies Lab Results   Component Value Date/Time    TSH 4.31 (H) 09/24/2019 09:57 AM

## 2019-09-29 NOTE — PROGRESS NOTES
Problem: Pressure Injury - Risk of  Goal: *Prevention of pressure injury  Description  Document Rodrigue Scale and appropriate interventions in the flowsheet.   Note:   Pressure Injury Interventions:       Moisture Interventions: Maintain skin hydration (lotion/cream)    Activity Interventions: Pressure redistribution bed/mattress(bed type), Increase time out of bed    Mobility Interventions: HOB 30 degrees or less    Nutrition Interventions: Document food/fluid/supplement intake

## 2019-09-30 ENCOUNTER — APPOINTMENT (OUTPATIENT)
Dept: GENERAL RADIOLOGY | Age: 79
DRG: 226 | End: 2019-09-30
Attending: INTERNAL MEDICINE
Payer: MEDICARE

## 2019-09-30 ENCOUNTER — ANESTHESIA (OUTPATIENT)
Dept: CARDIAC CATH/INVASIVE PROCEDURES | Age: 79
DRG: 226 | End: 2019-09-30
Payer: MEDICARE

## 2019-09-30 LAB
ALBUMIN SERPL-MCNC: 2.5 G/DL (ref 3.4–5)
ALBUMIN/GLOB SERPL: 0.7 {RATIO} (ref 0.8–1.7)
ALP SERPL-CCNC: 207 U/L (ref 45–117)
ALT SERPL-CCNC: 82 U/L (ref 16–61)
ANION GAP SERPL CALC-SCNC: 7 MMOL/L (ref 3–18)
AST SERPL-CCNC: 70 U/L (ref 10–38)
BASOPHILS # BLD: 0 K/UL (ref 0–0.1)
BASOPHILS NFR BLD: 0 % (ref 0–2)
BILIRUB SERPL-MCNC: 1.2 MG/DL (ref 0.2–1)
BUN SERPL-MCNC: 16 MG/DL (ref 7–18)
BUN/CREAT SERPL: 13 (ref 12–20)
CALCIUM SERPL-MCNC: 8.6 MG/DL (ref 8.5–10.1)
CHLORIDE SERPL-SCNC: 98 MMOL/L (ref 100–111)
CO2 SERPL-SCNC: 29 MMOL/L (ref 21–32)
CREAT SERPL-MCNC: 1.2 MG/DL (ref 0.6–1.3)
DIFFERENTIAL METHOD BLD: ABNORMAL
EOSINOPHIL # BLD: 0.1 K/UL (ref 0–0.4)
EOSINOPHIL NFR BLD: 2 % (ref 0–5)
ERYTHROCYTE [DISTWIDTH] IN BLOOD BY AUTOMATED COUNT: 15.8 % (ref 11.6–14.5)
GLOBULIN SER CALC-MCNC: 3.6 G/DL (ref 2–4)
GLUCOSE SERPL-MCNC: 91 MG/DL (ref 74–99)
HCT VFR BLD AUTO: 27.7 % (ref 36–48)
HGB BLD-MCNC: 9.3 G/DL (ref 13–16)
LYMPHOCYTES # BLD: 2 K/UL (ref 0.9–3.6)
LYMPHOCYTES NFR BLD: 30 % (ref 21–52)
MAGNESIUM SERPL-MCNC: 1.6 MG/DL (ref 1.6–2.6)
MCH RBC QN AUTO: 35.2 PG (ref 24–34)
MCHC RBC AUTO-ENTMCNC: 33.6 G/DL (ref 31–37)
MCV RBC AUTO: 104.9 FL (ref 74–97)
MONOCYTES # BLD: 1.1 K/UL (ref 0.05–1.2)
MONOCYTES NFR BLD: 16 % (ref 3–10)
NEUTS SEG # BLD: 3.4 K/UL (ref 1.8–8)
NEUTS SEG NFR BLD: 52 % (ref 40–73)
PLATELET # BLD AUTO: 186 K/UL (ref 135–420)
PMV BLD AUTO: 10.8 FL (ref 9.2–11.8)
POTASSIUM SERPL-SCNC: 4 MMOL/L (ref 3.5–5.5)
PROT SERPL-MCNC: 6.1 G/DL (ref 6.4–8.2)
RBC # BLD AUTO: 2.64 M/UL (ref 4.7–5.5)
SODIUM SERPL-SCNC: 134 MMOL/L (ref 136–145)
WBC # BLD AUTO: 6.6 K/UL (ref 4.6–13.2)

## 2019-09-30 PROCEDURE — 76060000033 HC ANESTHESIA 1 TO 1.5 HR: Performed by: INTERNAL MEDICINE

## 2019-09-30 PROCEDURE — 77030018673: Performed by: INTERNAL MEDICINE

## 2019-09-30 PROCEDURE — 74011000250 HC RX REV CODE- 250: Performed by: INTERNAL MEDICINE

## 2019-09-30 PROCEDURE — C1895 LEAD, AICD, ENDO DUAL COIL: HCPCS | Performed by: INTERNAL MEDICINE

## 2019-09-30 PROCEDURE — 94640 AIRWAY INHALATION TREATMENT: CPT

## 2019-09-30 PROCEDURE — C1721 AICD, DUAL CHAMBER: HCPCS | Performed by: INTERNAL MEDICINE

## 2019-09-30 PROCEDURE — 85025 COMPLETE CBC W/AUTO DIFF WBC: CPT

## 2019-09-30 PROCEDURE — 74011250636 HC RX REV CODE- 250/636

## 2019-09-30 PROCEDURE — 94761 N-INVAS EAR/PLS OXIMETRY MLT: CPT

## 2019-09-30 PROCEDURE — 74011250637 HC RX REV CODE- 250/637: Performed by: FAMILY MEDICINE

## 2019-09-30 PROCEDURE — 65660000000 HC RM CCU STEPDOWN

## 2019-09-30 PROCEDURE — 77030031139 HC SUT VCRL2 J&J -A: Performed by: INTERNAL MEDICINE

## 2019-09-30 PROCEDURE — 77030018729 HC ELECTRD DEFIB PAD CARD -B: Performed by: INTERNAL MEDICINE

## 2019-09-30 PROCEDURE — 36415 COLL VENOUS BLD VENIPUNCTURE: CPT

## 2019-09-30 PROCEDURE — 71045 X-RAY EXAM CHEST 1 VIEW: CPT

## 2019-09-30 PROCEDURE — 77030002933 HC SUT MCRYL J&J -A: Performed by: INTERNAL MEDICINE

## 2019-09-30 PROCEDURE — 74011250637 HC RX REV CODE- 250/637: Performed by: PHYSICIAN ASSISTANT

## 2019-09-30 PROCEDURE — 80053 COMPREHEN METABOLIC PANEL: CPT

## 2019-09-30 PROCEDURE — 74011636320 HC RX REV CODE- 636/320: Performed by: INTERNAL MEDICINE

## 2019-09-30 PROCEDURE — 74011250636 HC RX REV CODE- 250/636: Performed by: PHYSICIAN ASSISTANT

## 2019-09-30 PROCEDURE — 74011000250 HC RX REV CODE- 250

## 2019-09-30 PROCEDURE — B51N1ZZ FLUOROSCOPY OF LEFT UPPER EXTREMITY VEINS USING LOW OSMOLAR CONTRAST: ICD-10-PCS | Performed by: INTERNAL MEDICINE

## 2019-09-30 PROCEDURE — 77030012935 HC DRSG AQUACEL BMS -B: Performed by: INTERNAL MEDICINE

## 2019-09-30 PROCEDURE — 0JH608Z INSERTION OF DEFIBRILLATOR GENERATOR INTO CHEST SUBCUTANEOUS TISSUE AND FASCIA, OPEN APPROACH: ICD-10-PCS | Performed by: INTERNAL MEDICINE

## 2019-09-30 PROCEDURE — C1898 LEAD, PMKR, OTHER THAN TRANS: HCPCS | Performed by: INTERNAL MEDICINE

## 2019-09-30 PROCEDURE — 74011250637 HC RX REV CODE- 250/637: Performed by: INTERNAL MEDICINE

## 2019-09-30 PROCEDURE — 33249 INSJ/RPLCMT DEFIB W/LEAD(S): CPT | Performed by: INTERNAL MEDICINE

## 2019-09-30 PROCEDURE — 77030019580 HC CBL PACE MEDT -B: Performed by: INTERNAL MEDICINE

## 2019-09-30 PROCEDURE — 77030002996 HC SUT SLK J&J -A: Performed by: INTERNAL MEDICINE

## 2019-09-30 PROCEDURE — 83735 ASSAY OF MAGNESIUM: CPT

## 2019-09-30 PROCEDURE — 02HK3KZ INSERTION OF DEFIBRILLATOR LEAD INTO RIGHT VENTRICLE, PERCUTANEOUS APPROACH: ICD-10-PCS | Performed by: INTERNAL MEDICINE

## 2019-09-30 PROCEDURE — 74011250636 HC RX REV CODE- 250/636: Performed by: FAMILY MEDICINE

## 2019-09-30 PROCEDURE — 74011250636 HC RX REV CODE- 250/636: Performed by: INTERNAL MEDICINE

## 2019-09-30 PROCEDURE — C1893 INTRO/SHEATH, FIXED,NON-PEEL: HCPCS | Performed by: INTERNAL MEDICINE

## 2019-09-30 PROCEDURE — 02H63KZ INSERTION OF DEFIBRILLATOR LEAD INTO RIGHT ATRIUM, PERCUTANEOUS APPROACH: ICD-10-PCS | Performed by: INTERNAL MEDICINE

## 2019-09-30 PROCEDURE — 77010033678 HC OXYGEN DAILY

## 2019-09-30 DEVICE — ICD DDMB1D4 EVERA MRI DR XT DF4 US
Type: IMPLANTABLE DEVICE | Status: FUNCTIONAL
Brand: EVERA MRI™ XT DR SURESCAN®

## 2019-09-30 DEVICE — LEAD 407652 CAPSUREFIX NOVUS US MRI
Type: IMPLANTABLE DEVICE | Status: FUNCTIONAL
Brand: CAPSUREFIX NOVUS MRI™ SURESCAN™

## 2019-09-30 DEVICE — LEAD 6947M62 QUATTRO SECURE MRI US
Type: IMPLANTABLE DEVICE | Status: FUNCTIONAL
Brand: SPRINT QUATTRO SECURE MRI™ SURESCAN™

## 2019-09-30 RX ORDER — FENTANYL CITRATE 50 UG/ML
25 INJECTION, SOLUTION INTRAMUSCULAR; INTRAVENOUS AS NEEDED
Status: CANCELLED | OUTPATIENT
Start: 2019-09-30

## 2019-09-30 RX ORDER — PROPOFOL 10 MG/ML
INJECTION, EMULSION INTRAVENOUS AS NEEDED
Status: DISCONTINUED | OUTPATIENT
Start: 2019-09-30 | End: 2019-09-30 | Stop reason: HOSPADM

## 2019-09-30 RX ORDER — ACETAMINOPHEN 500 MG
500 TABLET ORAL
Status: DISCONTINUED | OUTPATIENT
Start: 2019-09-30 | End: 2019-10-01 | Stop reason: HOSPADM

## 2019-09-30 RX ORDER — HYDRALAZINE HYDROCHLORIDE 20 MG/ML
10 INJECTION INTRAMUSCULAR; INTRAVENOUS
Status: DISCONTINUED | OUTPATIENT
Start: 2019-09-30 | End: 2019-10-01 | Stop reason: HOSPADM

## 2019-09-30 RX ORDER — SODIUM CHLORIDE 0.9 % (FLUSH) 0.9 %
5-40 SYRINGE (ML) INJECTION AS NEEDED
Status: DISCONTINUED | OUTPATIENT
Start: 2019-09-30 | End: 2019-10-01 | Stop reason: HOSPADM

## 2019-09-30 RX ORDER — FENTANYL CITRATE 50 UG/ML
50 INJECTION, SOLUTION INTRAMUSCULAR; INTRAVENOUS
Status: CANCELLED | OUTPATIENT
Start: 2019-09-30

## 2019-09-30 RX ORDER — KETOROLAC TROMETHAMINE 15 MG/ML
15 INJECTION, SOLUTION INTRAMUSCULAR; INTRAVENOUS ONCE
Status: COMPLETED | OUTPATIENT
Start: 2019-10-01 | End: 2019-10-01

## 2019-09-30 RX ORDER — SODIUM CHLORIDE 0.9 % (FLUSH) 0.9 %
5-40 SYRINGE (ML) INJECTION EVERY 8 HOURS
Status: CANCELLED | OUTPATIENT
Start: 2019-09-30

## 2019-09-30 RX ORDER — IBUPROFEN 600 MG/1
600 TABLET ORAL
Status: DISCONTINUED | OUTPATIENT
Start: 2019-09-30 | End: 2019-10-01 | Stop reason: HOSPADM

## 2019-09-30 RX ORDER — SODIUM CHLORIDE 0.9 % (FLUSH) 0.9 %
5-40 SYRINGE (ML) INJECTION AS NEEDED
Status: CANCELLED | OUTPATIENT
Start: 2019-09-30

## 2019-09-30 RX ORDER — SODIUM CHLORIDE, SODIUM LACTATE, POTASSIUM CHLORIDE, CALCIUM CHLORIDE 600; 310; 30; 20 MG/100ML; MG/100ML; MG/100ML; MG/100ML
75 INJECTION, SOLUTION INTRAVENOUS CONTINUOUS
Status: DISPENSED | OUTPATIENT
Start: 2019-09-30 | End: 2019-10-01

## 2019-09-30 RX ORDER — SODIUM CHLORIDE 9 MG/ML
INJECTION, SOLUTION INTRAVENOUS
Status: DISCONTINUED | OUTPATIENT
Start: 2019-09-30 | End: 2019-09-30 | Stop reason: HOSPADM

## 2019-09-30 RX ORDER — CEFAZOLIN SODIUM 2 G/50ML
2 SOLUTION INTRAVENOUS EVERY 8 HOURS
Status: COMPLETED | OUTPATIENT
Start: 2019-09-30 | End: 2019-10-01

## 2019-09-30 RX ORDER — LIDOCAINE HYDROCHLORIDE 10 MG/ML
INJECTION, SOLUTION EPIDURAL; INFILTRATION; INTRACAUDAL; PERINEURAL AS NEEDED
Status: DISCONTINUED | OUTPATIENT
Start: 2019-09-30 | End: 2019-09-30 | Stop reason: HOSPADM

## 2019-09-30 RX ORDER — LIDOCAINE HYDROCHLORIDE 20 MG/ML
INJECTION, SOLUTION EPIDURAL; INFILTRATION; INTRACAUDAL; PERINEURAL AS NEEDED
Status: DISCONTINUED | OUTPATIENT
Start: 2019-09-30 | End: 2019-09-30 | Stop reason: HOSPADM

## 2019-09-30 RX ORDER — FENTANYL CITRATE 50 UG/ML
INJECTION, SOLUTION INTRAMUSCULAR; INTRAVENOUS AS NEEDED
Status: DISCONTINUED | OUTPATIENT
Start: 2019-09-30 | End: 2019-09-30 | Stop reason: HOSPADM

## 2019-09-30 RX ORDER — CEFAZOLIN SODIUM 2 G/50ML
2 SOLUTION INTRAVENOUS
Status: COMPLETED | OUTPATIENT
Start: 2019-09-30 | End: 2019-09-30

## 2019-09-30 RX ORDER — GENTAMICIN SULFATE 40 MG/ML
INJECTION, SOLUTION INTRAMUSCULAR; INTRAVENOUS AS NEEDED
Status: DISCONTINUED | OUTPATIENT
Start: 2019-09-30 | End: 2019-09-30 | Stop reason: HOSPADM

## 2019-09-30 RX ORDER — SODIUM CHLORIDE 0.9 % (FLUSH) 0.9 %
5-40 SYRINGE (ML) INJECTION EVERY 8 HOURS
Status: DISCONTINUED | OUTPATIENT
Start: 2019-09-30 | End: 2019-10-01 | Stop reason: HOSPADM

## 2019-09-30 RX ORDER — MIDAZOLAM HYDROCHLORIDE 1 MG/ML
INJECTION, SOLUTION INTRAMUSCULAR; INTRAVENOUS AS NEEDED
Status: DISCONTINUED | OUTPATIENT
Start: 2019-09-30 | End: 2019-09-30 | Stop reason: HOSPADM

## 2019-09-30 RX ADMIN — ENOXAPARIN SODIUM 50 MG: 60 INJECTION SUBCUTANEOUS at 00:14

## 2019-09-30 RX ADMIN — CEFAZOLIN 2 G: 10 INJECTION, POWDER, FOR SOLUTION INTRAVENOUS at 20:25

## 2019-09-30 RX ADMIN — AMIODARONE HYDROCHLORIDE 200 MG: 200 TABLET ORAL at 20:22

## 2019-09-30 RX ADMIN — FENTANYL CITRATE 25 MCG: 50 INJECTION, SOLUTION INTRAMUSCULAR; INTRAVENOUS at 13:05

## 2019-09-30 RX ADMIN — CEFAZOLIN 2 G: 10 INJECTION, POWDER, FOR SOLUTION INTRAVENOUS at 12:54

## 2019-09-30 RX ADMIN — MIDAZOLAM HYDROCHLORIDE 1 MG: 1 INJECTION, SOLUTION INTRAMUSCULAR; INTRAVENOUS at 13:15

## 2019-09-30 RX ADMIN — FENTANYL CITRATE 25 MCG: 50 INJECTION, SOLUTION INTRAMUSCULAR; INTRAVENOUS at 13:41

## 2019-09-30 RX ADMIN — ATORVASTATIN CALCIUM 40 MG: 40 TABLET, FILM COATED ORAL at 22:00

## 2019-09-30 RX ADMIN — FENTANYL CITRATE 25 MCG: 50 INJECTION, SOLUTION INTRAMUSCULAR; INTRAVENOUS at 13:11

## 2019-09-30 RX ADMIN — PROPOFOL 20 MG: 10 INJECTION, EMULSION INTRAVENOUS at 13:42

## 2019-09-30 RX ADMIN — BUDESONIDE 500 MCG: 0.5 INHALANT RESPIRATORY (INHALATION) at 19:21

## 2019-09-30 RX ADMIN — IBUPROFEN 600 MG: 600 TABLET, FILM COATED ORAL at 20:23

## 2019-09-30 RX ADMIN — LIDOCAINE HYDROCHLORIDE 50 MG: 20 INJECTION, SOLUTION EPIDURAL; INFILTRATION; INTRACAUDAL; PERINEURAL at 13:15

## 2019-09-30 RX ADMIN — SODIUM CHLORIDE: 9 INJECTION, SOLUTION INTRAVENOUS at 12:54

## 2019-09-30 RX ADMIN — FENTANYL CITRATE 25 MCG: 50 INJECTION, SOLUTION INTRAMUSCULAR; INTRAVENOUS at 13:22

## 2019-09-30 RX ADMIN — BUDESONIDE 500 MCG: 0.5 INHALANT RESPIRATORY (INHALATION) at 07:46

## 2019-09-30 RX ADMIN — PROPOFOL 20 MG: 10 INJECTION, EMULSION INTRAVENOUS at 13:16

## 2019-09-30 RX ADMIN — HYDRALAZINE HYDROCHLORIDE 10 MG: 20 INJECTION INTRAMUSCULAR; INTRAVENOUS at 19:00

## 2019-09-30 RX ADMIN — MIDAZOLAM HYDROCHLORIDE 1 MG: 1 INJECTION, SOLUTION INTRAMUSCULAR; INTRAVENOUS at 12:59

## 2019-09-30 NOTE — PROGRESS NOTES
Received report on pt.from Salvador Aguilar, off going RN. Resting quietly in bed on rounds. Denies c/o pain or SOB at this time. No acute distress noted. NPO for procedure. Will cont to monitor for any changes in status. 1025 to cath lab per bed.  returned to room. Sling intact to left arm. Large bulky dressing intact to left shoulder. Reminded pt of arm restrictions. pulled up in bed and sat up to eat boxed lunch sent up with pt from cath lab.     1700 sitting up talking to visitors. Left pacer site dressing dry and in tact. Sling remains on.     1939 Bedside and Verbal shift change report given to Trini Tristan (oncoming nurse) by Carlos Alberto Ortiz RN (offgoing nurse). Report given with Addy STACY and MAR.

## 2019-09-30 NOTE — PROGRESS NOTES
Patient arrived to cath holding awake and alert, vital signs stable, no C/O pain will continue to monitor. 1400 patient arrived to cath holding awake and alert, vital signs stable, left upper chest site clean, dry, and intact with no hematoma present, no C/O pain will continue to monitor.

## 2019-09-30 NOTE — PROGRESS NOTES
Discussed with Dr. Neftaly Singh, will plan AICD today, continue amiodarone. Patient did not have primary ACS, elevated troponin is demand ischemia from sustained VT and CHF. Patient can be seen as outpatient with Dr. Paulie Nichols to determine possible PCI to RCA and LM/OM. Patient had previous CABG and only the LIMA to LAD is patent earlier this year.

## 2019-09-30 NOTE — PROGRESS NOTES
Bedside shift change report given to Rudolph Coelho (oncoming nurse) by Neisha Hall RN (offgoing nurse). Report included the following information SBAR, Kardex and MAR.

## 2019-09-30 NOTE — PROGRESS NOTES
[facility] Hospitalist Group  Progress Note    Patient: Hitesh Manzano Age: 69 y.o. : 1940 MR#: [id number] SSN: [id number]  Date: 2019     Subjective/24-hour events:     AICD implanted this afternoon without difficulty. No acute chest pain or SOB. Assessment:   Acute on chronic mixed (systolic and diastolic) CHF  Troponin elevation  Wide complex tachycardia  CHRISTIANO on CKD 3  Moderate protein calorie malnutrition  COPD without acute exacerbation  CAD   HTN  Hypercholesterolemia  Active smoker  Chronic ETOH use  Underweight    Plan:   AICD implantation today. B-blocker/nitrate/ASA/statin/Plavix/amiodarone as ordered. Monitor BPs. Ordered PRN antihypertensive with parameters. Adjust PO cardiac regimen as necessary for attempt at improved control. Recommend smoking cessation and limiting ETOH intake to 2 drinks/day maximum. Anticipate disposition in the next 24 hours if remains medically stable and cleared by cardiology. Case discussed with:  [x]Patient  [x]Family  [x]Nursing  [x]Case Management  DVT Prophylaxis:  [x]Lovenox  []Hep SQ  []SCDs  []Coumadin   []On Heparin gtt    Objective:   VS:   Visit Vitals  BP (!) 194/97 (BP 1 Location: Right arm)   Pulse 60   Temp 97.3 °F (36.3 °C)   Resp 20   Ht 5' 5\" (1.651 m)   Wt 48.3 kg (106 lb 6.4 oz)   SpO2 91%   BMI 17.71 kg/m²      Tmax/24hrs: Temp (24hrs), Av.1 °F (36.7 °C), Min:97.3 °F (36.3 °C), Max:98.7 °F (37.1 °C)      Intake/Output Summary (Last 24 hours) at 2019 1619  Last data filed at 2019 1650  Gross per 24 hour   Intake    Output 100 ml   Net -100 ml       General:  In NAD. Cardiovascular:  RRR. Pulmonary:  Clear, no wheezes. GI:  Abdomen soft, NTTP. Extremities:  Warm, no ischemia.   Neuro:  Awake and alert, motor nonfocal.    Labs:    Recent Results (from the past 24 hour(s))   CBC WITH AUTOMATED DIFF    Collection Time: 19  4:04 AM   Result Value Ref Range    WBC 6.6 4.6 - 13.2 K/uL RBC 2.64 (L) 4.70 - 5.50 M/uL    HGB 9.3 (L) 13.0 - 16.0 g/dL    HCT 27.7 (L) 36.0 - 48.0 %    .9 (H) 74.0 - 97.0 FL    MCH 35.2 (H) 24.0 - 34.0 PG    MCHC 33.6 31.0 - 37.0 g/dL    RDW 15.8 (H) 11.6 - 14.5 %    PLATELET 893 485 - 378 K/uL    MPV 10.8 9.2 - 11.8 FL    NEUTROPHILS 52 40 - 73 %    LYMPHOCYTES 30 21 - 52 %    MONOCYTES 16 (H) 3 - 10 %    EOSINOPHILS 2 0 - 5 %    BASOPHILS 0 0 - 2 %    ABS. NEUTROPHILS 3.4 1.8 - 8.0 K/UL    ABS. LYMPHOCYTES 2.0 0.9 - 3.6 K/UL    ABS. MONOCYTES 1.1 0.05 - 1.2 K/UL    ABS. EOSINOPHILS 0.1 0.0 - 0.4 K/UL    ABS. BASOPHILS 0.0 0.0 - 0.1 K/UL    DF AUTOMATED     METABOLIC PANEL, COMPREHENSIVE    Collection Time: 09/30/19  4:04 AM   Result Value Ref Range    Sodium 134 (L) 136 - 145 mmol/L    Potassium 4.0 3.5 - 5.5 mmol/L    Chloride 98 (L) 100 - 111 mmol/L    CO2 29 21 - 32 mmol/L    Anion gap 7 3.0 - 18 mmol/L    Glucose 91 74 - 99 mg/dL    BUN 16 7.0 - 18 MG/DL    Creatinine 1.20 0.6 - 1.3 MG/DL    BUN/Creatinine ratio 13 12 - 20      GFR est AA >60 >60 ml/min/1.73m2    GFR est non-AA 59 (L) >60 ml/min/1.73m2    Calcium 8.6 8.5 - 10.1 MG/DL    Bilirubin, total 1.2 (H) 0.2 - 1.0 MG/DL    ALT (SGPT) 82 (H) 16 - 61 U/L    AST (SGOT) 70 (H) 10 - 38 U/L    Alk.  phosphatase 207 (H) 45 - 117 U/L    Protein, total 6.1 (L) 6.4 - 8.2 g/dL    Albumin 2.5 (L) 3.4 - 5.0 g/dL    Globulin 3.6 2.0 - 4.0 g/dL    A-G Ratio 0.7 (L) 0.8 - 1.7     MAGNESIUM    Collection Time: 09/30/19  4:04 AM   Result Value Ref Range    Magnesium 1.6 1.6 - 2.6 mg/dL       Signed By: Stacy Lucero MD     September 30, 2019

## 2019-09-30 NOTE — ANESTHESIA POSTPROCEDURE EVALUATION
Procedure(s):  INSERT ICD DUAL. MAC    Anesthesia Post Evaluation      Multimodal analgesia: multimodal analgesia used between 6 hours prior to anesthesia start to PACU discharge  Patient location during evaluation: PACU  Patient participation: complete - patient participated  Level of consciousness: awake and alert  Pain management: adequate  Airway patency: patent  Anesthetic complications: no  Cardiovascular status: acceptable and hemodynamically stable  Respiratory status: acceptable and room air  Hydration status: acceptable  Post anesthesia nausea and vomiting:  controlled      Vitals Value Taken Time   /105 9/30/2019  3:10 PM   Temp     Pulse 60 9/30/2019  3:17 PM   Resp 22 9/30/2019  3:17 PM   SpO2 94 % 9/30/2019  3:17 PM   Vitals shown include unvalidated device data.

## 2019-09-30 NOTE — PROGRESS NOTES
Pt not seen for skilled PT due to:  []  Nausea/vomiting  []  Eating  []  Pain  []  Pt lethargic  [x]  Off Unit for cardiac cath  Will f/u later as schedule allows. Thank you. Markell Carrillo, PT, DPT      2nd attempt 13:34: Pt remains off unit. Will re-attempt as pt's schedule allows.        Markell Carrillo, PT, DPT

## 2019-09-30 NOTE — PROGRESS NOTES
Procedure:  -Dual chamber [employer] AICd    Plan monitor overnight, discharge tomorrow with outpatient followup for stent to RCA/LM.

## 2019-09-30 NOTE — ANESTHESIA PREPROCEDURE EVALUATION
Relevant Problems   No relevant active problems       Anesthetic History   No history of anesthetic complications            Review of Systems / Medical History  Patient summary reviewed, nursing notes reviewed and pertinent labs reviewed    Pulmonary    COPD      Smoker         Neuro/Psych   Within defined limits           Cardiovascular    Hypertension  Valvular problems/murmurs: tricuspid insufficiency, pulmonic insufficiency, mitral insufficiency and aortic insufficiency    CHF  Dysrhythmias (VT)   Past MI (troponin elevated this admission), CAD, CABG (2014) and hyperlipidemia      Comments: 09/2019 ECHO  Estimated left ventricular ejection fraction is 16 - 20%. Mild mitral valve regurgitation is present. Mild tricuspid valve regurgitation is present. Mild pulmonic valve regurgitation is present. Mild aortic valve regurgitation is present.    GI/Hepatic/Renal     GERD           Endo/Other        Anemia     Other Findings   Comments: Alcohol abuse         Physical Exam    Airway  Mallampati: II  TM Distance: 4 - 6 cm  Neck ROM: normal range of motion   Mouth opening: Normal     Cardiovascular    Rhythm: regular           Dental    Dentition: Poor dentition     Pulmonary      Decreased breath sounds: bilateral           Abdominal         Other Findings            Anesthetic Plan    ASA: 4  Anesthesia type: MAC            Anesthetic plan and risks discussed with: Patient

## 2019-09-30 NOTE — PROGRESS NOTES
[facility]  -  Progress Note      Patient: Cecil Castillo MRN: [id number]  SSN: [id number]    YOB: 1940  Age: 69 y.o. Sex: male      Admit Date: 9/24/2019    Assessment:     Hospital Problems  Date Reviewed: 5/15/2019          Codes Class Noted POA    Congestive heart failure (CHF) ([facility].) ICD-10-CM: I50.9  ICD-9-CM: 428.0  9/24/2019 Yes        Wide-complex tachycardia ([facility] ICD-10-CM: I47.2  ICD-9-CM: 427.1  9/24/2019 Unknown        Acute on chronic combined systolic and diastolic ACC/AHA stage C congestive heart failure ([facility] ICD-10-CM: I50.43  ICD-9-CM: 428.43, 428.0  9/24/2019 Unknown        S/P CABG x 5 (Chronic) ICD-10-CM: Z95.1  ICD-9-CM: V45.81  4/10/2014 Yes    Overview Signed 4/10/2014  8:08 AM by ROBIN Frances to LAD, and a sequential saphenous vein graft to the 1st diagonal of the LAD and to the ramus intermedius, and another sequential saphenous vein graft to the posterior descending branch and the posterolateral branch of the RCA, Dr. Álvaro Rosales, 4/9/14. Emphysematous COPD ([facility].) ICD-10-CM: J43.9  ICD-9-CM: 492.8  Unknown Yes    Overview Addendum 6/22/2011 12:34 PM by Kinza Wolfe V     PFTs March 2009: mild-moderate obstruction; + bronchodil. Response; decreased DLCO             Alcohol abuse ICD-10-CM: F10.10  ICD-9-CM: 305.00  Unknown Yes    Overview Signed 3/23/2011 11:24 AM by Alfonzo Naylor     quit February 2011             Tobacco abuse ICD-10-CM: Z72.0  ICD-9-CM: 305.1  Unknown Yes        CAD (coronary artery disease), native coronary artery ICD-10-CM: I25.10  ICD-9-CM: 414.01  1/3/2005 Yes    Overview Addendum 5/11/2014  3:27 PM by Alfonzo Farnsworth     LAD 70-80% mid; Cx-diffuse 80%; OM1-70% prox; RCA 40-50% mid; 100% RPLB with collaterals. Repeat cor angio (Aug 2013): 30% dLM, 70%pLAD, 80%mLAD, 60%dLAD, 80%D1 (large), 80% ramus, 85%dLCx, 80%dRCA, 75%RPLB. LV EF 50% with inferobasal hypokinesis.   S/p CABG x5 (LIMA->LAD, Seq SVG-> D &ramus, Seq SVG-> RPDA & RPLB; April 2014)                 -Presented with shortness of breath, at least partially associated wtih CHF exacerbation with BNP 5612 and CT with small bilateral pleural effusions and suspected pulmonary edema but also with history of COPD as well  -Echo 9/25/2019: EF 16-20% with akinetic basal inferior, basal inferolateral, mid inferior, mid inferolateral, apical anterior, apical septal, apical inferior, apical lateral and apical walls  -Echo 1/24/2019: EF 46-50% with hypokinetic basal inferior, basal inferolateral, mid inferior, mid inferolateral and apical inferior wall segments, grade 2 diastolic dysfunction  -CAD, Cardiac cath 2/1/19 with following findings:   · Severe native CAD with distal left main heavily calcified lesion   · Patent LIMA-LAD  · 2 sequential vein grafts (all 4 vein grafts to diagonal, OM, RPDA, RPL branch) occluded at aortotomy site  · Subtotal mid RCA occludion with NICO 0-I flow, some collaterals from left coronary system  · Native diagonal 85% stenosis  · Native OM 85% stenosis  -Elevated troponin  -Wide-complex tachycardia, most consistent with bundle branch VT  -Hypomagnesemia, Mg 1.3 on presentation, given 2 gm Mg in ER  -HTN  -Hypercholesterolemia  -Hx tobacco and alcohol abuse  -Previously DNR status, now full code.     Primary cardiologist is Dr. Mariella Velasquez:     Stable and ready for ICD. Will discuss with Dr. Juliet Sibley regarding possible cath and course. More recommendations pending. Subjective:     No new complaints. Denies any chest pain or other symptoms.     Objective:      Patient Vitals for the past 8 hrs:   Temp Pulse Resp BP SpO2   09/30/19 0747     100 %   09/30/19 0325 97.7 °F (36.5 °C) 83 18 143/74 94 %         Patient Vitals for the past 96 hrs:   Weight   09/30/19 0424 48.3 kg (106 lb 6.4 oz)   09/29/19 0443 49.8 kg (109 lb 12.8 oz)   09/28/19 0417 50.5 kg (111 lb 4.8 oz)         Intake/Output Summary (Last 24 hours) at 9/30/2019 0752  Last data filed at 9/29/2019 1650  Gross per 24 hour   Intake    Output 350 ml   Net -350 ml       Physical Exam:  General:  alert, cooperative, no distress, appears stated age  Neck:  nontender, no JVD  Lungs:  clear to auscultation bilaterally  Heart:  regular rate and rhythm, S1, S2 normal, no murmur, click, rub or gallop  Extremities:  extremities normal, atraumatic, no cyanosis or edema    Data Review:     Labs: Results:       Chemistry Recent Labs     09/30/19 0404 09/29/19 0412 09/28/19  0513   GLU 91 86 85   * 133* 131*   K 4.0 4.2 4.4   CL 98* 97* 97*   CO2 29 31 27   BUN 16 25* 34*   CREA 1.20 1.30 1.54*   CA 8.6 8.6 8.3*   MG 1.6  --   --    AGAP 7 5 7   BUCR 13 19 22*   *  --   --    TP 6.1*  --   --    ALB 2.5*  --   --    GLOB 3.6  --   --    AGRAT 0.7*  --   --       CBC w/Diff Recent Labs     09/30/19 0404 09/29/19 0412 09/28/19  0513   WBC 6.6  --  8.8   RBC 2.64*  --  2.75*   HGB 9.3* 9.2* 9.6*   HCT 27.7* 27.0* 28.3*     --  193   GRANS 52  --   --    LYMPH 30  --   --    EOS 2  --   --       Cardiac Enzymes No results found for: CPK, CK, CKMMB, CKMB, RCK3, CKMBT, CKNDX, CKND1, EDWARD, TROPT, TROIQ, ALLAN, TROPT, TNIPOC, BNP, BNPP   Coagulation No results for input(s): PTP, INR, APTT, INREXT in the last 72 hours.     Lipid Panel Lab Results   Component Value Date/Time    Cholesterol, total 144 05/03/2019 10:26 AM    HDL Cholesterol 86 (H) 05/03/2019 10:26 AM    LDL, calculated 18 05/03/2019 10:26 AM    VLDL, calculated 40 05/03/2019 10:26 AM    Triglyceride 200 (H) 05/03/2019 10:26 AM    CHOL/HDL Ratio 1.7 05/03/2019 10:26 AM      BNP No results found for: BNP, BNPP, XBNPT   Liver Enzymes Recent Labs     09/30/19  0404   TP 6.1*   ALB 2.5*   *   SGOT 70*      Digoxin    Thyroid Studies Lab Results   Component Value Date/Time    TSH 4.31 (H) 09/24/2019 09:57 AM

## 2019-09-30 NOTE — PROGRESS NOTES
NUTRITION    Nursing Referral: [facility]  Nutrition Consult: General Nutrition Management & Supplements     RECOMMENDATIONS / PLAN:     - Monitor readiness to resume oral diet and supplements following procedure. - Continue all other nutrition interventions. - Continue RD inpatient monitoring and evaluation. NUTRITION INTERVENTIONS & DIAGNOSIS:     - Meals/snacks: NPO  - Medical food supplement therapy: Ensure Enlive, BID & Magic Cup once daily (held)  - Vitamin and mineral supplement therapy: folic acid, theragran, thiamine      Nutrition Diagnosis: Underweight related to inadequate oral intake with excessive alcohol intake as evidenced by BMI of 17 kg/(m^2) upon admisison. ASSESSMENT:     9/30: Briefly visited with pt as being taken off floor for AICD placement today, NPO. Reports small improvement in appetite since last visit, supplement intake variable. Fair meal intake per chart. 9/25: Admitted for SOB 2/2 acute on chronic CHF. Per cardiology, poor prognosis, palliative following. Admits to ETOH use PTA with poor appetite and a few lb weight loss. States \"I have been skinny all of my life. \" Sometimes consuming Ensure supplements PTA when he can afford them.      Nutritional intake adequate to meet patients estimated nutritional needs:  No    Diet: DIET NUTRITIONAL SUPPLEMENTS Breakfast, Lunch; ENSURE ENLIVE  DIET NUTRITIONAL SUPPLEMENTS Dinner; MAGIC CUPS  DIET NPO      Food Allergies: NKFA  Current Appetite: NPO  Appetite/meal intake prior to admission: Poor 1-2 small meals/day x months  Feeding Limitations:  [] Swallowing difficulty    [x] Chewing difficulty: poor dentition    [] Other:  Current Meal Intake:   Patient Vitals for the past 100 hrs:   % Diet Eaten   09/29/19 1332 10 %   09/28/19 1305 25 %   09/27/19 1404 75 %   09/27/19 0907 50 %       BM: 9/25  Skin Integrity: WDL  Edema:   [x] No     [] Yes   Pertinent Medications: Reviewed: lipitor, pepcid, zofran    Recent Labs     09/30/19  0409 09/29/19  0412 09/28/19  0513   * 133* 131*   K 4.0 4.2 4.4   CL 98* 97* 97*   CO2 29 31 27   GLU 91 86 85   BUN 16 25* 34*   CREA 1.20 1.30 1.54*   CA 8.6 8.6 8.3*   MG 1.6  --   --    ALB 2.5*  --   --    SGOT 70*  --   --    ALT 82*  --   --        Intake/Output Summary (Last 24 hours) at 9/30/2019 1336  Last data filed at 9/29/2019 1650  Gross per 24 hour   Intake    Output 100 ml   Net -100 ml       Anthropometrics:  Ht Readings from Last 1 Encounters:   09/25/19 5' 5\" (1.651 m)     Last 3 Recorded Weights in this Encounter    09/28/19 0417 09/29/19 0443 09/30/19 0424   Weight: 50.5 kg (111 lb 4.8 oz) 49.8 kg (109 lb 12.8 oz) 48.3 kg (106 lb 6.4 oz)     Body mass index is 17.71 kg/m². Underweight    Weight History: Pt reports a few lb weight loss PTA. Per chart -8 lbs, 7.2% x 7 months PTA. Weight Metrics 9/30/2019 5/16/2019 5/15/2019 5/14/2019 5/10/2019 2/8/2019 2/1/2019   Weight 106 lb 6.4 oz - 103 lb 103 lb 103 lb 110 lb 105 lb   BMI 17.71 kg/m2 17.14 kg/m2 17.14 kg/m2 - 17.14 kg/m2 18.3 kg/m2 17.47 kg/m2        Admitting Diagnosis: Congestive heart failure (CHF) (HCC) [I50.9]  Pertinent PMHx: alcohol abuse, CAD with CABG x5, CKD, COPD, CHF, GERD, HTN, hypercholesterolemia    Education Needs:        [x] None identified  [] Identified - Not appropriate at this time  []  Identified and addressed - refer to education log  Learning Limitations:   [x] None identified  [] Identified    Cultural, Latter-day & ethnic food preferences:  [x] None identified    [] Identified and addressed     ESTIMATED NUTRITION NEEDS:     Calories: 0850-6010 kcal (25-35 kcal/kg) based on  [] Actual BW      [x] IBW: 62 kg   Protein: 50-74 gm (0.8-1.2 gm/kg) based on  [] Actual BW      [x] IBW   Fluid: 1 mL/kcal     MONITORING & EVALUATION:     Nutrition Goal(s):   - PO nutrition intake will meet >75% of patient estimated nutritional needs within the next 7 days. - Weight gain of 1-2 lbs over the next 7 days.     Outcome: Not progressing towards goal       Monitoring:   [x] Food and beverage intake   [x] Diet order   [x] Nutrition-focused physical findings   [x] Treatment/therapy   [] Weight   [] Enteral nutrition intake        Previous Recommendations (for follow-up assessments only):     Implemented      Discharge Planning: cardiac diet + Ensure Enlive or comparable supplement 2-3x per day  [x] Participated in care planning, discharge planning, & interdisciplinary rounds as appropriate      Kamini Gongora RD   Pager: [phone]

## 2019-09-30 NOTE — ROUTINE PROCESS
Bedside and Verbal shift change report given to  Cher Chao 86 (oncoming nurse) by Dom Su RN (offgoing nurse). Report included the following information SBAR, Kardex, Intake/Output, MAR and Recent Results.

## 2019-10-01 ENCOUNTER — HOME HEALTH ADMISSION (OUTPATIENT)
Dept: HOME HEALTH SERVICES | Facility: HOME HEALTH | Age: 79
End: 2019-10-01
Payer: MEDICARE

## 2019-10-01 ENCOUNTER — APPOINTMENT (OUTPATIENT)
Dept: GENERAL RADIOLOGY | Age: 79
DRG: 226 | End: 2019-10-01
Attending: INTERNAL MEDICINE
Payer: MEDICARE

## 2019-10-01 VITALS
HEART RATE: 66 BPM | TEMPERATURE: 98.5 F | BODY MASS INDEX: 17.88 KG/M2 | DIASTOLIC BLOOD PRESSURE: 86 MMHG | OXYGEN SATURATION: 100 % | HEIGHT: 65 IN | SYSTOLIC BLOOD PRESSURE: 166 MMHG | RESPIRATION RATE: 20 BRPM | WEIGHT: 107.3 LBS

## 2019-10-01 LAB — MAGNESIUM SERPL-MCNC: 1.6 MG/DL (ref 1.6–2.6)

## 2019-10-01 PROCEDURE — 74011250637 HC RX REV CODE- 250/637: Performed by: INTERNAL MEDICINE

## 2019-10-01 PROCEDURE — 74011250636 HC RX REV CODE- 250/636: Performed by: INTERNAL MEDICINE

## 2019-10-01 PROCEDURE — 94761 N-INVAS EAR/PLS OXIMETRY MLT: CPT

## 2019-10-01 PROCEDURE — 97116 GAIT TRAINING THERAPY: CPT

## 2019-10-01 PROCEDURE — 74011000250 HC RX REV CODE- 250: Performed by: INTERNAL MEDICINE

## 2019-10-01 PROCEDURE — 94640 AIRWAY INHALATION TREATMENT: CPT

## 2019-10-01 PROCEDURE — 97530 THERAPEUTIC ACTIVITIES: CPT

## 2019-10-01 PROCEDURE — 36415 COLL VENOUS BLD VENIPUNCTURE: CPT

## 2019-10-01 PROCEDURE — 83735 ASSAY OF MAGNESIUM: CPT

## 2019-10-01 PROCEDURE — 77010033678 HC OXYGEN DAILY

## 2019-10-01 PROCEDURE — 71046 X-RAY EXAM CHEST 2 VIEWS: CPT

## 2019-10-01 PROCEDURE — 74011250637 HC RX REV CODE- 250/637: Performed by: PHYSICIAN ASSISTANT

## 2019-10-01 PROCEDURE — 74011250637 HC RX REV CODE- 250/637: Performed by: FAMILY MEDICINE

## 2019-10-01 PROCEDURE — 74011250636 HC RX REV CODE- 250/636: Performed by: PHYSICIAN ASSISTANT

## 2019-10-01 RX ORDER — AMIODARONE HYDROCHLORIDE 200 MG/1
200 TABLET ORAL EVERY 12 HOURS
Qty: 60 TAB | Refills: 0 | Status: ON HOLD | OUTPATIENT
Start: 2019-10-01 | End: 2019-10-16 | Stop reason: SDUPTHER

## 2019-10-01 RX ADMIN — METOPROLOL SUCCINATE 25 MG: 25 TABLET, EXTENDED RELEASE ORAL at 09:48

## 2019-10-01 RX ADMIN — ISOSORBIDE MONONITRATE 30 MG: 30 TABLET, EXTENDED RELEASE ORAL at 09:48

## 2019-10-01 RX ADMIN — Medication 10 ML: at 06:37

## 2019-10-01 RX ADMIN — ASPIRIN 81 MG: 81 TABLET, COATED ORAL at 09:48

## 2019-10-01 RX ADMIN — CEFAZOLIN 2 G: 10 INJECTION, POWDER, FOR SOLUTION INTRAVENOUS at 04:25

## 2019-10-01 RX ADMIN — KETOROLAC TROMETHAMINE 15 MG: 15 INJECTION, SOLUTION INTRAMUSCULAR; INTRAVENOUS at 00:32

## 2019-10-01 RX ADMIN — Medication 10 ML: at 14:14

## 2019-10-01 RX ADMIN — FOLIC ACID 1 MG: 1 TABLET ORAL at 09:48

## 2019-10-01 RX ADMIN — Medication 10 ML: at 00:43

## 2019-10-01 RX ADMIN — Medication 10 ML: at 06:36

## 2019-10-01 RX ADMIN — THERA TABS 1 TABLET: TAB at 09:48

## 2019-10-01 RX ADMIN — AMIODARONE HYDROCHLORIDE 200 MG: 200 TABLET ORAL at 09:48

## 2019-10-01 RX ADMIN — BUDESONIDE 500 MCG: 0.5 INHALANT RESPIRATORY (INHALATION) at 07:35

## 2019-10-01 RX ADMIN — ENOXAPARIN SODIUM 50 MG: 60 INJECTION SUBCUTANEOUS at 14:13

## 2019-10-01 RX ADMIN — Medication 100 MG: at 09:48

## 2019-10-01 RX ADMIN — ACETAMINOPHEN 500 MG: 500 TABLET ORAL at 00:34

## 2019-10-01 RX ADMIN — ENOXAPARIN SODIUM 50 MG: 60 INJECTION SUBCUTANEOUS at 00:32

## 2019-10-01 RX ADMIN — Medication 10 ML: at 00:42

## 2019-10-01 RX ADMIN — IBUPROFEN 600 MG: 600 TABLET, FILM COATED ORAL at 04:25

## 2019-10-01 NOTE — PROGRESS NOTES
Problem: Patient Education: Go to Patient Education Activity  Goal: Patient/Family Education  Outcome: Progressing Towards Goal     Problem: Heart Failure: Discharge Outcomes  Goal: *Demonstrates ability to perform prescribed activity without shortness of breath or discomfort  Outcome: Progressing Towards Goal  Goal: *Left ventricular function assessment completed prior to or during stay, or planned for post-discharge  Outcome: Progressing Towards Goal  Goal: *ACEI prescribed if LVEF less than 40% and no contraindications or ARB prescribed  Outcome: Progressing Towards Goal  Goal: *Verbalizes understanding and describes prescribed diet  Outcome: Progressing Towards Goal  Goal: *Verbalizes understanding/describes prescribed medications  Outcome: Progressing Towards Goal  Goal: *Describes available resources and support systems  Description  (eg: Home Health, Palliative Care, Advanced Medical Directive)  Outcome: Progressing Towards Goal  Goal: *Describes smoking cessation resources  Outcome: Progressing Towards Goal  Goal: *Understands and describes signs and symptoms to report to providers(Stroke Metric)  Outcome: Progressing Towards Goal  Goal: *Describes/verbalizes understanding of follow-up/return appt  Description  (eg: to physicians, diabetes treatment coordinator, and other resources  Outcome: Progressing Towards Goal  Goal: *Describes importance of continuing daily weights and changes to report to physician  Outcome: Progressing Towards Goal     Problem: Pressure Injury - Risk of  Goal: *Prevention of pressure injury  Description  Document Rodrigue Scale and appropriate interventions in the flowsheet.   Outcome: Progressing Towards Goal  Note:   Pressure Injury Interventions:       Moisture Interventions: Absorbent underpads    Activity Interventions: Increase time out of bed, Pressure redistribution bed/mattress(bed type), PT/OT evaluation    Mobility Interventions: HOB 30 degrees or less, Pressure redistribution bed/mattress (bed type)    Nutrition Interventions: Document food/fluid/supplement intake, Offer support with meals,snacks and hydration                     Problem: Patient Education: Go to Patient Education Activity  Goal: Patient/Family Education  Outcome: Progressing Towards Goal     Problem: Falls - Risk of  Goal: *Absence of Falls  Description  Document Radha Mcekon Fall Risk and appropriate interventions in the flowsheet. Outcome: Progressing Towards Goal  Note:   Fall Risk Interventions:  Mobility Interventions: Patient to call before getting OOB         Medication Interventions: Patient to call before getting OOB, Teach patient to arise slowly    Elimination Interventions: Call light in reach, Patient to call for help with toileting needs              Problem: Patient Education: Go to Patient Education Activity  Goal: Patient/Family Education  Outcome: Progressing Towards Goal     Problem: Nutrition Deficit  Goal: *Optimize nutritional status  Outcome: Progressing Towards Goal     Problem: Patient Education: Go to Patient Education Activity  Goal: Patient/Family Education  Outcome: Progressing Towards Goal     Problem: Patient Education: Go to Patient Education Activity  Goal: Patient/Family Education  Outcome: Progressing Towards Goal     Problem: Falls - Risk of  Goal: *Absence of Falls  Description  Document Radha Mckeon Fall Risk and appropriate interventions in the flowsheet.   Outcome: Progressing Towards Goal  Note:   Fall Risk Interventions:  Mobility Interventions: Patient to call before getting OOB         Medication Interventions: Patient to call before getting OOB, Teach patient to arise slowly    Elimination Interventions: Call light in reach, Patient to call for help with toileting needs              Problem: Patient Education: Go to Patient Education Activity  Goal: Patient/Family Education  Outcome: Progressing Towards Goal

## 2019-10-01 NOTE — PROGRESS NOTES
[facility]: [phone]  [facility]: [phone]    Time In: 0900  Time Out: Eliu Osei Team Kd Baez NP, and this LCSW) met with patient at bedside.     Patient has PMH of COPD, tobacco use, ETOH abuse, systolic and diastolic heart failure, chronic doxy for eye infection, and severe CAD w/ CABG x 5. He was admitted 09/24/2019 from home w/ dx CHF exacerbation, wide complex tachycardia, and SOB. Patient reported that he had an AICD placed yesterday and that he was feeling sore on his R side. He was wearing a sling, which NP helped him adjust as he c/o it being \"too large. \" Patient said that his pain was \"doing okay. \" He reported that he has a fair appetite and consumed 50% of breakfast.     Patient status is FULL CODE; full interventions. Patient rescinded his DNR (Signed and dated rescinded copy on file). PM Team ensured that patient understood the benefits and burdens of resuscitation and intubation. He reported that at this time, he would like to pursue all life-prolonging measures.  Patient's goal is to return home.     Patient has an AMD on file naming the following decision-makers:     Primary Healthcare Agent: Daron Jolley  Relationship: Daughter  Phone Number: [phone]     Secondary Healthcare Agent: Hawk Galindoelor  Relationship: Ex-wife  Phone Number: [phone]

## 2019-10-01 NOTE — HOME CARE
Discharge noted for today. Received home health referral for [facility] for SN, PT, and OT - telehealth monitoring. Order processed and emailed to central office.   Bhavik Gonzalez, [facility] Liaison

## 2019-10-01 NOTE — PROGRESS NOTES
Discharge instruction given. Patient given verbal and handout instructions about CHF, AICD placement/care and Amiodarone. Smaller Sling applied to left arm inplace. Patient verbalize understanding.

## 2019-10-01 NOTE — PROGRESS NOTES
[facility]  -  Progress Note      Patient: Nisha Trent MRN: [id number]  SSN: [id number]    YOB: 1940  Age: 69 y.o. Sex: male      Admit Date: 9/24/2019    Assessment:     -Presented with shortness of breath, at least partially associated wtih CHF exacerbation with BNP 5612 and CT with small bilateral pleural effusions and suspected pulmonary edema but also with history of COPD as well  -Echo 9/25/2019: EF 16-20% with akinetic basal inferior, basal inferolateral, mid inferior, mid inferolateral, apical anterior, apical septal, apical inferior, apical lateral and apical walls  -Echo 1/24/2019: EF 46-50% with hypokinetic basal inferior, basal inferolateral, mid inferior, mid inferolateral and apical inferior wall segments, grade 2 diastolic dysfunction  -CAD, Cardiac cath 2/1/19 with following findings:   · Severe native CAD with distal left main heavily calcified lesion   · Patent LIMA-LAD  · 2 sequential vein grafts (all 4 vein grafts to diagonal, OM, RPDA, RPL branch) occluded at aortotomy site  · Subtotal mid RCA occludion with NICO 0-I flow, some collaterals from left coronary system  · Native diagonal 85% stenosis  · Native OM 85% stenosis  -Elevated troponin  -Wide-complex tachycardia, most consistent with bundle branch VT  -Hypomagnesemia, Mg 1.3 on presentation, given 2 gm Mg in ER  -HTN  -Hypercholesterolemia  -Hx tobacco and alcohol abuse  -Previously DNR status, now full code.     Primary cardiologist is Dr. Alexandra Calderón:     -CXR from this AM pending results. -Device interrogation this AM with normal function.  -Continued on PO Amiodarone, ASA, Lipitor, Imdur, Toprol.  -May resume Plavix in on 10/6/19.  -Will need to follow-up in our office in 1 week for wound/device check. Subjective:     Reports 2/10 \"discomfort\" at incision site.     Objective:      Patient Vitals for the past 8 hrs:   Temp Pulse Resp BP SpO2   10/01/19 1116 98.5 °F (36.9 °C) 66 20 166/86 100 %   10/01/19 0737     100 %   10/01/19 0732 98.2 °F (36.8 °C) 69 18 176/85 100 %         Patient Vitals for the past 96 hrs:   Weight   10/01/19 0505 107 lb 4.8 oz (48.7 kg)   09/30/19 0424 106 lb 6.4 oz (48.3 kg)   09/29/19 0443 109 lb 12.8 oz (49.8 kg)   09/28/19 0417 111 lb 4.8 oz (50.5 kg)       No intake or output data in the 24 hours ending 10/01/19 1204    Physical Exam:  General:  alert, cooperative, no distress, appears stated age  Neck:  suppple  Lungs:  clear to auscultation bilaterally to anterolateral lung fields  Heart:  Regular rate and rhythm  Abdomen:  abdomen is soft without significant tenderness, masses, organomegaly or guarding  Extremities:  Atraumatic, no edema     Data Review:     Labs: Results:       Chemistry Recent Labs     10/01/19  0450 09/30/19 0404 09/29/19 0412   GLU  --  91 86   NA  --  134* 133*   K  --  4.0 4.2   CL  --  98* 97*   CO2  --  29 31   BUN  --  16 25*   CREA  --  1.20 1.30   CA  --  8.6 8.6   MG 1.6 1.6  --    AGAP  --  7 5   BUCR  --  13 19   AP  --  207*  --    TP  --  6.1*  --    ALB  --  2.5*  --    GLOB  --  3.6  --    AGRAT  --  0.7*  --       CBC w/Diff Recent Labs     09/30/19 0404 09/29/19 0412   WBC 6.6  --    RBC 2.64*  --    HGB 9.3* 9.2*   HCT 27.7* 27.0*     --    GRANS 52  --    LYMPH 30  --    EOS 2  --       Lipid Panel Lab Results   Component Value Date/Time    Cholesterol, total 144 05/03/2019 10:26 AM    HDL Cholesterol 86 (H) 05/03/2019 10:26 AM    LDL, calculated 18 05/03/2019 10:26 AM    VLDL, calculated 40 05/03/2019 10:26 AM    Triglyceride 200 (H) 05/03/2019 10:26 AM    CHOL/HDL Ratio 1.7 05/03/2019 10:26 AM      Liver Enzymes Recent Labs     09/30/19  0404   TP 6.1*   ALB 2.5*   *   SGOT 70*      Thyroid Studies Lab Results   Component Value Date/Time    TSH 4.31 (H) 09/24/2019 09:57 AM

## 2019-10-01 NOTE — DISCHARGE SUMMARY
Discharge Summary    Patient: Melvin Kirby MRN: [id number]  CSN: [id number]    YOB: 1940  Age: 69 y.o. Sex: male    DOA: 9/24/2019 LOS:  LOS: 7 days   Discharge Date:      Admission Diagnoses: [facility]est[facility] ([facility]) ([facility] [I50.9]    Discharge Diagnoses:   Acute on chr combined CHF   Ischemic CMO   NSTEMI  CHRISTIANO on CKD 3   CAD   COPD without exac   HTN   Severe Malnutrition   Hyponatremia   HLPD   Chr macrocytic anemia 2y to ETOH abuse            Discharge Condition: Stable    Consults: Cardiology    PHYSICAL EXAM  Visit Vitals  /86 (BP 1 Location: Right arm)   Pulse 66   Temp 98.5 °F (36.9 °C)   Resp 20   Ht 5' 5\" (1.651 m)   Wt 48.7 kg (107 lb 4.8 oz)   SpO2 100%   BMI 17.86 kg/m²       General: Alert, cooperative, no acute distress    HEENT: PERRLA, EOMI. Anicteric sclerae. Lungs:  CTA Bilaterally. No Wheezing/Rhonchi/Rales. Heart:  Regular rate and Rhythm. Abdomen: Soft, Non distended, Non tender. + Bowel sounds. Extremities: No edema/ cyanosis/ clubbing  Psych:   Good insight. Not anxious or agitated. Neurologic:  AA oriented X 3. Moves all extremities. HPI:  Melvin Kirby is a 69 y.o. male with medical co-morbidities including HTN, CAD with CABG, chronic systolic, diastolic heart failure, COPD, active tobacco smoker, hyperlipidemia, chronic alcoholism, on chronic doxycycline for eye infection, presented with shortness of breath and right-sided chest pressure. He has had this symptom for a few days and worsened this AM when he ambulated to the restroom. He felt he could not take a deep breath. No chest palpitation, no dizziness. No nausea/vomiting. No fever. He has tried respiratory treatment at home without much help.      In the ER, he was found to have pulmonary congestion on chest xray with elevated proBNP, he was given neb treatment with lasix with some relief.  He was also observed to have SVT vs VT on EKG, he received dosing of harinderer to help with rate control. Cardiac enzyme was negative. Since he had mild elevated D-dimer, CTA chest was done to show no PE. Cardiology evaluated and suspected that he had wide-complex tachycardia with bundle branch VT in the setting of severe CAD and COPD.     He expressed that if he heart stopped, he does not want to be resuscitated or to be intubated when he can no longer breath. His sons/family at bedside agreed to his signing DNR form . Hospital Course:   Pt was admitted to the hosp with SOB - found to have acute on chr combined CHF - diuresed with IV lasix - also noted to have elevated trop at the time of admission - On Heparin gtt   Seen by cardiology - advised Non invasive measures since he had agreed to DNR - however after speaking with daughter - pt rescinded DNR   In light of that change & given his Echo results which showed poor EF - Cardiology decided to proceed with AICD placement & Cardiac cath as out pt. Lasix was also stopped 2y to increasing creatinine   H/o Alcohol abuse with macrocytic anemia - on CIWA protocol   Pt underwent the procedure without complications   Pt seen by PT & OT - recommended HH   He is being discharged [facility] with [facility] & is advised to f/u with PCP & Cardiology in 2 weeks         Imaging:  CTA Chest   IMPRESSION:  1. No evidence for pulmonary emboli. 2.  Likely heart failure with increased multichamber cardiac enlargement, new  small bilateral pleural effusions, mildly loculated on the left and suspected  pulmonary edema. 3.   Unchanged scarring at the left lung apex. 4.  Unchanged cystic changes left greater than right lung bases, favoring UIP. 5.  Possible esophageal wall thickening. Echo   · Left Ventricle: Normal cavity size and wall thickness. Severe systolic dysfunction. Estimated left ventricular ejection fraction is 16 - 20%. Visually measured ejection fraction. Abnormal left ventricular wall motion.   · Mitral Valve: Mild mitral valve regurgitation is present. · Tricuspid Valve: Mild tricuspid valve regurgitation is present. · Pulmonic Valve: Mild pulmonic valve regurgitation is present. · Aortic Valve: Mild aortic valve regurgitation is present. · Pulmonary Artery: Pulmonary arterial systolic pressure is 33 mmHg. · IVC/Hepatic Veins: Mildly elevated central venous pressure (5-10 mmHg); IVC diameter is less than 21 mm and collapses less than 50% with respiration. · This was a limited echo, but EF has significant decreased compared to prior study. Procedures:   AICD Placement by Cardiology         Discharge Medications:     Current Discharge Medication List      START taking these medications    Details   amiodarone (CORDARONE) 200 mg tablet Take 1 Tab by mouth every twelve (12) hours. Qty: 60 Tab, Refills: 0         CONTINUE these medications which have NOT CHANGED    Details   atorvastatin (LIPITOR) 40 mg tablet Take 40 mg by mouth nightly. tiotropium-olodaterol (STIOLTO RESPIMAT) 2.5-2.5 mcg/actuation inhaler Take 2 Puffs by inhalation daily. 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 cyanocobalamin 1,000 mcg tablet Take 1,000 mcg by mouth daily. clopidogrel (PLAVIX) 75 mg tab Take 1 Tab by mouth daily. Qty: 30 Tab, Refills: 3      isosorbide mononitrate ER (IMDUR) 30 mg tablet Take 1 Tab by mouth every morning. Qty: 30 Tab, Refills: 3      aspirin delayed-release 81 mg tablet Take  by mouth daily. losartan (COZAAR) 50 mg tablet 50 mg daily. metoprolol succinate (TOPROL-XL) 25 mg XL tablet 25 mg daily. thiamine (VITAMIN B-1) 50 mg tablet Take 50 mg by mouth daily. PSEUDOEPHEDRINE/CPM/METHYLSCOP (ALLERGY AM-PM PO) Take  by mouth daily as needed. Cholecalciferol, Vitamin D3, (VITAMIN D) 5,000 unit Tab Take 1 Cap by mouth daily. 1,000 iu qd      Ibuprofen-Diphenhydramine (ADVIL PM) 200-38 mg Tab Take  by mouth as needed. albuterol (VENTOLIN HFA) 90 mcg/Actuation inhaler Take 1 Puff by inhalation as needed. CYANOCOBALAMIN/FA/PYRIDOXINE (FOLBEE PO) Take 1 Tab by mouth daily. multivitamin (ONE A DAY) tablet Take 1 Tab by mouth daily. nitroglycerin (NITROSTAT) 0.4 mg SL tablet 1 Tab by SubLINGual route every five (5) minutes as needed for Chest Pain. Up to 3 doses.   Qty: 1 Bottle, Refills: 3         STOP taking these medications       doxycycline (ADOXA) 100 mg tablet Comments:   Reason for Stopping:               Current Facility-Administered Medications:     sodium chloride (NS) flush 5-40 mL, 5-40 mL, IntraVENous, Q8H, [place], Rick X, CRNA, 10 mL at 10/01/19 0786    sodium chloride (NS) flush 5-40 mL, 5-40 mL, IntraVENous, PRN, [place], Negley X, CRNA    hydrALAZINE (APRESOLINE) 20 mg/mL injection 10 mg, 10 mg, IntraVENous, Q6H PRN, Skyla Blanco MD, 10 mg at 09/30/19 1900    ibuprofen (MOTRIN) tablet 600 mg, 600 mg, Oral, Q6H PRN, Skyla Blanco MD, 600 mg at 10/01/19 0425    acetaminophen (TYLENOL) tablet 500 mg, 500 mg, Oral, Q6H PRN, Luis Carlos MD, 500 mg at 10/01/19 0034    enoxaparin (LOVENOX) injection 50 mg, 1 mg/kg, SubCUTAneous, Q12H, Kathleen Arora PA-C, 50 mg at 10/01/19 0032    amiodarone (CORDARONE) tablet 200 mg, 200 mg, Oral, Q12H, Kathleen Arora PA-C, 200 mg at 10/01/19 0948    ondansetron (ZOFRAN) injection 4 mg, 4 mg, IntraVENous, Q6H PRN, Rebel Douglass MD    aspirin delayed-release tablet 81 mg, 81 mg, Oral, DAILY, Saskia Mendoza MD, 81 mg at 10/01/19 0948    atorvastatin (LIPITOR) tablet 40 mg, 40 mg, Oral, QHS, Saskia Mendoza MD, 40 mg at 09/30/19 2200    [Held by provider] clopidogrel (PLAVIX) tablet 75 mg, 75 mg, Oral, DAILY, Nicolle Sanchez MD, Stopped at 09/28/19 0900    isosorbide mononitrate ER (IMDUR) tablet 30 mg, 30 mg, Oral, 7am, Saskia Rodríguez MD, 30 mg at 10/01/19 0948    metoprolol succinate (TOPROL-XL) XL tablet 25 mg, 25 mg, Oral, DAILY, Saskia Rodríguez MD, 25 mg at 10/01/19 0948    budesonide (PULMICORT) 500 mcg/2 ml nebulizer suspension, 500 mcg, Nebulization, BID RT, Nanette Ingram MD, 500 mcg at 10/01/19 0735    sodium chloride (NS) flush 5-40 mL, 5-40 mL, IntraVENous, Q8H, Saskia Rodríguez MD, 10 mL at 10/01/19 3787    sodium chloride (NS) flush 5-40 mL, 5-40 mL, IntraVENous, PRN, Nanette Ingram MD    folic acid (FOLVITE) tablet 1 mg, 1 mg, Oral, DAILY, Nanette Ingram MD, 1 mg at 10/01/19 0948    therapeutic multivitamin (THERAGRAN) tablet 1 Tab, 1 Tab, Oral, DAILY, Nanette Ingram MD, 1 Tab at 10/01/19 0948    thiamine HCL (B-1) tablet 100 mg, 100 mg, Oral, DAILY, Nanette Ingram MD, 100 mg at 10/01/19 1661  · It is important that you take the medication exactly as they are prescribed. · Keep your medication in the bottles provided by the pharmacist and keep a list of the medication names, dosages, and times to be taken in your wallet. · Do not take other medications without consulting your doctor.          Minutes spent on discharge: 45 minutes spent coordinating this discharge (review instructions/follow-up, prescriptions, preparing report for sign off)    Siddharth Canales MD  10/1/2019 12:42 PM

## 2019-10-01 NOTE — ROUTINE PROCESS
2022: Pt complaint of pain 9/10 over AICD incision. 600mg Motrin given. 2112: Pain reassessed. Pt states pain maintained at 9/10. Paged Dr. Lori Beal and applied ice pack to area. 2200: Pain reassessed. Reduced to 8/10 but still uncomfortable to pt. Paged Dr. Lori Beal and received orders for 15mg Toradol IV and 500mg Tylenol PO q6hr. Discussed listed allergy to Hydrocodone-acetaminophen combo. MD aware and stated rash is due to hydrocodone portion.

## 2019-10-01 NOTE — PROGRESS NOTES
Problem: Mobility Impaired (Adult and Pediatric)  Goal: *Acute Goals and Plan of Care (Insert Text)  Description  Physical Therapy Goals  Initiated 9/26/2019 and to be accomplished within 7 day(s)  1. Patient will move from supine to sit and sit to supine , scoot up and down and roll side to side in bed with modified independence. 2.  Patient will transfer from bed to chair and chair to bed with modified independence using the least restrictive device. 3.  Patient will perform sit to stand with modified independence. 4.  Patient will ambulate with modified independence for >150 feet with the least restrictive device. PLOF: Patient lives with his niece in a 1 story home with 0STE and has SC and walk in shower and was independent with mobility. Patient has Rollator and SPC at home. Outcome: Progressing Towards Goal   PHYSICAL THERAPY TREATMENT    Patient: Cyn Llamas (40 y.o. male)  Date: 10/1/2019  Diagnosis: [facility] [I50.9] <principal problem not specified>  Procedure(s) (LRB):  INSERT ICD DUAL (Left) 1 Day Post-Op  Precautions: Fall  PLOF: See goals section above    ASSESSMENT:  Pt was cleared for participation in therapy by nursing. Pt was received semi-reclined in bed, agreeable to work with PT .  Pt's L UE in sling. Pt reported mild soreness in L UE . Pt performed supine-sit transfer and sit-stand transfer independently. He reported mild shortness of breath and states that is normal for him when he first begins any activity. Pt ambulated in hallway with no AD , with supervision x 100 feet and then took a seated rest break. SPO2 was 93% with exertion on Room air ( 98% at rest) and HR was 86. After brief seated rest break of ~ 3 minutes, pt ambulated 100 additional feet with supervision with decreased bilateral LE clearance, narrow KERI. Pt still mildly short of breath. At conclusion of session, pt was handed off to transport to be taken to radiology. Progression toward goals:   ?      Improving appropriately and progressing toward goals  ? Improving slowly and progressing toward goals  ? Not making progress toward goals and plan of care will be adjusted     PLAN:  Patient continues to benefit from skilled intervention to address the above impairments. Continue treatment per established plan of care. Discharge Recommendations:  Home Health  Further Equipment Recommendations for Discharge:  N/A     SUBJECTIVE:   Patient stated  I feel just a little sore in this arm.     OBJECTIVE DATA SUMMARY:   Critical Behavior:  Neurologic State: Alert  Orientation Level: Oriented X4  Cognition: Follows commands  Safety/Judgement: Awareness of environment, Fall prevention  Functional Mobility Training:  Bed Mobility:  Supine to Sit: Modified independent    Transfers:  Sit to Stand: Modified independent  Stand to Sit: Modified independent    Balance:  Sitting: Intact  Standing: Impaired; With support  Standing - Static: Good  Standing - Dynamic : Good   Range Of Motion:   AROM: Within functional limits     Ambulation/Gait Training:  Distance (ft): 100 Feet (ft)(x 2 with seated rest break)  Assistive Device: (no AD)  Ambulation - Level of Assistance: Supervision    Gait Abnormalities: Decreased step clearance    Base of Support: Narrowed     Speed/Kathleen: Pace decreased (<100 feet/min)      Pain:  Pain level pre-treatment: 2/10 ( L arm)  Pain level post-treatment: 2/10  (L arm)  Pain Intervention(s): Medication (see MAR); Rest (Arm in sling)  Response to intervention: Nurse notified, See doc flow    Activity Tolerance:   Fair  Please refer to the flowsheet for vital signs taken during this treatment. After treatment:   ? Patient left in no apparent distress sitting up in chair  ? Patient left in no apparent distress in bed  ? Call bell left within reach  ? Nursing notified  ? Caregiver present  ? Bed alarm activated  ?  SCDs applied      COMMUNICATION/EDUCATION: ?         Role of Physical Therapy in the acute care setting. ?         Fall prevention education was provided and the patient/caregiver indicated understanding. ? Patient/family have participated as able in working toward goals and plan of care. ?         Patient/family agree to work toward stated goals and plan of care. ?         Patient understands intent and goals of therapy, but is neutral about his/her participation. ? Patient is unable to participate in stated goals/plan of care: ongoing with therapy staff.   ?         Other:        Yari Pandey, PT   Time Calculation: 23 mins

## 2019-10-01 NOTE — ROUTINE PROCESS
Bedside verbal shift change report given to Michael Welsh RN (oncoming nurse). Report included the following information: SBAR, Kardex, OR Summary, Intake/Output, Recent Results and Cardiac Rhythm (Paced). Pt in bed resting quietly with call light in reach.

## 2019-10-01 NOTE — CONSULTS
Palliative Medicine Consult    Patient Name: Samy Prakash  YOB: 1940    Date of Initial Consult: 9/25/2019, Follow up 9/26/2019, Follow up 10/1/2019  Reason for Consult: Goals of care discussion  Requesting Provider:  Lashonda Andino NP   Primary Care Physician: Roxann Amezcua MD      SUMMARY:   Samy Prakash is a 69 y.o. with a past history of HTN, severe CAD with CABG x 5, systolic and diastolic heart failure, COPD, tobacco abuse, alcoholism, and chronic doxy for eye infection, who was admitted on 9/24/2019 from home with a diagnosis of CHF exacerbation, wide complex tachycardia, and SOB. Current medical issues leading to Palliative Medicine involvement include: Goals of care discussions for a 69year old male who has significant cardiac history. 9/26/2019: Patient still with some dyspnea, mild with rest. Denies chest pain. Patient talked with family, he is moving forward with aggressive therapies including AICD placement. He shares that he just revoked his DNR status, and the nurse came to cut off his band. Explained the benefits and burdens of CPR and patient wants Full code. AMD completed with patient. See \"Plan\" section for thorough details. 9/27/2019: Patient breathing improved today. Denies pain. Sitting up in chair. Possible cath and AICD placement on Monday. No change in goals of care. Continue Full code with full aggressive measures. 10/1/2019: Patient denies and SOB. Pain overnight from AICD placement but improved today 2/10 on scale. Helped patient adjust arm sling, which is too large for patient. Asked  to inquire about getting a smaller sling for proper fitting. Goals of care remain unchanged, continue full code with full interventions. PALLIATIVE DIAGNOSES:   1. Goals of care discussions  2. CHF exacerbation  3. CAD  4. COPD       PLAN:   10/1/2019: Patient denies and SOB.  Pain overnight at site of recent AICD placement but improved today 2/10 on scale. Helped patient adjust arm sling, which is too large for patient. Asked  to inquire about getting a smaller sling for proper fitting. Support offered to family. Goals of care remain unchanged, continue full code with full interventions. Goals of care have been defined and support offered. Will sign off at this time but please reconsult should there be any changes in patient status. See previous visits below:    9/27/2019: Palliative medicine team including WINSTON Rosen RN and myself met with patient at patient's bedside. No family at bedside. Patient is alert and oriented x 4, denies SOB or pain today. He is sitting up in chair, no concerns. Support offered. No changes in goals of care: Continue  Full code with full aggressive measures. 9/26/2019: Patient still with some dyspnea, mild with rest. Denies chest pain. Patient talked with family, he is moving forward with aggressive therapies including AICD placement. He shares that he just revoked his DNR status, and the nurse came to cut off his band. Explained the benefits and burdens of CPR including the low success rates with history of heart disease, and possibilities of hypoxic brain injury, inability to extubate, long term ventilator needs, and potential inability to eat/perfrom ADL care if CPR were successful at reviving. Patient states it is a lot to think about but for now his goals remain Full code with full aggressive measures. DDNR form revoked. Patient states he has thought about doing the AMD we left for him yesterday, and we helped him complete it. AMD completed naming daugther Alonso Barks as primary MPOA and ex-spouse Gallo Khanna as secondary MPOA. Supportive listening offered. Will continue to follow for support and goals of care discussions. 9/25/2019  1. Goals of care discussion: Met with patient and patient's son Scarlet Powers at bedside.  Patient is alert and oriented x 4 and states he needs to talk to his daughter about his heart and decide whether he would be accepting of an AICD. Patient does not have an AMD on file, but states he would want his daughter Maricel Rowley to make medical decisions for him in the event he were unable to communicate. Encouraged and offered help with AMD completion, but patient does not wish to complete at this time. Blank copy left with patient along with instructions for completion. Patient has a DDNR on file, and did not wish for further discussion when introducing the POST form. Full aggressive measures up until cardiac or pulmonary arrest. Supportive listening offered to patient and family. Will continue to follow for continued goals of care discussions and support. 1. CHF exacerbation: Cardiology following. Echo result pending. Per cardiology note, \"drop in EF consistent with worsening heart failure/cardiomyopathy. \" Patient admits to dyspnea at rest and with exertion. Just finished receiving a breathing nebulizer treatment. 2. CAD: Cardiology following. Reviewed note. Per cardiology, \"can consider AICD, and potential PCI to left main as outpatient, however, this is unlikely to change EF significantly. \"  Patient is discussing this with daughter versus conservative management. 3. COPD: On nebulizer treatments. Nicotine dependence. 4. Initial consult note routed to primary continuity provider  5.  Communicated plan of care with: Palliative IDT, patient, family       GOALS OF CARE / TREATMENT PREFERENCES:   [====Goals of Care====]  GOALS OF CARE: Full code with full aggressive measures  Patient/Health Care Proxy Stated Goals: Prolong life      TREATMENT PREFERENCES: full aggressive measures  Code Status: Full Code    Advance Care Planning:  Advance Care Planning 9/26/2019   Patient's Healthcare Decision Maker is: Named in scanned ACP document   Confirm Advance Directive Yes, on file   Patient Would Like to Complete Advance Directive No   Does the patient have other document types - The palliative care team has discussed with patient / health care proxy about goals of care / treatment preferences for patient.  [====Goals of Care====]         HISTORY:     History obtained from: patient, chart    CHIEF COMPLAINT: dyspnea    HPI/SUBJECTIVE:    The patient is:   [x] Verbal and participatory  [] Non-participatory due to:   Pleasant, oriented x 4    Clinical Pain Assessment (nonverbal scale for severity on nonverbal patients):   Clinical Pain Assessment  Severity: 2            FUNCTIONAL ASSESSMENT:     Palliative Performance Scale (PPS):  PPS: 60       PSYCHOSOCIAL/SPIRITUAL SCREENING:     Advance Care Planning:  Advance Care Planning 9/26/2019   Patient's Healthcare Decision Maker is: Named in scanned ACP document   Confirm Advance Directive Yes, on file   Patient Would Like to Complete Advance Directive No   Does the patient have other document types -        Any spiritual / Hindu concerns:  [] Yes /  [x] No    Caregiver Burnout:  [] Yes /  [x] No /  [] No Caregiver Present      Anticipatory grief assessment:   [] Normal  / [x] Maladaptive        REVIEW OF SYSTEMS:     Positive and pertinent negative findings in ROS are noted above in HPI. The following systems were [x] reviewed / [] unable to be reviewed as noted in HPI  Other findings are noted below. Systems: constitutional, ears/nose/mouth/throat, respiratory, gastrointestinal, genitourinary, musculoskeletal, integumentary, neurologic, psychiatric, endocrine. Positive findings noted below. Modified ESAS Completed by: provider   Fatigue: 0 Drowsiness: 0   Depression: 0 Pain: 2   Anxiety: 0 Nausea: 0   Anorexia: 0 Dyspnea: 0     Constipation: No              PHYSICAL EXAM:     From RN flowsheet:  Wt Readings from Last 3 Encounters:   10/01/19 48.7 kg (107 lb 4.8 oz)   05/15/19 46.7 kg (103 lb)   05/10/19 46.7 kg (103 lb)     Blood pressure 176/85, pulse 69, temperature 98.2 °F (36.8 °C), resp.  rate 18, height 5' 5\" (1.651 m), weight 48.7 kg (107 lb 4.8 oz), SpO2 100 %. Pain Scale 1: Numeric (0 - 10)  Pain Intensity 1: 0     Pain Location 1: Chest, Arm  Pain Orientation 1: Left  Pain Description 1: Sharp  Pain Intervention(s) 1: Medication (see MAR)  Last bowel movement, if known: unknown    Constitutional: Awake, alert, pleasant  Eyes: pupils equal, anicteric  ENMT: no nasal discharge, moist mucous membranes  Respiratory: breathing unlabored, symmetric, on oxygen via NC  Musculoskeletal: no deformity, no tenderness to palpation, Left arm in sling  Skin: warm, dry  Neurologic: following commands, moving all extremities  Psychiatric: full affect, no hallucinations       HISTORY:     Active Problems:    Tobacco abuse ()      CAD (coronary artery disease), native coronary artery (1/3/2005)      Overview: LAD 70-80% mid; Cx-diffuse 80%; OM1-70% prox; RCA 40-50% mid; 100% RPLB       with collaterals. Repeat cor angio (Aug 2013): 30% dLM, 70%pLAD, 80%mLAD,       60%dLAD, 80%D1 (large), 80% ramus, 85%dLCx, 80%dRCA, 75%RPLB. LV EF 50%       with inferobasal hypokinesis. S/p CABG x5 (LIMA->LAD, Seq SVG-> D &ramus, Seq SVG-> RPDA & RPLB; April 2014)      Emphysematous COPD [facility]) ()      Overview: PFTs March 2009: mild-moderate obstruction; + bronchodil. Response;       decreased DLCO      Alcohol abuse ()      Overview: quit February 2011      S/P CABG x 5 (4/10/2014)      Overview: LIMA to LAD, and a sequential saphenous vein graft to the 1st diagonal of       the LAD and to the ramus intermedius, and another sequential saphenous       vein graft to the posterior descending branch and the posterolateral       branch of the RCA, Dr. Nolan Saucedo, 4/9/14.             Congestive heart failure (CHF) ([address] (9/24/2019)      Wide-complex tachycardia ([address] (9/24/2019)      Acute on chronic combined systolic and diastolic ACC/AHA stage C congestive heart failure ([address] (9/24/2019)      Past Medical History:   Diagnosis Date    Alcohol abuse     quit February 2011    Binocular visual disturbance 10/31/2013    CAD (coronary artery disease)     CAD (coronary artery disease), native coronary artery 1/3/05    LAD 70-80% mid; Cx-diffuse 80%; OM1-70% prox; RCA 40-50% mid; 100% RPLB with collaterals    Cardiac cath 08/19/2013    Severe, diffuse calcification. dLM 30%. mLAD 80%. o/pD1 80%. o/pRamus 80%. dCX 85%. dRCA 80%.  m-dRPLB 75%. EF 50%. Mod to severe inferobasal hypk.  Cardiac echocardiogram 01/19/2011    EF 50-55%. Gr 1 DDfx. RVSP 30 mmHg.  Cardiac nuclear imaging test 06/27/2011    No evidence of ischemia or previous infarction. EF 60%. Neg EKG on max EST. Ex time 6 mins.  Chronic kidney disease     Chronic lung disease     Chronic obstructive pulmonary disease (HCC)     Coronary artery disease     Diastolic CHF, chronic (HCC)     LV EF 55% (Echo Jan 2011)    Diverticula of colon 12/17/2013    Emphysematous COPD ([address]     PFTs March 2009; mild-mode obstruction, + bronchodil response; decreased DLCO    GERD (gastroesophageal reflux disease)     with erosive esophagitis history    Heart failure ([address]     History of renal failure     2 years ago, which completely resolved    Hypercholesterolemia     Hypertension     Hypertensive heart disease with CHF ([address]     MI (myocardial infarction) ([address]     Inferior wall    Paresthesia and pain of both upper extremities 10/31/2013    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 Geronimo Patel, 4/9/14.       Tobacco abuse     Unspecified hereditary and idiopathic peripheral neuropathy 12/4/2013      Past Surgical History:   Procedure Laterality Date    HX CORONARY ARTERY BYPASS GRAFT  about 2013    HX HEART CATHETERIZATION  11/2013      Family History   Problem Relation Age of Onset    Heart Disease Maternal Uncle     Diabetes Father     Hypertension Father       History reviewed, no pertinent family history.   Social History     Tobacco Use    Smoking status: Current Every Day Smoker     Packs/day: 0.50     Years: 55.00     Pack years: 27.50     Types: Cigarettes     Start date: 3/13/2014    Smokeless tobacco: Never Used   Substance Use Topics    Alcohol use: Yes     Comment: every other day drinker     Allergies   Allergen Reactions    Vicodin [Hydrocodone-Acetaminophen] Rash      Current Facility-Administered Medications   Medication Dose Route Frequency    lactated Ringers infusion  75 mL/hr IntraVENous CONTINUOUS    sodium chloride (NS) flush 5-40 mL  5-40 mL IntraVENous Q8H    sodium chloride (NS) flush 5-40 mL  5-40 mL IntraVENous PRN    hydrALAZINE (APRESOLINE) 20 mg/mL injection 10 mg  10 mg IntraVENous Q6H PRN    ibuprofen (MOTRIN) tablet 600 mg  600 mg Oral Q6H PRN    acetaminophen (TYLENOL) tablet 500 mg  500 mg Oral Q6H PRN    enoxaparin (LOVENOX) injection 50 mg  1 mg/kg SubCUTAneous Q12H    amiodarone (CORDARONE) tablet 200 mg  200 mg Oral Q12H    ondansetron (ZOFRAN) injection 4 mg  4 mg IntraVENous Q6H PRN    aspirin delayed-release tablet 81 mg  81 mg Oral DAILY    atorvastatin (LIPITOR) tablet 40 mg  40 mg Oral QHS    [Held by provider] clopidogrel (PLAVIX) tablet 75 mg  75 mg Oral DAILY    isosorbide mononitrate ER (IMDUR) tablet 30 mg  30 mg Oral 7am    metoprolol succinate (TOPROL-XL) XL tablet 25 mg  25 mg Oral DAILY    budesonide (PULMICORT) 500 mcg/2 ml nebulizer suspension  500 mcg Nebulization BID RT    sodium chloride (NS) flush 5-40 mL  5-40 mL IntraVENous Q8H    sodium chloride (NS) flush 5-40 mL  5-40 mL IntraVENous PRN    folic acid (FOLVITE) tablet 1 mg  1 mg Oral DAILY    therapeutic multivitamin (THERAGRAN) tablet 1 Tab  1 Tab Oral DAILY    thiamine HCL (B-1) tablet 100 mg  100 mg Oral DAILY          LAB AND IMAGING FINDINGS:     Lab Results   Component Value Date/Time    WBC 6.6 09/30/2019 04:04 AM    HGB 9.3 (L) 09/30/2019 04:04 AM    PLATELET 695 80/26/9342 04:04 AM     Lab Results   Component Value Date/Time    Sodium 134 (L) 09/30/2019 04:04 AM    Potassium 4.0 09/30/2019 04:04 AM    Chloride 98 (L) 09/30/2019 04:04 AM    CO2 29 09/30/2019 04:04 AM    BUN 16 09/30/2019 04:04 AM    Creatinine 1.20 09/30/2019 04:04 AM    Calcium 8.6 09/30/2019 04:04 AM    Magnesium 1.6 10/01/2019 04:50 AM      Lab Results   Component Value Date/Time    AST (SGOT) 70 (H) 09/30/2019 04:04 AM    Alk. phosphatase 207 (H) 09/30/2019 04:04 AM    Protein, total 6.1 (L) 09/30/2019 04:04 AM    Albumin 2.5 (L) 09/30/2019 04:04 AM    Globulin 3.6 09/30/2019 04:04 AM     (H) 01/19/2011 02:45 AM     Lab Results   Component Value Date/Time    INR 0.9 01/23/2019 10:34 AM    Prothrombin time 12.3 01/23/2019 10:34 AM    aPTT 100.5 (H) 09/26/2019 03:20 AM      Lab Results   Component Value Date/Time    Iron 62 09/25/2019 07:03 AM    TIBC 174 (L) 09/25/2019 07:03 AM    Iron % saturation 36 09/25/2019 07:03 AM    Ferritin 1,367 (H) 09/25/2019 07:03 AM      No results found for: PH, PCO2, PO2  No components found for: [place]   Lab Results   Component Value Date/Time     09/26/2019 03:20 AM    CK - MB 7.3 (H) 09/26/2019 03:20 AM                Total time: 15 minutes  Counseling / coordination time, spent as noted above: 10 minutes  > 50% counseling / coordination?: yes, patient, RN    Prolonged service was provided for  []30 min   []75 min in face to face time in the presence of the patient, spent as noted above. Time Start:   Time End:   Note: this can only be billed with 74049 (initial) or 34518 (follow up). If multiple start / stop times, list each separately.

## 2019-10-01 NOTE — ROUTINE PROCESS
[facility] Report included SBAR, Kardex, MAR, Recent Results, Procedure Summary, and Cardiac Rhythm (Paced). Pt in bed with call light in reach. Family members at bedside.

## 2019-10-01 NOTE — PROGRESS NOTES
Home health orders noted. Freedom of choice already on file for [facility] care. Office called, Pt put in que.     Young Palafox RN BSN  Care Manager  [phone]

## 2019-10-01 NOTE — DISCHARGE INSTRUCTIONS
Patient Education        Learning About Heart Failure Zones  What are heart failure zones? Heart failure zones give you an easy way to see changes in your heart failure symptoms. They also tell you when you need to get help. Check every day to see which zone you are in. Green zone. You are doing well. This is where you want to be. · Your weight is stable. This means it is not going up or down. · You breathe easily. · You are sleeping well. You are able to lie flat without shortness of breath. · You can do your usual activities. Yellow zone. Be careful. Your symptoms are changing. Call your doctor. · You have new or increased shortness of breath. · You are dizzy or lightheaded, or you feel like you may faint. · You have sudden weight gain, such as more than 2 to 3 pounds in a day or 5 pounds in a week. (Your doctor may suggest a different range of weight gain.)  · You have increased swelling in your legs, ankles, or feet. · You are so tired or weak that you cannot do your usual activities. · You are not sleeping well. Shortness of breath wakes you up at night. You need extra pillows. Your doctor's name: ____________________________________________________________  Your doctor's contact information: _________________________________________________  Red zone. This is an emergency. Call 911. You have symptoms of sudden heart failure, such as:  · You have severe trouble breathing. · You cough up pink, foamy mucus. · You have a new irregular or fast heartbeat. You have symptoms of a heart attack. These may include:  · Chest pain or pressure, or a strange feeling in the chest.  · Sweating. · Shortness of breath. · Nausea or vomiting. · Pain, pressure, or a strange feeling in the back, neck, jaw, or upper belly or in one or both shoulders or arms. · Lightheadedness or sudden weakness. · A fast or irregular heartbeat.   If you have symptoms of a heart attack: After you call 911, the  may tell you to chew 1 adult-strength or 2 to 4 low-dose aspirin. Wait for an ambulance. Do not try to drive yourself. Follow-up care is a key part of your treatment and safety. Be sure to make and go to all appointments, and call your doctor if you are having problems. It's also a good idea to know your test results and keep a list of the medicines you take. Where can you learn more? Go to http://bozena-ciera.info/. Enter T174 in the search box to learn more about \"Learning About Heart Failure Zones. \"  Current as of: April 9, 2019  Content Version: 12.2  © 6907-1476 [employer]. Care instructions adapted under license by [employer] (which disclaims liability or warranty for this information). If you have questions about a medical condition or this instruction, always ask your healthcare professional. [place] [address] any warranty or liability for your use of this information. DISCHARGE SUMMARY from Nurse    PATIENT INSTRUCTIONS:    After general anesthesia or intravenous sedation, for 24 hours or while taking prescription Narcotics:  · Limit your activities  · Do not drive and operate hazardous machinery  · Do not make important personal or business decisions  · Do  not drink alcoholic beverages  · If you have not urinated within 8 hours after discharge, please contact your surgeon on call.     Report the following to your surgeon:  · Excessive pain, swelling, redness or odor of or around the surgical area  · Temperature over 100.5  · Nausea and vomiting lasting longer than 4 hours or if unable to take medications  · Any signs of decreased circulation or nerve impairment to extremity: change in color, persistent  numbness, tingling, coldness or increase pain  · Any questions    What to do at Home:  Recommended activity: Activity as tolerated    If you experience any of the following symptoms Chest pain, Shortness of breath,  Dizziness, Fainting, Nausea/ Vomiting lasting more than 4 hours Fever greater than 100.4 please follow up with Primary Care Provider or go to the nearest Emergency Room. *  Please give a list of your current medications to your Primary Care Provider. *  Please update this list whenever your medications are discontinued, doses are      changed, or new medications (including over-the-counter products) are added. *  Please carry medication information at all times in case of emergency situations. These are general instructions for a healthy lifestyle:    No smoking/ No tobacco products/ Avoid exposure to second hand smoke  Surgeon General's Warning:  Quitting smoking now greatly reduces serious risk to your health. Obesity, smoking, and sedentary lifestyle greatly increases your risk for illness    A healthy diet, regular physical exercise & weight monitoring are important for maintaining a healthy lifestyle    You may be retaining fluid if you have a history of heart failure or if you experience any of the following symptoms:  Weight gain of 3 pounds or more overnight or 5 pounds in a week, increased swelling in our hands or feet or shortness of breath while lying flat in bed. Please call your doctor as soon as you notice any of these symptoms; do not wait until your next office visit. The discharge information has been reviewed with the patient. The patient verbalized understanding. Discharge medications reviewed with the patient and appropriate educational materials and side effects teaching were provided. [employer] Activation    Thank you for requesting access to [employer]. Please follow the instructions below to securely access and download your online medical record. [employer] allows you to send messages to your doctor, view your test results, renew your prescriptions, schedule appointments, and more. How Do I Sign Up? 1. In your internet browser, go to [email or web address]  2. Click on the First Time User? Click Here link in the Sign In box. You will be redirect to the New Member Sign Up page. 3. Enter your [employer] Access Code exactly as it appears below. You will not need to use this code after youve completed the sign-up process. If you do not sign up before the expiration date, you must request a new code. MyChart Access Code: Activation code not generated  Current [employer] Status: Patient Declined (This is the date your MyChart access code will )    4. Enter the last four digits of your Social Security Number (xxxx) and Date of Birth (mm/dd/yyyy) as indicated and click Submit. You will be taken to the next sign-up page. 5. Create a [employer] ID. This will be your [employer] login ID and cannot be changed, so think of one that is secure and easy to remember. 6. Create a [employer] password. You can change your password at any time. 7. Enter your Password Reset Question and Answer. This can be used at a later time if you forget your password. 8. Enter your e-mail address. You will receive e-mail notification when new information is available in [address] 9. Click Sign Up. You can now view and download portions of your medical record. 10. Click the Download Summary menu link to download a portable copy of your medical information. Additional Information    If you have questions, please visit the Frequently Asked Questions section of the [employer] website at [email or web address] Remember, [employer] is NOT to be used for urgent needs. For medical emergencies, dial 911.       Patient armband removed and shredded  ___________________________________________________________________________________________________________________________________

## 2019-10-01 NOTE — PROGRESS NOTES
NUTRITION    Nursing Referral: [facility]  Nutrition Consult: General Nutrition Management & Supplements     RECOMMENDATIONS / PLAN:     - Modify supplements to reflect pt's preferences. - Continue all other nutrition interventions. - Continue RD inpatient monitoring and evaluation. NUTRITION INTERVENTIONS & DIAGNOSIS:     - Meals/snacks: modified composition  - Medical food supplement therapy: Ensure Enlive, BID & Magic Cup once daily- modify  - Vitamin and mineral supplement therapy: folic acid, theragran, thiamine   - Collaboration and referral of nutrition care: Discussed last BM with Dr. Neyda Lamb MD to assess and order bowel regimen as appropriate     Nutrition Diagnosis: Underweight related to inadequate oral intake with excessive alcohol intake as evidenced by BMI of 17 kg/(m^2) upon admisison. ASSESSMENT:     10/1: Pt describes fair meal intake today at breakfast, around 50% of meal and 100% of supplement. Encourage the continued use of supplements post-discharge. Noted last BM x 6 days ago, discussed with MD.  9/30: Briefly visited with pt as being taken off floor for AICD placement today, NPO. Reports small improvement in appetite since last visit, supplement intake variable. Fair meal intake per chart. 9/25: Admitted for SOB 2/2 acute on chronic CHF. Per cardiology, poor prognosis, palliative following. Admits to ETOH use PTA with poor appetite and a few lb weight loss. States \"I have been skinny all of my life. \" Sometimes consuming Ensure supplements PTA when he can afford them.      Nutritional intake adequate to meet patients estimated nutritional needs:  Unable to determine at this time    Diet: DIET NUTRITIONAL SUPPLEMENTS Breakfast, Lunch; ENSURE ENLIVE  DIET NUTRITIONAL SUPPLEMENTS Dinner; MAGIC CUPS  DIET CARDIAC Regular      Food Allergies: NKFA  Current Appetite: Fair  Appetite/meal intake prior to admission: Poor 1-2 small meals/day x months  Feeding Limitations:  [] Swallowing difficulty [x] Chewing difficulty: poor dentition    [] Other:  Current Meal Intake:   Patient Vitals for the past 100 hrs:   % Diet Eaten   09/29/19 1332 10 %   09/28/19 1305 25 %   09/27/19 1404 75 %   09/27/19 0907 50 %       BM: 9/25  Skin Integrity: WDL  Edema:   [x] No     [] Yes   Pertinent Medications: Reviewed: lipitor, zofran    Recent Labs     10/01/19  0450 09/30/19  0404 09/29/19  0412   NA  --  134* 133*   K  --  4.0 4.2   CL  --  98* 97*   CO2  --  29 31   GLU  --  91 86   BUN  --  16 25*   CREA  --  1.20 1.30   CA  --  8.6 8.6   MG 1.6 1.6  --    ALB  --  2.5*  --    SGOT  --  70*  --    ALT  --  82*  --      No intake or output data in the 24 hours ending 10/01/19 1212    Anthropometrics:  Ht Readings from Last 1 Encounters:   09/25/19 5' 5\" (1.651 m)     Last 3 Recorded Weights in this Encounter    09/29/19 0443 09/30/19 0424 10/01/19 0505   Weight: 49.8 kg (109 lb 12.8 oz) 48.3 kg (106 lb 6.4 oz) 48.7 kg (107 lb 4.8 oz)     Body mass index is 17.86 kg/m². Underweight    Weight History: Pt reports a few lb weight loss PTA. Per chart -8 lbs, 7.2% x 7 months PTA.      Weight Metrics 10/1/2019 5/16/2019 5/15/2019 5/14/2019 5/10/2019 2/8/2019 2/1/2019   Weight 107 lb 4.8 oz - 103 lb 103 lb 103 lb 110 lb 105 lb   BMI 17.86 kg/m2 17.14 kg/m2 17.14 kg/m2 - 17.14 kg/m2 18.3 kg/m2 17.47 kg/m2        Admitting Diagnosis: Congestive heart failure (CHF) (HCC) [I50.9]  Pertinent PMHx: alcohol abuse, CAD with CABG x5, CKD, COPD, CHF, GERD, HTN, hypercholesterolemia    Education Needs:        [x] None identified  [] Identified - Not appropriate at this time  []  Identified and addressed - refer to education log  Learning Limitations:   [x] None identified  [] Identified    Cultural, Mu-ism & ethnic food preferences:  [x] None identified    [] Identified and addressed     ESTIMATED NUTRITION NEEDS:     Calories: 5597-1216 kcal (25-35 kcal/kg) based on  [] Actual BW      [x] IBW: 62 kg   Protein: 50-74 gm (0.8-1.2 gm/kg) based on  [] Actual BW      [x] IBW   Fluid: 1 mL/kcal     MONITORING & EVALUATION:     Nutrition Goal(s):   - PO nutrition intake will meet >75% of patient estimated nutritional needs within the next 7 days. - Weight gain of 1-2 lbs over the next 7 days. Outcome: Progressing towards goal        Monitoring:   [x] Food and beverage intake   [x] Diet order   [x] Nutrition-focused physical findings   [x] Treatment/therapy   [] Weight   [] Enteral nutrition intake        Previous Recommendations (for follow-up assessments only):     Implemented      Discharge Planning: cardiac diet + Ensure Enlive or comparable supplement 2-3x per day  [x] Participated in care planning, discharge planning, & interdisciplinary rounds as appropriate      Fabrice Thompson RD   Pager: [phone]

## 2019-10-01 NOTE — PROGRESS NOTES
Discharge order noted for today. Pt has been accepted to [facility] agency. Met with patient  and are agreeable to the transition plan today. Transport has been arranged through family. Patient's discharge summary and home health  orders have been forwarded to [facility] home health  agency via [employer]. Updated bedside RN, Wilman Good,  to the transition plan.   Discharge information has been documented on the AVS.       Dane Kincaid RN BSN  Care Manager  [phone]

## 2019-10-03 ENCOUNTER — HOME CARE VISIT (OUTPATIENT)
Dept: SCHEDULING | Facility: HOME HEALTH | Age: 79
End: 2019-10-03
Payer: MEDICARE

## 2019-10-03 PROCEDURE — 3331090001 HH PPS REVENUE CREDIT

## 2019-10-03 PROCEDURE — G0299 HHS/HOSPICE OF RN EA 15 MIN: HCPCS

## 2019-10-03 PROCEDURE — 3331090002 HH PPS REVENUE DEBIT

## 2019-10-03 PROCEDURE — 400013 HH SOC

## 2019-10-04 ENCOUNTER — HOME CARE VISIT (OUTPATIENT)
Dept: HOME HEALTH SERVICES | Facility: HOME HEALTH | Age: 79
End: 2019-10-04
Payer: MEDICARE

## 2019-10-04 ENCOUNTER — HOME CARE VISIT (OUTPATIENT)
Dept: SCHEDULING | Facility: HOME HEALTH | Age: 79
End: 2019-10-04
Payer: MEDICARE

## 2019-10-04 VITALS
DIASTOLIC BLOOD PRESSURE: 70 MMHG | TEMPERATURE: 97.8 F | HEART RATE: 76 BPM | RESPIRATION RATE: 17 BRPM | SYSTOLIC BLOOD PRESSURE: 110 MMHG

## 2019-10-04 PROCEDURE — 3331090002 HH PPS REVENUE DEBIT

## 2019-10-04 PROCEDURE — G0151 HHCP-SERV OF PT,EA 15 MIN: HCPCS

## 2019-10-04 PROCEDURE — G0152 HHCP-SERV OF OT,EA 15 MIN: HCPCS

## 2019-10-04 PROCEDURE — 3331090001 HH PPS REVENUE CREDIT

## 2019-10-05 VITALS
OXYGEN SATURATION: 97 % | SYSTOLIC BLOOD PRESSURE: 132 MMHG | TEMPERATURE: 98.2 F | HEART RATE: 88 BPM | RESPIRATION RATE: 20 BRPM | DIASTOLIC BLOOD PRESSURE: 86 MMHG

## 2019-10-05 PROCEDURE — 3331090002 HH PPS REVENUE DEBIT

## 2019-10-05 PROCEDURE — 3331090001 HH PPS REVENUE CREDIT

## 2019-10-06 PROCEDURE — 3331090002 HH PPS REVENUE DEBIT

## 2019-10-06 PROCEDURE — 3331090001 HH PPS REVENUE CREDIT

## 2019-10-07 ENCOUNTER — HOME CARE VISIT (OUTPATIENT)
Dept: SCHEDULING | Facility: HOME HEALTH | Age: 79
End: 2019-10-07
Payer: MEDICARE

## 2019-10-07 PROCEDURE — G0157 HHC PT ASSISTANT EA 15: HCPCS

## 2019-10-07 PROCEDURE — 3331090002 HH PPS REVENUE DEBIT

## 2019-10-07 PROCEDURE — 3331090001 HH PPS REVENUE CREDIT

## 2019-10-08 VITALS
DIASTOLIC BLOOD PRESSURE: 70 MMHG | OXYGEN SATURATION: 95 % | TEMPERATURE: 98.4 F | HEART RATE: 69 BPM | SYSTOLIC BLOOD PRESSURE: 150 MMHG

## 2019-10-08 PROCEDURE — 3331090002 HH PPS REVENUE DEBIT

## 2019-10-08 PROCEDURE — 3331090001 HH PPS REVENUE CREDIT

## 2019-10-09 ENCOUNTER — HOME CARE VISIT (OUTPATIENT)
Dept: SCHEDULING | Facility: HOME HEALTH | Age: 79
End: 2019-10-09
Payer: MEDICARE

## 2019-10-09 PROCEDURE — G0158 HHC OT ASSISTANT EA 15: HCPCS

## 2019-10-09 PROCEDURE — 3331090001 HH PPS REVENUE CREDIT

## 2019-10-09 PROCEDURE — 3331090002 HH PPS REVENUE DEBIT

## 2019-10-10 ENCOUNTER — CLINICAL SUPPORT (OUTPATIENT)
Dept: CARDIOLOGY CLINIC | Age: 79
End: 2019-10-10

## 2019-10-10 VITALS
HEART RATE: 67 BPM | TEMPERATURE: 97.6 F | OXYGEN SATURATION: 92 % | DIASTOLIC BLOOD PRESSURE: 88 MMHG | SYSTOLIC BLOOD PRESSURE: 162 MMHG

## 2019-10-10 DIAGNOSIS — I50.32 DIASTOLIC CHF, CHRONIC (HCC): Primary | ICD-10-CM

## 2019-10-10 DIAGNOSIS — Z95.810 AICD (AUTOMATIC CARDIOVERTER/DEFIBRILLATOR) PRESENT: ICD-10-CM

## 2019-10-10 PROCEDURE — 3331090002 HH PPS REVENUE DEBIT

## 2019-10-10 PROCEDURE — 3331090001 HH PPS REVENUE CREDIT

## 2019-10-11 ENCOUNTER — OFFICE VISIT (OUTPATIENT)
Dept: PULMONOLOGY | Age: 79
End: 2019-10-11

## 2019-10-11 ENCOUNTER — HOSPITAL ENCOUNTER (OUTPATIENT)
Dept: LAB | Age: 79
Discharge: HOME | DRG: 291 | End: 2019-10-11
Payer: MEDICARE

## 2019-10-11 ENCOUNTER — HOME CARE VISIT (OUTPATIENT)
Dept: HOME HEALTH SERVICES | Facility: HOME HEALTH | Age: 79
End: 2019-10-11
Payer: MEDICARE

## 2019-10-11 VITALS
DIASTOLIC BLOOD PRESSURE: 75 MMHG | HEIGHT: 65 IN | WEIGHT: 106.2 LBS | BODY MASS INDEX: 17.69 KG/M2 | TEMPERATURE: 97 F | OXYGEN SATURATION: 88 % | SYSTOLIC BLOOD PRESSURE: 151 MMHG | RESPIRATION RATE: 20 BRPM | HEART RATE: 71 BPM

## 2019-10-11 DIAGNOSIS — R09.02 HYPOXIA: ICD-10-CM

## 2019-10-11 DIAGNOSIS — J90 PLEURAL EFFUSION, BILATERAL: ICD-10-CM

## 2019-10-11 DIAGNOSIS — R05.3 CHRONIC COUGH: ICD-10-CM

## 2019-10-11 DIAGNOSIS — G47.33 OSA AND COPD OVERLAP SYNDROME (HCC): ICD-10-CM

## 2019-10-11 DIAGNOSIS — Z87.891 PERSONAL HISTORY OF TOBACCO USE, PRESENTING HAZARDS TO HEALTH: ICD-10-CM

## 2019-10-11 DIAGNOSIS — J44.9 OSA AND COPD OVERLAP SYNDROME (HCC): ICD-10-CM

## 2019-10-11 DIAGNOSIS — J47.9 BRONCHIECTASIS WITHOUT COMPLICATION (HCC): ICD-10-CM

## 2019-10-11 DIAGNOSIS — R06.02 SHORTNESS OF BREATH: ICD-10-CM

## 2019-10-11 DIAGNOSIS — R06.09 DYSPNEA ON EXERTION: ICD-10-CM

## 2019-10-11 DIAGNOSIS — J44.9 COPD, GROUP B, BY GOLD 2017 CLASSIFICATION (HCC): ICD-10-CM

## 2019-10-11 DIAGNOSIS — I50.43 ACUTE ON CHRONIC COMBINED SYSTOLIC AND DIASTOLIC CONGESTIVE HEART FAILURE (HCC): ICD-10-CM

## 2019-10-11 DIAGNOSIS — I50.43 ACUTE ON CHRONIC COMBINED SYSTOLIC AND DIASTOLIC CONGESTIVE HEART FAILURE (HCC): Primary | ICD-10-CM

## 2019-10-11 LAB
ANION GAP SERPL CALC-SCNC: 6 MMOL/L (ref 3–18)
BNP SERPL-MCNC: ABNORMAL PG/ML (ref 0–1800)
BUN SERPL-MCNC: 12 MG/DL (ref 7–18)
BUN/CREAT SERPL: 12 (ref 12–20)
CALCIUM SERPL-MCNC: 9 MG/DL (ref 8.5–10.1)
CHLORIDE SERPL-SCNC: 106 MMOL/L (ref 100–111)
CO2 SERPL-SCNC: 25 MMOL/L (ref 21–32)
CREAT SERPL-MCNC: 1.03 MG/DL (ref 0.6–1.3)
GLUCOSE SERPL-MCNC: 95 MG/DL (ref 74–99)
POTASSIUM SERPL-SCNC: 4.5 MMOL/L (ref 3.5–5.5)
SODIUM SERPL-SCNC: 137 MMOL/L (ref 136–145)

## 2019-10-11 PROCEDURE — 3331090001 HH PPS REVENUE CREDIT

## 2019-10-11 PROCEDURE — 80048 BASIC METABOLIC PNL TOTAL CA: CPT

## 2019-10-11 PROCEDURE — 83880 ASSAY OF NATRIURETIC PEPTIDE: CPT

## 2019-10-11 PROCEDURE — 3331090002 HH PPS REVENUE DEBIT

## 2019-10-11 PROCEDURE — G0152 HHCP-SERV OF OT,EA 15 MIN: HCPCS

## 2019-10-11 PROCEDURE — 36415 COLL VENOUS BLD VENIPUNCTURE: CPT

## 2019-10-11 RX ORDER — AMOXICILLIN 500 MG/1
CAPSULE ORAL
COMMUNITY
Start: 2019-10-10 | End: 2019-10-13

## 2019-10-11 NOTE — PROGRESS NOTES
PATIENT'S O2 SATS:   88% Room Air Rest, 71 Heart Rate                                         2 LPM Started O2 SAT 98% HR 69                                         Ambulated in Hallway 34 Meters on 2LPM O2 SAT 92 % HR 70

## 2019-10-11 NOTE — PROGRESS NOTES
[address] [place] [address], [address] [facility]  Pulmonary, Critical Care, and Sleep Medicine    Pulmonary Office Follow-Up  Name: Samy Prakash 67 y.o. male  MRN: [id number]  : 1940  Service Date: 10/11/19  Chief Complaint:   Chief Complaint   Patient presents with   Major Hospital Follow Up             History of Present Illness:  Samy Prakash is a 67 y.o. male, who presents to Pulmonary clinic for follow-up of COPD. Patient was last seen in our clinic on 5/15/2019. In the interval, pt had a CHF exacerbation requiring hospitalization to [facility] from 2019 through 10/1/2019. Patient reports that he was diuresed during this hospitalization, however diuretics were held due to abnormal renal function. Patient reports he was on oxygen during the entire hospitalization, but was not discharged on oxygen. Since discharge, patient reports that his breathing is not at baseline. He reports dyspnea with mild to moderate exertion. His symptoms are better than when he had to go to the hospital, however he notes that he still is very limited. He reports only being able to walk less than 1 block, unable to climb 1 flight of steps before having to stop. Alleviated with rest, no other modifying factors. Patient reports he is quit smoking in the interval.  Patient reports that he saw his PCP, Dr. Gaby Guajardo, who prescribed amoxicillin. Patient denies any worsening cough, purulent sputum, fevers, chills, night sweats. Patient reports chronic sputum of white color. Patient does report excessive daytime sleepiness, reports having to take a nap during the daytime, very sleepy. Patient reports he has been told he snores occasionally.   Pt denies any wt loss, appetite changes, hemoptysis, chest pain/angina, night sweats, LE swelling, fevers, chills, night sweats, N/V/D      Past Medical History:   Diagnosis Date    Alcohol abuse     quit February 2011    Binocular visual disturbance 10/31/2013    CAD (coronary artery disease)     CAD (coronary artery disease), native coronary artery 1/3/05    LAD 70-80% mid; Cx-diffuse 80%; OM1-70% prox; RCA 40-50% mid; 100% RPLB with collaterals    Cardiac cath 08/19/2013    Severe, diffuse calcification. dLM 30%. mLAD 80%. o/pD1 80%. o/pRamus 80%. dCX 85%. dRCA 80%.  m-dRPLB 75%. EF 50%. Mod to severe inferobasal hypk.  Cardiac echocardiogram 01/19/2011    EF 50-55%. Gr 1 DDfx. RVSP 30 mmHg.  Cardiac nuclear imaging test 06/27/2011    No evidence of ischemia or previous infarction. EF 60%. Neg EKG on max EST. Ex time 6 mins.  Chronic kidney disease     Chronic lung disease     Chronic obstructive pulmonary disease (HCC)     Coronary artery disease     Diastolic CHF, chronic (HCC)     LV EF 55% (Echo Jan 2011)    Diverticula of colon 12/17/2013    Emphysematous COPD ([address]     PFTs March 2009; mild-mode obstruction, + bronchodil response; decreased DLCO    GERD (gastroesophageal reflux disease)     with erosive esophagitis history    Heart failure ([address]     History of renal failure     2 years ago, which completely resolved    Hypercholesterolemia     Hypertension     Hypertensive heart disease with CHF ([address]     MI (myocardial infarction) ([address]     Inferior wall    Paresthesia and pain of both upper extremities 10/31/2013    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 Herman Sidhu, 4/9/14.       Tobacco abuse     Unspecified hereditary and idiopathic peripheral neuropathy 12/4/2013     Past Surgical History:   Procedure Laterality Date    HX CORONARY ARTERY BYPASS GRAFT  about 2013    HX HEART CATHETERIZATION  11/2013    AR INSJ/RPLCMT PERM DFB W/TRNSVNS LDS 1/DUAL CHMBR Left 9/30/2019    INSERT ICD DUAL performed by Sandy Espinosa MD at [facility]     Family History   Problem Relation Age of Onset    Heart Disease Maternal Uncle     Diabetes Father     Hypertension Father      Social History     Socioeconomic History    Marital status:      Spouse name: Not on file    Number of children: Not on file    Years of education: Not on file    Highest education level: Not on file   Occupational History    Not on file   Social Needs    Financial resource strain: Not on file    Food insecurity:     Worry: Not on file     Inability: Not on file    Transportation needs:     Medical: Not on file     Non-medical: Not on file   Tobacco Use    Smoking status: Current Every Day Smoker     Packs/day: 0.50     Years: 55.00     Pack years: 27.50     Types: Cigarettes     Start date: 3/13/2014    Smokeless tobacco: Never Used   Substance and Sexual Activity    Alcohol use: Yes     Comment: every other day drinker    Drug use: No    Sexual activity: Not Currently     Partners: Female   Lifestyle    Physical activity:     Days per week: Not on file     Minutes per session: Not on file    Stress: Not on file   Relationships    Social connections:     Talks on phone: Not on file     Gets together: Not on file     Attends [employer]al service: Not on file     Active member of club or organization: Not on file     Attends meetings of clubs or organizations: Not on file     Relationship status: Not on file    Intimate partner violence:     Fear of current or ex partner: Not on file     Emotionally abused: Not on file     Physically abused: Not on file     Forced sexual activity: Not on file   Other Topics Concern    Not on file   Social History Narrative    Not on file       Allergies: I have reviewed the allergy hx  Allergies   Allergen Reactions    Vicodin [Hydrocodone-Acetaminophen] Rash       Medications:  I have reviewed the patient's medications  Prior to Admission medications    Medication Sig Start Date End Date Taking? Authorizing Provider   pantoprazole (PROTONIX) 40 mg tablet Take 40 mg by mouth daily. 1 pill daily 30 minutes before breakfast.  Indications: indigestion   Yes Provider, Historical   cholecalciferol (VITAMIN D3) 1,000 unit cap Take 1,000 Units by mouth daily. 1 pill po daily  Indications: low vitamin D levels   Yes Provider, Historical   amiodarone (CORDARONE) 200 mg tablet Take 1 Tab by mouth every twelve (12) hours. 10/1/19  Yes Dayton Sarah MD   atorvastatin (LIPITOR) 40 mg tablet Take 40 mg by mouth nightly. 1 pill daily   Yes Provider, Historical   tiotropium-olodaterol (STIOLTO RESPIMAT) 2.5-2.5 mcg/actuation inhaler Take 2 Puffs by inhalation daily. 5/15/19  Yes Jimmy Rousseau MD   folic acid (FOLVITE) 1 mg tablet Take 1 mg by mouth two (2) times a day. 1 pill po daily   Yes Provider, Historical   cyanocobalamin 1,000 mcg tablet Take 1,000 mcg by mouth daily. Yes Provider, Historical   clopidogrel (PLAVIX) 75 mg tab Take 1 Tab by mouth daily. 2/8/19  Yes Alanis Rosario, DO   isosorbide mononitrate ER (IMDUR) 30 mg tablet Take 1 Tab by mouth every morning. 2/8/19  Yes Alanis Rosario, DO   aspirin delayed-release 81 mg tablet Take 81 mg by mouth daily. 1 pill po daily   Yes Provider, Historical   losartan (COZAAR) 50 mg tablet Take 50 mg by mouth daily. 1 pill po daily 12/4/18  Yes Provider, Historical   metoprolol succinate (TOPROL-XL) 25 mg XL tablet Take 25 mg by mouth daily. 1 pill po daily 9/13/17  Yes Provider, Historical   thiamine (VITAMIN B-1) 50 mg tablet Take 50 mg by mouth daily. 1 pill po daily   Yes Provider, Historical   PSEUDOEPHEDRINE/CPM/METHYLSCOP (ALLERGY AM-PM PO) Take  by mouth daily as needed. Yes Provider, Historical   Cholecalciferol, Vitamin D3, (VITAMIN D) 5,000 unit Tab Take 1 Cap by mouth daily.  1,000 iu qd   Yes Provider, Historical   Ibuprofen-Diphenhydramine (ADVIL PM) 200-38 mg Tab Take 200 mg by mouth as needed for Pain. 1 pill po q 6 hours as needed for pain   6/22/11  Yes Provider, Historical   albuterol (VENTOLIN HFA) 90 mcg/Actuation inhaler Take 1 Puff by inhalation as needed. Yes Provider, Historical   CYANOCOBALAMIN/FA/PYRIDOXINE (FOLBEE PO) Take 1 Tab by mouth daily. 3/23/11  Yes Provider, Historical   multivitamin (ONE A DAY) tablet Take 1 Tab by mouth daily. Yes Provider, Historical   amoxicillin (AMOXIL) 500 mg capsule  10/10/19   Provider, Historical   cyanocobalamin 1,000 mcg tablet Take 1,000 mcg by mouth daily. 1 pill po daily  Indications: Prevention of Vitamin B12 Deficiency    Provider, Historical   nitroglycerin (NITROSTAT) 0.4 mg SL tablet 1 Tab by SubLINGual route every five (5) minutes as needed for Chest Pain. Up to 3 doses. 2/8/19   Mahsa Calderon DO       Immunizations:  I have reviewed the patient's immunizations    There is no immunization history on file for this patient. Review of Systems:  A complete review of systems was performed as stated in the HPI, all others are negative. Objective:    Physical Exam:  /75 (BP 1 Location: Right arm, BP Patient Position: Sitting)   Pulse 71   Temp 97 °F (36.1 °C) (Oral)   Resp 20   Ht 5' 5\" (1.651 m)   Wt 48.2 kg (106 lb 3.2 oz)   SpO2 (!) 88% Comment: RA Rest  BMI 17.67 kg/m²   Vitals were personally reviewed  Gen: no acute distress, thin, pleasant and cooperative, sitting up in chair, able to climb to exam table w/o difficulty  HEENT: normocephalic/atraumatic, PERRLA, EOMI, no scleral icterus, no oral lesions, poor dentition, Mallampati II  Neck: supple, trachea midline, + JVD, no cervical and supraclavicular adenopathy  Chest: no lesions, with even rise and fall, no pectus excavatum or flail chest  CVS: regular rate rhythm, S1/S2, no murmurs/rubs/gallops  Lungs: Bilateral lower lobe rales, no wheezes/rhonchi.   Distant breath sounds bilaterally  Back: no kyphosis or scoliosis  Abdomen: soft, nontender, bowel sounds present, no hepatosplenomegaly  Ext: no pitting edema B/L, no peripheral cyanosis or clubbing  Neuro: grossly normal, AAOx3, normal strength and coordination grossly, no focal deficits  Skin: no rashes, erythema, lesions  Psych: normal memory, thought content, and processing    Labs: I have reviewed the patient's available labs  Lab Results   Component Value Date/Time    WBC 6.6 09/30/2019 04:04 AM    Hemoglobin, POC 8.8 (L) 04/10/2014 03:35 AM    HGB 9.3 (L) 09/30/2019 04:04 AM    Hematocrit, POC 26 (L) 04/10/2014 03:35 AM    HCT 27.7 (L) 09/30/2019 04:04 AM    PLATELET 655 56/30/8281 04:04 AM    .9 (H) 09/30/2019 04:04 AM     Lab Results   Component Value Date/Time    Sodium 134 (L) 09/30/2019 04:04 AM    Potassium 4.0 09/30/2019 04:04 AM    Chloride 98 (L) 09/30/2019 04:04 AM    CO2 29 09/30/2019 04:04 AM    Anion gap 7 09/30/2019 04:04 AM    Glucose 91 09/30/2019 04:04 AM    BUN 16 09/30/2019 04:04 AM    Creatinine 1.20 09/30/2019 04:04 AM    BUN/Creatinine ratio 13 09/30/2019 04:04 AM    GFR est AA >60 09/30/2019 04:04 AM    GFR est non-AA 59 (L) 09/30/2019 04:04 AM    Calcium 8.6 09/30/2019 04:04 AM    Bilirubin, total 1.2 (H) 09/30/2019 04:04 AM    AST (SGOT) 70 (H) 09/30/2019 04:04 AM    Alk. phosphatase 207 (H) 09/30/2019 04:04 AM    Protein, total 6.1 (L) 09/30/2019 04:04 AM    Albumin 2.5 (L) 09/30/2019 04:04 AM    Globulin 3.6 09/30/2019 04:04 AM    A-G Ratio 0.7 (L) 09/30/2019 04:04 AM    ALT (SGPT) 82 (H) 09/30/2019 04:04 AM       Imaging:  I have personally reviewed patient's imaging as follows --- CTA chest under PE protocol from 9/24/2019 shows bilateral pleural effusions, mild to moderate, as well as pulmonary edema, and chronic emphysema, no consolidations, infiltrates, nodules, masses were seen.   Official report per radiology:  CT Results (most recent):  Results from Hospital Encounter encounter on 09/24/19   CTA CHEST W OR W WO CONT    Narrative CTA CHEST PULMONARY EMBOLISM PROTOCOL      INDICATION: Shortness of breath, chest pain. Question pulmonary embolism. TECHNIQUE: Thin collimation axial images obtained through the level of the  pulmonary arteries with additional imaging through the chest following the  uneventful administration of 80 cc Isovue-370 nonionic intravenous contrast.   Images reconstructed into three dimensional coronal and sagittal projections for  complete evaluation of the tortuous and overlapping pulmonary vascular  structures and to reduce patient radiation dose. All CT scans at this facility are performed using dose optimization technique as  appropriate to a performed exam, to include automated exposure control,  adjustment of the mA and/or kV according to patient size (including appropriate  matching first site-specific examinations), or use of iterative reconstruction  technique. COMPARISON: 12/27/2018. FINDINGS:    No filling defects are appreciated within the main, left, right, lobar or  visualized segmental pulmonary arteries to suggest embolism. Thyroid: Unremarkable in its visualized aspects. Pericardium/ Heart: Increased multichamber cardiac enlargement. Severe coronary  arteriosclerosis throughout but most significant in the LAD. Delayed  opacification of aorta which is suboptimally evaluated. Moderate  atherosclerosis. There is mediastinal edema. Lymph Nodes: Unremarkable. .    Lungs: Lungs are hypoinflated with respiratory motion which limits assessment. Mild to moderate bronchial wall thickening. Mild bronchovascular and dependent  groundglass. Unchanged spiculated scar in the left upper lobe with architectural  distortion. Similar cystic changes greatest dependently with honeycombing. Pleura: New small Bilateral pleural effusions, mildly loculated on the left. No  pneumothorax. Upper Abdomen: Reflux of contrast into the IVC and hepatic veins. Similar left  greater than right adrenal thickening.  Possible esophageal wall thickening. Periportal edema. Similar gastrohepatic lymph nodes which measure up to 0.8 cm. Tight stenosis at the origin of the celiac and SMA. Bones/soft tissues: Mild anasarca. Mild symmetric bilateral gynecomastia. Osteopenia. Status post median sternotomy. Impression IMPRESSION:  1. No evidence for pulmonary emboli. 2.  Likely heart failure with increased multichamber cardiac enlargement, new  small bilateral pleural effusions, mildly loculated on the left and suspected  pulmonary edema. 3.   Unchanged scarring at the left lung apex. 4.  Unchanged cystic changes left greater than right lung bases, favoring UIP. 5.  Possible esophageal wall thickening. PFTs: 5/15/19: Spirometry is normal based on age, lung volumes show mild restriction, diffusion capacity is moderately reduced. Of note, patient had limited effort. TTE:  I have reviewed the patient's TTE results  09/24/19   ECHO ADULT FOLLOW-UP OR LIMITED 09/25/2019 9/25/2019    Narrative · Left Ventricle: Normal cavity size and wall thickness. Severe systolic   dysfunction. Estimated left ventricular ejection fraction is 16 - 20%. Visually measured ejection fraction. Abnormal left ventricular wall   motion. · Mitral Valve: Mild mitral valve regurgitation is present. · Tricuspid Valve: Mild tricuspid valve regurgitation is present. · Pulmonic Valve: Mild pulmonic valve regurgitation is present. · Aortic Valve: Mild aortic valve regurgitation is present. · Pulmonary Artery: Pulmonary arterial systolic pressure is 33 mmHg. · IVC/Hepatic Veins: Mildly elevated central venous pressure (5-10 mmHg);   IVC diameter is less than 21 mm and collapses less than 50% with   respiration. · This was a limited echo, but EF has significant decreased compared to   prior study. Signed by: Rosa Willis DO         Assessment and Plan:  67 y.o. male with:    Impression:  1.   Dyspnea on exertion/shortness of breath: Etiology due to decompensated acute on chronic combined systolic and diastolic CHF  2. Acute on chronic combined systolic and diastolic CHF: Patient was recently hospitalized with CHF exacerbation, however due to creatinine elevation patient was not discharged on diuretics. Patient has new hypoxia and ongoing dyspnea, as well as bilateral lower lobe rales, and CT scan showing significant pleural effusions and pulmonary edema, consistent with CHF exacerbation. 3.  Acute hypoxia: Etiology due to above  4. COPD, gold risk category B, gold stage I: No evidence of COPD exacerbation, symptoms are stable  5. Lung nodules: Seen on lung cancer screening CT from 12/27/18, subcentimeter, largest was noted to be 4 mm. Not visualized on CTA from recent hospitalization, due to pulmonary edema. Repeat CT scan planned for 12/2019  6. Non-CF bronchiectasis: COPD secondary to  7  Chronic cough etiology most likely due to above but also has a component of bronchiectasis  8. COPD cachexia: Body mass index is 17.67 kg/m². with albumin of 2.5  9. Hx of CAD s/p CABG  10. Chronic GERD with esophageal thickening on last CT scan:  Pt seen by GI, planned for EGD but cancelled since pt not cleared to come off of Plavix per Cardiology  11. History of tobacco use: Prior 0.75 pack/day smoker, 50 total pack-year smoking history. Patient quit after discharge for most recent hospitalization on 10/1/2019. Plan:  -Obtain BMP and NT proBNP  -Advised patient to follow-up with cardiology (Dr. Rochelle Raphael) by early next week given symptoms of decompensated CHF. Since patient's renal function began to normalize, he should likely resume diuretics when evaluated by cardiology. -Walk for desaturation performed today in clinic, patient was 88% on room air at rest, corrected to 98% on 2 L by nasal cannula, and went to 92% with ambulation. Total walk distance was only 29 m, as patient developed significant shortness of breath and hip pain.   Patient unable to ambulate further to help with titration. We will prescribe 2 L by nasal cannula to be used at all times including at night via POC  -Continue Stiolto Respimat 2 puffs once daily  -Continue albuterol HFA as needed  -Advised patient to remain active  -Advised patient to folow-up with PCP for nutrition referral  -Immunizations reviewed, advised patient to follow-up with PCP, since patient was given amoxicillin, will hold off on influenza vaccination  -Repeat CT scheduled for December 2019  -Management CAD per Cardiology  -Management of GERD and esophageal narrowing per GI  -Congratulated patient on smoking cessation. Advised patient to contact her clinic if he needs additional help. Follow-up and Dispositions    · Return in about 10 weeks (around 12/20/2019).          Orders Placed This Encounter    METABOLIC PANEL, BASIC    NT-PRO BNP    SPLIT CPAP/PSG       Chris Myles MD/MPH     Pulmonary, Critical Care Medicine  [facility]

## 2019-10-11 NOTE — LETTER
10/13/19 Patient: Yovani Robledo YOB: 1940 Date of Visit: 10/11/2019 Elvie Halsted, [address] [facility] [address] [place] [address] VIA Facsimile: [phone] Frieda Maya, Kellen Leatha [facility] [phone] [place] [address] VIA In Basket Dear Elvie Halsted, MD Murleen Cadet, DO, Thank you for referring Mr. Angel Carballo to [address] [place] [address] for evaluation. My notes for this consultation are attached. If you have questions, please do not hesitate to call me. I look forward to following your patient along with you. Sincerely, Lee Mullins MD/[facility] Pulmonary, Critical Care Medicine [facility]

## 2019-10-11 NOTE — PROGRESS NOTES
Mariola Crawford presents today for   Chief Complaint   Patient presents with   [facility] Follow Up             Is someone accompanying this pt? No    Is the patient using any DME equipment during OV? No    -DME Company None    Depression Screening:  3 most recent PHQ Screens 5/15/2019   Little interest or pleasure in doing things Not at all   Feeling down, depressed, irritable, or hopeless Not at all   Total Score PHQ 2 0       Learning Assessment:  Learning Assessment 3/12/2014   PRIMARY LEARNER Patient   HIGHEST LEVEL OF EDUCATION - PRIMARY LEARNER  [facility] [address] [facility] CAREGIVER No   PRIMARY LANGUAGE ENGLISH    NEED No   LEARNER PREFERENCE PRIMARY OTHER (COMMENT)   ANSWERED BY Patient   RELATIONSHIP SELF       Abuse Screening:  No flowsheet data found. Fall Risk  Fall Risk Assessment, last 12 mths 5/15/2019   Able to walk? Yes   Fall in past 12 months? Yes   Fall with injury? No   Number of falls in past 12 months 1   Fall Risk Score 1         Coordination of Care:  1. Have you been to the ER, urgent care clinic since your last visit? Hospitalized since your last visit? Yes; Name: Paula Lizarraga  9/30/19    2. Have you seen or consulted any other health care providers outside of the [address] [place] [address] since your last visit? Include any pap smears or colon screening.  No

## 2019-10-12 ENCOUNTER — HOME CARE VISIT (OUTPATIENT)
Dept: SCHEDULING | Facility: HOME HEALTH | Age: 79
End: 2019-10-12
Payer: MEDICARE

## 2019-10-12 PROCEDURE — 3331090001 HH PPS REVENUE CREDIT

## 2019-10-12 PROCEDURE — G0299 HHS/HOSPICE OF RN EA 15 MIN: HCPCS

## 2019-10-12 PROCEDURE — 3331090002 HH PPS REVENUE DEBIT

## 2019-10-13 ENCOUNTER — APPOINTMENT (OUTPATIENT)
Dept: CT IMAGING | Age: 79
DRG: 291 | End: 2019-10-13
Attending: STUDENT IN AN ORGANIZED HEALTH CARE EDUCATION/TRAINING PROGRAM
Payer: MEDICARE

## 2019-10-13 ENCOUNTER — HOSPITAL ENCOUNTER (INPATIENT)
Age: 79
LOS: 3 days | Discharge: HOME HEALTH | DRG: 291 | End: 2019-10-16
Attending: EMERGENCY MEDICINE | Admitting: HOSPITALIST
Payer: MEDICARE

## 2019-10-13 ENCOUNTER — APPOINTMENT (OUTPATIENT)
Dept: GENERAL RADIOLOGY | Age: 79
DRG: 291 | End: 2019-10-13
Attending: EMERGENCY MEDICINE
Payer: MEDICARE

## 2019-10-13 DIAGNOSIS — I50.23 ACUTE ON CHRONIC SYSTOLIC CONGESTIVE HEART FAILURE (HCC): Primary | ICD-10-CM

## 2019-10-13 PROBLEM — Z95.810 AICD (AUTOMATIC CARDIOVERTER/DEFIBRILLATOR) PRESENT: Chronic | Status: ACTIVE | Noted: 2019-10-13

## 2019-10-13 PROBLEM — I50.9 CHF EXACERBATION (HCC): Status: ACTIVE | Noted: 2019-10-13

## 2019-10-13 LAB
ALBUMIN SERPL-MCNC: 2.6 G/DL (ref 3.4–5)
ALBUMIN/GLOB SERPL: 0.6 {RATIO} (ref 0.8–1.7)
ALP SERPL-CCNC: 135 U/L (ref 45–117)
ALT SERPL-CCNC: 18 U/L (ref 16–61)
ANION GAP SERPL CALC-SCNC: 9 MMOL/L (ref 3–18)
ARTERIAL PATENCY WRIST A: YES
AST SERPL-CCNC: 24 U/L (ref 10–38)
ATRIAL RATE: 95 BPM
BASE DEFICIT BLD-SCNC: 6 MMOL/L
BASOPHILS # BLD: 0 K/UL (ref 0–0.1)
BASOPHILS NFR BLD: 0 % (ref 0–2)
BDY SITE: ABNORMAL
BILIRUB SERPL-MCNC: 0.4 MG/DL (ref 0.2–1)
BNP SERPL-MCNC: ABNORMAL PG/ML (ref 0–1800)
BODY TEMPERATURE: 37
BUN SERPL-MCNC: 13 MG/DL (ref 7–18)
BUN/CREAT SERPL: 11 (ref 12–20)
CALCIUM SERPL-MCNC: 8.7 MG/DL (ref 8.5–10.1)
CALCULATED P AXIS, ECG09: 58 DEGREES
CALCULATED R AXIS, ECG10: -13 DEGREES
CALCULATED T AXIS, ECG11: 94 DEGREES
CHLORIDE SERPL-SCNC: 104 MMOL/L (ref 100–111)
CK MB CFR SERPL CALC: 6.3 % (ref 0–4)
CK MB CFR SERPL CALC: 6.6 % (ref 0–4)
CK MB CFR SERPL CALC: 6.7 % (ref 0–4)
CK MB SERPL-MCNC: 2.4 NG/ML (ref 5–25)
CK MB SERPL-MCNC: 2.9 NG/ML (ref 5–25)
CK MB SERPL-MCNC: 3.1 NG/ML (ref 5–25)
CK SERPL-CCNC: 38 U/L (ref 39–308)
CK SERPL-CCNC: 44 U/L (ref 39–308)
CK SERPL-CCNC: 46 U/L (ref 39–308)
CO2 SERPL-SCNC: 21 MMOL/L (ref 21–32)
CREAT SERPL-MCNC: 1.19 MG/DL (ref 0.6–1.3)
DIAGNOSIS, 93000: NORMAL
DIFFERENTIAL METHOD BLD: ABNORMAL
EOSINOPHIL # BLD: 0.1 K/UL (ref 0–0.4)
EOSINOPHIL NFR BLD: 1 % (ref 0–5)
ERYTHROCYTE [DISTWIDTH] IN BLOOD BY AUTOMATED COUNT: 14.3 % (ref 11.6–14.5)
GAS FLOW.O2 O2 DELIVERY SYS: ABNORMAL L/MIN
GLOBULIN SER CALC-MCNC: 4.6 G/DL (ref 2–4)
GLUCOSE SERPL-MCNC: 196 MG/DL (ref 74–99)
HCO3 BLD-SCNC: 18.9 MMOL/L (ref 22–26)
HCT VFR BLD AUTO: 34.1 % (ref 36–48)
HGB BLD-MCNC: 11.5 G/DL (ref 13–16)
LYMPHOCYTES # BLD: 2.2 K/UL (ref 0.9–3.6)
LYMPHOCYTES NFR BLD: 28 % (ref 21–52)
MCH RBC QN AUTO: 33.5 PG (ref 24–34)
MCHC RBC AUTO-ENTMCNC: 33.7 G/DL (ref 31–37)
MCV RBC AUTO: 99.4 FL (ref 74–97)
MONOCYTES # BLD: 0.5 K/UL (ref 0.05–1.2)
MONOCYTES NFR BLD: 7 % (ref 3–10)
NEUTS SEG # BLD: 5 K/UL (ref 1.8–8)
NEUTS SEG NFR BLD: 64 % (ref 40–73)
O2/TOTAL GAS SETTING VFR VENT: 45 %
P-R INTERVAL, ECG05: 214 MS
PCO2 BLD: 33.6 MMHG (ref 35–45)
PEEP RESPIRATORY: 8 CMH2O
PH BLD: 7.36 [PH] (ref 7.35–7.45)
PIP ISTAT,IPIP: 18
PLATELET # BLD AUTO: 515 K/UL (ref 135–420)
PMV BLD AUTO: 9.3 FL (ref 9.2–11.8)
PO2 BLD: 92 MMHG (ref 80–100)
POTASSIUM SERPL-SCNC: 4 MMOL/L (ref 3.5–5.5)
PRESSURE SUPPORT SETTING VENT: 10 CMH2O
PROT SERPL-MCNC: 7.2 G/DL (ref 6.4–8.2)
Q-T INTERVAL, ECG07: 374 MS
QRS DURATION, ECG06: 128 MS
QTC CALCULATION (BEZET), ECG08: 469 MS
RBC # BLD AUTO: 3.43 M/UL (ref 4.7–5.5)
SAO2 % BLD: 97 % (ref 92–97)
SERVICE CMNT-IMP: ABNORMAL
SODIUM SERPL-SCNC: 134 MMOL/L (ref 136–145)
SPECIMEN TYPE: ABNORMAL
SPONTANEOUS TIMED, IST: YES
TOTAL RESP. RATE, ITRR: 30
TROPONIN I SERPL-MCNC: 0.02 NG/ML (ref 0–0.04)
TROPONIN I SERPL-MCNC: <0.02 NG/ML (ref 0–0.04)
VENTRICULAR RATE, ECG03: 95 BPM
WBC # BLD AUTO: 7.9 K/UL (ref 4.6–13.2)

## 2019-10-13 PROCEDURE — 74011250636 HC RX REV CODE- 250/636: Performed by: HOSPITALIST

## 2019-10-13 PROCEDURE — 3331090002 HH PPS REVENUE DEBIT

## 2019-10-13 PROCEDURE — 99285 EMERGENCY DEPT VISIT HI MDM: CPT

## 2019-10-13 PROCEDURE — 94660 CPAP INITIATION&MGMT: CPT

## 2019-10-13 PROCEDURE — 77010033678 HC OXYGEN DAILY

## 2019-10-13 PROCEDURE — 74011000250 HC RX REV CODE- 250: Performed by: HOSPITALIST

## 2019-10-13 PROCEDURE — 74011000250 HC RX REV CODE- 250: Performed by: INTERNAL MEDICINE

## 2019-10-13 PROCEDURE — 36415 COLL VENOUS BLD VENIPUNCTURE: CPT

## 2019-10-13 PROCEDURE — 94760 N-INVAS EAR/PLS OXIMETRY 1: CPT

## 2019-10-13 PROCEDURE — 36600 WITHDRAWAL OF ARTERIAL BLOOD: CPT

## 2019-10-13 PROCEDURE — 82550 ASSAY OF CK (CPK): CPT

## 2019-10-13 PROCEDURE — 74011250637 HC RX REV CODE- 250/637: Performed by: HOSPITALIST

## 2019-10-13 PROCEDURE — 94761 N-INVAS EAR/PLS OXIMETRY MLT: CPT

## 2019-10-13 PROCEDURE — 94640 AIRWAY INHALATION TREATMENT: CPT

## 2019-10-13 PROCEDURE — 3331090001 HH PPS REVENUE CREDIT

## 2019-10-13 PROCEDURE — 71045 X-RAY EXAM CHEST 1 VIEW: CPT

## 2019-10-13 PROCEDURE — 74011250636 HC RX REV CODE- 250/636: Performed by: EMERGENCY MEDICINE

## 2019-10-13 PROCEDURE — 85025 COMPLETE CBC W/AUTO DIFF WBC: CPT

## 2019-10-13 PROCEDURE — 74011250637 HC RX REV CODE- 250/637: Performed by: EMERGENCY MEDICINE

## 2019-10-13 PROCEDURE — 65660000000 HC RM CCU STEPDOWN

## 2019-10-13 PROCEDURE — 84484 ASSAY OF TROPONIN QUANT: CPT

## 2019-10-13 PROCEDURE — 71250 CT THORAX DX C-: CPT

## 2019-10-13 PROCEDURE — 83880 ASSAY OF NATRIURETIC PEPTIDE: CPT

## 2019-10-13 PROCEDURE — 5A09357 ASSISTANCE WITH RESPIRATORY VENTILATION, LESS THAN 24 CONSECUTIVE HOURS, CONTINUOUS POSITIVE AIRWAY PRESSURE: ICD-10-PCS | Performed by: INTERNAL MEDICINE

## 2019-10-13 PROCEDURE — 96374 THER/PROPH/DIAG INJ IV PUSH: CPT

## 2019-10-13 PROCEDURE — 74011250636 HC RX REV CODE- 250/636: Performed by: INTERNAL MEDICINE

## 2019-10-13 PROCEDURE — 74011000250 HC RX REV CODE- 250: Performed by: EMERGENCY MEDICINE

## 2019-10-13 PROCEDURE — 74011250637 HC RX REV CODE- 250/637: Performed by: INTERNAL MEDICINE

## 2019-10-13 PROCEDURE — 80053 COMPREHEN METABOLIC PANEL: CPT

## 2019-10-13 PROCEDURE — 82803 BLOOD GASES ANY COMBINATION: CPT

## 2019-10-13 PROCEDURE — 93005 ELECTROCARDIOGRAM TRACING: CPT

## 2019-10-13 RX ORDER — NITROGLYCERIN 0.4 MG/1
0.4 TABLET SUBLINGUAL
Status: DISCONTINUED | OUTPATIENT
Start: 2019-10-13 | End: 2019-10-16 | Stop reason: HOSPADM

## 2019-10-13 RX ORDER — NITROGLYCERIN 0.4 MG/1
0.4 TABLET SUBLINGUAL
Status: COMPLETED | OUTPATIENT
Start: 2019-10-13 | End: 2019-10-13

## 2019-10-13 RX ORDER — LOSARTAN POTASSIUM 50 MG/1
50 TABLET ORAL DAILY
Status: DISCONTINUED | OUTPATIENT
Start: 2019-10-13 | End: 2019-10-16

## 2019-10-13 RX ORDER — FUROSEMIDE 10 MG/ML
40 INJECTION INTRAMUSCULAR; INTRAVENOUS
Status: COMPLETED | OUTPATIENT
Start: 2019-10-13 | End: 2019-10-13

## 2019-10-13 RX ORDER — IBUPROFEN 200 MG
1 TABLET ORAL
Status: DISCONTINUED | OUTPATIENT
Start: 2019-10-13 | End: 2019-10-13

## 2019-10-13 RX ORDER — SODIUM CHLORIDE 0.9 % (FLUSH) 0.9 %
5-40 SYRINGE (ML) INJECTION AS NEEDED
Status: DISCONTINUED | OUTPATIENT
Start: 2019-10-13 | End: 2019-10-16 | Stop reason: HOSPADM

## 2019-10-13 RX ORDER — PANTOPRAZOLE SODIUM 40 MG/1
40 TABLET, DELAYED RELEASE ORAL DAILY
Status: DISCONTINUED | OUTPATIENT
Start: 2019-10-13 | End: 2019-10-13

## 2019-10-13 RX ORDER — IPRATROPIUM BROMIDE AND ALBUTEROL SULFATE 2.5; .5 MG/3ML; MG/3ML
3 SOLUTION RESPIRATORY (INHALATION)
Status: COMPLETED | OUTPATIENT
Start: 2019-10-13 | End: 2019-10-13

## 2019-10-13 RX ORDER — ALBUTEROL SULFATE 0.83 MG/ML
2.5 SOLUTION RESPIRATORY (INHALATION)
Status: DISCONTINUED | OUTPATIENT
Start: 2019-10-13 | End: 2019-10-16 | Stop reason: HOSPADM

## 2019-10-13 RX ORDER — NALOXONE HYDROCHLORIDE 0.4 MG/ML
0.4 INJECTION, SOLUTION INTRAMUSCULAR; INTRAVENOUS; SUBCUTANEOUS AS NEEDED
Status: DISCONTINUED | OUTPATIENT
Start: 2019-10-13 | End: 2019-10-16 | Stop reason: HOSPADM

## 2019-10-13 RX ORDER — ISOSORBIDE MONONITRATE 60 MG/1
60 TABLET, EXTENDED RELEASE ORAL
Status: DISCONTINUED | OUTPATIENT
Start: 2019-10-14 | End: 2019-10-16

## 2019-10-13 RX ORDER — BENZONATATE 100 MG/1
100 CAPSULE ORAL
Status: DISCONTINUED | OUTPATIENT
Start: 2019-10-13 | End: 2019-10-16 | Stop reason: HOSPADM

## 2019-10-13 RX ORDER — SODIUM CHLORIDE 0.9 % (FLUSH) 0.9 %
5-40 SYRINGE (ML) INJECTION EVERY 8 HOURS
Status: DISCONTINUED | OUTPATIENT
Start: 2019-10-13 | End: 2019-10-16 | Stop reason: HOSPADM

## 2019-10-13 RX ORDER — ONDANSETRON 2 MG/ML
4 INJECTION INTRAMUSCULAR; INTRAVENOUS
Status: DISCONTINUED | OUTPATIENT
Start: 2019-10-13 | End: 2019-10-16 | Stop reason: HOSPADM

## 2019-10-13 RX ORDER — ATORVASTATIN CALCIUM 40 MG/1
40 TABLET, FILM COATED ORAL
Status: DISCONTINUED | OUTPATIENT
Start: 2019-10-13 | End: 2019-10-16 | Stop reason: HOSPADM

## 2019-10-13 RX ORDER — CODEINE PHOSPHATE AND GUAIFENESIN 10; 100 MG/5ML; MG/5ML
10 SOLUTION ORAL
Status: DISCONTINUED | OUTPATIENT
Start: 2019-10-13 | End: 2019-10-16 | Stop reason: HOSPADM

## 2019-10-13 RX ORDER — LANOLIN ALCOHOL/MO/W.PET/CERES
1000 CREAM (GRAM) TOPICAL DAILY
Status: DISCONTINUED | OUTPATIENT
Start: 2019-10-13 | End: 2019-10-16 | Stop reason: HOSPADM

## 2019-10-13 RX ORDER — IPRATROPIUM BROMIDE AND ALBUTEROL SULFATE 2.5; .5 MG/3ML; MG/3ML
3 SOLUTION RESPIRATORY (INHALATION)
Status: DISCONTINUED | OUTPATIENT
Start: 2019-10-13 | End: 2019-10-14

## 2019-10-13 RX ORDER — CLOPIDOGREL BISULFATE 75 MG/1
75 TABLET ORAL DAILY
Status: DISCONTINUED | OUTPATIENT
Start: 2019-10-13 | End: 2019-10-16 | Stop reason: HOSPADM

## 2019-10-13 RX ORDER — DIPHENHYDRAMINE HYDROCHLORIDE 50 MG/ML
25 INJECTION, SOLUTION INTRAMUSCULAR; INTRAVENOUS
Status: DISCONTINUED | OUTPATIENT
Start: 2019-10-13 | End: 2019-10-16 | Stop reason: HOSPADM

## 2019-10-13 RX ORDER — FOLIC ACID 1 MG/1
1 TABLET ORAL 2 TIMES DAILY
Status: DISCONTINUED | OUTPATIENT
Start: 2019-10-13 | End: 2019-10-16 | Stop reason: HOSPADM

## 2019-10-13 RX ORDER — ISOSORBIDE MONONITRATE 30 MG/1
30 TABLET, EXTENDED RELEASE ORAL
Status: DISCONTINUED | OUTPATIENT
Start: 2019-10-13 | End: 2019-10-13

## 2019-10-13 RX ORDER — LANOLIN ALCOHOL/MO/W.PET/CERES
50 CREAM (GRAM) TOPICAL DAILY
Status: DISCONTINUED | OUTPATIENT
Start: 2019-10-13 | End: 2019-10-16 | Stop reason: HOSPADM

## 2019-10-13 RX ORDER — HEPARIN SODIUM 5000 [USP'U]/ML
5000 INJECTION, SOLUTION INTRAVENOUS; SUBCUTANEOUS EVERY 8 HOURS
Status: DISCONTINUED | OUTPATIENT
Start: 2019-10-13 | End: 2019-10-14

## 2019-10-13 RX ORDER — BUDESONIDE 0.5 MG/2ML
500 INHALANT ORAL
Status: DISCONTINUED | OUTPATIENT
Start: 2019-10-13 | End: 2019-10-15

## 2019-10-13 RX ORDER — FUROSEMIDE 10 MG/ML
40 INJECTION INTRAMUSCULAR; INTRAVENOUS 2 TIMES DAILY
Status: DISCONTINUED | OUTPATIENT
Start: 2019-10-13 | End: 2019-10-14

## 2019-10-13 RX ORDER — THERA TABS 400 MCG
1 TAB ORAL DAILY
Status: DISCONTINUED | OUTPATIENT
Start: 2019-10-13 | End: 2019-10-16 | Stop reason: HOSPADM

## 2019-10-13 RX ORDER — PANTOPRAZOLE SODIUM 40 MG/1
40 TABLET, DELAYED RELEASE ORAL
Status: DISCONTINUED | OUTPATIENT
Start: 2019-10-13 | End: 2019-10-16 | Stop reason: HOSPADM

## 2019-10-13 RX ORDER — METOPROLOL SUCCINATE 25 MG/1
25 TABLET, EXTENDED RELEASE ORAL DAILY
Status: DISCONTINUED | OUTPATIENT
Start: 2019-10-13 | End: 2019-10-16 | Stop reason: HOSPADM

## 2019-10-13 RX ORDER — ASPIRIN 81 MG/1
81 TABLET ORAL DAILY
Status: DISCONTINUED | OUTPATIENT
Start: 2019-10-13 | End: 2019-10-16 | Stop reason: HOSPADM

## 2019-10-13 RX ORDER — AMIODARONE HYDROCHLORIDE 200 MG/1
200 TABLET ORAL EVERY 12 HOURS
Status: DISCONTINUED | OUTPATIENT
Start: 2019-10-13 | End: 2019-10-16

## 2019-10-13 RX ADMIN — FOLIC ACID 1 MG: 1 TABLET ORAL at 17:26

## 2019-10-13 RX ADMIN — FOLIC ACID 1 MG: 1 TABLET ORAL at 09:38

## 2019-10-13 RX ADMIN — IPRATROPIUM BROMIDE AND ALBUTEROL SULFATE 3 ML: .5; 3 SOLUTION RESPIRATORY (INHALATION) at 10:03

## 2019-10-13 RX ADMIN — IPRATROPIUM BROMIDE AND ALBUTEROL SULFATE 3 ML: .5; 3 SOLUTION RESPIRATORY (INHALATION) at 04:57

## 2019-10-13 RX ADMIN — NITROGLYCERIN 0.4 MG: 0.4 TABLET SUBLINGUAL at 04:50

## 2019-10-13 RX ADMIN — IPRATROPIUM BROMIDE AND ALBUTEROL SULFATE 3 ML: .5; 3 SOLUTION RESPIRATORY (INHALATION) at 15:03

## 2019-10-13 RX ADMIN — GUAIFENESIN AND CODEINE PHOSPHATE 10 ML: 100; 10 SOLUTION ORAL at 14:27

## 2019-10-13 RX ADMIN — ASPIRIN 81 MG: 81 TABLET, COATED ORAL at 09:38

## 2019-10-13 RX ADMIN — AMIODARONE HYDROCHLORIDE 200 MG: 200 TABLET ORAL at 20:22

## 2019-10-13 RX ADMIN — NITROGLYCERIN 1 INCH: 20 OINTMENT TOPICAL at 05:04

## 2019-10-13 RX ADMIN — CLOPIDOGREL BISULFATE 75 MG: 75 TABLET ORAL at 09:38

## 2019-10-13 RX ADMIN — CYANOCOBALAMIN TAB 1000 MCG 1000 MCG: 1000 TAB at 09:38

## 2019-10-13 RX ADMIN — HEPARIN SODIUM 5000 UNITS: 5000 INJECTION INTRAVENOUS; SUBCUTANEOUS at 10:28

## 2019-10-13 RX ADMIN — LOSARTAN POTASSIUM 50 MG: 50 TABLET, FILM COATED ORAL at 09:39

## 2019-10-13 RX ADMIN — Medication 10 ML: at 10:28

## 2019-10-13 RX ADMIN — BUDESONIDE 500 MCG: 0.5 INHALANT RESPIRATORY (INHALATION) at 19:30

## 2019-10-13 RX ADMIN — THERA TABS 1 TABLET: TAB at 09:38

## 2019-10-13 RX ADMIN — AMIODARONE HYDROCHLORIDE 200 MG: 200 TABLET ORAL at 09:38

## 2019-10-13 RX ADMIN — HEPARIN SODIUM 5000 UNITS: 5000 INJECTION INTRAVENOUS; SUBCUTANEOUS at 17:27

## 2019-10-13 RX ADMIN — FUROSEMIDE 40 MG: 10 INJECTION, SOLUTION INTRAMUSCULAR; INTRAVENOUS at 17:30

## 2019-10-13 RX ADMIN — BENZONATATE 100 MG: 100 CAPSULE ORAL at 14:27

## 2019-10-13 RX ADMIN — IPRATROPIUM BROMIDE AND ALBUTEROL SULFATE 3 ML: .5; 3 SOLUTION RESPIRATORY (INHALATION) at 19:30

## 2019-10-13 RX ADMIN — PANTOPRAZOLE SODIUM 40 MG: 40 TABLET, DELAYED RELEASE ORAL at 17:28

## 2019-10-13 RX ADMIN — Medication 10 ML: at 17:31

## 2019-10-13 RX ADMIN — ISOSORBIDE MONONITRATE 30 MG: 30 TABLET, EXTENDED RELEASE ORAL at 09:38

## 2019-10-13 RX ADMIN — Medication 10 ML: at 23:39

## 2019-10-13 RX ADMIN — ATORVASTATIN CALCIUM 40 MG: 40 TABLET, FILM COATED ORAL at 23:27

## 2019-10-13 RX ADMIN — FUROSEMIDE 40 MG: 10 INJECTION, SOLUTION INTRAVENOUS at 04:58

## 2019-10-13 RX ADMIN — PANTOPRAZOLE SODIUM 40 MG: 40 TABLET, DELAYED RELEASE ORAL at 09:38

## 2019-10-13 RX ADMIN — GUAIFENESIN AND CODEINE PHOSPHATE 10 ML: 100; 10 SOLUTION ORAL at 23:30

## 2019-10-13 RX ADMIN — METOPROLOL SUCCINATE 25 MG: 25 TABLET, EXTENDED RELEASE ORAL at 10:28

## 2019-10-13 RX ADMIN — NITROGLYCERIN 0.4 MG: 0.4 TABLET SUBLINGUAL at 04:58

## 2019-10-13 RX ADMIN — NITROGLYCERIN 0.4 MG: 0.4 TABLET SUBLINGUAL at 04:51

## 2019-10-13 RX ADMIN — Medication 50 MG: at 09:38

## 2019-10-13 NOTE — ROUTINE PROCESS
TRANSFER - OUT REPORT: 
 
Verbal report given to RN(name) on [place] [facility]  being transferred to (unit) for routine progression of care Report consisted of patients Situation, Background, Assessment and  
Recommendations(SBAR). Information from the following report(s) SBAR was reviewed with the receiving nurse. Lines:  
Peripheral IV 10/13/19 Left Antecubital (Active) Site Assessment Clean, dry, & intact 10/13/2019  4:53 AM  
Phlebitis Assessment 0 10/13/2019  4:53 AM  
Infiltration Assessment 0 10/13/2019  4:53 AM  
Dressing Status Clean, dry, & intact 10/13/2019  4:53 AM  
Dressing Type Transparent 10/13/2019  4:53 AM  
Hub Color/Line Status Pink;Patent; Flushed 10/13/2019  4:53 AM  
Action Taken Blood drawn 10/13/2019  4:53 AM  
  
 
Opportunity for questions and clarification was provided. Patient transported with: 
 Registered Nurse

## 2019-10-13 NOTE — PROGRESS NOTES
Hospitalist Progress Note    Patient: Elliott Diana Age: 67 y.o. : 1940 MR#: [id number] SSN: [id number]  Date/Time: 10/13/2019 5:38 PM    DOA: 10/13/2019  PCP: Mu Guerra MD    Subjective:     Patient was admitted this morning by Dr. Esperanza Mills, follow up rounding     Has been coughing a lot at home. Started to have worsened shortness of breath this AM.  He required BiPAP on admission. Nitropaste and IV lasix were given in ER with improvement of his symptom  His BP was quite elevated on admission       ROS: no fever/chills, no headache, no dizziness, no facial pain, no sinus congestion, +cough  No swallowing pain, No chest pain, no palpitation, +improved shortness of breath, no abd pain,  No diarrhea, no urinary complaint, no leg pain or swelling      Assessment/Plan:   1. Acute on chronic combined systolic and diastolic heart failure, elevated proBNP, last EF 20%  2. Hypertensive emergency   3. Cough with shortness of breath, due to congestion, improved   4. CAD with CABGx5, negative troponin this admission   5. Ischemic cardiomyopathy  6. Wide-complex tachycardia with AICD in place   7. Emphysematous COPD, stable   8. H/o tobacco smoking   9. Hyperlipidemia     He continues on IV lasix, continue on ASA, clopidogrel, atorvastatin, metoprolol, losartan, Imdur, Amiodarone   Will add anti-tussin and respiratory treatment to help with cough. Add budesonide neb   No need for steroid at this junction, no evidence of infectious   Spoke with cardiology for further follow up   ICS, OOB as tolerated.      Full Code,    Additional Notes:    Time spent >30 minutes    Case discussed with:  [x]Patient  []Family  [x]Nursing  [x]Case Management  DVT Prophylaxis:  []Lovenox  [x]Hep SQ  []SCDs  []Coumadin   []On Heparin gtt    Signed By: Russ Ag MD     2019 5:38 PM              Objective:   VS:   Visit Vitals  /78 (BP 1 Location: Left arm, BP Patient Position: Supine)   Pulse 69   Temp 98.8 °F (37.1 °C)   Resp 19   Ht 5' 5\" (1.651 m)   Wt 47.6 kg (105 lb)   SpO2 94%   BMI 17.47 kg/m²      Tmax/24hrs: Temp (24hrs), Av °F (36.7 °C), Min:97.4 °F (36.3 °C), Max:98.8 °F (37.1 °C)      Intake/Output Summary (Last 24 hours) at 10/13/2019 9564  Last data filed at 10/13/2019 0710  Gross per 24 hour   Intake    Output 605 ml   Net -605 ml     Tele: sinus  General:  Cooperative, Not in acute distress, speaks in short sentence while in bed  HEENT: PERRL, EOMI, supple neck, no JVD, dry oral mucosa  Cardiovascular: S1S2 regular, no rub/gallop   Pulmonary: air entry bilaterally, no wheezing, +++ crackle  GI:  Soft, non tender, non distended, +bs, no guarding   Extremities:  No pedal edema, +distal pulses appreciated   Neuro: AOx3, moving all extremities,      Additional:       Current Facility-Administered Medications   Medication Dose Route Frequency    vit B12-levomefolate calcium-vit B6 (FOLBIC RF, FOLTX) 2-1.13-25 mg tablet 1 Tab  1 Tab Oral DAILY    therapeutic multivitamin (THERAGRAN) tablet 1 Tab  1 Tab Oral DAILY    thiamine HCL (B-1) tablet 50 mg  50 mg Oral DAILY    metoprolol succinate (TOPROL-XL) XL tablet 25 mg  25 mg Oral DAILY    losartan (COZAAR) tablet 50 mg  50 mg Oral DAILY    aspirin delayed-release tablet 81 mg  81 mg Oral DAILY    clopidogrel (PLAVIX) tablet 75 mg  75 mg Oral DAILY    nitroglycerin (NITROSTAT) tablet 0.4 mg  0.4 mg SubLINGual N1FXH PRN    folic acid (FOLVITE) tablet 1 mg  1 mg Oral BID    albuterol-ipratropium (DUO-NEB) 2.5 MG-0.5 MG/3 ML  3 mL Nebulization Q6H RT    atorvastatin (LIPITOR) tablet 40 mg  40 mg Oral QHS    amiodarone (CORDARONE) tablet 200 mg  200 mg Oral Q12H    cyanocobalamin tablet 1,000 mcg  1,000 mcg Oral DAILY    albuterol (PROVENTIL VENTOLIN) nebulizer solution 2.5 mg  2.5 mg Nebulization Q4H PRN    sodium chloride (NS) flush 5-40 mL  5-40 mL IntraVENous Q8H    sodium chloride (NS) flush 5-40 mL  5-40 mL IntraVENous PRN    naloxone [facility]) injection 0.4 mg  0.4 mg IntraVENous PRN    diphenhydrAMINE (BENADRYL) injection 25 mg  25 mg IntraVENous Q4H PRN    ondansetron (ZOFRAN) injection 4 mg  4 mg IntraVENous Q4H PRN    heparin (porcine) injection 5,000 Units  5,000 Units SubCUTAneous Q8H    influenza vaccine 2019-20 (6 mos+)(PF) (FLUARIX/FLULAVAL/FLUZONE QUAD) injection 0.5 mL  0.5 mL IntraMUSCular PRIOR TO DISCHARGE    [START ON 10/14/2019] isosorbide mononitrate ER (IMDUR) tablet 60 mg  60 mg Oral 7am    budesonide (PULMICORT) 500 mcg/2 ml nebulizer suspension  500 mcg Nebulization BID RT    guaiFENesin-codeine (ROBITUSSIN AC) 100-10 mg/5 mL solution 10 mL  10 mL Oral Q4H PRN    benzonatate (TESSALON) capsule 100 mg  100 mg Oral TID PRN    pantoprazole (PROTONIX) tablet 40 mg  40 mg Oral ACB&D    benzocaine-menthol (CEPACOL) lozenge 1 Lozenge  1 Lozenge Mucous Membrane PRN    furosemide (LASIX) injection 40 mg  40 mg IntraVENous BID            Lab/Data Review:  Labs: Results:       Chemistry Recent Labs     10/13/19  0445 10/11/19  1319   * 95   * 137   K 4.0 4.5    106   CO2 21 25   BUN 13 12   CREA 1.19 1.03   BUCR 11* 12   AGAP 9 6   CA 8.7 9.0     Recent Labs     10/13/19  0445   ALT 18   SGOT 24   TP 7.2   ALB 2.6*   GLOB 4.6*   AGRAT 0.6*      CBC w/Diff Recent Labs     10/13/19  0445   WBC 7.9   RBC 3.43*   HGB 11.5*   HCT 34.1*   MCV 99.4*   MCH 33.5   MCHC 33.7   RDW 14.3   *   GRANS 64   LYMPH 28   EOS 1      Coagulation No results for input(s): PTP, INR, APTT, INREXT in the last 72 hours.     Iron/Ferritin Lab Results   Component Value Date/Time    Iron 62 09/25/2019 07:03 AM    TIBC 174 (L) 09/25/2019 07:03 AM    Iron % saturation 36 09/25/2019 07:03 AM    Ferritin 1,367 (H) 09/25/2019 07:03 AM       BNP    Cardiac Enzymes Lab Results   Component Value Date/Time    CK 46 10/13/2019 02:20 PM    CK - MB 3.1 10/13/2019 02:20 PM    CK-MB Index 6.7 (H) 10/13/2019 02:20 PM    Troponin-I, QT <0.02 10/13/2019 02:20 PM        Lactic Acid    Thyroid Studies          All Micro Results     None            Images:    CT (Most Recent). CT Results (most recent):  Results from Hospital Encounter encounter on 10/13/19   CT CHEST WO CONT    Narrative CT chest without IV contrast    HISTORY: CHF exacerbation. All CT scans at this facility are performed using dose optimization technique as  appropriate to a performed exam, to include automated exposure control,  adjustment of the mA and/or kV according to patient size (including appropriate  matching for site specific examination) or use of iterative reconstruction  technique. Comparison CT September 24, 2019. Increasing to large right pleural effusion. More severe infiltrate/edema in the  right upper lobe, right lower lobe and also in the right middle lobe. Underlying emphysema. Fascial thickening and scarring in the left upper lobe,  stable. Small left upper lateral collateral complex collection. No change in the  left pleural effusion in the lower chest. Cystic changes in the left lung base. Slightly worse edema base. Aortic calcification. Cardiomegaly with no significant pericardial effusion. Upper abdomen shows cholelithiasis. No ascites or free air. Impression IMPRESSION:  1. Worsening right lung infiltrate/edema and increasing right pleural effusion. Slightly worse edema at the left lung base. 2. Cardiomegaly with no pericardial effusion. 3. Cholelithiasis. XRAY (Most Recent)      EKG Results for orders placed or performed in visit on 02/08/19   AMB POC EKG ROUTINE W/ 12 LEADS, INTER & REP     Status: None    Narrative    Normal sinus rhythm rate 68. Left atrial enlargement. Incomplete left bundle branch block. Old inferior wall myocardial infarction. Some mild T wave inversions inferolaterally which could reflect ischemia. Compared to the EKG of January 17, 2019 there was little interval change.         2D ECHO

## 2019-10-13 NOTE — PROGRESS NOTES
ipratropium-albuterol (DUO-NEB) 2.5-0.5 mg/3 mL nebulizer every 6 hours was therapeutically interchanged for tiotropium-olodaterol (STIOLTO RESPIMAT) 2.5-2.5 mcg/actuation inhaler daily per the P&T Committee approved Therapeutic Interchanges Policy.     Angelia Castillo [facility] Pharmacist  10/13/2019 8:15 AM

## 2019-10-13 NOTE — PROGRESS NOTES
Bedside and Verbal shift change report given to [address] (oncoming nurse) by Leonid Valladares  RN (offgoing nurse). Report included the following information SBAR, Kardex, ED Summary, Intake/Output, MAR and Recent Results.

## 2019-10-13 NOTE — ED PROVIDER NOTES
EMERGENCY DEPARTMENT HISTORY AND PHYSICAL EXAM    4:44 AM      Date: 10/13/2019  Patient Name: Manuel May    History of Presenting Illness     Chief Complaint   Patient presents with    Shortness of Breath         History Provided By: Patient      Additional History (Context): Manuel May is a 67 y.o. male with HTN, CAD with CABG, chronic BiV heart failure last EF 15-20% recent ICD placed, COPD, active tobacco smoker, hyperlipidemia, chronic alcoholism, on chronic doxycycline for eye infection who presents with shortness of breath. Patient woke up from sleep just now quite short of breath, he was not lying completely flat, he was just discharged from a similar event. No chest pain, but has been having a wet cough, feeling like he has a rattle in his chest, no fevers noted, no leg swelling noted.   Has been taking all of his medications as instructed    PCP: Triston Soto MD    Current Facility-Administered Medications   Medication Dose Route Frequency Provider Last Rate Last Dose    furosemide (LASIX) injection 40 mg  40 mg IntraVENous DAILY Jacquelyn VALENTIN, PA   40 mg at 10/14/19 0926    tiotropium (SPIRIVA) inhalation capsule 18 mcg  1 Cap Inhalation QAM RT Vangie Goddard MD   18 mcg at 10/14/19 1114    albuterol-ipratropium (DUO-NEB) 2.5 MG-0.5 MG/3 ML  3 mL Nebulization TID RT Jose Roberto Young MD   3 mL at 10/14/19 1922    vit B12-levomefolate calcium-vit B6 (FOLBIC RF, FOLTX) 2-1.13-25 mg tablet 1 Tab  1 Tab Oral DAILY Ronelle Gun A, DO   1 Tab at 10/14/19 7586    therapeutic multivitamin (THERAGRAN) tablet 1 Tab  1 Tab Oral DAILY Ronelle Gun A, DO   1 Tab at 10/14/19 1627    thiamine HCL (B-1) tablet 50 mg  50 mg Oral DAILY Ronelle Gun A, DO   50 mg at 10/14/19 5577    metoprolol succinate (TOPROL-XL) XL tablet 25 mg  25 mg Oral DAILY Ronelle Gun A, DO   25 mg at 10/14/19 2313    losartan (COZAAR) tablet 50 mg  50 mg Oral DAILY Ronelle Gun A, DO   50 mg at 10/14/19 0925    aspirin delayed-release tablet 81 mg  81 mg Oral DAILY Paulette Mount, DO   81 mg at 10/14/19 0988    clopidogrel (PLAVIX) tablet 75 mg  75 mg Oral DAILY Clydene Kaska A, DO   75 mg at 10/14/19 2882    nitroglycerin (NITROSTAT) tablet 0.4 mg  0.4 mg SubLINGual Q5MIN PRN Paulette Mount, DO        folic acid (FOLVITE) tablet 1 mg  1 mg Oral BID Clydene Kaska A, DO   1 mg at 10/14/19 1842    atorvastatin (LIPITOR) tablet 40 mg  40 mg Oral QHS Clydene Kaska A, DO   40 mg at 10/14/19 2155    amiodarone (CORDARONE) tablet 200 mg  200 mg Oral Q12H Clydene Kaska A, DO   200 mg at 10/14/19 2155    cyanocobalamin tablet 1,000 mcg  1,000 mcg Oral DAILY Clydene Kaska A, DO   1,000 mcg at 10/14/19 8736    albuterol (PROVENTIL VENTOLIN) nebulizer solution 2.5 mg  2.5 mg Nebulization Q4H PRN Paulette Mount, DO        sodium chloride (NS) flush 5-40 mL  5-40 mL IntraVENous Q8H Clydene Kaska A, DO   10 mL at 10/14/19 2200    sodium chloride (NS) flush 5-40 mL  5-40 mL IntraVENous PRN Paulette Mount, DO        naloxone [facility]) injection 0.4 mg  0.4 mg IntraVENous PRN Paulette Mount, DO        diphenhydrAMINE (BENADRYL) injection 25 mg  25 mg IntraVENous Q4H PRN Paulette Mount, DO        ondansetron [facility]) injection 4 mg  4 mg IntraVENous Q4H PRN Paulette Mount, DO        influenza vaccine 2019-20 (6 mos+)(PF) (FLUARIX/FLULAVAL/FLUZONE QUAD) injection 0.5 mL  0.5 mL IntraMUSCular PRIOR TO DISCHARGE Lashaun Carlos MD        isosorbide mononitrate ER (IMDUR) tablet 60 mg  60 [facility]   60 mg at 10/14/19 0925    budesonide (PULMICORT) 500 mcg/2 ml nebulizer suspension  500 mcg Nebulization BID RT Lashaun Carlos MD   500 mcg at 10/14/19 1922    guaiFENesin-codeine (ROBITUSSIN AC) 100-10 mg/5 mL solution 10 mL  10 mL Oral Q4H PRN Lashaun Carlos MD   10 mL at 10/13/19 2330    benzonatate (TESSALON) capsule 100 mg  100 mg Oral TID PRN Lashaun Carlos MD 100 mg at 10/13/19 1427    pantoprazole (PROTONIX) tablet 40 mg  40 mg Oral ACB&D Evelyn Stephenson MD   40 mg at 10/14/19 1842    benzocaine-menthol (CEPACOL) lozenge 1 Lozenge  1 Lozenge Mucous Membrane PRN Evelyn Stephenson MD           Past History     Past Medical History:  Past Medical History:   Diagnosis Date    AICD (automatic cardioverter/defibrillator) present 10/13/2019    Alcohol abuse     quit February 2011    Binocular visual disturbance 10/31/2013    CAD (coronary artery disease)     CAD (coronary artery disease), native coronary artery 1/3/05    LAD 70-80% mid; Cx-diffuse 80%; OM1-70% prox; RCA 40-50% mid; 100% RPLB with collaterals    Cardiac cath 08/19/2013    Severe, diffuse calcification. dLM 30%. mLAD 80%. o/pD1 80%. o/pRamus 80%. dCX 85%. dRCA 80%.  m-dRPLB 75%. EF 50%. Mod to severe inferobasal hypk.  Cardiac echocardiogram 01/19/2011    EF 50-55%. Gr 1 DDfx. RVSP 30 mmHg.  Cardiac nuclear imaging test 06/27/2011    No evidence of ischemia or previous infarction. EF 60%. Neg EKG on max EST. Ex time 6 mins.     Chronic kidney disease     Chronic lung disease     Chronic obstructive pulmonary disease (HCC)     Coronary artery disease     Diastolic CHF, chronic (HCC)     LV EF 55% (Echo Jan 2011)    Diverticula of colon 12/17/2013    Emphysematous COPD ([address]     PFTs March 2009; mild-mode obstruction, + bronchodil response; decreased DLCO    GERD (gastroesophageal reflux disease)     with erosive esophagitis history    Heart failure (HCC)     History of renal failure     2 years ago, which completely resolved    Hypercholesterolemia     Hypertension     Hypertensive heart disease with CHF ([address]     MI (myocardial infarction) ([address]     Inferior wall    Paresthesia and pain of both upper extremities 10/31/2013    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 Bernadette Henderson, 4/9/14.  Tobacco abuse     Unspecified hereditary and idiopathic peripheral neuropathy 12/4/2013       Past Surgical History:  Past Surgical History:   Procedure Laterality Date    HX CORONARY ARTERY BYPASS GRAFT  about 2013    HX HEART CATHETERIZATION  11/2013    ID INSJ/RPLCMT PERM DFB W/TRNSVNS LDS 1/DUAL CHMBR Left 9/30/2019    INSERT ICD DUAL [facility] Russell [facility]ren [facility] C[facility]       Family History:  Family History   Problem Relation Age of Onset    Heart Disease Maternal Uncle     Diabetes Father     Hypertension Father        Social History:  Social History     Tobacco Use    Smoking status: Current Every Day Smoker     Packs/day: 0.50     Years: 55.00     Pack years: 27.50     Types: Cigarettes     Start date: 3/13/2014    Smokeless tobacco: Never Used   Substance Use Topics    Alcohol use: Yes     Comment: every other day drinker    Drug use: No       Allergies: Allergies   Allergen Reactions    Vicodin [Hydrocodone-Acetaminophen] Rash         Review of Systems       Review of Systems   Constitutional: Negative. Negative for activity change, chills and fever. HENT: Negative. Negative for ear pain, hearing loss and sore throat. Eyes: Negative. Negative for pain and visual disturbance. Respiratory: Positive for cough and shortness of breath. Negative for chest tightness. Cardiovascular: Negative. Negative for chest pain and leg swelling. Gastrointestinal: Negative. Negative for abdominal distention and abdominal pain. Genitourinary: Negative. Negative for dysuria and hematuria. Musculoskeletal: Negative. Skin: Negative. Negative for rash. Neurological: Negative. Negative for dizziness and headaches. Psychiatric/Behavioral: Negative. Negative for agitation and behavioral problems.          Physical Exam     Visit Vitals  /72 (BP 1 Location: Left arm)   Pulse 63   Temp 98.8 °F (37.1 °C)   Resp 16   Ht 5' 5\" (1.651 m)   Wt 47.6 kg (105 lb)   SpO2 97%   BMI 17.47 kg/m²         Physical Exam   Constitutional: He is oriented to person, place, and time. He appears well-developed and well-nourished. HENT:   Head: Normocephalic and atraumatic. Eyes: Pupils are equal, round, and reactive to light. EOM are normal. Right eye exhibits no discharge. Left eye exhibits no discharge. Neck: Normal range of motion. Neck supple. No JVD present. No tracheal deviation present. Cardiovascular: Normal rate, regular rhythm and normal heart sounds. No murmur heard. Pulmonary/Chest: He is in respiratory distress. He has wheezes. He has rales. Tachypnea, high work of breathing, somewhat tripoding, using accessory muscles, with decreased breath sounds at bilateral bases, crackles up to mid to upper lungs, volumes seem restricted, few wheezes heard   Abdominal: Soft. Bowel sounds are normal. He exhibits no distension. There is no tenderness. There is no rebound. Musculoskeletal: Normal range of motion. He exhibits no edema, tenderness or deformity. Neurological: He is alert and oriented to person, place, and time. No cranial nerve deficit. 5/5 strength UE/LE, 5/5 sensation UE/LE     Skin: Skin is warm and dry. No rash noted. No erythema. Psychiatric: He has a normal mood and affect. His behavior is normal.         Diagnostic Study Results     Labs -  No results found for this or any previous visit (from the past 12 hour(s)). Radiologic Studies -   XR CHEST PORT   Final Result   IMPRESSION: Comparison to the previous study reveals significant improvement in   the right pleural effusion and right-sided infiltrates. No new finding   identified. Rapidity of change suggests treatment of volume overload. CT CHEST WO CONT   Final Result   IMPRESSION:   1.  Worsening right lung infiltrate/edema and increasing right pleural effusion. Slightly worse edema at the left lung base. 2. Cardiomegaly with no pericardial effusion. 3. Cholelithiasis. XR CHEST PORT   Final Result   IMPRESSION:       Interval decrease in aeration with large consolidative airspace disease in the   right perihilar and upper lung in addition to mild to moderate diffuse   interstitial and airspace opacity. Bilateral pleural effusions. Medical Decision Making   I am the first provider for this patient. I reviewed the vital signs, available nursing notes, past medical history, past surgical history, family history and social history. Vital Signs-Reviewed the patient's vital signs. EKG: Interpreted by the EP. Time Interpreted: 448   Rate: 95   Rhythm: Normal Sinus Rhythm    Interpretation: likely LBBB, difficult to interpret baseline, no obvious ischemia, LVH and LV strain present   Comparison: unchanged    Records Reviewed: Nursing Notes and Old Medical Records      Provider Notes (Medical Decision Making):     MDM  Number of Diagnoses or Management Options  Acute on chronic systolic congestive heart failure [facility]):   Diagnosis management comments: Differential Diagnosis: COPD exacerbation, CHF exacerbation, pneumonia, PE    Patient presenting in respiratory distress, bedside ultrasound showing pleural effusion on the left, significant B-lines indicating pulmonary edema, he does have wheezing but this is likely cardiac, will still give a few nebulizers to see if this improves his volumes. Will place patient on BiPAP given that he is breathing 40 times per minute, not hypoxic to 90 on 3 L of oxygen.   Just discharged with echo showing EF 15%, not on any diuretics, he has BiV failure, he is not showing significant volume overload in the legs but does have large amount of JVD, obvious crackles on his exam.  Suspect this is all related to volume overload, he is extremely hypertensive, will give sublingual nitroglycerin and Nitropaste and if this is not working shortly, will start nitro drip. Given presentation and patient history, PE or pneumonia are considered much less likely and will not pursue these diagnoses    Reevaluation: Patient's blood pressure trended down quickly with nitroglycerin sublingual and paste, he did very well on BiPAP and has been trialed off of it successfully, now on 2 L of nasal cannula oxygen. Will be safe to admit to telemetry, he is Rah making urine from the Lasix given    Hospitalist accepts    Chela Camacho MD              For Hospitalized Patients:    1. Critical Care Time: Critical Care Time:  The services I provided to this patient were to treat and/or prevent clinically significant deterioration that could result in the failure of one or more body systems and/or organ systems due to respiratory failure. Services included the following:  -reviewing nursing notes and old charts  -vital sign assessments  -direct patient care  -medication orders and management  -interpreting and reviewing diagnostic studies/labs  -re-evaluations  -documentation time    Aggregate critical care time was 38 minutes, which includes only time during which I was engaged in work directly related to the patient's care as described above, whether I was at bedside or elsewhere in the Emergency Department. It did not include time spent performing other reported procedures or the services of residents, students, nurses, or advance practice providers. Chela Camacho MD  4:50 AM         Diagnosis     Clinical Impression:   1. Acute on chronic systolic congestive heart failure (HCC)        Disposition: Admit    Follow-up Information    None          Current Discharge Medication List      CONTINUE these medications which have NOT CHANGED    Details   cyanocobalamin 1,000 mcg tablet Take 1,000 mcg by mouth daily. 1 pill po daily  Indications: Prevention of Vitamin B12 Deficiency      pantoprazole (PROTONIX) 40 mg tablet Take 40 mg by mouth daily. 1 pill daily 30 minutes before breakfast.  Indications: indigestion      amiodarone (CORDARONE) 200 mg tablet Take 1 Tab by mouth every twelve (12) hours. Qty: 60 Tab, Refills: 0      atorvastatin (LIPITOR) 40 mg tablet Take 40 mg by mouth nightly. 1 pill daily      tiotropium-olodaterol (STIOLTO RESPIMAT) 2.5-2.5 mcg/actuation inhaler Take 2 Puffs by inhalation daily. 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 1 pill po daily      clopidogrel (PLAVIX) 75 mg tab Take 1 Tab by mouth daily. Qty: 30 Tab, Refills: 3      isosorbide mononitrate ER (IMDUR) 30 mg tablet Take 1 Tab by mouth every morning. Qty: 30 Tab, Refills: 3      nitroglycerin (NITROSTAT) 0.4 mg SL tablet 1 Tab by SubLINGual route every five (5) minutes as needed for Chest Pain. Up to 3 doses. Qty: 1 Bottle, Refills: 3      aspirin delayed-release 81 mg tablet Take 81 mg by mouth daily. 1 pill po daily      losartan (COZAAR) 50 mg tablet Take 50 mg by mouth daily. 1 pill po daily      metoprolol succinate (TOPROL-XL) 25 mg XL tablet Take 25 mg by mouth daily. 1 pill po daily      thiamine (VITAMIN B-1) 50 mg tablet Take 50 mg by mouth daily. 1 pill po daily      albuterol (VENTOLIN HFA) 90 mcg/Actuation inhaler Take 1 Puff by inhalation as needed. CYANOCOBALAMIN/FA/PYRIDOXINE (FOLBEE PO) Take 1 Tab by mouth daily. multivitamin (ONE A DAY) tablet Take 1 Tab by mouth daily. amoxicillin (AMOXIL) 500 mg capsule Take 500 mg by mouth three (3) times daily. Take 1 capsule by mouth thre times a daily  Qty: 21 Cap, Refills: 0      PSEUDOEPHEDRINE/CPM/METHYLSCOP (ALLERGY AM-PM PO) Take  by mouth daily as needed. Ibuprofen-Diphenhydramine (ADVIL PM) 200-38 mg Tab Take 200 mg by mouth as needed for Pain.  1 pill po q 6 hours as needed for pain             _______________________________    Please note that this dictation was completed with [email or web address], the computer voice recognition software. Quite often unanticipated grammatical, syntax, homophones, and other interpretive errors are inadvertently transcribed by the computer software. Please disregard these errors. Please excuse any errors that have escaped final proofreading.

## 2019-10-13 NOTE — H&P
History & Physical    Patient: Mariola Crawford MRN: [id number]  CSN: [id number]    YOB: 1940  Age: 67 y.o. Sex: male      DOA: 10/13/2019       HPI:     Mariola Crawford is a 67 y.o. male with a history of CAD; CABG, hypertension, Chronic biventricular heart failure with EF 15-20% and recent AICD, COPD, hyperlipidemia, chronic etOH and tobacco use. He developed a wet cough and awoke from sleep early am 10/13/2019 with increased shortness of breath and presented to [facility] ER. In the ED he demonstrated pulmonary edema, RR 40,  and was placed on BiPap and treated with Ntg and paste and Lasix 40 mg IV with gradual improvement to nasal O2  He was admitted to Telemetry with acute on Chronic Systolic and Diastolic CHF. Past Medical History:   Diagnosis Date    AICD (automatic cardioverter/defibrillator) present 10/13/2019    Alcohol abuse     quit February 2011    Binocular visual disturbance 10/31/2013    CAD (coronary artery disease)     CAD (coronary artery disease), native coronary artery 1/3/05    LAD 70-80% mid; Cx-diffuse 80%; OM1-70% prox; RCA 40-50% mid; 100% RPLB with collaterals    Cardiac cath 08/19/2013    Severe, diffuse calcification. dLM 30%. mLAD 80%. o/pD1 80%. o/pRamus 80%. dCX 85%. dRCA 80%.  m-dRPLB 75%. EF 50%. Mod to severe inferobasal hypk.  Cardiac echocardiogram 01/19/2011    EF 50-55%. Gr 1 DDfx. RVSP 30 mmHg.  Cardiac nuclear imaging test 06/27/2011    No evidence of ischemia or previous infarction. EF 60%. Neg EKG on max EST. Ex time 6 mins.     Chronic kidney disease     Chronic lung disease     Chronic obstructive pulmonary disease ([facility]     Coronary artery disease     Diastolic CHF, chronic (HCC)     LV EF 55% (Echo Jan 2011)    Diverticula of colon 12/17/2013    Emphysematous COPD ([facility] [address]     PFTs March 2009; mild-mode obstruction, + bronchodil response; decreased DLCO    GERD (gastroesophageal reflux disease)     with erosive esophagitis history    Heart failure (HCC)     History of renal failure     2 years ago, which completely resolved    Hypercholesterolemia     Hypertension     Hypertensive heart disease with CHF ([facility] [address]     MI (myocardial infarction) ([facility] [address]     Inferior wall    Paresthesia and pain of both upper extremities 10/31/2013    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 Bernadette Henderson, 4/9/14.  Tobacco abuse     Unspecified hereditary and idiopathic peripheral neuropathy 12/4/2013       Past Surgical History:   Procedure Laterality Date    HX CORONARY ARTERY BYPASS GRAFT  about 2013    HX HEART CATHETERIZATION  11/2013    CA INSJ/RPLCMT PERM DFB W/TRNSVNS LDS 1/DUAL CHMBR Left 9/30/2019    INSERT ICD DUAL performed by Russell Wren MD at [facility] CATH LAB       Family History   Problem Relation Age of Onset    Heart Disease Maternal Uncle     Diabetes Father     Hypertension Father        Social History     Socioeconomic History    Marital status:      Spouse name: Not on file    Number of children: Not on file    Years of education: Not on file    Highest education level: Not on file   Tobacco Use    Smoking status: Current Every Day Smoker     Packs/day: 0.50     Years: 55.00     Pack years: 27.50     Types: Cigarettes     Start date: 3/13/2014    Smokeless tobacco: Never Used   Substance and Sexual Activity    Alcohol use: Yes     Comment: every other day drinker    Drug use: No    Sexual activity: Not Currently     Partners: Female       Prior to Admission medications    Medication Sig Start Date End Date Taking? Authorizing Provider   cyanocobalamin 1,000 mcg tablet Take 1,000 mcg by mouth daily.  1 pill po daily  Indications: Prevention of Vitamin B12 Deficiency Provider, Historical   pantoprazole (PROTONIX) 40 mg tablet Take 40 mg by mouth daily. 1 pill daily 30 minutes before breakfast.  Indications: indigestion    Provider, Historical   amiodarone (CORDARONE) 200 mg tablet Take 1 Tab by mouth every twelve (12) hours. 10/1/19   Adilene Mukherjee MD   atorvastatin (LIPITOR) 40 mg tablet Take 40 mg by mouth nightly. 1 pill daily    Provider, Historical   tiotropium-olodaterol (STIOLTO RESPIMAT) 2.5-2.5 mcg/actuation inhaler Take 2 Puffs by inhalation daily. 5/15/19   Meg Lindsay MD   folic acid (FOLVITE) 1 mg tablet Take 1 mg by mouth two (2) times a day. 1 pill po daily    Provider, Historical   clopidogrel (PLAVIX) 75 mg tab Take 1 Tab by mouth daily. 2/8/19   Meri Roa, DO   isosorbide mononitrate ER (IMDUR) 30 mg tablet Take 1 Tab by mouth every morning. 2/8/19   Meri Roa, DO   nitroglycerin (NITROSTAT) 0.4 mg SL tablet 1 Tab by SubLINGual route every five (5) minutes as needed for Chest Pain. Up to 3 doses. 2/8/19   Meri Roa,    aspirin delayed-release 81 mg tablet Take 81 mg by mouth daily. 1 pill po daily    Provider, Historical   losartan (COZAAR) 50 mg tablet Take 50 mg by mouth daily. 1 pill po daily 12/4/18   Provider, Historical   metoprolol succinate (TOPROL-XL) 25 mg XL tablet Take 25 mg by mouth daily. 1 pill po daily 9/13/17   Provider, Historical   thiamine (VITAMIN B-1) 50 mg tablet Take 50 mg by mouth daily. 1 pill po daily    Provider, Historical   PSEUDOEPHEDRINE/CPM/METHYLSCOP (ALLERGY AM-PM PO) Take  by mouth daily as needed. Provider, Historical   Ibuprofen-Diphenhydramine (ADVIL PM) 200-38 mg Tab Take 200 mg by mouth as needed for Pain. 1 pill po q 6 hours as needed for pain   6/22/11   Provider, Historical   albuterol (VENTOLIN HFA) 90 mcg/Actuation inhaler Take 1 Puff by inhalation as needed. Provider, Historical   CYANOCOBALAMIN/FA/PYRIDOXINE (FOLBEE PO) Take 1 Tab by mouth daily.  3/23/11   Provider, Historical multivitamin (ONE A DAY) tablet Take 1 Tab by mouth daily. Provider, Historical       Allergies   Allergen Reactions    Vicodin [Hydrocodone-Acetaminophen] Rash     Review of Systems:  A 12 point review of systems was performed and is unremarkable except as stated in HPI. Physical Exam:      Visit Vitals  /80   Pulse 73   Temp 97.4 °F (36.3 °C)   Resp 26   Ht 5' 5\" (1.651 m)   Wt 47.6 kg (105 lb)   SpO2 97%   BMI 17.47 kg/m²       Physical Exam:  GENERAL: fatigued, cooperative, mild distress  HEENT speech clear and goal directed, conjunctiva clear,   Anicteric, mouth moist, neck supple, -JVD  Chest AICD wund well healed left chest wall  HR reg 73, lungs with basilar crackles, anterior with coarse breath sounds, no wheezes  Abdomen soft, non tender, BS+  Extremities moving all 4 extremities, calves non tender, no edema    Lab/Data Review:  Labs: Results:       Chemistry Recent Labs     10/13/19  0445 10/11/19  1319   * 95   * 137   K 4.0 4.5    106   CO2 21 25   BUN 13 12   CREA 1.19 1.03   CA 8.7 9.0   AGAP 9 6   BUCR 11* 12   *  --    TP 7.2  --    ALB 2.6*  --    GLOB 4.6*  --    AGRAT 0.6*  --       CBC w/Diff Recent Labs     10/13/19  0445   WBC 7.9   RBC 3.43*   HGB 11.5*   HCT 34.1*   *   GRANS 64   LYMPH 28   EOS 1      Coagulation No results for input(s): PTP, INR, APTT, INREXT in the last 72 hours. Iron/Ferritin No results for input(s): IRON in the last 72 hours. No lab exists for component: TIBCCALC   BNP No results for input(s): BNPP in the last 72 hours. Cardiac Enzymes No results for input(s): CPK, CKND1, EDWARD in the last 72 hours.     No lab exists for component: CKRMB, TROIP   Liver Enzymes Recent Labs     10/13/19  0445   TP 7.2   ALB 2.6*   *   SGOT 24      Thyroid Studies Lab Results   Component Value Date/Time    TSH 4.31 (H) 09/24/2019 09:57 AM          All Micro Results     None          Imaging Reviewed:  XR Results (most recent):  Results from [place] encounter on 10/13/19   XR CHEST PORT    Narrative AP CHEST, PORTABLE    CPT CODE: 98196    INDICATION: Above. Pulmonary edema/effusions. COMPARISON: 10/1/2019. TECHNIQUE: Portable AP chest radiograph is reviewed. FINDINGS:    Large region of consolidative opacity in the right perihilar and upper lung,  markedly increased compared to the prior radiograph. Otherwise mild to moderate  diffuse interstitial and airspace opacity bilaterally, slightly increased  compared to the prior radiograph. Blunting of the costophrenic sulci bilaterally  consistent was small pleural effusions, slightly smaller appearing on the left  compared to the prior radiograph. Mild left greater than right apical pleural  thickening, appears chronic since at least 2014, likely scarring. No evidence of  pneumothorax. Skin fold noted. The cardiac silhouette is within normal limits in size  for technique. Aortic  atherosclerotic calcification noted best seen at the level of the aortic arch. Median sternotomy wires and surgical clips project over the mediastinum. Dual-lead cardiac pacer/ICD is again seen. EKG leads/wires overlie the patient. Impression IMPRESSION:     Interval decrease in aeration with large consolidative airspace disease in the  right perihilar and upper lung in addition to mild to moderate diffuse  interstitial and airspace opacity. Bilateral pleural effusions. Assessment:   Principal Problem:    Acute on chronic combined systolic and diastolic ACC/AHA stage C congestive heart failure ([address] (9/24/2019)    Active Problems:    Emphysematous COPD ([address] ()      Overview: PFTs March 2009: mild-moderate obstruction; + bronchodil.  Response;       decreased DLCO      S/P CABG x 5 (4/10/2014)      Overview: LIMA to LAD, and a sequential saphenous vein graft to the 1st diagonal of       the LAD and to the ramus intermedius, and another sequential saphenous       vein graft to the posterior descending branch and the posterolateral       branch of the RCA, Dr. Jonn Ruiz, 4/9/14.             AICD (automatic cardioverter/defibrillator) present (10/13/2019)      Plan:     Admit to telemetry  Diuresing from IV 40 mg lasix 3 hours ago  Pulmonary and Cardiology consulted from the ED  Diuretics per Cardiology-may be daily eval to   Protect renal function  Monitor BMP, Mg++, Phos  Trend troponin, ECG  Continue O2, plan walk test for home O2 when stable    Full Code    Christine Nava,   10/13/2019, 7:49 AM

## 2019-10-13 NOTE — CONSULTS
[facility] - Consult Note    Consultation request by Adal Deleon DO for advice/opinion related to evaluating CHF exacerbation [facility]) [I50.9]    Date of  Admission: 10/13/2019  4:26 AM   Primary Care Physician:  Sulaiman Trotter MD     The patient was seen, examined, and independently evaluated and I agree with the below assessment and plan by Kameron Isbell PA-C with the following comments. Will give IV Lasix and re-evaluate in AM        Assessment:     Patient Active Problem List   Diagnosis Code    Tobacco abuse Z72.0    Hypercholesterolemia E78.00    CAD (coronary artery disease), native coronary artery I25.10    Hypertensive heart disease with CHF ([address] I11.0    Emphysematous COPD ([facility] [address] J43.9    Alcohol abuse F10.10    Paresthesia and pain of both upper extremities R20.2, M79.601, M79.602    Binocular visual disturbance H53.30    Unspecified hereditary and idiopathic peripheral neuropathy G60.9    Diverticula of colon K57.30    S/P CABG x 5 Z95.1    Congestive heart failure (CHF) (HCC) I50.9    Wide-complex tachycardia (HCC) I47.2    Acute on chronic combined systolic and diastolic ACC/AHA stage C congestive heart failure (HCC) I50.43    CHF exacerbation (HCC) I50.9    AICD (automatic cardioverter/defibrillator) present Z95.810       -Presented with shortness of breath, and elevated BNP in the >20,000 range. Hypertensive as well which is likely reason for the failure. He is starting to feel better already with the O2 on.  Will continue to mildly diurese.  -Echo 9/25/2019: EF 16-20% with akinetic basal inferior, basal inferolateral, mid inferior, mid inferolateral, apical anterior, apical septal, apical inferior, apical lateral and apical walls  -Echo 1/24/2019: EF 46-50% with hypokinetic basal inferior, basal inferolateral, mid inferior, mid inferolateral and apical inferior wall segments, grade 2 diastolic dysfunction  -CAD, Cardiac cath 2/1/19 with following findings:   · Severe native CAD with distal left main heavily calcified lesion   · Patent LIMA-LAD  · 2 sequential vein grafts (all 4 vein grafts to diagonal, OM, RPDA, RPL branch) occluded at aortotomy site  · Subtotal mid RCA occludion with NICO 0-I flow, some collaterals from left coronary system  · Native diagonal 85% stenosis  · Native OM 85% stenosis  -Elevated troponin  -Wide-complex tachycardia, most consistent with bundle branch VT  -Hypomagnesemia, Mg 1.3 on presentation, given 2 gm Mg in ER  -HTN  -Hypercholesterolemia  -Hx tobacco and alcohol abuse  -Previously DNR status, now full code.     Primary cardiologist is Dr. Julian Sarabia:     -Stable and improving.  -Have increased his nitrates for improved BP control.   -He had some challenges with his renal function the last time he was here, and his volume status is not far from baseline despite high BNP's. Will give prn Lasix and monitor symptoms, and output.  -He had a discussion during last admission regarding cath and stent vs. CABG as possibilities with his CAD. Will discuss with Dr. Tesha Fernandez in am and determine best course as patient is now amenable to intervention.  -More recommendations pending clinical course  -Will follow. History of Present Illness: This is a 67 y.o. male admitted for CHF exacerbation ([facility] [address] [I50.9]. Patient complains of:  Patient admitted for 2 days of shortness of breath that worsened this morning. The patient states that he did not have any diuretics at home and progressively worsened over the course of the days. He never had any chest pains, fevers, lightheadedness, dizziness or other symptoms.       Cardiac risk factors: smoking/ tobacco exposure, family history, dyslipidemia, sedentary life style, male gender, hypertension, CAD      Review of Symptoms:  Except as stated above include:  Constitutional:  negative  Respiratory:  negative  Cardiovascular:  negative  Gastrointestinal: negative  Genitourinary: negative  Musculoskeletal:  Negative  Neurological:  Negative  Dermatological:  Negative  Endocrinological: Negative  Psychological:  Negative    Pertinent items are noted in HPI. Past Medical History:     Past Medical History:   Diagnosis Date    AICD (automatic cardioverter/defibrillator) present 10/13/2019    Alcohol abuse     quit February 2011    Binocular visual disturbance 10/31/2013    CAD (coronary artery disease)     CAD (coronary artery disease), native coronary artery 1/3/05    LAD 70-80% mid; Cx-diffuse 80%; OM1-70% prox; RCA 40-50% mid; 100% RPLB with collaterals    Cardiac cath 08/19/2013    Severe, diffuse calcification. dLM 30%. mLAD 80%. o/pD1 80%. o/pRamus 80%. dCX 85%. dRCA 80%.  m-dRPLB 75%. EF 50%. Mod to severe inferobasal hypk.  Cardiac echocardiogram 01/19/2011    EF 50-55%. Gr 1 DDfx. RVSP 30 mmHg.  Cardiac nuclear imaging test 06/27/2011    No evidence of ischemia or previous infarction. EF 60%. Neg EKG on max EST. Ex time 6 mins.     Chronic kidney disease     Chronic lung disease     Chronic obstructive pulmonary disease (HCC)     Coronary artery disease     Diastolic CHF, chronic ([facility])     LV EF 55% (Echo Jan 2011)    Diverticula of colon 12/17/2013    Emphysematous COPD ([address]     PFTs March 2009; mild-mode obstruction, + bronchodil response; decreased DLCO    GERD (gastroesophageal reflux disease)     with erosive esophagitis history    Heart failure ([facility])     History of renal failure     2 years ago, which completely resolved    Hypercholesterolemia     Hypertension     Hypertensive heart disease with CHF ([address]     MI (myocardial infarction) ([address]     Inferior wall    Paresthesia and pain of both upper extremities 10/31/2013    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 Fabian Baer, 4/9/14.  Tobacco abuse     Unspecified hereditary and idiopathic peripheral neuropathy 12/4/2013         Social History:     Social History     Socioeconomic History    Marital status:      Spouse name: Not on file    Number of children: Not on file    Years of education: Not on file    Highest education level: Not on file   Tobacco Use    Smoking status: Current Every Day Smoker     Packs/day: 0.50     Years: 55.00     Pack years: 27.50     Types: Cigarettes     Start date: 3/13/2014    Smokeless tobacco: Never Used   Substance and Sexual Activity    Alcohol use: Yes     Comment: every other day drinker    Drug use: No    Sexual activity: Not Currently     Partners: Female        Family History:     Family History   Problem Relation Age of Onset    Heart Disease Maternal Uncle     Diabetes Father     Hypertension Father         Medications:      Allergies   Allergen Reactions    Vicodin [Hydrocodone-Acetaminophen] Rash        Current Facility-Administered Medications   Medication Dose Route Frequency    vit B12-levomefolate calcium-vit B6 (FOLBIC RF, FOLTX) 2-1.13-25 mg tablet 1 Tab  1 Tab Oral DAILY    therapeutic multivitamin (THERAGRAN) tablet 1 Tab  1 Tab Oral DAILY    thiamine HCL (B-1) tablet 50 mg  50 mg Oral DAILY    metoprolol succinate (TOPROL-XL) XL tablet 25 mg  25 mg Oral DAILY    losartan (COZAAR) tablet 50 mg  50 mg Oral DAILY    aspirin delayed-release tablet 81 mg  81 mg Oral DAILY    clopidogrel (PLAVIX) tablet 75 mg  75 mg Oral DAILY    nitroglycerin (NITROSTAT) tablet 0.4 mg  0.4 mg SubLINGual [id number] PRN    folic acid (FOLVITE) tablet 1 mg  1 mg Oral BID    albuterol-ipratropium (DUO-NEB) 2.5 MG-0.5 MG/3 ML  3 mL Nebulization Q6H RT    atorvastatin (LIPITOR) tablet 40 mg  40 mg Oral QHS    amiodarone (CORDARONE) tablet 200 mg  200 mg Oral Q12H    cyanocobalamin tablet 1,000 mcg  1,000 mcg Oral DAILY    albuterol (PROVENTIL VENTOLIN) nebulizer solution 2.5 mg  2.5 mg Nebulization Q4H PRN    sodium chloride (NS) flush 5-40 mL  5-40 mL IntraVENous Q8H    sodium chloride (NS) flush 5-40 mL  5-40 mL IntraVENous PRN    naloxone (NARCAN) injection 0.4 mg  0.4 mg IntraVENous PRN    diphenhydrAMINE (BENADRYL) injection 25 mg  25 mg IntraVENous Q4H PRN    ondansetron (ZOFRAN) injection 4 mg  4 mg IntraVENous Q4H PRN    heparin (porcine) injection 5,000 Units  5,000 Units SubCUTAneous Q8H    influenza vaccine 2019-20 (6 mos+)(PF) (FLUARIX/FLULAVAL/FLUZONE QUAD) injection 0.5 mL  0.5 mL IntraMUSCular PRIOR TO DISCHARGE    [START ON 10/14/2019] isosorbide mononitrate ER (IMDUR) tablet 60 mg  60 mg Oral 7am    budesonide (PULMICORT) 500 mcg/2 ml nebulizer suspension  500 mcg Nebulization BID RT    guaiFENesin-codeine (ROBITUSSIN AC) 100-10 mg/5 mL solution 10 mL  10 mL Oral Q4H PRN    benzonatate (TESSALON) capsule 100 mg  100 mg Oral TID PRN    pantoprazole (PROTONIX) tablet 40 mg  40 mg Oral ACB&D    benzocaine-menthol (CEPACOL) lozenge 1 Lozenge  1 Lozenge Mucous Membrane PRN    furosemide (LASIX) injection 40 mg  40 mg IntraVENous BID         Physical Exam:     Visit Vitals  /78 (BP 1 Location: Left arm, BP Patient Position: Supine)   Pulse 69   Temp 98.8 °F (37.1 °C)   Resp 19   Ht 5' 5\" (1.651 m)   Wt 47.6 kg (105 lb)   SpO2 94%   BMI 17.47 kg/m²     BP Readings from Last 3 Encounters:   10/13/19 150/78   10/11/19 151/75   10/09/19 162/88     Pulse Readings from Last 3 Encounters:   10/13/19 69   10/11/19 71   10/09/19 67     Wt Readings from Last 3 Encounters:   10/13/19 47.6 kg (105 lb)   10/11/19 48.2 kg (106 lb 3.2 oz)   10/01/19 48.7 kg (107 lb 4.8 oz)       General:  alert, cooperative, no distress, appears stated age  Neck:  nontender, no carotid bruit, no JVD  Lungs:  clear to auscultation bilaterally  Heart:  regular rate and rhythm, S1, S2 normal, no murmur, click, rub or gallop  Abdomen:  abdomen is soft without significant tenderness, masses, organomegaly or guarding  Extremities:  extremities normal, atraumatic, no cyanosis or edema  Skin: Warm and dry. no hyperpigmentation, vitiligo, or suspicious lesions  Neuro: alert, oriented x3, affect appropriate, no focal neurological deficits, moves all extremities well, no involuntary movements  Psych: non focal     Data Review:     Recent Labs     10/13/19  0445   WBC 7.9   HGB 11.5*   HCT 34.1*   *     Recent Labs     10/13/19  0445 10/11/19  1319   * 137   K 4.0 4.5    106   CO2 21 25   * 95   BUN 13 12   CREA 1.19 1.03   CA 8.7 9.0   ALB 2.6*  --    SGOT 24  --    ALT 18  --        Results for orders placed or performed during the hospital encounter of 10/13/19   EKG, 12 LEAD, INITIAL   Result Value Ref Range    Ventricular Rate 95 BPM    Atrial Rate 95 BPM    P-R Interval 214 ms    QRS Duration 128 ms    Q-T Interval 374 ms    QTC Calculation (Bezet) 469 ms    Calculated P Axis 58 degrees    Calculated R Axis -13 degrees    Calculated T Axis 94 degrees    Diagnosis       Sinus rhythm with 1st degree AV block  Possible Left atrial enlargement  Nonspecific intraventricular block  Nonspecific T wave abnormality  Abnormal ECG  When compared with ECG of 13-OCT-2019 04:45,  No significant change was found  Confirmed by Leonard Modesta ([phone]) on 10/13/2019 10:59:04 AM     Results for orders placed or performed in visit on 02/08/19   AMB POC EKG ROUTINE W/ 12 LEADS, INTER & REP    Narrative    Normal sinus rhythm rate 68. Left atrial enlargement. Incomplete left bundle branch block. Old inferior wall myocardial infarction. Some mild T wave inversions inferolaterally which could reflect ischemia. Compared to the EKG of January 17, 2019 there was little interval change.        All Cardiac Markers in the last 24 hours:    Lab Results   Component Value Date/Time    CPK 46 10/13/2019 02:20 PM    CPK 44 10/13/2019 10:42 AM    CKMB 3.1 10/13/2019 02:20 PM    CKMB 2.9 10/13/2019 10:42 AM    CKND1 6.7 (H) 10/13/2019 02:20 PM    CKND1 6.6 (H) 10/13/2019 10:42 AM    TROIQ <0.02 10/13/2019 02:20 PM    TROIQ 0.02 10/13/2019 10:42 AM    TROIQ <0.02 10/13/2019 04:45 AM       Last Lipid:    Lab Results   Component Value Date/Time    Cholesterol, total 144 05/03/2019 10:26 AM    HDL Cholesterol 86 (H) 05/03/2019 10:26 AM    LDL, calculated 18 05/03/2019 10:26 AM    Triglyceride 200 (H) 05/03/2019 10:26 AM    CHOL/HDL Ratio 1.7 05/03/2019 10:26 AM       Signed By: Karen Zhu DO     October 13, 2019

## 2019-10-13 NOTE — ED TRIAGE NOTES
Patient presents to the ED for evaluation of shortness of breath. Patient states, \"I woke up not being able to breath. I just had a pacemaker/defibulator placed. I am working with my doctor to get home health setup and to get oxygen at home. I have COPD and CHF. \"

## 2019-10-13 NOTE — CONSULTS
[address] [place] [address] [facility]  Pulmonary, Critical Care, and Sleep Medicine    Initial Patient Consult    Name: Madison Danielson MRN: [id number]   : 1940 Hospital: [facility]   Date: 10/13/2019        IMPRESSION:   Acute on chronic combined systolic and diastolic CHF: Patient was recently hospitalized with CHF exacerbation- Systolic heart failure, however due to creatinine elevation patient was not discharged on diuretics. Patient has new hypoxia and ongoing dyspnea, as well as bilateral lower lobe rales, and CT scan showing significant pleural effusions and pulmonary edema, consistent with CHF exacerbation. CT scan today with 1. Worsening right lung infiltrate/edema and increasing right pleural effusion. Slightly worse edema at the left lung base. 2. Cardiomegaly with no pericardial effusion. Acute hypoxia: Etiology due to above  COPD, gold risk category B, gold stage I: No evidence of COPD exacerbation, symptoms are stable  Lung nodules: Seen on lung cancer screening CT from 18, subcentimeter, largest was noted to be 4 mm. Not visualized on CTA from recent hospitalization, due to pulmonary edema. Repeat CT scan planned for 2019  Non-CF bronchiectasis: COPD secondary to  Chronic cough etiology most likely due to above but also has a component of bronchiectasis  COPD cachexia: Body mass index is 17.67 kg/m². with albumin of 2.5  Hx of CAD s/p CABG, AICD  Chronic GERD with esophageal thickening on last CT scan:  Pt seen by GI, planned for EGD but cancelled since pt not cleared to come off of Plavix per Cardiology  History of tobacco use: Prior 0.75 pack/day smoker, 50 total pack-year smoking history. Patient quit after discharge for most recent hospitalization on 10/1/2019.       RECOMMENDATIONS:   · Oxygen- supplement to maintain SaO2 >92%  · BIPAP PRN  · Bronchial hygiene protocol  · Bronchodilators prn  · Diuresis  · Cardiology Consulted  · Aspiration precautions  · Out patient testing- Polysomnogram scheduled  · Assess home Oxygen needs at discharge  · OT, PT, OOB and ambulate  · Healthy weight  · Will Follow  · DVT, PUD prophylaxis     Subjective: This patient has been seen and evaluated at the request of Yazmin Brenner  for Acute Hypoxic Respiratory failure. Patient is a 67 y.o. male with HTN, CAD with CABG, chronic BiV heart failure last EF 15-20% recent ICD placed, COPD, active tobacco smoker, hyperlipidemia, chronic alcoholism, on chronic doxycycline for eye infection who presents with shortness of breath. Patient woke up from sleep just now quite short of breath, he was not lying completely flat, he was just discharged from a similar event. No chest pain, but has been having a wet cough, feeling like he has a rattle in his chest, no fevers noted, no leg swelling noted. Has been taking all of his medications as instructed  He was scheduled for Sleep study tonight at [facility] Sleep lab. Patient was last seen in our clinic on 5/15/2019 and more recently on 10/11/2019 by Dr. Jennifer Cruz. In the interval, pt had a CHF exacerbation requiring hospitalization to [facility] from 9/24/2019 through 10/1/2019. Patient reports that he was diuresed during this hospitalization, however diuretics were held due to abnormal renal function. Patient reports he was on oxygen during the entire hospitalization, but was not discharged on oxygen. Since discharge, patient reports that his breathing is not at baseline. He reports dyspnea with mild to moderate exertion. His symptoms are better than when he had to go to the hospital, however he notes that he still is very limited. He reports only being able to walk less than 1 block, unable to climb 1 flight of steps before having to stop. Alleviated with rest, no other modifying factors. Patient reports he is quit smoking in the interval.  Patient reports that he saw his PCP, Dr. Gaby Guajardo, who prescribed amoxicillin. Patient denies any worsening cough, purulent sputum, fevers, chills, night sweats. Patient reports chronic sputum of white color. Patient does report excessive daytime sleepiness, reports having to take a nap during the daytime, very sleepy. Patient reports he has been told he snores occasionally. Pt denies any wt loss, appetite changes, hemoptysis, chest pain/angina, night sweats, LE swelling, fevers, chills, night sweats, N/V/D  I have reviewed all of the available data including the patient's previous history external records and radiological imaging available for review. In addition applicable cardiology and other lab data were also reviewed. Past Medical History:   Diagnosis Date    AICD (automatic cardioverter/defibrillator) present 10/13/2019    Alcohol abuse     quit February 2011    Binocular visual disturbance 10/31/2013    CAD (coronary artery disease)     CAD (coronary artery disease), native coronary artery 1/3/05    LAD 70-80% mid; Cx-diffuse 80%; OM1-70% prox; RCA 40-50% mid; 100% RPLB with collaterals    Cardiac cath 08/19/2013    Severe, diffuse calcification. dLM 30%. mLAD 80%. o/pD1 80%. o/pRamus 80%. dCX 85%. dRCA 80%.  m-dRPLB 75%. EF 50%. Mod to severe inferobasal hypk.  Cardiac echocardiogram 01/19/2011    EF 50-55%. Gr 1 DDfx. RVSP 30 mmHg.  Cardiac nuclear imaging test 06/27/2011    No evidence of ischemia or previous infarction. EF 60%. Neg EKG on max EST. Ex time 6 mins.     Chronic kidney disease     Chronic lung disease     Chronic obstructive pulmonary disease ([address]     Coronary artery disease     Diastolic CHF, chronic (HCC)     LV EF 55% (Echo Jan 2011)    Diverticula of colon 12/17/2013    Emphysematous COPD ([address]     PFTs March 2009; mild-mode obstruction, + bronchodil response; decreased DLCO    GERD (gastroesophageal reflux disease)     with erosive esophagitis history    Heart failure ([address]     History of renal failure     2 years ago, which completely resolved    Hypercholesterolemia     Hypertension     [facility] disease [facility]     MI (myocardial infarction) ([facility]     Inferior wall    Paresthesia and pain of both upper extremities 10/31/2013    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 Didier Alex, 4/9/14.  Tobacco abuse     Unspecified hereditary and idiopathic peripheral neuropathy 12/4/2013      Past Surgical History:   Procedure Laterality Date    HX CORONARY ARTERY BYPASS GRAFT  about 2013    HX HEART CATHETERIZATION  11/2013    GA INSJ/RPLCMT PERM DFB W/TRNSVNS LDS 1/DUAL CHMBR Left 9/30/2019    INSERT ICD DUAL performed by Hellen Peter MD at [facility] C[facility]      Prior to Admission medications    Medication Sig Start Date End Date Taking? Authorizing Provider   cyanocobalamin 1,000 mcg tablet Take 1,000 mcg by mouth daily. 1 pill po daily  Indications: Prevention of Vitamin B12 Deficiency    Provider, Historical   pantoprazole (PROTONIX) 40 mg tablet Take 40 mg by mouth daily. 1 pill daily 30 minutes before breakfast.  Indications: indigestion    Provider, Historical   amiodarone (CORDARONE) 200 mg tablet Take 1 Tab by mouth every twelve (12) hours. 10/1/19   Franci Petersen MD   atorvastatin (LIPITOR) 40 mg tablet Take 40 mg by mouth nightly. 1 pill daily    Provider, Historical   tiotropium-olodaterol (STIOLTO RESPIMAT) 2.5-2.5 mcg/actuation inhaler Take 2 Puffs by inhalation daily. 5/15/19   Samantha Evans MD   folic acid (FOLVITE) 1 mg tablet Take 1 mg by mouth two (2) times a day. 1 pill po daily    Provider, Historical   clopidogrel (PLAVIX) 75 mg tab Take 1 Tab by mouth daily.  2/8/19   Etelvina Raman DO   isosorbide mononitrate ER (IMDUR) 30 mg tablet Take 1 Tab by mouth every morning. 2/8/19   Velma Nix, DO   nitroglycerin (NITROSTAT) 0.4 mg SL tablet 1 Tab by SubLINGual route every five (5) minutes as needed for Chest Pain. Up to 3 doses. 2/8/19   Velma Nix, DO   aspirin delayed-release 81 mg tablet Take 81 mg by mouth daily. 1 pill po daily    Provider, Historical   losartan (COZAAR) 50 mg tablet Take 50 mg by mouth daily. 1 pill po daily 12/4/18   Provider, Historical   metoprolol succinate (TOPROL-XL) 25 mg XL tablet Take 25 mg by mouth daily. 1 pill po daily 9/13/17   Provider, Historical   thiamine (VITAMIN B-1) 50 mg tablet Take 50 mg by mouth daily. 1 pill po daily    Provider, Historical   PSEUDOEPHEDRINE/CPM/METHYLSCOP (ALLERGY AM-PM PO) Take  by mouth daily as needed. Provider, Historical   Ibuprofen-Diphenhydramine (ADVIL PM) 200-38 mg Tab Take 200 mg by mouth as needed for Pain. 1 pill po q 6 hours as needed for pain   6/22/11   Provider, Historical   albuterol (VENTOLIN HFA) 90 mcg/Actuation inhaler Take 1 Puff by inhalation as needed. Provider, Historical   CYANOCOBALAMIN/FA/PYRIDOXINE (FOLBEE PO) Take 1 Tab by mouth daily. 3/23/11   Provider, Historical   multivitamin (ONE A DAY) tablet Take 1 Tab by mouth daily.     Provider, Historical     Allergies   Allergen Reactions    Vicodin [Hydrocodone-Acetaminophen] Rash      Social History     Tobacco Use    Smoking status: Current Every Day Smoker     Packs/day: 0.50     Years: 55.00     Pack years: 27.50     Types: Cigarettes     Start date: 3/13/2014    Smokeless tobacco: Never Used   Substance Use Topics    Alcohol use: Yes     Comment: every other day drinker      Family History   Problem Relation Age of Onset    Heart Disease Maternal Uncle     Diabetes Father     Hypertension Father       Current Facility-Administered Medications   Medication Dose Route Frequency    nicotine (NICODERM CQ) 14 mg/24 hr patch 1 Patch  1 Patch TransDERmal NOW    vit B12-levomefolate calcium-vit B6 (FOLBIC RF, FOLTX) 2-1.13-25 mg tablet 1 Tab  1 Tab Oral DAILY    therapeutic multivitamin (THERAGRAN) tablet 1 Tab  1 Tab Oral DAILY    thiamine HCL (B-1) tablet 50 mg  50 mg Oral DAILY    metoprolol succinate (TOPROL-XL) XL tablet 25 mg  25 mg Oral DAILY    losartan (COZAAR) tablet 50 mg  50 mg Oral DAILY    aspirin delayed-release tablet 81 mg  81 mg Oral DAILY    clopidogrel (PLAVIX) tablet 75 mg  75 mg Oral DAILY    isosorbide mononitrate ER (IMDUR) tablet 30 mg  30 mg Oral 7am    folic acid (FOLVITE) tablet 1 mg  1 mg Oral BID    atorvastatin (LIPITOR) tablet 40 mg  40 mg Oral QHS    amiodarone (CORDARONE) tablet 200 mg  200 mg Oral Q12H    cyanocobalamin tablet 1,000 mcg  1,000 mcg Oral DAILY    pantoprazole (PROTONIX) tablet 40 mg  40 mg Oral DAILY    sodium chloride (NS) flush 5-40 mL  5-40 mL IntraVENous Q8H    heparin (porcine) injection 5,000 Units  5,000 Units SubCUTAneous Q8H       Review of Systems:  A comprehensive review of systems was negative except for that written in the HPI. Objective:   Vital Signs:    Visit Vitals  /80   Pulse 73   Temp 97.4 °F (36.3 °C)   Resp 26   Ht 5' 5\" (1.651 m)   Wt 47.6 kg (105 lb)   SpO2 97%   BMI 17.47 kg/m²       O2 Device: Nasal cannula   O2 Flow Rate (L/min): 2 l/min   Temp (24hrs), Av.4 °F (36.3 °C), Min:97.4 °F (36.3 °C), Max:97.4 °F (36.3 °C)       Intake/Output:   Last shift:      10/13 0701 - 10/13 1900  In: -   Out: 300 [Urine:300]  Last 3 shifts: 10/11 1901 - 10/13 0700  In: -   Out: 305 [Urine:305]    Intake/Output Summary (Last 24 hours) at 10/13/2019 0811  Last data filed at 10/13/2019 0710  Gross per 24 hour   Intake    Output 605 ml   Net -605 ml      Physical Exam:   General:  Alert, cooperative, no distress, appears stated age. Cachetic   Head:  Normocephalic, without obvious abnormality, atraumatic. Eyes:  Conjunctivae/corneas clear. PERRL, EOMs intact. Nose: Nares normal. Septum midline.  Mucosa normal. No drainage or sinus tenderness. Throat: Lips, mucosa, and tongue normal. Teeth and gums normal.   Neck: Supple, symmetrical, trachea midline, no adenopathy, thyroid: no enlargment/tenderness/nodules, no carotid bruit and no JVD. Back:   Symmetric, no curvature. ROM normal.   Lungs:   Bibasilar rales , no wheezing   Chest wall:  No tenderness or deformity. Left infraclavicular AICD   Heart:  Regular rate and rhythm, S1, S2 normal, no murmur, click, rub or gallop. Abdomen:   Soft, non-tender. Bowel sounds normal. No masses,  No organomegaly. Extremities: Extremities normal, atraumatic, no cyanosis or edema. Pulses: 2+ and symmetric all extremities. Skin: Skin color, texture, turgor normal. No rashes or lesions   Lymph nodes: Cervical, supraclavicular, and axillary nodes normal.   Neurologic: Grossly nonfocal     Data review:     Recent Results (from the past 24 hour(s))   CBC WITH AUTOMATED DIFF    Collection Time: 10/13/19  4:45 AM   Result Value Ref Range    WBC 7.9 4.6 - 13.2 K/uL    RBC 3.43 (L) 4.70 - 5.50 M/uL    HGB 11.5 (L) 13.0 - 16.0 g/dL    HCT 34.1 (L) 36.0 - 48.0 %    MCV 99.4 (H) 74.0 - 97.0 FL    MCH 33.5 24.0 - 34.0 PG    MCHC 33.7 31.0 - 37.0 g/dL    RDW 14.3 11.6 - 14.5 %    PLATELET 180 (H) 944 - 420 K/uL    MPV 9.3 9.2 - 11.8 FL    NEUTROPHILS 64 40 - 73 %    LYMPHOCYTES 28 21 - 52 %    MONOCYTES 7 3 - 10 %    EOSINOPHILS 1 0 - 5 %    BASOPHILS 0 0 - 2 %    ABS. NEUTROPHILS 5.0 1.8 - 8.0 K/UL    ABS. LYMPHOCYTES 2.2 0.9 - 3.6 K/UL    ABS. MONOCYTES 0.5 0.05 - 1.2 K/UL    ABS. EOSINOPHILS 0.1 0.0 - 0.4 K/UL    ABS.  BASOPHILS 0.0 0.0 - 0.1 K/UL    DF AUTOMATED     METABOLIC PANEL, COMPREHENSIVE    Collection Time: 10/13/19  4:45 AM   Result Value Ref Range    Sodium 134 (L) 136 - 145 mmol/L    Potassium 4.0 3.5 - 5.5 mmol/L    Chloride 104 100 - 111 mmol/L    CO2 21 21 - 32 mmol/L    Anion gap 9 3.0 - 18 mmol/L    Glucose 196 (H) 74 - 99 mg/dL    BUN 13 7.0 - 18 MG/DL    Creatinine 1.19 0.6 - 1.3 MG/DL BUN/Creatinine ratio 11 (L) 12 - 20      GFR est AA >60 >60 ml/min/1.73m2    GFR est non-AA 59 (L) >60 ml/min/1.73m2    Calcium 8.7 8.5 - 10.1 MG/DL    Bilirubin, total 0.4 0.2 - 1.0 MG/DL    ALT (SGPT) 18 16 - 61 U/L    AST (SGOT) 24 10 - 38 U/L    Alk. phosphatase 135 (H) 45 - 117 U/L    Protein, total 7.2 6.4 - 8.2 g/dL    Albumin 2.6 (L) 3.4 - 5.0 g/dL    Globulin 4.6 (H) 2.0 - 4.0 g/dL    A-G Ratio 0.6 (L) 0.8 - 1.7     NT-PRO BNP    Collection Time: 10/13/19  4:45 AM   Result Value Ref Range    NT pro-BNP 23,922 (H) 0 - 1,800 PG/ML   TROPONIN I    Collection Time: 10/13/19  4:45 AM   Result Value Ref Range    Troponin-I, QT <0.02 0.0 - 0.045 NG/ML   EKG, 12 LEAD, INITIAL    Collection Time: 10/13/19  4:45 AM   Result Value Ref Range    Ventricular Rate 95 BPM    Atrial Rate 95 BPM    P-R Interval 214 ms    QRS Duration 128 ms    Q-T Interval 374 ms    QTC Calculation (Bezet) 469 ms    Calculated P Axis 58 degrees    Calculated R Axis -13 degrees    Calculated T Axis 94 degrees    Diagnosis       Sinus rhythm with 1st degree AV block  Possible Left atrial enlargement  Nonspecific intraventricular block  Nonspecific T wave abnormality  Abnormal ECG  When compared with ECG of 13-OCT-2019 04:45,  No significant change was found     POC G3    Collection Time: 10/13/19  5:07 AM   Result Value Ref Range    Device: BIPAP      FIO2 (POC) 45 %    pH (POC) 7.358 7.35 - 7.45      pCO2 (POC) 33.6 (L) 35.0 - 45.0 MMHG    pO2 (POC) 92 80 - 100 MMHG    HCO3 (POC) 18.9 (L) 22 - 26 MMOL/L    sO2 (POC) 97 92 - 97 %    Base deficit (POC) 6 mmol/L    PEEP/CPAP (POC) 8 cmH2O    PIP (POC) 18      Pressure support 10 cmH2O    Allens test (POC) YES      Total resp. rate 30      Site LEFT RADIAL      Patient temp.  37.0      Specimen type (POC) ARTERIAL      Performed by Mathieu Benítez     Spontaneous timed YES       PFT :5/15/2019    Normal Flows except for reduced FEF 25-75 to 63% predicted, DLCO 42% predicted    Imaging:  I have personally reviewed the patients radiographs and have reviewed the reports:  XR Results (most recent):  Results from Hospital Encounter encounter on 10/13/19   XR CHEST PORT    Narrative AP CHEST, PORTABLE    CPT CODE: 25689    INDICATION: Above. Pulmonary edema/effusions. COMPARISON: 10/1/2019. TECHNIQUE: Portable AP chest radiograph is reviewed. FINDINGS:    Large region of consolidative opacity in the right perihilar and upper lung,  markedly increased compared to the prior radiograph. Otherwise mild to moderate  diffuse interstitial and airspace opacity bilaterally, slightly increased  compared to the prior radiograph. Blunting of the costophrenic sulci bilaterally  consistent was small pleural effusions, slightly smaller appearing on the left  compared to the prior radiograph. Mild left greater than right apical pleural  thickening, appears chronic since at least 2014, likely scarring. No evidence of  pneumothorax. Skin fold noted. The cardiac silhouette is within normal limits in size  for technique. Aortic  atherosclerotic calcification noted best seen at the level of the aortic arch. Median sternotomy wires and surgical clips project over the mediastinum. Dual-lead cardiac pacer/ICD is again seen. EKG leads/wires overlie the patient. Impression IMPRESSION:     Interval decrease in aeration with large consolidative airspace disease in the  right perihilar and upper lung in addition to mild to moderate diffuse  interstitial and airspace opacity. Bilateral pleural effusions. CT Results (most recent):  Results from Hospital Encounter encounter on 10/13/19   CT CHEST WO CONT    Narrative CT chest without IV contrast    HISTORY: CHF exacerbation.     All CT scans at this facility are performed using dose optimization technique as  appropriate to a performed exam, to include automated exposure control,  adjustment of the mA and/or kV according to patient size (including appropriate  matching for site specific examination) or use of iterative reconstruction  technique. Comparison CT September 24, 2019. Increasing to large right pleural effusion. More severe infiltrate/edema in the  right upper lobe, right lower lobe and also in the right middle lobe. Underlying emphysema. Fascial thickening and scarring in the left upper lobe,  stable. Small left upper lateral collateral complex collection. No change in the  left pleural effusion in the lower chest. Cystic changes in the left lung base. Slightly worse edema base. Aortic calcification. Cardiomegaly with no significant pericardial effusion. Upper abdomen shows cholelithiasis. No ascites or free air. Impression IMPRESSION:  1. Worsening right lung infiltrate/edema and increasing right pleural effusion. Slightly worse edema at the left lung base. 2. Cardiomegaly with no pericardial effusion. 3. Cholelithiasis. 09/24/19   ECHO ADULT FOLLOW-UP OR LIMITED 09/25/2019 9/25/2019    Narrative · Left Ventricle: Normal cavity size and wall thickness. Severe systolic   dysfunction. Estimated left ventricular ejection fraction is 16 - 20%. Visually measured ejection fraction. Abnormal left ventricular wall   motion. · Mitral Valve: Mild mitral valve regurgitation is present. · Tricuspid Valve: Mild tricuspid valve regurgitation is present. · Pulmonic Valve: Mild pulmonic valve regurgitation is present. · Aortic Valve: Mild aortic valve regurgitation is present. · Pulmonary Artery: Pulmonary arterial systolic pressure is 33 mmHg. · IVC/Hepatic Veins: Mildly elevated central venous pressure (5-10 mmHg);   IVC diameter is less than 21 mm and collapses less than 50% with   respiration. · This was a limited echo, but EF has significant decreased compared to   prior study.         Signed by: Sheryle Kappa, DO         Complex decision making was made in the evaluation and management plans during this consultation. More than 50% of time was spent in counseling and coordination of care including review of data and discussion with other team members.          Александр Guaman MD

## 2019-10-14 ENCOUNTER — HOME CARE VISIT (OUTPATIENT)
Dept: HOME HEALTH SERVICES | Facility: HOME HEALTH | Age: 79
End: 2019-10-14
Payer: MEDICARE

## 2019-10-14 ENCOUNTER — APPOINTMENT (OUTPATIENT)
Dept: GENERAL RADIOLOGY | Age: 79
DRG: 291 | End: 2019-10-14
Attending: INTERNAL MEDICINE
Payer: MEDICARE

## 2019-10-14 VITALS
OXYGEN SATURATION: 94 % | HEART RATE: 61 BPM | SYSTOLIC BLOOD PRESSURE: 150 MMHG | TEMPERATURE: 97.5 F | RESPIRATION RATE: 18 BRPM | DIASTOLIC BLOOD PRESSURE: 80 MMHG

## 2019-10-14 LAB
ANION GAP SERPL CALC-SCNC: 7 MMOL/L (ref 3–18)
BUN SERPL-MCNC: 12 MG/DL (ref 7–18)
BUN/CREAT SERPL: 10 (ref 12–20)
CALCIUM SERPL-MCNC: 8.8 MG/DL (ref 8.5–10.1)
CHLORIDE SERPL-SCNC: 105 MMOL/L (ref 100–111)
CO2 SERPL-SCNC: 28 MMOL/L (ref 21–32)
CREAT SERPL-MCNC: 1.16 MG/DL (ref 0.6–1.3)
GLUCOSE SERPL-MCNC: 88 MG/DL (ref 74–99)
POTASSIUM SERPL-SCNC: 4.2 MMOL/L (ref 3.5–5.5)
SODIUM SERPL-SCNC: 140 MMOL/L (ref 136–145)

## 2019-10-14 PROCEDURE — 94761 N-INVAS EAR/PLS OXIMETRY MLT: CPT

## 2019-10-14 PROCEDURE — 80048 BASIC METABOLIC PNL TOTAL CA: CPT

## 2019-10-14 PROCEDURE — 71045 X-RAY EXAM CHEST 1 VIEW: CPT

## 2019-10-14 PROCEDURE — 74011000250 HC RX REV CODE- 250: Performed by: INTERNAL MEDICINE

## 2019-10-14 PROCEDURE — 94640 AIRWAY INHALATION TREATMENT: CPT

## 2019-10-14 PROCEDURE — 74011250637 HC RX REV CODE- 250/637: Performed by: HOSPITALIST

## 2019-10-14 PROCEDURE — 74011000250 HC RX REV CODE- 250: Performed by: HOSPITALIST

## 2019-10-14 PROCEDURE — 65660000000 HC RM CCU STEPDOWN

## 2019-10-14 PROCEDURE — 3331090001 HH PPS REVENUE CREDIT

## 2019-10-14 PROCEDURE — 3331090002 HH PPS REVENUE DEBIT

## 2019-10-14 PROCEDURE — 74011250637 HC RX REV CODE- 250/637: Performed by: INTERNAL MEDICINE

## 2019-10-14 PROCEDURE — 77010033678 HC OXYGEN DAILY

## 2019-10-14 PROCEDURE — 36415 COLL VENOUS BLD VENIPUNCTURE: CPT

## 2019-10-14 PROCEDURE — 74011250636 HC RX REV CODE- 250/636: Performed by: HOSPITALIST

## 2019-10-14 PROCEDURE — 74011250637 HC RX REV CODE- 250/637: Performed by: PHYSICIAN ASSISTANT

## 2019-10-14 PROCEDURE — 74011250636 HC RX REV CODE- 250/636: Performed by: PHYSICIAN ASSISTANT

## 2019-10-14 RX ORDER — IPRATROPIUM BROMIDE AND ALBUTEROL SULFATE 2.5; .5 MG/3ML; MG/3ML
3 SOLUTION RESPIRATORY (INHALATION)
Status: DISCONTINUED | OUTPATIENT
Start: 2019-10-14 | End: 2019-10-16 | Stop reason: HOSPADM

## 2019-10-14 RX ORDER — FUROSEMIDE 10 MG/ML
40 INJECTION INTRAMUSCULAR; INTRAVENOUS DAILY
Status: DISCONTINUED | OUTPATIENT
Start: 2019-10-15 | End: 2019-10-15

## 2019-10-14 RX ADMIN — CLOPIDOGREL BISULFATE 75 MG: 75 TABLET ORAL at 09:24

## 2019-10-14 RX ADMIN — FOLIC ACID 1 MG: 1 TABLET ORAL at 18:42

## 2019-10-14 RX ADMIN — BUDESONIDE 500 MCG: 0.5 INHALANT RESPIRATORY (INHALATION) at 08:55

## 2019-10-14 RX ADMIN — METOPROLOL SUCCINATE 25 MG: 25 TABLET, EXTENDED RELEASE ORAL at 09:24

## 2019-10-14 RX ADMIN — Medication 50 MG: at 09:24

## 2019-10-14 RX ADMIN — Medication 10 ML: at 07:26

## 2019-10-14 RX ADMIN — Medication 10 ML: at 22:00

## 2019-10-14 RX ADMIN — ASPIRIN 81 MG: 81 TABLET, COATED ORAL at 09:25

## 2019-10-14 RX ADMIN — TIOTROPIUM BROMIDE 18 MCG: 18 CAPSULE ORAL; RESPIRATORY (INHALATION) at 11:14

## 2019-10-14 RX ADMIN — IPRATROPIUM BROMIDE AND ALBUTEROL SULFATE 3 ML: .5; 3 SOLUTION RESPIRATORY (INHALATION) at 08:55

## 2019-10-14 RX ADMIN — LOSARTAN POTASSIUM 50 MG: 50 TABLET, FILM COATED ORAL at 09:25

## 2019-10-14 RX ADMIN — IPRATROPIUM BROMIDE AND ALBUTEROL SULFATE 3 ML: .5; 3 SOLUTION RESPIRATORY (INHALATION) at 19:22

## 2019-10-14 RX ADMIN — IPRATROPIUM BROMIDE AND ALBUTEROL SULFATE 3 ML: .5; 3 SOLUTION RESPIRATORY (INHALATION) at 01:13

## 2019-10-14 RX ADMIN — HEPARIN SODIUM 5000 UNITS: 5000 INJECTION INTRAVENOUS; SUBCUTANEOUS at 02:43

## 2019-10-14 RX ADMIN — IPRATROPIUM BROMIDE AND ALBUTEROL SULFATE 3 ML: .5; 3 SOLUTION RESPIRATORY (INHALATION) at 13:30

## 2019-10-14 RX ADMIN — FUROSEMIDE 40 MG: 40 INJECTION, SOLUTION INTRAMUSCULAR; INTRAVENOUS at 09:26

## 2019-10-14 RX ADMIN — ISOSORBIDE MONONITRATE 60 MG: 60 TABLET, EXTENDED RELEASE ORAL at 09:25

## 2019-10-14 RX ADMIN — PANTOPRAZOLE SODIUM 40 MG: 40 TABLET, DELAYED RELEASE ORAL at 18:42

## 2019-10-14 RX ADMIN — CYANOCOBALAMIN TAB 1000 MCG 1000 MCG: 1000 TAB at 09:24

## 2019-10-14 RX ADMIN — Medication 1 TABLET: at 09:25

## 2019-10-14 RX ADMIN — THERA TABS 1 TABLET: TAB at 09:25

## 2019-10-14 RX ADMIN — PANTOPRAZOLE SODIUM 40 MG: 40 TABLET, DELAYED RELEASE ORAL at 07:25

## 2019-10-14 RX ADMIN — AMIODARONE HYDROCHLORIDE 200 MG: 200 TABLET ORAL at 09:25

## 2019-10-14 RX ADMIN — BUDESONIDE 500 MCG: 0.5 INHALANT RESPIRATORY (INHALATION) at 19:22

## 2019-10-14 RX ADMIN — ATORVASTATIN CALCIUM 40 MG: 40 TABLET, FILM COATED ORAL at 21:55

## 2019-10-14 RX ADMIN — FOLIC ACID 1 MG: 1 TABLET ORAL at 09:25

## 2019-10-14 RX ADMIN — AMIODARONE HYDROCHLORIDE 200 MG: 200 TABLET ORAL at 21:55

## 2019-10-14 NOTE — PROGRESS NOTES
NUTRITION    Nutrition Screen      RECOMMENDATIONS / PLAN:     - Add supplement: Ensure Enlive BID  - Continue RD inpatient monitoring and evaluation. NUTRITION INTERVENTIONS & DIAGNOSIS:     - Meals/snacks: modified composition  - Medical food supplement therapy: initiate     Nutrition Diagnosis: Unintended weight loss related to predicted prolonged inadequate oral intake as evidenced by wt loss of 5 lb, 4.5% x 8 months PTA    Patient meets criteria for Severe Protein Calorie Malnutrition as evidenced by:   ASPEN Malnutrition Criteria  Acute Illness, Chronic Illness, or Social/Enviornmental: Chronic illness  Energy Intake: Less than/equal to 75% of est energy req for greater than/equal to 1 month  Muscle Mass: Severe(dorsal hands, patellar region, shoulders)  ASPEN Malnutrition Score - Chronic Illness: 12  Chronic Illness - Malnutrition Diagnosis: Severe malnutrition. ASSESSMENT:     Pt reported poor appetite and meal intake PTA; usually eats small meals. Improving since admission; reported consuming 75% of lunch today; noted ate 50% of breakfast per chart. Pt agreeable to nutrition supplement. NFPE conducted. Admitted for CHF    Nutritional intake adequate to meet patients estimated nutritional needs:  Unable to determine at this time    Diet: DIET CARDIAC Regular      Food Allergies: none known  Current Appetite: Good  Appetite/meal intake prior to admission: Poor; usually eats very small meals, <25% po intake of meals x months  Feeding Limitations:  [] Swallowing difficulty    [] Chewing difficulty    [x] Other: poor dentition/ missing teeth, but reported tolerating regular consistency diet  Current Meal Intake:   Patient Vitals for the past 100 hrs:   % Diet Eaten   10/14/19 0937 50 %       BM: 10/12  Skin Integrity: no pressure injury or wound noted  Edema:   [x] No     [] Yes   Pertinent Medications: Reviewed: lipitor, cyanocobalamin, folic acid, zofran, protonix, MVI, thiamine, foltx.   Lasix ordered Recent Labs     10/14/19  0357 10/13/19  0445    134*   K 4.2 4.0    104   CO2 28 21   GLU 88 196*   BUN 12 13   CREA 1.16 1.19   CA 8.8 8.7   ALB  --  2.6*   SGOT  --  24   ALT  --  18       Intake/Output Summary (Last 24 hours) at 10/14/2019 1513  Last data filed at 10/14/2019 0937  Gross per 24 hour   Intake 360 ml   Output 900 ml   Net -540 ml       Anthropometrics:  Ht Readings from Last 1 Encounters:   10/13/19 5' 5\" (1.651 m)     Last 3 Recorded Weights in this Encounter    10/13/19 0443   Weight: 47.6 kg (105 lb)     Body mass index is 17.47 kg/m². Underweight    Weight History:  -2 lb, 1.9% x 2 weeks PTA;   5 lb, 4.5% x 8 months PTA per chart hx    Weight Metrics 10/13/2019 10/11/2019 10/1/2019 5/16/2019 5/15/2019 5/14/2019 5/10/2019   Weight 105 lb 106 lb 3.2 oz 107 lb 4.8 oz - 103 lb 103 lb 103 lb   BMI 17.47 kg/m2 17.67 kg/m2 17.86 kg/m2 17.14 kg/m2 17.14 kg/m2 - 17.14 kg/m2        Admitting Diagnosis: CHF exacerbation (HCC) [I50.9]  Pertinent PMHx:  Alcohol abuse (quit 2/2011), CAD, CKD, COPD, CHF, diverticula of colon, GERD, HTN, hypercholesterolemia, s/p CABG x 5    Education Needs:        [x] None identified  [] Identified - Not appropriate at this time  []  Identified and addressed - refer to education log  Learning Limitations:   [x] None identified  [] Identified    Cultural, Buddhist & ethnic food preferences:  [x] None identified    [] Identified and addressed     ESTIMATED NUTRITION NEEDS:     Calories: 1507-8384 kcal (30-35 kcal/kg) based on  [x] Actual BW: 48 kg      [] IBW   Protein: 67-72 gm (1.4-1.5 gm/kg) based on  [x] Actual BW      [] IBW   Fluid: 1 mL/kcal     MONITORING & EVALUATION:     Nutrition Goal(s):   - PO nutrition intake will meet >75% of patient estimated nutritional needs within the next 7 days.    Outcome: New/Initial goal     Monitoring:   [x] Food and beverage intake   [x] Diet order   [x] Nutrition-focused physical findings   [x] Treatment/therapy [] Weight   [] Enteral nutrition intake        Previous Recommendations (for follow-up assessments only):  Not Applicable     Discharge Planning: No nutritional discharge needs at this time.     [x] Participated in care planning, discharge planning, & interdisciplinary rounds as appropriate      Sb Garvey  Pager: [phone]

## 2019-10-14 NOTE — PROGRESS NOTES
conducted an initial consultation and Spiritual Assessment for Carmelita Lisa, who is a 67 y. o.,male. Patients Primary Language is: [place]. According to the patients EMR Oriental orthodox Affiliation is: [facility].     The reason the Patient came to the hospital is:   Patient Active Problem List    Diagnosis Date Noted    CHF exacerbation ([address] 10/13/2019    AICD (automatic cardioverter/defibrillator) present 10/13/2019    Congestive heart failure (CHF) ([address] 09/24/2019    Wide-complex tachycardia ([address] 09/24/2019    Acute on chronic combined systolic and diastolic ACC/AHA stage C congestive heart failure ([address] 09/24/2019    S/P CABG x 5 04/10/2014    Diverticula of colon 12/17/2013    Unspecified hereditary and idiopathic peripheral neuropathy 12/04/2013    Paresthesia and pain of both upper extremities 10/31/2013    Binocular visual disturbance 10/31/2013    Hypertensive heart disease with CHF ([address]     Emphysematous COPD ([address]     Alcohol abuse     Tobacco abuse     Hypercholesterolemia     CAD (coronary artery disease), native coronary artery 01/03/2005        The  provided the following Interventions:  Initiated a relationship of care and support. Patient is half-way eating his lunch upon my visit but welcomed me anyway. Explored issues of jose, belief, spirituality and Pentecostal/ritual needs while hospitalized. Listened empathically as patient shared that his nieces stay with him. His daughter, Shaunna Henson, has the POA but lives in [place]. Provided information about Spiritual Care Services. Offered prayer and assurance of continued prayers on patient's behalf. Chart reviewed. The following outcomes where achieved:  Patient shared limited information about both his medical narrative and spiritual journey/beliefs.  confirmed Patient's Oriental orthodox Affiliation with [facility] on [place].   Patient processed feeling about current hospitalization. Patient expressed gratitude for 's visit. Assessment:  Patient denies any emotional concerns or spiritual needs at the time of visit. Patient does not have any Restoration/cultural needs that will affect patients preferences in health care. There are no spiritual or Restoration issues which require intervention at this time. Plan:  Chaplains will continue to follow and will provide pastoral care on an as needed/requested basis.  recommends bedside caregivers page  on duty if patient shows signs of acute spiritual or emotional distress.     [address] [facility]   [phone]

## 2019-10-14 NOTE — PROGRESS NOTES
Hospitalist Progress Note    Patient: Hitesh Manzano Age: 67 y.o. : 1940 MR#: [id number] SSN: [id number]  Date/Time: 10/14/2019 9:30 AM    DOA: 10/13/2019  PCP: Verner Carrie, MD    Subjective:     Feels much better today. Has been up and OOB, has shortness of breath but resolved with treatment. NO chest pain. No chest palpitation. Cough improved. No fever. ROS: no fever/chills, no headache, no dizziness, no facial pain, no sinus congestion, +cough  No swallowing pain, No chest pain, no palpitation, +improved shortness of breath, no abd pain,  No diarrhea, no urinary complaint, no leg pain or swelling      Assessment/Plan:     1. Acute on chronic combined systolic and diastolic heart failure, elevated proBNP, last EF 20%  2. Hypertensive emergency   3. Acute hypoxic respiratory failure, required BIPAP on admission, improved   4. CAD with CABGx5, negative troponin this admission   5. Ischemic cardiomyopathy  6. Wide-complex tachycardia with AICD in place   7. Emphysematous COPD, stable   8. H/o tobacco smoking        Coughing, shortness of breath, improved   9. Hyperlipidemia   10. Thrombocytosis, reactive   11. Cachexia with severe protein calorie malnutrition    Continues  IV lasix, continue on ASA, clopidogrel, atorvastatin, metoprolol, losartan, Imdur, Amiodarone   Tolerates anti-tussin and respiratory treatment to help with cough,  budesonide neb   Resume home respiratory treatment at discharge   No need for steroid at this junction, no evidence of infectious   ICS, OOB as tolerated.  Will wean off oxygen but will need 6-minute walk test tomorrow   Cardiology and pulmonary consult appreciated     Full Code,    Additional Notes:    Time spent >30 minutes    Case discussed with:  [x]Patient  []Family  [x]Nursing  [x]Case Management  DVT Prophylaxis:  []Lovenox  [x]Hep SQ  []SCDs  []Coumadin   []On Heparin gtt    Signed By: Berta Figueroa MD     2019 9:30 AM Objective:   VS:   Visit Vitals  /75 (BP 1 Location: Left arm, BP Patient Position: At rest)   Pulse 65   Temp 98.4 °F (36.9 °C)   Resp 18   Ht 5' 5\" (1.651 m)   Wt 47.6 kg (105 lb)   SpO2 99%   BMI 17.47 kg/m²      Tmax/24hrs: Temp (24hrs), Av.1 °F (36.7 °C), Min:97.5 °F (36.4 °C), Max:98.8 °F (37.1 °C)      Intake/Output Summary (Last 24 hours) at 10/14/2019 0930  Last data filed at 10/14/2019 0034  Gross per 24 hour   Intake    Output 900 ml   Net -900 ml     Tele: sinus  General:  Cooperative, Not in acute distress, speaks in short sentence while in bed  HEENT: PERRL, EOMI, supple neck, no JVD, dry oral mucosa  Cardiovascular: S1S2 regular, no rub/gallop   Pulmonary: air entry bilaterally, no wheezing, ++ crackle  GI:  Soft, non tender, non distended, +bs, no guarding   Extremities:  No pedal edema, +distal pulses appreciated   Neuro: AOx3, moving all extremities,      Additional:       Current Facility-Administered Medications   Medication Dose Route Frequency    [START ON 10/15/2019] furosemide (LASIX) injection 40 mg  40 mg IntraVENous DAILY    vit B12-levomefolate calcium-vit B6 (FOLBIC RF, FOLTX) 2-1.13-25 mg tablet 1 Tab  1 Tab Oral DAILY    therapeutic multivitamin (THERAGRAN) tablet 1 Tab  1 Tab Oral DAILY    thiamine HCL (B-1) tablet 50 mg  50 mg Oral DAILY    metoprolol succinate (TOPROL-XL) XL tablet 25 mg  25 mg Oral DAILY    losartan (COZAAR) tablet 50 mg  50 mg Oral DAILY    aspirin delayed-release tablet 81 mg  81 mg Oral DAILY    clopidogrel (PLAVIX) tablet 75 mg  75 mg Oral DAILY    nitroglycerin (NITROSTAT) tablet 0.4 mg  0.4 mg SubLINGual X5VRI PRN    folic acid (FOLVITE) tablet 1 mg  1 mg Oral BID    albuterol-ipratropium (DUO-NEB) 2.5 MG-0.5 MG/3 ML  3 mL Nebulization Q6H RT    atorvastatin (LIPITOR) tablet 40 mg  40 mg Oral QHS    amiodarone (CORDARONE) tablet 200 mg  200 mg Oral Q12H    cyanocobalamin tablet 1,000 mcg  1,000 mcg Oral DAILY    albuterol (PROVENTIL VENTOLIN) nebulizer solution 2.5 mg  2.5 mg Nebulization Q4H PRN    sodium chloride (NS) flush 5-40 mL  5-40 mL IntraVENous Q8H    sodium chloride (NS) flush 5-40 mL  5-40 mL IntraVENous PRN    naloxone (NARCAN) injection 0.4 mg  0.4 mg IntraVENous PRN    diphenhydrAMINE (BENADRYL) injection 25 mg  25 mg IntraVENous Q4H PRN    ondansetron (ZOFRAN) injection 4 mg  4 mg IntraVENous Q4H PRN    heparin (porcine) injection 5,000 Units  5,000 Units SubCUTAneous Q8H    influenza vaccine 2019-20 (6 mos+)(PF) (FLUARIX/FLULAVAL/FLUZONE QUAD) injection 0.5 mL  0.5 mL IntraMUSCular PRIOR TO DISCHARGE    isosorbide mononitrate ER (IMDUR) tablet 60 mg  60 mg Oral 7am    budesonide (PULMICORT) 500 mcg/2 ml nebulizer suspension  500 mcg Nebulization BID RT    guaiFENesin-codeine (ROBITUSSIN AC) 100-10 mg/5 mL solution 10 mL  10 mL Oral Q4H PRN    benzonatate (TESSALON) capsule 100 mg  100 mg Oral TID PRN    pantoprazole (PROTONIX) tablet 40 mg  40 mg Oral ACB&D    benzocaine-menthol (CEPACOL) lozenge 1 Lozenge  1 Lozenge Mucous Membrane PRN            Lab/Data Review:  Labs: Results:       Chemistry Recent Labs     10/14/19  0357 10/13/19  0445 10/11/19  1319   GLU 88 196* 95    134* 137   K 4.2 4.0 4.5    104 106   CO2 28 21 25   BUN 12 13 12   CREA 1.16 1.19 1.03   BUCR 10* 11* 12   AGAP 7 9 6   CA 8.8 8.7 9.0     Recent Labs     10/13/19  0445   ALT 18   SGOT 24   TP 7.2   ALB 2.6*   GLOB 4.6*   AGRAT 0.6*      CBC w/Diff Recent Labs     10/13/19  0445   WBC 7.9   RBC 3.43*   HGB 11.5*   HCT 34.1*   MCV 99.4*   MCH 33.5   MCHC 33.7   RDW 14.3   *   GRANS 64   LYMPH 28   EOS 1      Coagulation No results for input(s): PTP, INR, APTT, INREXT, INREXT in the last 72 hours.     Iron/Ferritin Lab Results   Component Value Date/Time    Iron 62 09/25/2019 07:03 AM    TIBC 174 (L) 09/25/2019 07:03 AM    Iron % saturation 36 09/25/2019 07:03 AM    Ferritin 1,367 (H) 09/25/2019 07:03 AM       BNP Cardiac Enzymes Lab Results   Component Value Date/Time    CK 38 (L) 10/13/2019 08:04 PM    CK - MB 2.4 10/13/2019 08:04 PM    CK-MB Index 6.3 (H) 10/13/2019 08:04 PM    Troponin-I, QT <0.02 10/13/2019 08:04 PM        Lactic Acid    Thyroid Studies          All Micro Results     None            Images:    CT (Most Recent). CT Results (most recent):  Results from Hospital Encounter encounter on 10/13/19   CT CHEST WO CONT    Narrative CT chest without IV contrast    HISTORY: CHF exacerbation. All CT scans at this facility are performed using dose optimization technique as  appropriate to a performed exam, to include automated exposure control,  adjustment of the mA and/or kV according to patient size (including appropriate  matching for site specific examination) or use of iterative reconstruction  technique. Comparison CT September 24, 2019. Increasing to large right pleural effusion. More severe infiltrate/edema in the  right upper lobe, right lower lobe and also in the right middle lobe. Underlying emphysema. Fascial thickening and scarring in the left upper lobe,  stable. Small left upper lateral collateral complex collection. No change in the  left pleural effusion in the lower chest. Cystic changes in the left lung base. Slightly worse edema base. Aortic calcification. Cardiomegaly with no significant pericardial effusion. Upper abdomen shows cholelithiasis. No ascites or free air. Impression IMPRESSION:  1. Worsening right lung infiltrate/edema and increasing right pleural effusion. Slightly worse edema at the left lung base. 2. Cardiomegaly with no pericardial effusion. 3. Cholelithiasis. XRAY (Most Recent)      EKG Results for orders placed or performed in visit on 02/08/19   AMB POC EKG ROUTINE W/ 12 LEADS, INTER & REP     Status: None    Narrative    Normal sinus rhythm rate 68. Left atrial enlargement. Incomplete left bundle branch block.   Old inferior wall myocardial infarction. Some mild T wave inversions inferolaterally which could reflect ischemia. Compared to the EKG of January 17, 2019 there was little interval change.         2D ECHO

## 2019-10-14 NOTE — PROGRESS NOTES
ADULT PROTOCOL: JET AEROSOL ASSESSMENT    Patient  Matt Mcdonald     67 y.o.   male     10/14/2019  6:53 PM    Breath Sounds Pre Procedure:  Breath Sounds Bilateral: Upper, Clear, Lower, Crackles              Left Breath Sounds: Crackles(slight)                        Right Breath Sounds: Clear    Breath Sounds Post Procedure: Breath Sounds Bilateral: Upper, Clear, Lower, Crackles                          Left Breath Sounds: Clear               Right Breath Sounds: Crackles(slight)    Breathing pattern: Pre procedure  Breathing Pattern: Regular          Post procedure  Breathing Pattern: Regular    Cough: Pre procedure  Cough: Non-productive               Post procedure Cough: Non-productive    Heart Rate: Pre procedure Pulse: 63           Post procedure Pulse: 66    Resp Rate: Pre procedure  Respirations: 12           Post procedure          Nebulizer Therapy: Current medications Aerosolized Medications: DuoNeb       Problem List:   Patient Active Problem List   Diagnosis Code    Tobacco abuse Z72.0    Hypercholesterolemia E78.00    CAD (coronary artery disease), native coronary artery I25.10    Hypertensive heart disease with CHF ([facility] I11.0    Emphysematous COPD ([facility] J43.9    Alcohol abuse F10.10    Paresthesia and pain of both upper extremities R20.2, M79.601, M79.602    Binocular visual disturbance H53.30    Unspecified hereditary and idiopathic peripheral neuropathy G60.9    Diverticula of colon K57.30    S/P CABG x 5 Z95.1    Congestive heart failure (CHF) (HCC) I50.9    Wide-complex tachycardia (HCC) I47.2    Acute on chronic combined systolic and diastolic ACC/AHA stage C congestive heart failure (HCC) I50.43    CHF exacerbation (HCC) I50.9    AICD (automatic cardioverter/defibrillator) present Z95.810       Patient alert and cooperative to use MDI: Yes    Home Respiratory Therapy Regimen/Frequency:  YES  Medication albuterol  Device MDI  Frequency PRN    SEVERITY INDEX:    ITEM 0 1 2 3 4 Score   Respiratory Pattern and or Rate Regular  10-19 Regular  20-24   24-30    30-34 Severe SOB or   Greater than 35 0   Breath Sounds Clear Occasional Wheeze Mild Wheezing Moderate Wheezing  wheezing/Absent breath sounds 0   Shortness of Breath None Dyspnea on Exertion Dyspnea at Rest Moderate Shortness of Breath at Rest Severe Shortness of Breath - Limited Speech 1       Total Score:  1    * Scoring Guidelines  0-4 pts:  PRN-BID   5-7 pts:  BID, TID, QID  8-9 pts:  TID, QID, Q6  10-12 pts:  Q4-Q6  * - Guidelines used with clinical judgement. PRN Treatments can be ordered to supplement scheduled treatments. Regardless of score, frequency should not be less than normal home regimen.     Recommended Order/Frequency:  TID & PRN    Comments:          Respiratory Therapist: Shannan Donis RT

## 2019-10-14 NOTE — ROUTINE PROCESS
Bedside and Verbal shift change report given to [facility] RN (oncoming nurse) by Tamara Baird RN (offgoing nurse). Report given with SBAR, Kardex, Intake/Output, MAR, Accordion and Recent Results.

## 2019-10-14 NOTE — CONSULTS
[facility]  Pulmonary, Critical Care, and Sleep Medicine    Initial Patient Consult    Name: Pamella Perez MRN: [id number]   : 1940 Hospital: [facility]   Date: 10/14/2019        IMPRESSION:   Acute on chronic combined systolic and diastolic CHF: Patient was recently hospitalized with CHF exacerbation- Systolic heart failure, however due to creatinine elevation patient was not discharged on diuretics. Patient has new hypoxia and ongoing dyspnea, as well as bilateral lower lobe rales, and CT scan showing significant pleural effusions and pulmonary edema, consistent with CHF exacerbation. CT scan today with 1. Worsening right lung infiltrate/edema and increasing right pleural effusion. Slightly worse edema at the left lung base. 2. Cardiomegaly with no pericardial effusion. Acute hypoxia: Etiology due to above  COPD, gold risk category B, gold stage I: No evidence of COPD exacerbation, symptoms are stable  Lung nodules: Seen on lung cancer screening CT from 18, subcentimeter, largest was noted to be 4 mm. Not visualized on CTA from recent hospitalization, due to pulmonary edema. Repeat CT scan planned for 2019  Non-CF bronchiectasis: COPD secondary to  Chronic cough etiology most likely due to above but also has a component of bronchiectasis  COPD cachexia: Body mass index is 17.67 kg/m². with albumin of 2.5  Hx of CAD s/p CABG, AICD  Chronic GERD with esophageal thickening on last CT scan:  Pt seen by GI, planned for EGD but cancelled since pt not cleared to come off of Plavix per Cardiology  History of tobacco use: Prior 0.75 pack/day smoker, 50 total pack-year smoking history. Patient quit after discharge for most recent hospitalization on 10/1/2019. RECOMMENDATIONS:   · Oxygen- supplement to maintain SaO2 >92%  · Pt still with significant bibasilar crackles, will repeat CXR  · Cr stable, would recommend continuing lasix at discharge.    · Needs home O2 evaluation prior to discharge  · Continue pulmicort, will add spiriva- On home Stiolto, to be restarted on discharge  · Duonebs prn  · Cardiology Consulted  · Aspiration precautions  · Out patient testing- Polysomnogram scheduled  · Follows with our group- Dr. Earl Lizarraga  · OT, PT, OOB and ambulate  · Healthy weight  · Will Follow  · DVT, PUD prophylaxis     Subjective:     10/14 update  - Pt doing well on NC. States his breathing is better. Not documenting UOP with diuretics. No chest pain, no cough. HPI:    This patient has been seen and evaluated at the request of Esme Gerber  for Acute Hypoxic Respiratory failure. Patient is a 67 y.o. male with HTN, CAD with CABG, chronic BiV heart failure last EF 15-20% recent ICD placed, COPD, active tobacco smoker, hyperlipidemia, chronic alcoholism, on chronic doxycycline for eye infection who presents with shortness of breath. Patient woke up from sleep just now quite short of breath, he was not lying completely flat, he was just discharged from a similar event. No chest pain, but has been having a wet cough, feeling like he has a rattle in his chest, no fevers noted, no leg swelling noted. Has been taking all of his medications as instructed  He was scheduled for Sleep study tonight at [facility] Sleep lab. Patient was last seen in our clinic on 5/15/2019 and more recently on 10/11/2019 by Dr. Earl Lizarraga. In the interval, pt had a CHF exacerbation requiring hospitalization to [facility] from 9/24/2019 through 10/1/2019. Patient reports that he was diuresed during this hospitalization, however diuretics were held due to abnormal renal function. Patient reports he was on oxygen during the entire hospitalization, but was not discharged on oxygen. Since discharge, patient reports that his breathing is not at baseline. He reports dyspnea with mild to moderate exertion.   His symptoms are better than when he had to go to the hospital, however he notes that he still is very limited. He reports only being able to walk less than 1 block, unable to climb 1 flight of steps before having to stop. Alleviated with rest, no other modifying factors. Patient reports he is quit smoking in the interval.  Patient reports that he saw his PCP, Dr. Jigar Soriano, who prescribed amoxicillin. Patient denies any worsening cough, purulent sputum, fevers, chills, night sweats. Patient reports chronic sputum of white color. Patient does report excessive daytime sleepiness, reports having to take a nap during the daytime, very sleepy. Patient reports he has been told he snores occasionally. Pt denies any wt loss, appetite changes, hemoptysis, chest pain/angina, night sweats, LE swelling, fevers, chills, night sweats, N/V/D  I have reviewed all of the available data including the patient's previous history external records and radiological imaging available for review. In addition applicable cardiology and other lab data were also reviewed. Past Medical History:   Diagnosis Date    AICD (automatic cardioverter/defibrillator) present 10/13/2019    Alcohol abuse     quit February 2011    Binocular visual disturbance 10/31/2013    CAD (coronary artery disease)     CAD (coronary artery disease), native coronary artery 1/3/05    LAD 70-80% mid; Cx-diffuse 80%; OM1-70% prox; RCA 40-50% mid; 100% RPLB with collaterals    Cardiac cath 08/19/2013    Severe, diffuse calcification. dLM 30%. mLAD 80%. o/pD1 80%. o/pRamus 80%. dCX 85%. dRCA 80%.  m-dRPLB 75%. EF 50%. Mod to severe inferobasal hypk.  Cardiac echocardiogram 01/19/2011    EF 50-55%. Gr 1 DDfx. RVSP 30 mmHg.  Cardiac nuclear imaging test 06/27/2011    No evidence of ischemia or previous infarction. EF 60%. Neg EKG on max EST. Ex time 6 mins.     Chronic kidney disease     Chronic lung disease     Chronic obstructive pulmonary disease (HCC)     Coronary artery disease     Diastolic CHF, chronic ([facility]     LV EF 55% (Echo Jan 2011)    Diverticula of colon 12/17/2013    Emphysematous COPD ([facility]     PFTs March 2009; mild-mode obstruction, + bronchodil response; decreased DLCO    GERD (gastroesophageal reflux disease)     with erosive esophagitis history    Heart failure ([facility]     History of renal failure     2 years ago, which completely resolved    Hypercholesterolemia     Hypertension     Hypertensive heart disease with CHF ([facility]     MI (myocardial infarction) ([facility]     Inferior wall    Paresthesia and pain of both upper extremities 10/31/2013    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 Cuong Winter, 4/9/14.  Tobacco abuse     Unspecified hereditary and idiopathic peripheral neuropathy 12/4/2013      Past Surgical History:   Procedure Laterality Date    HX CORONARY ARTERY BYPASS GRAFT  about 2013    HX HEART CATHETERIZATION  11/2013    MO INSJ/RPLCMT PERM DFB W/TRNSVNS LDS 1/DUAL CHMBR Left 9/30/2019    INSERT ICD DUAL performed by Isacc Betlran MD at [facility] CATH LAB      Prior to Admission medications    Medication Sig Start Date End Date Taking? Authorizing Provider   cyanocobalamin 1,000 mcg tablet Take 1,000 mcg by mouth daily. 1 pill po daily  Indications: Prevention of Vitamin B12 Deficiency   Yes Provider, Historical   pantoprazole (PROTONIX) 40 mg tablet Take 40 mg by mouth daily. 1 pill daily 30 minutes before breakfast.  Indications: indigestion   Yes Provider, Historical   amiodarone (CORDARONE) 200 mg tablet Take 1 Tab by mouth every twelve (12) hours. 10/1/19  Yes Naga Lux MD   atorvastatin (LIPITOR) 40 mg tablet Take 40 mg by mouth nightly.  1 pill daily   Yes Provider, Historical   tiotropium-olodaterol (STIOLTO RESPIMAT) 2.5-2.5 mcg/actuation inhaler Take 2 Puffs by inhalation daily. 5/15/19  Yes Meg Lindsay MD   folic acid (FOLVITE) 1 mg tablet Take 1 mg by mouth two (2) times a day. 1 pill po daily   Yes Provider, Historical   clopidogrel (PLAVIX) 75 mg tab Take 1 Tab by mouth daily. 2/8/19  Yes Meri Roa, DO   isosorbide mononitrate ER (IMDUR) 30 mg tablet Take 1 Tab by mouth every morning. 2/8/19  Yes Meri Roa, DO   nitroglycerin (NITROSTAT) 0.4 mg SL tablet 1 Tab by SubLINGual route every five (5) minutes as needed for Chest Pain. Up to 3 doses. 2/8/19  Yes Meri Roa, DO   aspirin delayed-release 81 mg tablet Take 81 mg by mouth daily. 1 pill po daily   Yes Provider, Historical   losartan (COZAAR) 50 mg tablet Take 50 mg by mouth daily. 1 pill po daily 12/4/18  Yes Provider, Historical   metoprolol succinate (TOPROL-XL) 25 mg XL tablet Take 25 mg by mouth daily. 1 pill po daily 9/13/17  Yes Provider, Historical   thiamine (VITAMIN B-1) 50 mg tablet Take 50 mg by mouth daily. 1 pill po daily   Yes Provider, Historical   albuterol (VENTOLIN HFA) 90 mcg/Actuation inhaler Take 1 Puff by inhalation as needed. Yes Provider, Historical   CYANOCOBALAMIN/FA/PYRIDOXINE (FOLBEE PO) Take 1 Tab by mouth daily. 3/23/11  Yes Provider, Historical   multivitamin (ONE A DAY) tablet Take 1 Tab by mouth daily. Yes Provider, Historical   amoxicillin (AMOXIL) 500 mg capsule Take 500 mg by mouth three (3) times daily. Take 1 capsule by mouth thre times a daily 10/8/19 10/15/19  Chavez Campos MD   PSEUDOEPHEDRINE/CPM/METHYLSCOP (ALLERGY AM-PM PO) Take  by mouth daily as needed. Provider, Historical   Ibuprofen-Diphenhydramine (ADVIL PM) 200-38 mg Tab Take 200 mg by mouth as needed for Pain.  1 pill po q 6 hours as needed for pain   6/22/11   Provider, Historical     Allergies   Allergen Reactions    Vicodin [Hydrocodone-Acetaminophen] Rash      Social History     Tobacco Use    Smoking status: Current Every Day Smoker Packs/day: 0.50     Years: 55.00     Pack years: 27.50     Types: Cigarettes     Start date: 3/13/2014    Smokeless tobacco: Never Used   Substance Use Topics    Alcohol use: Yes     Comment: every other day drinker      Family History   Problem Relation Age of Onset    Heart Disease Maternal Uncle     Diabetes Father     Hypertension Father       Current Facility-Administered Medications   Medication Dose Route Frequency    [START ON 10/15/2019] furosemide (LASIX) injection 40 mg  40 mg IntraVENous DAILY    vit B12-levomefolate calcium-vit B6 (FOLBIC RF, FOLTX) 2-1.13-25 mg tablet 1 Tab  1 Tab Oral DAILY    therapeutic multivitamin (THERAGRAN) tablet 1 Tab  1 Tab Oral DAILY    thiamine HCL (B-1) tablet 50 mg  50 mg Oral DAILY    metoprolol succinate (TOPROL-XL) XL tablet 25 mg  25 mg Oral DAILY    losartan (COZAAR) tablet 50 mg  50 mg Oral DAILY    aspirin delayed-release tablet 81 mg  81 mg Oral DAILY    clopidogrel (PLAVIX) tablet 75 mg  75 mg Oral DAILY    folic acid (FOLVITE) tablet 1 mg  1 mg Oral BID    albuterol-ipratropium (DUO-NEB) 2.5 MG-0.5 MG/3 ML  3 mL Nebulization Q6H RT    atorvastatin (LIPITOR) tablet 40 mg  40 mg Oral QHS    amiodarone (CORDARONE) tablet 200 mg  200 mg Oral Q12H    cyanocobalamin tablet 1,000 mcg  1,000 mcg Oral DAILY    sodium chloride (NS) flush 5-40 mL  5-40 mL IntraVENous Q8H    heparin (porcine) injection 5,000 Units  5,000 Units SubCUTAneous Q8H    influenza vaccine 2019-20 (6 mos+)(PF) (FLUARIX/FLULAVAL/FLUZONE QUAD) injection 0.5 mL  0.5 mL IntraMUSCular PRIOR TO DISCHARGE    isosorbide mononitrate ER (IMDUR) tablet 60 mg  60 mg Oral 7am    budesonide (PULMICORT) 500 mcg/2 ml nebulizer suspension  500 mcg Nebulization BID RT    pantoprazole (PROTONIX) tablet 40 mg  40 mg Oral ACB&D       Review of Systems:  A comprehensive review of systems was negative except for that written in the HPI.     Objective:   Vital Signs:    Visit Vitals  /75 (BP 1 Location: Left arm, BP Patient Position: At rest)   Pulse 65   Temp 98.4 °F (36.9 °C)   Resp 18   Ht 5' 5\" (1.651 m)   Wt 47.6 kg (105 lb)   SpO2 99%   BMI 17.47 kg/m²       O2 Device: Nasal cannula   O2 Flow Rate (L/min): 2 l/min(Weaned from 3)   Temp (24hrs), Av.1 °F (36.7 °C), Min:97.5 °F (36.4 °C), Max:98.8 °F (37.1 °C)       Intake/Output:   Last shift:      No intake/output data recorded. Last 3 shifts: 10/12 1901 - 10/14 0700  In: -   Out: 1505 [Urine:1505]    Intake/Output Summary (Last 24 hours) at 10/14/2019 1018  Last data filed at 10/14/2019 0034  Gross per 24 hour   Intake    Output 900 ml   Net -900 ml      Physical Exam:   General:  Alert, cooperative, no distress, appears stated age. Cachetic   Lungs:   Rales at the bases, no wheezing. Chest wall:  No tenderness or deformity. Left infraclavicular AICD   Heart:  Regular rate and rhythm, S1, S2 normal, no murmur, click, rub or gallop. Abdomen:   Soft, non-tender. Bowel sounds normal. No masses,  No organomegaly. Extremities: Extremities normal, atraumatic, no cyanosis or edema. Pulses: 2+ and symmetric all extremities.    Neurologic: Grossly nonfocal     Data review:     Recent Results (from the past 24 hour(s))   CARDIAC PANEL,(CK, CKMB & TROPONIN)    Collection Time: 10/13/19 10:42 AM   Result Value Ref Range    CK 44 39 - 308 U/L    CK - MB 2.9 <3.6 ng/ml    CK-MB Index 6.6 (H) 0.0 - 4.0 %    Troponin-I, QT 0.02 0.0 - 0.045 NG/ML   CARDIAC PANEL,(CK, CKMB & TROPONIN)    Collection Time: 10/13/19  2:20 PM   Result Value Ref Range    CK 46 39 - 308 U/L    CK - MB 3.1 <3.6 ng/ml    CK-MB Index 6.7 (H) 0.0 - 4.0 %    Troponin-I, QT <0.02 0.0 - 0.045 NG/ML   CARDIAC PANEL,(CK, CKMB & TROPONIN)    Collection Time: 10/13/19  8:04 PM   Result Value Ref Range    CK 38 (L) 39 - 308 U/L    CK - MB 2.4 <3.6 ng/ml    CK-MB Index 6.3 (H) 0.0 - 4.0 %    Troponin-I, QT <0.02 0.0 - 0.416 NG/ML   METABOLIC PANEL, BASIC    Collection Time: 10/14/19  3:57 AM   Result Value Ref Range    Sodium 140 136 - 145 mmol/L    Potassium 4.2 3.5 - 5.5 mmol/L    Chloride 105 100 - 111 mmol/L    CO2 28 21 - 32 mmol/L    Anion gap 7 3.0 - 18 mmol/L    Glucose 88 74 - 99 mg/dL    BUN 12 7.0 - 18 MG/DL    Creatinine 1.16 0.6 - 1.3 MG/DL    BUN/Creatinine ratio 10 (L) 12 - 20      GFR est AA >60 >60 ml/min/1.73m2    GFR est non-AA >60 >60 ml/min/1.73m2    Calcium 8.8 8.5 - 10.1 MG/DL     PFT :5/15/2019    Normal Flows except for reduced FEF 25-75 to 63% predicted, DLCO 42% predicted    Imaging:  I have personally reviewed the patients radiographs and have reviewed the reports:  XR Results (most recent):  Results from Hospital Encounter encounter on 10/13/19   XR CHEST PORT    Narrative AP CHEST, PORTABLE    CPT CODE: 99769    INDICATION: Above. Pulmonary edema/effusions. COMPARISON: 10/1/2019. TECHNIQUE: Portable AP chest radiograph is reviewed. FINDINGS:    Large region of consolidative opacity in the right perihilar and upper lung,  markedly increased compared to the prior radiograph. Otherwise mild to moderate  diffuse interstitial and airspace opacity bilaterally, slightly increased  compared to the prior radiograph. Blunting of the costophrenic sulci bilaterally  consistent was small pleural effusions, slightly smaller appearing on the left  compared to the prior radiograph. Mild left greater than right apical pleural  thickening, appears chronic since at least 2014, likely scarring. No evidence of  pneumothorax. Skin fold noted. The cardiac silhouette is within normal limits in size  for technique. Aortic  atherosclerotic calcification noted best seen at the level of the aortic arch. Median sternotomy wires and surgical clips project over the mediastinum. Dual-lead cardiac pacer/ICD is again seen. EKG leads/wires overlie the patient.        Impression IMPRESSION:     Interval decrease in aeration with large consolidative airspace disease in the  right perihilar and upper lung in addition to mild to moderate diffuse  interstitial and airspace opacity. Bilateral pleural effusions. CT Results (most recent):  Results from Hospital Encounter encounter on 10/13/19   CT CHEST WO CONT    Narrative CT chest without IV contrast    HISTORY: CHF exacerbation. All CT scans at this facility are performed using dose optimization technique as  appropriate to a performed exam, to include automated exposure control,  adjustment of the mA and/or kV according to patient size (including appropriate  matching for site specific examination) or use of iterative reconstruction  technique. Comparison CT September 24, 2019. Increasing to large right pleural effusion. More severe infiltrate/edema in the  right upper lobe, right lower lobe and also in the right middle lobe. Underlying emphysema. Fascial thickening and scarring in the left upper lobe,  stable. Small left upper lateral collateral complex collection. No change in the  left pleural effusion in the lower chest. Cystic changes in the left lung base. Slightly worse edema base. Aortic calcification. Cardiomegaly with no significant pericardial effusion. Upper abdomen shows cholelithiasis. No ascites or free air. Impression IMPRESSION:  1. Worsening right lung infiltrate/edema and increasing right pleural effusion. Slightly worse edema at the left lung base. 2. Cardiomegaly with no pericardial effusion. 3. Cholelithiasis. 09/24/19   ECHO ADULT FOLLOW-UP OR LIMITED 09/25/2019 9/25/2019    Narrative · Left Ventricle: Normal cavity size and wall thickness. Severe systolic   dysfunction. Estimated left ventricular ejection fraction is 16 - 20%. Visually measured ejection fraction. Abnormal left ventricular wall   motion. · Mitral Valve: Mild mitral valve regurgitation is present. · Tricuspid Valve: Mild tricuspid valve regurgitation is present.   · Pulmonic Valve: Mild pulmonic valve regurgitation is present. · Aortic Valve: Mild aortic valve regurgitation is present. · Pulmonary Artery: Pulmonary arterial systolic pressure is 33 mmHg. · IVC/Hepatic Veins: Mildly elevated central venous pressure (5-10 mmHg);   IVC diameter is less than 21 mm and collapses less than 50% with   respiration. · This was a limited echo, but EF has significant decreased compared to   prior study. Signed by: Radha Anderson DO         Complex decision making was made in the evaluation and management plans during this consultation. More than 50% of time was spent in counseling and coordination of care including review of data and discussion with other team members.          Sharee Kelley MD

## 2019-10-14 NOTE — CDMP QUERY
Pt admitted with  acute  CHF exacerbation. . If possible, please document in the progress notes and d/c summary if you are evaluating and / or treating any of the following: ? Acute respiratory failure 
 o With hypoxia 
 
 o? Acute on chronic respiratory failure 
 o With hypoxia 
 o With hypercapnia 
 
? Other, please specify ? Clinically unable to determine The medical record reflects the following: 
 
   Risk Factors:   HTN;  COPD;   CHF Clinical Indicators:   res p  rate  27-  36  in ER;   pulse ox  87%  In  ER Treatment:  Nasal 02 initially placed  bringing sat up to only  92%   then BIPAP   started. Acute Respiratory Failure indicators include: - Respirations <12 or >25 - [employer] - Use of accessory muscles of respiration  
- Inability to speak in full sentences - Cyanosis AND 
 
- Pulse ox <90% RA or <95% on O2  
- pH <7.35 or >7.45  
- pO2 < 60 mm Hg (or 10mm below COPD patient's baseline) - pCO2 >50mm Hg (or 10mm above COPD patient's baseline) Thank you,   Arline Grayson RN   CCDS  x [phone]

## 2019-10-14 NOTE — PROGRESS NOTES
Reason for Admission:  CHF exacerbation ([facility] [address] [address] [I50.9]                 RRAT Score:    20            Plan for utilizing home health:    Yes. Pt is active with [facility]                      Likelihood of Readmission:   Moderate                         Do you (patient/family) have any concerns for transition/discharge?  no    Transition of Care Plan:       Initial assessment completed with patient. Cognitive status of patient: oriented to time, place, person and situation. Face sheet information confirmed:  yes. The patient designates his daughter Elyssa Coy and ex wife Britany Gan to participate in his discharge plan and to receive any needed information. This patient lives in a  home with his niece and nephew. Patient is able to navigate steps as needed. Prior to hospitalization, patient was considered to be independent with ADLs/IADLS : yes . Patient has a current ACP document on file: yes  The niece Melissa Cates will be available to transport patient home upon discharge. The patient already has [employer] medical equipment available in the home. Per pt, he does not use DME at home    Patient is currently active with home health. If active, agency name is [facility]. Patient has stayed in a skilled nursing facility or rehab. Was  stay within last 60 days : no. This patient is on dialysis :no   Currently, the discharge plan is Home with Home Health. Pt will need order to resume home health    The patient states that he can obtain his medications from the pharmacy, and take his medications as directed. Patient's current insurance is [employer] Medicare,       Care Management Interventions  PCP Verified by CM: Yes(per pt, he saw his pcp last week)  Palliative Care Criteria Met (RRAT>21 & CHF Dx)?: No  Mode of Transport at Discharge:  Other (see comment)(family)  Transition of Care Consult (CM Consult): Discharge Planning  Physical Therapy Consult: No  Occupational Therapy Consult: No  Speech Therapy Consult: No  Current Support Network:  Other(pt's niece and nephew lives with him)  Confirm Follow Up Transport: Family  Plan discussed with Pt/Family/Caregiver: Yes  Discharge Location  Discharge Placement: Home with home health        NICOLAS Martin RN  Care Management  Pager: [phone]

## 2019-10-14 NOTE — PROGRESS NOTES
[facility]  -  Progress Note      Patient: Matt Mcdonald MRN: [id number]  SSN: [id number]    YOB: 1940  Age: 67 y.o. Sex: male      Admit Date: 10/13/2019    Assessment:     Hospital Problems  Date Reviewed: 10/13/2019          Codes Class Noted POA    AICD (automatic cardioverter/defibrillator) present (Chronic) ICD-10-CM: Z95.810  ICD-9-CM: V45.02  10/13/2019 Yes        * (Principal) Acute on chronic combined systolic and diastolic ACC/AHA stage C congestive heart failure ([facility] [address] ICD-10-CM: I50.43  ICD-9-CM: 428.43, 428.0  9/24/2019 Yes        S/P CABG x 5 (Chronic) ICD-10-CM: Z95.1  ICD-9-CM: V45.81  4/10/2014 Yes    Overview Signed 4/10/2014  8:08 AM by ROBIN Champion to LAD, and a sequential saphenous vein graft to the 1st diagonal of the LAD and to the ramus intermedius, and another sequential saphenous vein graft to the posterior descending branch and the posterolateral branch of the RCA, Dr. Caterina Glover, 4/9/14. Emphysematous COPD ([facility] [address] (Chronic) ICD-10-CM: J43.9  ICD-9-CM: 492.8  Unknown Yes    Overview Addendum 6/22/2011 12:34 PM by Chapin Monk V     PFTs March 2009: mild-moderate obstruction; + bronchodil. Response; decreased DLCO                 -Presented with shortness of breath, and elevated BNP in the >20,000 range. Hypertensive as well which is likely reason for the failure. He is starting to feel better already with the O2 on.  Will continue to mildly diurese.  -Echo 9/25/2019: EF 16-20% with akinetic basal inferior, basal inferolateral, mid inferior, mid inferolateral, apical anterior, apical septal, apical inferior, apical lateral and apical walls  -Echo 1/24/2019: EF 46-50% with hypokinetic basal inferior, basal inferolateral, mid inferior, mid inferolateral and apical inferior wall segments, grade 2 diastolic dysfunction  -CAD, Cardiac cath 2/1/19 with following findings:   · Severe native CAD with distal left main heavily calcified lesion   · Patent LIMA-LAD  · 2 sequential vein grafts (all 4 vein grafts to diagonal, OM, RPDA, RPL branch) occluded at aortotomy site  · Subtotal mid RCA occludion with NICO 0-I flow, some collaterals from left coronary system  · Native diagonal 85% stenosis  · Native OM 85% stenosis  -Elevated troponin  -Wide-complex tachycardia, most consistent with bundle branch VT  -Hypomagnesemia, resolved  -HTN  -Hypercholesterolemia  -Hx tobacco and alcohol abuse  -Previously DNR status, now full code.     Primary cardiologist is Dr. Jaden Ro    Plan:     Stable with improving symptoms  Will continue with IV Lasix but just once daily and then switch to po tomorrow. Dr. Ward Hem to have conversation with patient regarding his CAD. Continue with medication regimen. Subjective:     He states that his breathing is much better than yesterday. No pain.      Objective:      Patient Vitals for the past 8 hrs:   Temp Pulse Resp BP SpO2   10/14/19 0857     99 %   10/14/19 0812 98.4 °F (36.9 °C) 65 18 122/75 95 %   10/14/19 0412 98.3 °F (36.8 °C) 72 15 128/69 92 %         Patient Vitals for the past 96 hrs:   Weight   10/13/19 0443 47.6 kg (105 lb)         Intake/Output Summary (Last 24 hours) at 10/14/2019 0909  Last data filed at 10/14/2019 0034  Gross per 24 hour   Intake    Output 900 ml   Net -900 ml       Physical Exam:  General:  alert, cooperative, no distress, appears stated age  Neck:  nontender, no JVD  Lungs:  Mild crackles on R, clearer on L  Heart:  regular rate and rhythm, S1, S2 normal, no murmur, click, rub or gallop  Extremities:  extremities normal, atraumatic, no cyanosis or edema    Data Review:     Labs: Results:       Chemistry Recent Labs     10/14/19  0357 10/13/19  0445 10/11/19  1319   GLU 88 196* 95    134* 137   K 4.2 4.0 4.5    104 106   CO2 28 21 25   BUN 12 13 12   CREA 1.16 1.19 1.03   CA 8.8 8.7 9.0   AGAP 7 9 6   BUCR 10* 11* 12   AP  --  135*  --    TP  --  7.2  --    ALB --  2.6*  --    GLOB  --  4.6*  --    AGRAT  --  0.6*  --       CBC w/Diff Recent Labs     10/13/19  0445   WBC 7.9   RBC 3.43*   HGB 11.5*   HCT 34.1*   *   GRANS 64   LYMPH 28   EOS 1      Cardiac Enzymes Lab Results   Component Value Date/Time    CPK 38 (L) 10/13/2019 08:04 PM    CPK 46 10/13/2019 02:20 PM    CPK 44 10/13/2019 10:42 AM    CKMB 2.4 10/13/2019 08:04 PM    CKMB 3.1 10/13/2019 02:20 PM    CKMB 2.9 10/13/2019 10:42 AM    CKND1 6.3 (H) 10/13/2019 08:04 PM    CKND1 6.7 (H) 10/13/2019 02:20 PM    CKND1 6.6 (H) 10/13/2019 10:42 AM    TROIQ <0.02 10/13/2019 08:04 PM    TROIQ <0.02 10/13/2019 02:20 PM    TROIQ 0.02 10/13/2019 10:42 AM      Coagulation No results for input(s): PTP, INR, APTT, INREXT in the last 72 hours.     Lipid Panel Lab Results   Component Value Date/Time    Cholesterol, total 144 05/03/2019 10:26 AM    HDL Cholesterol 86 (H) 05/03/2019 10:26 AM    LDL, calculated 18 05/03/2019 10:26 AM    VLDL, calculated 40 05/03/2019 10:26 AM    Triglyceride 200 (H) 05/03/2019 10:26 AM    CHOL/HDL Ratio 1.7 05/03/2019 10:26 AM      BNP No results found for: BNP, BNPP, XBNPT   Liver Enzymes Recent Labs     10/13/19  0445   TP 7.2   ALB 2.6*   *   SGOT 24      Digoxin    Thyroid Studies Lab Results   Component Value Date/Time    TSH 4.31 (H) 09/24/2019 09:57 AM

## 2019-10-15 LAB
ANION GAP SERPL CALC-SCNC: 2 MMOL/L (ref 3–18)
BUN SERPL-MCNC: 18 MG/DL (ref 7–18)
BUN/CREAT SERPL: 15 (ref 12–20)
CALCIUM SERPL-MCNC: 8.4 MG/DL (ref 8.5–10.1)
CHLORIDE SERPL-SCNC: 102 MMOL/L (ref 100–111)
CO2 SERPL-SCNC: 30 MMOL/L (ref 21–32)
CREAT SERPL-MCNC: 1.24 MG/DL (ref 0.6–1.3)
ERYTHROCYTE [DISTWIDTH] IN BLOOD BY AUTOMATED COUNT: 14.8 % (ref 11.6–14.5)
GLUCOSE SERPL-MCNC: 90 MG/DL (ref 74–99)
HCT VFR BLD AUTO: 29.8 % (ref 36–48)
HGB BLD-MCNC: 10 G/DL (ref 13–16)
MAGNESIUM SERPL-MCNC: 1.5 MG/DL (ref 1.6–2.6)
MCH RBC QN AUTO: 33.6 PG (ref 24–34)
MCHC RBC AUTO-ENTMCNC: 33.6 G/DL (ref 31–37)
MCV RBC AUTO: 100 FL (ref 74–97)
PLATELET # BLD AUTO: 463 K/UL (ref 135–420)
PMV BLD AUTO: 9.2 FL (ref 9.2–11.8)
POTASSIUM SERPL-SCNC: 4.2 MMOL/L (ref 3.5–5.5)
RBC # BLD AUTO: 2.98 M/UL (ref 4.7–5.5)
SODIUM SERPL-SCNC: 134 MMOL/L (ref 136–145)
WBC # BLD AUTO: 8.1 K/UL (ref 4.6–13.2)

## 2019-10-15 PROCEDURE — 74011250637 HC RX REV CODE- 250/637: Performed by: PHYSICIAN ASSISTANT

## 2019-10-15 PROCEDURE — 65660000000 HC RM CCU STEPDOWN

## 2019-10-15 PROCEDURE — 80048 BASIC METABOLIC PNL TOTAL CA: CPT

## 2019-10-15 PROCEDURE — 36415 COLL VENOUS BLD VENIPUNCTURE: CPT

## 2019-10-15 PROCEDURE — 74011250636 HC RX REV CODE- 250/636: Performed by: INTERNAL MEDICINE

## 2019-10-15 PROCEDURE — 74011000250 HC RX REV CODE- 250: Performed by: INTERNAL MEDICINE

## 2019-10-15 PROCEDURE — 83735 ASSAY OF MAGNESIUM: CPT

## 2019-10-15 PROCEDURE — 3331090001 HH PPS REVENUE CREDIT

## 2019-10-15 PROCEDURE — 3331090002 HH PPS REVENUE DEBIT

## 2019-10-15 PROCEDURE — 94640 AIRWAY INHALATION TREATMENT: CPT

## 2019-10-15 PROCEDURE — 97161 PT EVAL LOW COMPLEX 20 MIN: CPT

## 2019-10-15 PROCEDURE — 94762 N-INVAS EAR/PLS OXIMTRY CONT: CPT

## 2019-10-15 PROCEDURE — 74011250637 HC RX REV CODE- 250/637: Performed by: INTERNAL MEDICINE

## 2019-10-15 PROCEDURE — 85027 COMPLETE CBC AUTOMATED: CPT

## 2019-10-15 PROCEDURE — 74011250637 HC RX REV CODE- 250/637: Performed by: HOSPITALIST

## 2019-10-15 PROCEDURE — 97165 OT EVAL LOW COMPLEX 30 MIN: CPT

## 2019-10-15 PROCEDURE — 74011250636 HC RX REV CODE- 250/636: Performed by: PHYSICIAN ASSISTANT

## 2019-10-15 RX ORDER — LANOLIN ALCOHOL/MO/W.PET/CERES
400 CREAM (GRAM) TOPICAL DAILY
Status: DISCONTINUED | OUTPATIENT
Start: 2019-10-15 | End: 2019-10-16 | Stop reason: HOSPADM

## 2019-10-15 RX ORDER — FUROSEMIDE 10 MG/ML
20 INJECTION INTRAMUSCULAR; INTRAVENOUS DAILY
Status: DISCONTINUED | OUTPATIENT
Start: 2019-10-15 | End: 2019-10-15

## 2019-10-15 RX ORDER — FLUTICASONE FUROATE AND VILANTEROL 100; 25 UG/1; UG/1
1 POWDER RESPIRATORY (INHALATION)
Status: DISCONTINUED | OUTPATIENT
Start: 2019-10-15 | End: 2019-10-15

## 2019-10-15 RX ORDER — FUROSEMIDE 20 MG/1
20 TABLET ORAL DAILY
Status: DISCONTINUED | OUTPATIENT
Start: 2019-10-16 | End: 2019-10-16 | Stop reason: HOSPADM

## 2019-10-15 RX ORDER — MAGNESIUM SULFATE HEPTAHYDRATE 40 MG/ML
2 INJECTION, SOLUTION INTRAVENOUS ONCE
Status: COMPLETED | OUTPATIENT
Start: 2019-10-15 | End: 2019-10-15

## 2019-10-15 RX ADMIN — PANTOPRAZOLE SODIUM 40 MG: 40 TABLET, DELAYED RELEASE ORAL at 09:27

## 2019-10-15 RX ADMIN — PANTOPRAZOLE SODIUM 40 MG: 40 TABLET, DELAYED RELEASE ORAL at 18:02

## 2019-10-15 RX ADMIN — Medication 1 TABLET: at 09:27

## 2019-10-15 RX ADMIN — LOSARTAN POTASSIUM 50 MG: 50 TABLET, FILM COATED ORAL at 09:27

## 2019-10-15 RX ADMIN — METOPROLOL SUCCINATE 25 MG: 25 TABLET, EXTENDED RELEASE ORAL at 09:27

## 2019-10-15 RX ADMIN — Medication 400 MG: at 18:02

## 2019-10-15 RX ADMIN — CYANOCOBALAMIN TAB 1000 MCG 1000 MCG: 1000 TAB at 09:27

## 2019-10-15 RX ADMIN — CLOPIDOGREL BISULFATE 75 MG: 75 TABLET ORAL at 09:27

## 2019-10-15 RX ADMIN — ATORVASTATIN CALCIUM 40 MG: 40 TABLET, FILM COATED ORAL at 21:34

## 2019-10-15 RX ADMIN — MAGNESIUM SULFATE 2 G: 2 INJECTION INTRAVENOUS at 10:39

## 2019-10-15 RX ADMIN — ASPIRIN 81 MG: 81 TABLET, COATED ORAL at 09:26

## 2019-10-15 RX ADMIN — Medication 50 MG: at 09:26

## 2019-10-15 RX ADMIN — ISOSORBIDE MONONITRATE 60 MG: 60 TABLET, EXTENDED RELEASE ORAL at 09:27

## 2019-10-15 RX ADMIN — AMIODARONE HYDROCHLORIDE 200 MG: 200 TABLET ORAL at 09:26

## 2019-10-15 RX ADMIN — TIOTROPIUM BROMIDE 18 MCG: 18 CAPSULE ORAL; RESPIRATORY (INHALATION) at 08:58

## 2019-10-15 RX ADMIN — FOLIC ACID 1 MG: 1 TABLET ORAL at 18:02

## 2019-10-15 RX ADMIN — Medication 10 ML: at 10:40

## 2019-10-15 RX ADMIN — GUAIFENESIN AND CODEINE PHOSPHATE 10 ML: 100; 10 SOLUTION ORAL at 21:37

## 2019-10-15 RX ADMIN — IPRATROPIUM BROMIDE AND ALBUTEROL SULFATE 3 ML: .5; 3 SOLUTION RESPIRATORY (INHALATION) at 08:47

## 2019-10-15 RX ADMIN — Medication 10 ML: at 18:03

## 2019-10-15 RX ADMIN — THERA TABS 1 TABLET: TAB at 09:27

## 2019-10-15 RX ADMIN — FOLIC ACID 1 MG: 1 TABLET ORAL at 09:27

## 2019-10-15 RX ADMIN — FUROSEMIDE 20 MG: 40 INJECTION, SOLUTION INTRAMUSCULAR; INTRAVENOUS at 09:26

## 2019-10-15 RX ADMIN — IPRATROPIUM BROMIDE AND ALBUTEROL SULFATE 3 ML: .5; 3 SOLUTION RESPIRATORY (INHALATION) at 20:10

## 2019-10-15 RX ADMIN — BUDESONIDE 500 MCG: 0.5 INHALANT RESPIRATORY (INHALATION) at 08:47

## 2019-10-15 RX ADMIN — AMIODARONE HYDROCHLORIDE 200 MG: 200 TABLET ORAL at 21:34

## 2019-10-15 NOTE — PROGRESS NOTES
Problem: Self Care Deficits Care Plan (Adult)  Goal: *Acute Goals and Plan of Care (Insert Text)  Outcome: Resolved/Met       OCCUPATIONAL THERAPY EVALUATION/DISCHARGE    Patient: Cyn Llamas (22 y.o. male)  Date: 10/15/2019  Primary Diagnosis: CHF exacerbation ([facility] [address] [I50.9]  Precautions: none listed    PLOF: he reports he is independent    ASSESSMENT AND RECOMMENDATIONS:  Based on the objective data described below, the patient presents with out functional deficit and feels he is at his baseline level of function; therefore, skilled occupational therapy is not indicated at this time. Discharge Recommendations: None  Further Equipment Recommendations for Discharge: N/A      SUBJECTIVE:   Patient stated I am ready to go home.     OBJECTIVE DATA SUMMARY:     Past Medical History:   Diagnosis Date    AICD (automatic cardioverter/defibrillator) present 10/13/2019    Alcohol abuse     quit February 2011    Binocular visual disturbance 10/31/2013    CAD (coronary artery disease)     CAD (coronary artery disease), native coronary artery 1/3/05    LAD 70-80% mid; Cx-diffuse 80%; OM1-70% prox; RCA 40-50% mid; 100% RPLB with collaterals    Cardiac cath 08/19/2013    Severe, diffuse calcification. dLM 30%. mLAD 80%. o/pD1 80%. o/pRamus 80%. dCX 85%. dRCA 80%.  m-dRPLB 75%. EF 50%. Mod to severe inferobasal hypk. Cardiac echocardiogram 01/19/2011    EF 50-55%. Gr 1 DDfx. RVSP 30 mmHg. Cardiac nuclear imaging test 06/27/2011    No evidence of ischemia or previous infarction. EF 60%. Neg EKG on max EST. Ex time 6 mins.     Chronic kidney disease     Chronic lung disease     Chronic obstructive pulmonary disease (HCC)     Coronary artery disease     Diastolic CHF, chronic (HCC)     LV EF 55% (Echo Jan 2011)    Diverticula of colon 12/17/2013    Emphysematous COPD ([facility] [address]     PFTs March 2009; mild-mode obstruction, + bronchodil response; decreased DLCO    GERD (gastroesophageal reflux disease) with erosive esophagitis history    Heart failure (HCC)     History of renal failure     2 years ago, which completely resolved    Hypercholesterolemia     Hypertension     Hypertensive heart disease with CHF ([facility] [address]     MI (myocardial infarction) ([facility] [address]     Inferior wall    Paresthesia and pain of both upper extremities 10/31/2013    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 Laurent Side, 4/9/14. Tobacco abuse     Unspecified hereditary and idiopathic peripheral neuropathy 12/4/2013     Past Surgical History:   Procedure Laterality Date    HX CORONARY ARTERY BYPASS GRAFT  about 2013    HX HEART CATHETERIZATION  11/2013    [place] INSJ/RPLCMT PERM DFB W/TRNSVNS LDS 1/DUAL CHMBR Left 9/30/2019    INSERT ICD DUAL performed by Carmelina Harris MD at [facility] CATH LAB     Barriers to Learning/Limitations: None  Home Situation:   Home Situation  Home Environment: Private residence  # Steps to Enter: 0  One/Two Story Residence: One story  Living Alone: No  Support Systems: Home care staff  Patient Expects to be Discharged to[de-identified] Private residence  Current DME Used/Available at Home: None  ? Right hand dominant   ?      Left hand dominant    Cognitive/Behavioral Status:   He is alert, oriented X 4   Skin: no skin integrity issues noted during OT evaluation  Edema: no UE edema noted  Vision/Perceptual:      Tracking is WFL     Coordination: BUE  WFL  Balance:  Sitting: Intact  Standing: Intact  Strength: BUE  4/5  Tone & Sensation: BUE  WFL  Range of Motion: BUE  AROM WFL  Functional Mobility and Transfers for ADLs:  Bed Mobility:  Supine to Sit: Modified independent  Sit to Supine: Supervision  Scooting: Modified independent  Transfers:  Sit to Stand: Modified independent  Stand to Sit: Modified independent ADL Assessment:   Self feeding: independent (simulated)  Grooming: Independent (simulated)  UB bathing/dressing: independent  LB bathing/dressing: independent   Pain:  Pain level pre-treatment: 0/10   Pain level post-treatment: 0/10   Activity Tolerance:   No SOB and no c/o fatigue  Please refer to the flowsheet for vital signs taken during this treatment. After treatment:   ?  Patient left in no apparent distress sitting up in chair  ? Patient left in no apparent distress in bed  ? Call bell left within reach  ? Nursing notified  ? Caregiver present  ? Bed alarm activated    COMMUNICATION/EDUCATION:   ?      Role of Occupational Therapy in the acute care setting  ? Home safety education was provided and the patient/caregiver indicated understanding. ? Patient/family have participated as able and agree with findings and recommendations. ?      Patient is unable to participate in plan of care at this time. Thank you for this referral.  Yemi Villanueva OTR/L  Time Calculation: 9 mins      Eval Complexity: History: LOW Complexity : Brief history review ; Examination: LOW Complexity : 1-3 performance deficits relating to physical, cognitive , or psychosocial skils that result in activity limitations and / or participation restrictions ;    Decision Making:LOW Complexity : No comorbidities that affect functional and no verbal or physical assistance needed to complete eval tasks

## 2019-10-15 NOTE — PROGRESS NOTES
PHYSICAL THERAPY EVALUATION AND DISCHARGE    Patient: Madison Danielson (89 y.o. male)  Date: 10/15/2019  Primary Diagnosis: CHF exacerbation ([address] [I50.9]  Precautions: none    ASSESSMENT :  Patient is 71yo M admitted to hospital for CHF and presents today alert and agreeable for therapy and was supine in bed upon arrival. Patient transferred to sitting at EOB for objective assessment and then trialed for walking with and without O2; see Activity Tolerance below. Patient is ambulating at supervision/modified independent without AD and encouraged patient to continue ambulation with nursing 3-5x/day. Patient transferred to sitting in locked recliner with call bell by his side and educated to have nursing assist when up; he acknowledged understanding. Patient does not require further skilled intervention at this level of care. PLAN :  Recommendations and Planned Interventions:   No formal PT needs identified at this time. Discharge Recommendations: Home Health  Further Equipment Recommendations for Discharge: N/A     SUBJECTIVE:   Patient stated I feel much better today.     OBJECTIVE DATA SUMMARY:     Past Medical History:   Diagnosis Date    AICD (automatic cardioverter/defibrillator) present 10/13/2019    Alcohol abuse     quit February 2011    Binocular visual disturbance 10/31/2013    CAD (coronary artery disease)     CAD (coronary artery disease), native coronary artery 1/3/05    LAD 70-80% mid; Cx-diffuse 80%; OM1-70% prox; RCA 40-50% mid; 100% RPLB with collaterals    Cardiac cath 08/19/2013    Severe, diffuse calcification. dLM 30%. mLAD 80%. o/pD1 80%. o/pRamus 80%. dCX 85%. dRCA 80%.  m-dRPLB 75%. EF 50%. Mod to severe inferobasal hypk. Cardiac echocardiogram 01/19/2011    EF 50-55%. Gr 1 DDfx. RVSP 30 mmHg. Cardiac nuclear imaging test 06/27/2011    No evidence of ischemia or previous infarction. EF 60%. Neg EKG on max EST. Ex time 6 mins.     Chronic kidney disease Chronic lung disease     Chronic obstructive pulmonary disease (HCC)     Coronary artery disease     Diastolic CHF, chronic (HCC)     LV EF 55% (Echo Jan 2011)    Diverticula of colon 12/17/2013    Emphysematous COPD ([facility] [address]     PFTs March 2009; mild-mode obstruction, + bronchodil response; decreased DLCO    GERD (gastroesophageal reflux disease)     with erosive esophagitis history    Heart failure ([address]     History of renal failure     2 years ago, which completely resolved    Hypercholesterolemia     Hypertension     Hypertensive heart disease with CHF ([facility] [address]     MI (myocardial infarction) ([facility] [address]     Inferior wall    Paresthesia and pain of both upper extremities 10/31/2013    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 Beckie Acosta, 4/9/14. Tobacco abuse     Unspecified hereditary and idiopathic peripheral neuropathy 12/4/2013     Past Surgical History:   Procedure Laterality Date    HX CORONARY ARTERY BYPASS GRAFT  about 2013    HX HEART CATHETERIZATION  11/2013    MT INSJ/RPLCMT PERM DFB W/TRNSVNS LDS 1/DUAL CHMBR Left 9/30/2019    INSERT ICD DUAL performed by Norman Marcos MD at [address] [place] [address] LAB     Barriers to Learning/Limitations: None  Compensate with: N/A  Home Situation:   Home Situation  Home Environment: Private residence  # Steps to Enter: 0  One/Two Story Residence: One story  Living Alone: No  Support Systems: Home care staff  Patient Expects to be Discharged to[de-identified] Private residence  Current DME Used/Available at Home: None  Critical Behavior:   A&Ox4   Strength:    Strength:  Within functional limits(BLE)   Tone & Sensation:   Tone: Normal(BLE)   Sensation: Intact(BLE)   Range Of Motion:  AROM: Within functional limits(BLE)   Functional Mobility:  Bed Mobility:   Supine to Sit: Modified independent  Sit to Supine: Supervision  Scooting: Modified independent  Transfers:  Sit to Stand: Modified independent  Stand to Sit: Modified independent   Balance:   Sitting: Intact  Standing: Intact  Ambulation/Gait Training:  Distance (ft): 100 Feet (ft)   Ambulation - Level of Assistance: Modified independent   Speed/Kathleen: Slow   Interventions: Verbal cues; Visual/Demos  Pain:  Pain level pre-treatment: 0/10   Pain level post-treatment: 0/10    Activity Tolerance:   Patient tolerated activity well; SpO2 as follows    Room air:  Resting-94%  S/p 50ft ambulation 86%    4L N2 O2:  Resting-98%  S/p 50ft ambulation 89%    Please refer to the flowsheet for vital signs taken during this treatment. After treatment:   ?         Patient left in no apparent distress sitting up in chair  ? Patient left in no apparent distress in bed  ? Call bell left within reach  ? Nursing notified  ? Caregiver present  ? Bed alarm activated  ? SCDs applied    COMMUNICATION/EDUCATION:   ?         Role of Physical Therapy in the acute care setting. ?         Fall prevention education was provided and the patient/caregiver indicated understanding. ? Patient/family have participated as able in goal setting and plan of care. ?         Patient/family agree to work toward stated goals and plan of care. ?         Patient understands intent and goals of therapy, but is neutral about his/her participation. ? Patient is unable to participate in goal setting/plan of care: ongoing with therapy staff. ?         Other:     Thank you for this referral.  Edward Markham, PT   Time Calculation: 14 mins      Eval Complexity: History: LOW Complexity : Zero comorbidities / personal factors that will impact the outcome / POCExam:LOW Complexity : 1-2 Standardized tests and measures addressing body structure, function, activity limitation and / or participation in recreation Presentation: LOW Complexity : Stable, uncomplicated  Clinical Decision Making:Low Complexity    Overall Complexity:LOW

## 2019-10-15 NOTE — PROGRESS NOTES
[facility]  -  Progress Note      Patient: Haydee Wood MRN: [id number]  SSN: [id number]    YOB: 1940  Age: 67 y.o. Sex: male      Admit Date: 10/13/2019    Assessment:     -Presented with shortness of breath, and elevated BNP in the >20,000 range. Hypertensive as well which is likely reason for the failure. He is starting to feel better already with the O2 on. Will continue to mildly diurese.  -Echo 9/25/2019: EF 16-20% with akinetic basal inferior, basal inferolateral, mid inferior, mid inferolateral, apical anterior, apical septal, apical inferior, apical lateral and apical walls  -Echo 1/24/2019: EF 46-50% with hypokinetic basal inferior, basal inferolateral, mid inferior, mid inferolateral and apical inferior wall segments, grade 2 diastolic dysfunction  -CAD, Cardiac cath 2/1/19 with following findings:   · Severe native CAD with distal left main heavily calcified lesion   · Patent LIMA-LAD  · 2 sequential vein grafts (all 4 vein grafts to diagonal, OM, RPDA, RPL branch) occluded at aortotomy site  · Subtotal mid RCA occludion with NICO 0-I flow, some collaterals from left coronary system  · Native diagonal 85% stenosis  · Native OM 85% stenosis  -Elevated troponin  -Wide-complex tachycardia, most consistent with bundle branch VT  -Hypomagnesemia, resolved  -HTN  -Hypercholesterolemia  -Hx tobacco and alcohol abuse  -Previously DNR status, now full code.     Primary cardiologist is Dr. Addi Das:     -IV Lasix decreased to 20 mg daily per pulmonary team's recommendations, appreciate assistance. Cr remains stable. Strict I/O's while on IV diuretics. -Mg 1.5 this AM, will give 2 gm Mg IV. Subjective:     No new complaints. States breathing feels much better compared to his presentation.     Objective:      Patient Vitals for the past 8 hrs:   Temp Pulse Resp BP SpO2   10/15/19 0847     90 %   10/15/19 0803 97.5 °F (36.4 °C) 70 17 146/73 98 %   10/15/19 0406 98.8 °F (37.1 °C) 63 16 138/72 97 %         Patient Vitals for the past 96 hrs:   Weight   10/13/19 0443 105 lb (47.6 kg)         Intake/Output Summary (Last 24 hours) at 10/15/2019 1132  Last data filed at 10/15/2019 0049  Gross per 24 hour   Intake    Output 100 ml   Net -100 ml       Physical Exam:  General:  alert, cooperative, no distress, appears stated age  Neck:  supple  Lungs:  Bibasilar inspiratory rales  Heart:  Regular rate and rhythm  Abdomen:  abdomen is soft without significant tenderness, masses, organomegaly or guarding  Extremities:  Atraumatic, no edema     Data Review:     Labs: Results:       Chemistry Recent Labs     10/15/19  0405 10/14/19  0357 10/13/19  0445   GLU 90 88 196*   * 140 134*   K 4.2 4.2 4.0    105 104   CO2 30 28 21   BUN 18 12 13   CREA 1.24 1.16 1.19   CA 8.4* 8.8 8.7   MG 1.5*  --   --    AGAP 2* 7 9   BUCR 15 10* 11*   AP  --   --  135*   TP  --   --  7.2   ALB  --   --  2.6*   GLOB  --   --  4.6*   AGRAT  --   --  0.6*      CBC w/Diff Recent Labs     10/15/19  0405 10/13/19  0445   WBC 8.1 7.9   RBC 2.98* 3.43*   HGB 10.0* 11.5*   HCT 29.8* 34.1*   * 515*   GRANS  --  64   LYMPH  --  28   EOS  --  1      Lipid Panel Lab Results   Component Value Date/Time    Cholesterol, total 144 05/03/2019 10:26 AM    HDL Cholesterol 86 (H) 05/03/2019 10:26 AM    LDL, calculated 18 05/03/2019 10:26 AM    VLDL, calculated 40 05/03/2019 10:26 AM    Triglyceride 200 (H) 05/03/2019 10:26 AM    CHOL/HDL Ratio 1.7 05/03/2019 10:26 AM      Liver Enzymes Recent Labs     10/13/19  0445   TP 7.2   ALB 2.6*   *   SGOT 24      Thyroid Studies Lab Results   Component Value Date/Time    TSH 4.31 (H) 09/24/2019 09:57 AM

## 2019-10-15 NOTE — PROGRESS NOTES
Hospitalist Progress Note    Patient: Haydee Wood Age: 67 y.o. : 1940 MR#: [id number] SSN: [id number]  Date/Time: 10/15/2019 3:30 PM    DOA: 10/13/2019  PCP: Mirela Adame MD    Subjective:     Feels better. Off oxygen while sitting in bed. Participated with PT/OT requires oxygen. Six-minute walk with oxygen desaturated to 85%, improved with 2-3 liters oxygen supplement   Tolerated IV lasix today with good output    ROS: no fever/chills, no headache, no dizziness, no facial pain, no sinus congestion, +cough  No swallowing pain, No chest pain, no palpitation, +improved shortness of breath, no abd pain,  No diarrhea, no urinary complaint, no leg pain or swelling        Assessment/Plan:     1. Acute on chronic combined systolic and diastolic heart failure, elevated proBNP, last EF 20%  2. Hypertensive emergency   3. Acute hypoxic respiratory failure, required BIPAP on admission, improved   4. CAD with CABGx5, negative troponin this admission   5. Ischemic cardiomyopathy  6. Wide-complex tachycardia with AICD in place   7. Emphysematous COPD, stable   8. H/o tobacco smoking        Coughing, shortness of breath, improved   9. Hyperlipidemia   10. Thrombocytosis, reactive   11. Cachexia with severe protein calorie malnutrition    IV Lasix 20mg today, will start Oral lasix tomorrow. continue on ASA, clopidogrel, atorvastatin, metoprolol, losartan, Imdur, Amiodarone   Tolerates anti-tussin and respiratory treatment to help with cough,  budesonide neb   He will need oxygen for home use. 6-minutes done this afternoon with oxygen at 85% ambulation on RA. ICS, OOB as tolerated.   Cardiology and pulmonary consult appreciated   Repeat chest xray prn    Full Code,    Additional Notes:    Time spent >30 minutes    Case discussed with:  [x]Patient  []Family  [x]Nursing  [x]Case Management  DVT Prophylaxis:  []Lovenox  [x]Hep SQ  []SCDs  []Coumadin   []On Heparin gtt    Signed By: Haley Ralph MD October 15, 2019 3:30 PM              Objective:   VS:   Visit Vitals  /66 (BP 1 Location: Left arm, BP Patient Position: Supine)   Pulse 65   Temp 98.6 °F (37 °C)   Resp 17   Ht 5' 5\" (1.651 m)   Wt 47.6 kg (105 lb)   SpO2 100%   BMI 17.47 kg/m²      Tmax/24hrs: Temp (24hrs), Av.3 °F (36.8 °C), Min:97.5 °F (36.4 °C), Max:98.8 °F (37.1 °C)      Intake/Output Summary (Last 24 hours) at 10/15/2019 1530  Last data filed at 10/15/2019 0049  Gross per 24 hour   Intake    Output 100 ml   Net -100 ml     Tele: sinus  General:  Cooperative, Not in acute distress, speaks in short sentence while in bed  HEENT: PERRL, EOMI, supple neck, no JVD, dry oral mucosa  Cardiovascular: S1S2 regular, no rub/gallop   Pulmonary: air entry bilaterally, no wheezing, ++ crackle  GI:  Soft, non tender, non distended, +bs, no guarding   Extremities:  No pedal edema, +distal pulses appreciated   Neuro: AOx3, moving all extremities,      Additional:       Current Facility-Administered Medications   Medication Dose Route Frequency    [START ON 10/16/2019] furosemide (LASIX) tablet 20 mg  20 mg Oral DAILY    magnesium oxide (MAG-OX) tablet 400 mg  400 mg Oral DAILY    tiotropium (SPIRIVA) inhalation capsule 18 mcg  1 Cap Inhalation QAM RT    albuterol-ipratropium (DUO-NEB) 2.5 MG-0.5 MG/3 ML  3 mL Nebulization TID RT    vit B12-levomefolate calcium-vit B6 (FOLBIC RF, FOLTX) 2-1.13-25 mg tablet 1 Tab  1 Tab Oral DAILY    therapeutic multivitamin (THERAGRAN) tablet 1 Tab  1 Tab Oral DAILY    thiamine HCL (B-1) tablet 50 mg  50 mg Oral DAILY    metoprolol succinate (TOPROL-XL) XL tablet 25 mg  25 mg Oral DAILY    losartan (COZAAR) tablet 50 mg  50 mg Oral DAILY    aspirin delayed-release tablet 81 mg  81 mg Oral DAILY    clopidogrel (PLAVIX) tablet 75 mg  75 mg Oral DAILY    nitroglycerin (NITROSTAT) tablet 0.4 mg  0.4 mg SubLINGual X1ZCT PRN    folic acid (FOLVITE) tablet 1 mg  1 mg Oral BID    atorvastatin (LIPITOR) tablet 40 mg  40 mg Oral QHS    amiodarone (CORDARONE) tablet 200 mg  200 mg Oral Q12H    cyanocobalamin tablet 1,000 mcg  1,000 mcg Oral DAILY    albuterol (PROVENTIL VENTOLIN) nebulizer solution 2.5 mg  2.5 mg Nebulization Q4H PRN    sodium chloride (NS) flush 5-40 mL  5-40 mL IntraVENous Q8H    sodium chloride (NS) flush 5-40 mL  5-40 mL IntraVENous PRN    naloxone (NARCAN) injection 0.4 mg  0.4 mg IntraVENous PRN    diphenhydrAMINE (BENADRYL) injection 25 mg  25 mg IntraVENous Q4H PRN    ondansetron (ZOFRAN) injection 4 mg  4 mg IntraVENous Q4H PRN    influenza vaccine 2019-20 (6 mos+)(PF) (FLUARIX/FLULAVAL/FLUZONE QUAD) injection 0.5 mL  0.5 mL IntraMUSCular PRIOR TO DISCHARGE    isosorbide mononitrate ER (IMDUR) tablet 60 mg  60 mg Oral 7am    guaiFENesin-codeine (ROBITUSSIN AC) 100-10 mg/5 mL solution 10 mL  10 mL Oral Q4H PRN    benzonatate (TESSALON) capsule 100 mg  100 mg Oral TID PRN    pantoprazole (PROTONIX) tablet 40 mg  40 mg Oral ACB&D    benzocaine-menthol (CEPACOL) lozenge 1 Lozenge  1 Lozenge Mucous Membrane PRN            Lab/Data Review:  Labs: Results:       Chemistry Recent Labs     10/15/19  0405 10/14/19  0357 10/13/19  0445   GLU 90 88 196*   * 140 134*   K 4.2 4.2 4.0    105 104   CO2 30 28 21   BUN 18 12 13   CREA 1.24 1.16 1.19   BUCR 15 10* 11*   AGAP 2* 7 9   CA 8.4* 8.8 8.7     Recent Labs     10/13/19  0445   ALT 18   SGOT 24   TP 7.2   ALB 2.6*   GLOB 4.6*   AGRAT 0.6*      CBC w/Diff Recent Labs     10/15/19  0405 10/13/19  0445   WBC 8.1 7.9   RBC 2.98* 3.43*   HGB 10.0* 11.5*   HCT 29.8* 34.1*   .0* 99.4*   MCH 33.6 33.5   MCHC 33.6 33.7   RDW 14.8* 14.3   * 515*   GRANS  --  64   LYMPH  --  28   EOS  --  1      Coagulation No results for input(s): PTP, INR, APTT, INREXT, INREXT in the last 72 hours.     Iron/Ferritin Lab Results   Component Value Date/Time    Iron 62 09/25/2019 07:03 AM    TIBC 174 (L) 09/25/2019 07:03 AM    Iron % saturation 36 09/25/2019 07:03 AM    Ferritin 1,367 (H) 09/25/2019 07:03 AM       BNP    Cardiac Enzymes Lab Results   Component Value Date/Time    CK 38 (L) 10/13/2019 08:04 PM    CK - MB 2.4 10/13/2019 08:04 PM    CK-MB Index 6.3 (H) 10/13/2019 08:04 PM    Troponin-I, QT <0.02 10/13/2019 08:04 PM        Lactic Acid    Thyroid Studies          All Micro Results     None            Images:    CT (Most Recent). CT Results (most recent):  Results from Hospital Encounter encounter on 10/13/19   CT CHEST WO CONT    Narrative CT chest without IV contrast    HISTORY: CHF exacerbation. All CT scans at this facility are performed using dose optimization technique as  appropriate to a performed exam, to include automated exposure control,  adjustment of the mA and/or kV according to patient size (including appropriate  matching for site specific examination) or use of iterative reconstruction  technique. Comparison CT September 24, 2019. Increasing to large right pleural effusion. More severe infiltrate/edema in the  right upper lobe, right lower lobe and also in the right middle lobe. Underlying emphysema. Fascial thickening and scarring in the left upper lobe,  stable. Small left upper lateral collateral complex collection. No change in the  left pleural effusion in the lower chest. Cystic changes in the left lung base. Slightly worse edema base. Aortic calcification. Cardiomegaly with no significant pericardial effusion. Upper abdomen shows cholelithiasis. No ascites or free air. Impression IMPRESSION:  1. Worsening right lung infiltrate/edema and increasing right pleural effusion. Slightly worse edema at the left lung base. 2. Cardiomegaly with no pericardial effusion. 3. Cholelithiasis. XRAY (Most Recent)      EKG Results for orders placed or performed in visit on 02/08/19   AMB POC EKG ROUTINE W/ 12 LEADS, INTER & REP     Status: None    Narrative    Normal sinus rhythm rate 68. Left atrial enlargement. Incomplete left bundle branch block. Old inferior wall myocardial infarction. Some mild T wave inversions inferolaterally which could reflect ischemia. Compared to the EKG of January 17, 2019 there was little interval change.         2D ECHO

## 2019-10-15 NOTE — ROUTINE PROCESS
Bedside and Verbal shift change report given to Aj Johnson RN (oncoming nurse) by Lillie Dubon RN (offgoing nurse). Report included the following information SBAR, kardex, results

## 2019-10-15 NOTE — PROGRESS NOTES
Problem: Falls - Risk of  Goal: *Absence of Falls  Description  Document Som Elaine Fall Risk and appropriate interventions in the flowsheet.   Outcome: Progressing Towards Goal  Note:   Fall Risk Interventions:  Mobility Interventions: Bed/chair exit alarm, OT consult for ADLs, Patient to call before getting OOB, PT Consult for mobility concerns         Medication Interventions: Bed/chair exit alarm, Patient to call before getting OOB    Elimination Interventions: Call light in reach, Urinal in reach    History of Falls Interventions: Bed/chair exit alarm, Room close to nurse's station         Problem: Patient Education: Go to Patient Education Activity  Goal: Patient/Family Education  Outcome: Progressing Towards Goal     Problem: General Medical Care Plan  Goal: *Vital signs within specified parameters  Outcome: Progressing Towards Goal  Goal: *Labs within defined limits  Outcome: Progressing Towards Goal  Goal: *Absence of infection signs and symptoms  Outcome: Progressing Towards Goal  Goal: *Optimal pain control at patient's stated goal  Outcome: Progressing Towards Goal  Goal: *Skin integrity maintained  Outcome: Progressing Towards Goal  Goal: *Fluid volume balance  Outcome: Progressing Towards Goal  Goal: *Optimize nutritional status  Outcome: Progressing Towards Goal  Goal: *Anxiety reduced or absent  Outcome: Progressing Towards Goal  Goal: *Progressive mobility and function (eg: ADL's)  Outcome: Progressing Towards Goal     Problem: Patient Education: Go to Patient Education Activity  Goal: Patient/Family Education  Outcome: Progressing Towards Goal     Problem: Pain  Goal: *Control of Pain  Outcome: Progressing Towards Goal  Goal: *PALLIATIVE CARE:  Alleviation of Pain  Outcome: Progressing Towards Goal     Problem: Patient Education: Go to Patient Education Activity  Goal: Patient/Family Education  Outcome: Progressing Towards Goal     Problem: General Infection Care Plan (Adult and Pediatric)  Goal: Improvement in signs and symptoms of infection  Outcome: Progressing Towards Goal  Goal: *Optimize nutritional status  Outcome: Progressing Towards Goal     Problem: Patient Education: Go to Patient Education Activity  Goal: Patient/Family Education  Outcome: Progressing Towards Goal     Problem: Heart Failure: Day 1  Goal: Off Pathway (Use only if patient is Off Pathway)  Outcome: Progressing Towards Goal  Goal: Activity/Safety  Outcome: Progressing Towards Goal  Goal: Consults, if ordered  Outcome: Progressing Towards Goal  Goal: Diagnostic Test/Procedures  Outcome: Progressing Towards Goal  Goal: Nutrition/Diet  Outcome: Progressing Towards Goal  Goal: Discharge Planning  Outcome: Progressing Towards Goal  Goal: Medications  Outcome: Progressing Towards Goal  Goal: Respiratory  Outcome: Progressing Towards Goal  Goal: Treatments/Interventions/Procedures  Outcome: Progressing Towards Goal  Goal: Psychosocial  Outcome: Progressing Towards Goal  Goal: *Oxygen saturation within defined limits  Outcome: Progressing Towards Goal  Goal: *Hemodynamically stable  Outcome: Progressing Towards Goal  Goal: *Optimal pain control at patient's stated goal  Outcome: Progressing Towards Goal  Goal: *Anxiety reduced or absent  Outcome: Progressing Towards Goal     Problem: Heart Failure: Day 2  Goal: Off Pathway (Use only if patient is Off Pathway)  Outcome: Progressing Towards Goal  Goal: Activity/Safety  Outcome: Progressing Towards Goal  Goal: Consults, if ordered  Outcome: Progressing Towards Goal  Goal: Diagnostic Test/Procedures  Outcome: Progressing Towards Goal  Goal: Nutrition/Diet  Outcome: Progressing Towards Goal  Goal: Discharge Planning  Outcome: Progressing Towards Goal  Goal: Medications  Outcome: Progressing Towards Goal  Goal: Respiratory  Outcome: Progressing Towards Goal  Goal: Treatments/Interventions/Procedures  Outcome: Progressing Towards Goal  Goal: Psychosocial  Outcome: Progressing Towards Goal  Goal: *Oxygen saturation within defined limits  Outcome: Progressing Towards Goal  Goal: *Hemodynamically stable  Outcome: Progressing Towards Goal  Goal: *Optimal pain control at patient's stated goal  Outcome: Progressing Towards Goal  Goal: *Anxiety reduced or absent  Outcome: Progressing Towards Goal  Goal: *Demonstrates progressive activity  Outcome: Progressing Towards Goal     Problem: Heart Failure: Day 3  Goal: Off Pathway (Use only if patient is Off Pathway)  Outcome: Progressing Towards Goal  Goal: Activity/Safety  Outcome: Progressing Towards Goal  Goal: Diagnostic Test/Procedures  Outcome: Progressing Towards Goal  Goal: Nutrition/Diet  Outcome: Progressing Towards Goal  Goal: Discharge Planning  Outcome: Progressing Towards Goal  Goal: Medications  Outcome: Progressing Towards Goal  Goal: Respiratory  Outcome: Progressing Towards Goal  Goal: Treatments/Interventions/Procedures  Outcome: Progressing Towards Goal  Goal: Psychosocial  Outcome: Progressing Towards Goal  Goal: *Oxygen saturation within defined limits  Outcome: Progressing Towards Goal  Goal: *Hemodynamically stable  Outcome: Progressing Towards Goal  Goal: *Optimal pain control at patient's stated goal  Outcome: Progressing Towards Goal  Goal: *Anxiety reduced or absent  Outcome: Progressing Towards Goal  Goal: *Demonstrates progressive activity  Outcome: Progressing Towards Goal     Problem: Heart Failure: Day 4  Goal: Off Pathway (Use only if patient is Off Pathway)  Outcome: Progressing Towards Goal  Goal: Activity/Safety  Outcome: Progressing Towards Goal  Goal: Diagnostic Test/Procedures  Outcome: Progressing Towards Goal  Goal: Nutrition/Diet  Outcome: Progressing Towards Goal  Goal: Discharge Planning  Outcome: Progressing Towards Goal  Goal: Medications  Outcome: Progressing Towards Goal  Goal: Respiratory  Outcome: Progressing Towards Goal  Goal: Treatments/Interventions/Procedures  Outcome: Progressing Towards Goal  Goal: Psychosocial  Outcome: Progressing Towards Goal  Goal: *Oxygen saturation within defined limits  Outcome: Progressing Towards Goal  Goal: *Hemodynamically stable  Outcome: Progressing Towards Goal  Goal: *Optimal pain control at patient's stated goal  Outcome: Progressing Towards Goal  Goal: *Anxiety reduced or absent  Outcome: Progressing Towards Goal  Goal: *Demonstrates progressive activity  Outcome: Progressing Towards Goal     Problem: Heart Failure: Day 5  Goal: Off Pathway (Use only if patient is Off Pathway)  Outcome: Progressing Towards Goal  Goal: Activity/Safety  Outcome: Progressing Towards Goal  Goal: Diagnostic Test/Procedures  Outcome: Progressing Towards Goal  Goal: Nutrition/Diet  Outcome: Progressing Towards Goal  Goal: Discharge Planning  Outcome: Progressing Towards Goal  Goal: Medications  Outcome: Progressing Towards Goal  Goal: Respiratory  Outcome: Progressing Towards Goal  Goal: Treatments/Interventions/Procedures  Outcome: Progressing Towards Goal  Goal: Psychosocial  Outcome: Progressing Towards Goal     Problem: Heart Failure: Discharge Outcomes  Goal: *Demonstrates ability to perform prescribed activity without shortness of breath or discomfort  Outcome: Progressing Towards Goal  Goal: *Left ventricular function assessment completed prior to or during stay, or planned for post-discharge  Outcome: Progressing Towards Goal  Goal: *ACEI prescribed if LVEF less than 40% and no contraindications or ARB prescribed  Outcome: Progressing Towards Goal  Goal: *Verbalizes understanding and describes prescribed diet  Outcome: Progressing Towards Goal  Goal: *Verbalizes understanding/describes prescribed medications  Outcome: Progressing Towards Goal  Goal: *Describes available resources and support systems  Description  (eg: Home Health, Palliative Care, [employer] Medical Directive)  Outcome: Progressing Towards Goal  Goal: *Describes smoking cessation resources  Outcome: Progressing Towards Goal  Goal: *Understands and describes signs and symptoms to report to providers(Stroke Metric)  Outcome: Progressing Towards Goal  Goal: *Describes/verbalizes understanding of follow-up/return appt  Description  (eg: to physicians, diabetes treatment coordinator, and other resources  Outcome: Progressing Towards Goal  Goal: *Describes importance of continuing daily weights and changes to report to physician  Outcome: Progressing Towards Goal

## 2019-10-15 NOTE — CONSULTS
[address] [place] [address] [facility]  Pulmonary, Critical Care, and Sleep Medicine    Initial Patient Consult    Name: Nguyen Metcalf MRN: [id number]   : 1940 Hospital: [address] [place] [address]   Date: 10/15/2019        IMPRESSION:   Acute on chronic combined systolic and diastolic CHF: Patient was recently hospitalized with CHF exacerbation- Systolic heart failure, however due to creatinine elevation patient was not discharged on diuretics. Patient has new hypoxia and ongoing dyspnea, as well as bilateral lower lobe rales, and CT scan showing significant pleural effusions and pulmonary edema, consistent with CHF exacerbation. CT scan today with 1. Worsening right lung infiltrate/edema and increasing right pleural effusion. Slightly worse edema at the left lung base. 2. Cardiomegaly with no pericardial effusion. Acute hypoxia: Etiology due to above  COPD, gold risk category B, gold stage I: No evidence of COPD exacerbation, symptoms are stable  Lung nodules: Seen on lung cancer screening CT from 18, subcentimeter, largest was noted to be 4 mm. Not visualized on CTA from recent hospitalization, due to pulmonary edema. Repeat CT scan planned for 2019  Non-CF bronchiectasis: COPD secondary to  Chronic cough etiology most likely due to above but also has a component of bronchiectasis  COPD cachexia: Body mass index is 17.67 kg/m². with albumin of 2.5  Hx of CAD s/p CABG, AICD  Chronic GERD with esophageal thickening on last CT scan:  Pt seen by GI, planned for EGD but cancelled since pt not cleared to come off of Plavix per Cardiology  History of tobacco use: Prior 0.75 pack/day smoker, 50 total pack-year smoking history. Patient quit after discharge for most recent hospitalization on 10/1/2019.       RECOMMENDATIONS:   · Oxygen- supplement to maintain SaO2 >92%  · Pt still needs diuresis, recommend discharging with at least 20mg PO every day lasix  · Needs home O2 evaluation prior to discharge  · D/c  pulmicort, will add spiriva while inpatient- On home Stiolto, to be restarted on discharge  · Duonebs prn  · Out patient testing- Polysomnogram scheduled  · Follows with our group- Dr. Earl Lizarraga  · OT, PT, OOB and ambulate    Please call with questions     Subjective:     10/14 update  - Pt improving. He is concerned about not getting home O2. He already qualified in clinic on 10/11. Overall breathing has improved. HPI:    This patient has been seen and evaluated at the request of Esme Gerber  for Acute Hypoxic Respiratory failure. Patient is a 67 y.o. male with HTN, CAD with CABG, chronic BiV heart failure last EF 15-20% recent ICD placed, COPD, active tobacco smoker, hyperlipidemia, chronic alcoholism, on chronic doxycycline for eye infection who presents with shortness of breath. Patient woke up from sleep just now quite short of breath, he was not lying completely flat, he was just discharged from a similar event. No chest pain, but has been having a wet cough, feeling like he has a rattle in his chest, no fevers noted, no leg swelling noted. Has been taking all of his medications as instructed  He was scheduled for Sleep study tonight at [facility] Sleep lab. Patient was last seen in our clinic on 5/15/2019 and more recently on 10/11/2019 by Dr. Earl Lizarraga. In the interval, pt had a CHF exacerbation requiring hospitalization to [facility] from 9/24/2019 through 10/1/2019. Patient reports that he was diuresed during this hospitalization, however diuretics were held due to abnormal renal function. Patient reports he was on oxygen during the entire hospitalization, but was not discharged on oxygen. Since discharge, patient reports that his breathing is not at baseline. He reports dyspnea with mild to moderate exertion. His symptoms are better than when he had to go to the hospital, however he notes that he still is very limited.   He reports only being able to walk less than 1 block, unable to climb 1 flight of steps before having to stop. Alleviated with rest, no other modifying factors. Patient reports he is quit smoking in the interval.  Patient reports that he saw his PCP, Dr. Griselda Almanza, who prescribed amoxicillin. Patient denies any worsening cough, purulent sputum, fevers, chills, night sweats. Patient reports chronic sputum of white color. Patient does report excessive daytime sleepiness, reports having to take a nap during the daytime, very sleepy. Patient reports he has been told he snores occasionally. Pt denies any wt loss, appetite changes, hemoptysis, chest pain/angina, night sweats, LE swelling, fevers, chills, night sweats, N/V/D  I have reviewed all of the available data including the patient's previous history external records and radiological imaging available for review. In addition applicable cardiology and other lab data were also reviewed. Past Medical History:   Diagnosis Date    AICD (automatic cardioverter/defibrillator) present 10/13/2019    Alcohol abuse     quit February 2011    Binocular visual disturbance 10/31/2013    CAD (coronary artery disease)     CAD (coronary artery disease), native coronary artery 1/3/05    LAD 70-80% mid; Cx-diffuse 80%; OM1-70% prox; RCA 40-50% mid; 100% RPLB with collaterals    Cardiac cath 08/19/2013    Severe, diffuse calcification. dLM 30%. mLAD 80%. o/pD1 80%. o/pRamus 80%. dCX 85%. dRCA 80%.  m-dRPLB 75%. EF 50%. Mod to severe inferobasal hypk.  Cardiac echocardiogram 01/19/2011    EF 50-55%. Gr 1 DDfx. RVSP 30 mmHg.  Cardiac nuclear imaging test 06/27/2011    No evidence of ischemia or previous infarction. EF 60%. Neg EKG on max EST. Ex time 6 mins.     Chronic kidney disease     Chronic lung disease     Chronic obstructive pulmonary disease ([address]     Coronary artery disease     Diastolic CHF, chronic (HCC)     LV EF 55% (Echo Jan 2011)    Diverticula of colon 12/17/2013    Emphysematous COPD ([facility]     PFTs March 2009; mild-mode obstruction, + bronchodil response; decreased DLCO    GERD (gastroesophageal reflux disease)     with erosive esophagitis history    Heart failure (HCC)     History of renal failure     2 years ago, which completely resolved    Hypercholesterolemia     Hypertension     Hypertensive heart disease with CHF ([facility]     [facility]     Inferior wall    Paresthesia and pain of both upper extremities 10/31/2013    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 Becky Gray, 4/9/14.  Tobacco abuse     Unspecified hereditary and idiopathic peripheral neuropathy 12/4/2013      Past Surgical History:   Procedure Laterality Date    HX CORONARY ARTERY BYPASS GRAFT  about 2013    HX HEART CATHETERIZATION  11/2013    AK INSJ/RPLCMT PERM DFB W/TRNSVNS LDS 1/DUAL CHMBR Left 9/30/2019    INSERT ICD DUAL performed by Mily Walton MD at [address] [place] [address] CATH LAB      Prior to Admission medications    Medication Sig Start Date End Date Taking? Authorizing Provider   cyanocobalamin 1,000 mcg tablet Take 1,000 mcg by mouth daily. 1 pill po daily  Indications: Prevention of Vitamin B12 Deficiency   Yes Provider, Historical   pantoprazole (PROTONIX) 40 mg tablet Take 40 mg by mouth daily. 1 pill daily 30 minutes before breakfast.  Indications: indigestion   Yes Provider, Historical   amiodarone (CORDARONE) 200 mg tablet Take 1 Tab by mouth every twelve (12) hours. 10/1/19  Yes Damien Alcaraz MD   atorvastatin (LIPITOR) 40 mg tablet Take 40 mg by mouth nightly. 1 pill daily   Yes Provider, Historical   tiotropium-olodaterol (STIOLTO RESPIMAT) 2.5-2.5 mcg/actuation inhaler Take 2 Puffs by inhalation daily.  5/15/19  Yes Luly Cheung Chris Cornelius MD   folic acid (FOLVITE) 1 mg tablet Take 1 mg by mouth two (2) times a day. 1 pill po daily   Yes Provider, Historical   clopidogrel (PLAVIX) 75 mg tab Take 1 Tab by mouth daily. 2/8/19  Yes Meron العلي, DO   isosorbide mononitrate ER (IMDUR) 30 mg tablet Take 1 Tab by mouth every morning. 2/8/19  Yes Meron العلي, DO   nitroglycerin (NITROSTAT) 0.4 mg SL tablet 1 Tab by SubLINGual route every five (5) minutes as needed for Chest Pain. Up to 3 doses. 2/8/19  Yes Meorn العلي, DO   aspirin delayed-release 81 mg tablet Take 81 mg by mouth daily. 1 pill po daily   Yes Provider, Historical   losartan (COZAAR) 50 mg tablet Take 50 mg by mouth daily. 1 pill po daily 12/4/18  Yes Provider, Historical   metoprolol succinate (TOPROL-XL) 25 mg XL tablet Take 25 mg by mouth daily. 1 pill po daily 9/13/17  Yes Provider, Historical   thiamine (VITAMIN B-1) 50 mg tablet Take 50 mg by mouth daily. 1 pill po daily   Yes Provider, Historical   albuterol (VENTOLIN HFA) 90 mcg/Actuation inhaler Take 1 Puff by inhalation as needed. Yes Provider, Historical   CYANOCOBALAMIN/FA/PYRIDOXINE (FOLBEE PO) Take 1 Tab by mouth daily. 3/23/11  Yes Provider, Historical   multivitamin (ONE A DAY) tablet Take 1 Tab by mouth daily. Yes Provider, Historical   amoxicillin (AMOXIL) 500 mg capsule Take 500 mg by mouth three (3) times daily. Take 1 capsule by mouth thre times a daily 10/8/19 10/15/19  Geovanni Mchugh MD   PSEUDOEPHEDRINE/CPM/METHYLSCOP (ALLERGY AM-PM PO) Take  by mouth daily as needed. Provider, Historical   Ibuprofen-Diphenhydramine (ADVIL PM) 200-38 mg Tab Take 200 mg by mouth as needed for Pain.  1 pill po q 6 hours as needed for pain   6/22/11   Provider, Historical     Allergies   Allergen Reactions    Vicodin [Hydrocodone-Acetaminophen] Rash      Social History     Tobacco Use    Smoking status: Current Every Day Smoker     Packs/day: 0.50     Years: 55.00     Pack years: 27.50     Types: Cigarettes Start date: 3/13/2014    Smokeless tobacco: Never Used   Substance Use Topics    Alcohol use: Yes     Comment: every other day drinker      Family History   Problem Relation Age of Onset    Heart Disease Maternal Uncle     Diabetes Father     Hypertension Father       Current Facility-Administered Medications   Medication Dose Route Frequency    magnesium sulfate 2 g/50 ml IVPB (premix or compounded)  2 g IntraVENous ONCE    furosemide (LASIX) injection 20 mg  20 mg IntraVENous DAILY    tiotropium (SPIRIVA) inhalation capsule 18 mcg  1 Cap Inhalation QAM RT    albuterol-ipratropium (DUO-NEB) 2.5 MG-0.5 MG/3 ML  3 mL Nebulization TID RT    vit B12-levomefolate calcium-vit B6 (FOLBIC RF, FOLTX) 2-1.13-25 mg tablet 1 Tab  1 Tab Oral DAILY    therapeutic multivitamin (THERAGRAN) tablet 1 Tab  1 Tab Oral DAILY    thiamine HCL (B-1) tablet 50 mg  50 mg Oral DAILY    metoprolol succinate (TOPROL-XL) XL tablet 25 mg  25 mg Oral DAILY    losartan (COZAAR) tablet 50 mg  50 mg Oral DAILY    aspirin delayed-release tablet 81 mg  81 mg Oral DAILY    clopidogrel (PLAVIX) tablet 75 mg  75 mg Oral DAILY    folic acid (FOLVITE) tablet 1 mg  1 mg Oral BID    atorvastatin (LIPITOR) tablet 40 mg  40 mg Oral QHS    amiodarone (CORDARONE) tablet 200 mg  200 mg Oral Q12H    cyanocobalamin tablet 1,000 mcg  1,000 mcg Oral DAILY    sodium chloride (NS) flush 5-40 mL  5-40 mL IntraVENous Q8H    influenza vaccine 2019-20 (6 mos+)(PF) (FLUARIX/FLULAVAL/FLUZONE QUAD) injection 0.5 mL  0.5 mL IntraMUSCular PRIOR TO DISCHARGE    isosorbide mononitrate ER (IMDUR) tablet 60 mg  60 mg Oral 7am    pantoprazole (PROTONIX) tablet 40 mg  40 mg Oral ACB&D       Review of Systems:  A comprehensive review of systems was negative except for that written in the HPI.     Objective:   Vital Signs:    Visit Vitals  /73 (BP 1 Location: Left arm, BP Patient Position: Supine)   Pulse 70   Temp 97.5 °F (36.4 °C)   Resp 17   Ht 5' 5\" (1.651 m)   Wt 47.6 kg (105 lb)   SpO2 90%   BMI 17.47 kg/m²       O2 Device: Room air   O2 Flow Rate (L/min): 4 l/min   Temp (24hrs), Av.3 °F (36.8 °C), Min:97.5 °F (36.4 °C), Max:98.8 °F (37.1 °C)       Intake/Output:   Last shift:      No intake/output data recorded. Last 3 shifts: 10/13 1901 - 10/15 0700  In: 360 [P.O.:360]  Out: 1000 [Urine:1000]    Intake/Output Summary (Last 24 hours) at 10/15/2019 1857  Last data filed at 10/15/2019 0049  Gross per 24 hour   Intake    Output 100 ml   Net -100 ml      Physical Exam:   General:  Alert, cooperative, no distress, appears stated age. Cachetic   Lungs:   Rales at the bases, no wheezing. Chest wall:  No tenderness or deformity. Left infraclavicular AICD   Heart:  Regular rate and rhythm, S1, S2 normal, no murmur, click, rub or gallop. Abdomen:   Soft, non-tender. Bowel sounds normal. No masses,  No organomegaly. Extremities: Extremities normal, atraumatic, no cyanosis or edema. Pulses: 2+ and symmetric all extremities.    Neurologic: Grossly nonfocal     Data review:     Recent Results (from the past 24 hour(s))   CBC W/O DIFF    Collection Time: 10/15/19  4:05 AM   Result Value Ref Range    WBC 8.1 4.6 - 13.2 K/uL    RBC 2.98 (L) 4.70 - 5.50 M/uL    HGB 10.0 (L) 13.0 - 16.0 g/dL    HCT 29.8 (L) 36.0 - 48.0 %    .0 (H) 74.0 - 97.0 FL    MCH 33.6 24.0 - 34.0 PG    MCHC 33.6 31.0 - 37.0 g/dL    RDW 14.8 (H) 11.6 - 14.5 %    PLATELET 642 (H) 532 - 420 K/uL    MPV 9.2 9.2 - 43.3 FL   METABOLIC PANEL, BASIC    Collection Time: 10/15/19  4:05 AM   Result Value Ref Range    Sodium 134 (L) 136 - 145 mmol/L    Potassium 4.2 3.5 - 5.5 mmol/L    Chloride 102 100 - 111 mmol/L    CO2 30 21 - 32 mmol/L    Anion gap 2 (L) 3.0 - 18 mmol/L    Glucose 90 74 - 99 mg/dL    BUN 18 7.0 - 18 MG/DL    Creatinine 1.24 0.6 - 1.3 MG/DL    BUN/Creatinine ratio 15 12 - 20      GFR est AA >60 >60 ml/min/1.73m2    GFR est non-AA 56 (L) >60 ml/min/1.73m2    Calcium 8.4 (L) 8.5 - 10.1 MG/DL   MAGNESIUM    Collection Time: 10/15/19  4:05 AM   Result Value Ref Range    Magnesium 1.5 (L) 1.6 - 2.6 mg/dL     PFT :5/15/2019    Normal Flows except for reduced FEF 25-75 to 63% predicted, DLCO 42% predicted    Imaging:  I have personally reviewed the patients radiographs and have reviewed the reports:  XR Results (most recent):  Results from Hospital Encounter encounter on 10/13/19   XR CHEST PORT    Narrative EXAM: PORTABLE CHEST    CLINICAL HISTORY/INDICATION: f/u infiltrates       COMPARISON: 10/13/2019. TECHNIQUE: Single AP view    FINDINGS:     Portable chest x-ray 10/14/2019 at 1130 hours reveals evidence of previous  cardiac surgery. Implanted cardiac device noted. Prominent interstitial  infiltrate noted in the right upper lobe with mild volume loss. Mild generalized  interstitial thickening noted. Minimal pleural effusions or thickening noted in  both lung bases. The cardiac device are in the right atrium and right  ventricular apex. There is no pneumothorax. Impression IMPRESSION: Comparison to the previous study reveals significant improvement in  the right pleural effusion and right-sided infiltrates. No new finding  identified. Rapidity of change suggests treatment of volume overload. CT Results (most recent):  Results from Hospital Encounter encounter on 10/13/19   CT CHEST WO CONT    Narrative CT chest without IV contrast    HISTORY: CHF exacerbation. All CT scans at this facility are performed using dose optimization technique as  appropriate to a performed exam, to include automated exposure control,  adjustment of the mA and/or kV according to patient size (including appropriate  matching for site specific examination) or use of iterative reconstruction  technique. Comparison CT September 24, 2019. Increasing to large right pleural effusion.  More severe infiltrate/edema in the  right upper lobe, right lower lobe and also in the right middle lobe.    Underlying emphysema. Fascial thickening and scarring in the left upper lobe,  stable. Small left upper lateral collateral complex collection. No change in the  left pleural effusion in the lower chest. Cystic changes in the left lung base. Slightly worse edema base. Aortic calcification. Cardiomegaly with no significant pericardial effusion. Upper abdomen shows cholelithiasis. No ascites or free air. Impression IMPRESSION:  1. Worsening right lung infiltrate/edema and increasing right pleural effusion. Slightly worse edema at the left lung base. 2. Cardiomegaly with no pericardial effusion. 3. Cholelithiasis. 09/24/19   ECHO ADULT FOLLOW-UP OR LIMITED 09/25/2019 9/25/2019    Narrative · Left Ventricle: Normal cavity size and wall thickness. Severe systolic   dysfunction. Estimated left ventricular ejection fraction is 16 - 20%. Visually measured ejection fraction. Abnormal left ventricular wall   motion. · Mitral Valve: Mild mitral valve regurgitation is present. · Tricuspid Valve: Mild tricuspid valve regurgitation is present. · Pulmonic Valve: Mild pulmonic valve regurgitation is present. · Aortic Valve: Mild aortic valve regurgitation is present. · Pulmonary Artery: Pulmonary arterial systolic pressure is 33 mmHg. · IVC/Hepatic Veins: Mildly elevated central venous pressure (5-10 mmHg);   IVC diameter is less than 21 mm and collapses less than 50% with   respiration. · This was a limited echo, but EF has significant decreased compared to   prior study. Signed by: Juan Lyles DO         Complex decision making was made in the evaluation and management plans during this consultation. More than 50% of time was spent in counseling and coordination of care including review of data and discussion with other team members.          Liseth Keen MD

## 2019-10-15 NOTE — ROUTINE PROCESS
Pt sat on room air at rest was 90%. Pt walked on room air to the nurses station and his sat went to 85%. The patient became short of breath and dizziness. The patient's sat remained 85 to 88% on the way back to his room. The patient was allow to set in the chair for about 3 minutes and his sat was 86%. The patient was placed on 2 liters via nasal cannula and his sat went to 91%. The patient still c/o shortness of breath. The oxygen was then turned up to 3  liters via nasal cannula and the patient's sat went up to 95%.

## 2019-10-15 NOTE — ROUTINE PROCESS
Bedside and Verbal shift change report given to [id number] (oncoming nurse) by Vivienne Pascual RN (offgoing nurse). Report given with SBAR, Kardex, Intake/Output, MAR, Accordion and Recent Results.

## 2019-10-15 NOTE — ROUTINE PROCESS
Bedside and Verbal shift change report given to Juan Diego Foley RN (oncoming nurse) by Rock Mary RN (offgoing nurse). Report included the following information SBAR, Kardex and Recent Results.

## 2019-10-16 VITALS
OXYGEN SATURATION: 96 % | BODY MASS INDEX: 16.63 KG/M2 | DIASTOLIC BLOOD PRESSURE: 58 MMHG | RESPIRATION RATE: 18 BRPM | HEIGHT: 65 IN | TEMPERATURE: 98.1 F | HEART RATE: 72 BPM | SYSTOLIC BLOOD PRESSURE: 113 MMHG | WEIGHT: 99.8 LBS

## 2019-10-16 LAB
ANION GAP SERPL CALC-SCNC: 7 MMOL/L (ref 3–18)
BUN SERPL-MCNC: 19 MG/DL (ref 7–18)
BUN/CREAT SERPL: 16 (ref 12–20)
CALCIUM SERPL-MCNC: 8.2 MG/DL (ref 8.5–10.1)
CHLORIDE SERPL-SCNC: 100 MMOL/L (ref 100–111)
CO2 SERPL-SCNC: 28 MMOL/L (ref 21–32)
CREAT SERPL-MCNC: 1.18 MG/DL (ref 0.6–1.3)
GLUCOSE SERPL-MCNC: 99 MG/DL (ref 74–99)
MAGNESIUM SERPL-MCNC: 1.8 MG/DL (ref 1.6–2.6)
POTASSIUM SERPL-SCNC: 4.2 MMOL/L (ref 3.5–5.5)
SODIUM SERPL-SCNC: 135 MMOL/L (ref 136–145)

## 2019-10-16 PROCEDURE — 77010033678 HC OXYGEN DAILY

## 2019-10-16 PROCEDURE — 94640 AIRWAY INHALATION TREATMENT: CPT

## 2019-10-16 PROCEDURE — 74011250637 HC RX REV CODE- 250/637: Performed by: INTERNAL MEDICINE

## 2019-10-16 PROCEDURE — 74011250637 HC RX REV CODE- 250/637: Performed by: HOSPITALIST

## 2019-10-16 PROCEDURE — 3331090002 HH PPS REVENUE DEBIT

## 2019-10-16 PROCEDURE — 3331090001 HH PPS REVENUE CREDIT

## 2019-10-16 PROCEDURE — 36415 COLL VENOUS BLD VENIPUNCTURE: CPT

## 2019-10-16 PROCEDURE — 80048 BASIC METABOLIC PNL TOTAL CA: CPT

## 2019-10-16 PROCEDURE — 74011000250 HC RX REV CODE- 250: Performed by: INTERNAL MEDICINE

## 2019-10-16 PROCEDURE — 83735 ASSAY OF MAGNESIUM: CPT

## 2019-10-16 RX ORDER — LOSARTAN POTASSIUM 50 MG/1
50 TABLET ORAL
Qty: 30 TAB | Refills: 3 | Status: SHIPPED | OUTPATIENT
Start: 2019-10-16 | End: 2021-07-19 | Stop reason: ALTCHOICE

## 2019-10-16 RX ORDER — BENZONATATE 100 MG/1
100 CAPSULE ORAL
Qty: 30 CAP | Refills: 1 | Status: SHIPPED | OUTPATIENT
Start: 2019-10-16 | End: 2019-11-15

## 2019-10-16 RX ORDER — FUROSEMIDE 20 MG/1
20 TABLET ORAL DAILY
Qty: 30 TAB | Refills: 2 | Status: ON HOLD | OUTPATIENT
Start: 2019-10-16 | End: 2020-02-03 | Stop reason: SDUPTHER

## 2019-10-16 RX ORDER — LOSARTAN POTASSIUM 50 MG/1
50 TABLET ORAL
Status: DISCONTINUED | OUTPATIENT
Start: 2019-10-16 | End: 2019-10-16 | Stop reason: HOSPADM

## 2019-10-16 RX ORDER — AMIODARONE HYDROCHLORIDE 200 MG/1
200 TABLET ORAL DAILY
Status: DISCONTINUED | OUTPATIENT
Start: 2019-10-17 | End: 2019-10-16 | Stop reason: HOSPADM

## 2019-10-16 RX ORDER — ISOSORBIDE MONONITRATE 30 MG/1
30 TABLET, EXTENDED RELEASE ORAL
Status: DISCONTINUED | OUTPATIENT
Start: 2019-10-17 | End: 2019-10-16 | Stop reason: HOSPADM

## 2019-10-16 RX ORDER — AMIODARONE HYDROCHLORIDE 200 MG/1
200 TABLET ORAL DAILY
Qty: 60 TAB | Refills: 2 | Status: SHIPPED | OUTPATIENT
Start: 2019-10-16 | End: 2021-07-19 | Stop reason: ALTCHOICE

## 2019-10-16 RX ORDER — ALBUTEROL SULFATE 0.83 MG/ML
2.5 SOLUTION RESPIRATORY (INHALATION)
Qty: 30 NEBULE | Refills: 3 | Status: SHIPPED | OUTPATIENT
Start: 2019-10-16

## 2019-10-16 RX ADMIN — AMIODARONE HYDROCHLORIDE 200 MG: 200 TABLET ORAL at 09:23

## 2019-10-16 RX ADMIN — THERA TABS 1 TABLET: TAB at 09:21

## 2019-10-16 RX ADMIN — FUROSEMIDE 20 MG: 20 TABLET ORAL at 09:21

## 2019-10-16 RX ADMIN — ASPIRIN 81 MG: 81 TABLET, COATED ORAL at 09:23

## 2019-10-16 RX ADMIN — IPRATROPIUM BROMIDE AND ALBUTEROL SULFATE 3 ML: .5; 3 SOLUTION RESPIRATORY (INHALATION) at 14:24

## 2019-10-16 RX ADMIN — Medication 400 MG: at 09:21

## 2019-10-16 RX ADMIN — CLOPIDOGREL BISULFATE 75 MG: 75 TABLET ORAL at 09:21

## 2019-10-16 RX ADMIN — METOPROLOL SUCCINATE 25 MG: 25 TABLET, EXTENDED RELEASE ORAL at 09:24

## 2019-10-16 RX ADMIN — IPRATROPIUM BROMIDE AND ALBUTEROL SULFATE 3 ML: .5; 3 SOLUTION RESPIRATORY (INHALATION) at 07:20

## 2019-10-16 RX ADMIN — FOLIC ACID 1 MG: 1 TABLET ORAL at 09:22

## 2019-10-16 RX ADMIN — LOSARTAN POTASSIUM 50 MG: 50 TABLET, FILM COATED ORAL at 12:47

## 2019-10-16 RX ADMIN — TIOTROPIUM BROMIDE 18 MCG: 18 CAPSULE ORAL; RESPIRATORY (INHALATION) at 07:20

## 2019-10-16 RX ADMIN — CYANOCOBALAMIN TAB 1000 MCG 1000 MCG: 1000 TAB at 09:22

## 2019-10-16 RX ADMIN — PANTOPRAZOLE SODIUM 40 MG: 40 TABLET, DELAYED RELEASE ORAL at 09:22

## 2019-10-16 RX ADMIN — PANTOPRAZOLE SODIUM 40 MG: 40 TABLET, DELAYED RELEASE ORAL at 16:29

## 2019-10-16 RX ADMIN — Medication 50 MG: at 09:21

## 2019-10-16 NOTE — DISCHARGE SUMMARY
Discharge Summary    Patient: Robinson Sanders               Sex: male          DOA: 10/13/2019         YOB: 1940      Age:  67 y.o.        LOS:  LOS: 3 days                Admit Date: 10/13/2019    Discharge Date: 10/16/2019    Primary care physician: Jose Gardner MD    Discharge Diagnoses:    1. Acute on chronic combined systolic and diastolic heart failure, resolving  2. Hypertensive emergency, resolved   3. Acute hypoxic respiratory failure, on continuous oxygen supplement at 3 liters  4. CAD with CABGx5, NEGATIVE TROPONIN   5. Ischemic cardiomyopathy, on AICD. Stable   6. Wide-complex tachycardia with AICD in place, stable    7. Emphysematous COPD, stable   8. H/o tobacco smoking with smoker cough, improved  9. Thrombocytosis, reactive   10. Cachexia with severe protein calorie malnutrition  11. Hyponatremia    Discharge Condition: Good  Disposition: home with home health   Code Status: full code (previous DNR was cancelled)    Follow up for Primary Care Physician:  1) please address his advance directive again in clinic   2) he is with lasix daily for fluid overload, please monitor BMP weekly   3) please adjust his antihypertensive to achieve SBP <140  4) he remains on oxygen supplement, please monitor   5) he needs follow up with cardiology and pulmonology for further care. Hospital Course:     67 y.o. male with CAD; CABG, hypertension, Chronic biventricular heart failure with EF 15-20% and recent AICD, COPD, hyperlipidemia, h/o chronic EtOH and tobacco use. He developed a wet cough and awoke from sleep early am 10/13/2019 with increased shortness of breath and presented to [facility] ER. In the ED he demonstrated pulmonary edema, RR 40,  and was placed on BiPap and treated with Ntg and paste and Lasix 40 mg IV with gradual improvement to nasal O2  He was admitted to Telemetry with acute on Chronic Systolic and Diastolic CHF. Cardiology consulted for further care.  His antihypertensive medications were adjusted. He remains on IV lasix. This was thought to be uncontrolled BP leading to heart failure symptom. Education provided and compliant with his medications and low salt diet at home. He tolerated diuretic for a few day and able to wean onto oral lasix. His oxygen needs lessened but he required oxygen after stability. He was discharged with oxygen 2-3 liters. Nebulizer machine ordered. Cardiology has no further offer for his CAD. His amiodarone has been decreased at discharge. Pulmonary was consulted. Recommended bronchodilator and lasix. OP care follow up        Last 24 Hours: feels better today, has been stable. Still requires oxygen with ambulation. He is on oral lasix now. Note his amiodarone has been decrease to daily dosing. BP is better controlled. Cardiology has followed and has no further offer for his CAD   Pulmonary follows, can resume his Stiolto at discharge.  Nebulizer machine ordered for prn duoneb use at home    ROS: no fever/chills, no headache, no dizziness, no facial pain, no sinus congestion,   No swallowing pain, No chest pain, no palpitation, +improved shortness of breath, no abd pain,  No diarrhea, no urinary complaint, no leg pain or swelling    VS:   Visit Vitals  /59 (BP 1 Location: Left arm, BP Patient Position: At rest)   Pulse 65   Temp (P) 98.1 °F (36.7 °C)   Resp 17   Ht 5' 5\" (1.651 m)   Wt 45.3 kg (99 lb 12.8 oz)   SpO2 100%   BMI 16.61 kg/m²      Tmax/24hrs: Temp (24hrs), Av.3 °F (36.8 °C), Min:98.1 °F (36.7 °C), Max:98.7 °F (37.1 °C)      Intake/Output Summary (Last 24 hours) at 10/16/2019 1249  Last data filed at 10/15/2019 1803  Gross per 24 hour   Intake 50 ml   Output 600 ml   Net -550 ml       Tele: sinus  General:  Cooperative, Not in acute distress, speaks in full sentence while in bed  HEENT: PERRL, EOMI, supple neck, no JVD, dry oral mucosa  Cardiovascular: S1S2 regular, no rub/gallop   Pulmonary: air entry bilaterally, no wheezing, ++ crackle  GI:  Soft, non tender, non distended, +bs, no guarding   Extremities:  No pedal edema, +distal pulses appreciated   Neuro: AOx3, moving all extremities, no gross deficit. Consults:   Cardiology: Dr. Fernando Nolen  Pulmonary: Dr. Suyapa Lua:   CT Results (most recent):  Results from Hospital Encounter encounter on 10/13/19   CT CHEST WO CONT    Narrative CT chest without IV contrast    HISTORY: CHF exacerbation. All CT scans at this facility are performed using dose optimization technique as  appropriate to a performed exam, to include automated exposure control,  adjustment of the mA and/or kV according to patient size (including appropriate  matching for site specific examination) or use of iterative reconstruction  technique. Comparison CT September 24, 2019. Increasing to large right pleural effusion. More severe infiltrate/edema in the  right upper lobe, right lower lobe and also in the right middle lobe. Underlying emphysema. Fascial thickening and scarring in the left upper lobe,  stable. Small left upper lateral collateral complex collection. No change in the  left pleural effusion in the lower chest. Cystic changes in the left lung base. Slightly worse edema base. Aortic calcification. Cardiomegaly with no significant pericardial effusion. Upper abdomen shows cholelithiasis. No ascites or free air. Impression IMPRESSION:  1. Worsening right lung infiltrate/edema and increasing right pleural effusion. Slightly worse edema at the left lung base. 2. Cardiomegaly with no pericardial effusion. 3. Cholelithiasis. XR Results (most recent):  Results from Hospital Encounter encounter on 10/13/19   XR CHEST PORT    Narrative EXAM: PORTABLE CHEST    CLINICAL HISTORY/INDICATION: f/u infiltrates       COMPARISON: 10/13/2019.     TECHNIQUE: Single AP view    FINDINGS:     Portable chest x-ray 10/14/2019 at 1130 hours reveals evidence of previous  cardiac surgery. Implanted cardiac device noted. Prominent interstitial  infiltrate noted in the right upper lobe with mild volume loss. Mild generalized  interstitial thickening noted. Minimal pleural effusions or thickening noted in  both lung bases. The cardiac device are in the right atrium and right  ventricular apex. There is no pneumothorax. Impression IMPRESSION: Comparison to the previous study reveals significant improvement in  the right pleural effusion and right-sided infiltrates. No new finding  identified. Rapidity of change suggests treatment of volume overload. Lab/Data Review:  Labs: Results:       Chemistry Recent Labs     10/16/19  0343 10/15/19  0405 10/14/19  0357   GLU 99 90 88   * 134* 140   K 4.2 4.2 4.2    102 105   CO2 28 30 28   BUN 19* 18 12   CREA 1.18 1.24 1.16   CA 8.2* 8.4* 8.8   AGAP 7 2* 7   BUCR 16 15 10*      CBC w/Diff Recent Labs     10/15/19  0405   WBC 8.1   RBC 2.98*   HGB 10.0*   HCT 29.8*   *      Coagulation No results for input(s): PTP, INR, APTT, INREXT in the last 72 hours. Iron/Ferritin No results for input(s): IRON in the last 72 hours. No lab exists for component: TIBCCALC   BNP No results for input(s): BNPP in the last 72 hours. Cardiac Enzymes Recent Labs     10/13/19  2004 10/13/19  1420   CPK 38* 46   CKND1 6.3* 6.7*      Liver Enzymes No results for input(s): TP, ALB, TBIL, AP, SGOT, GPT in the last 72 hours. No lab exists for component: DBIL   Thyroid Studies No results for input(s): T4, T3U, TSH, TSHEXT in the last 72 hours.     No lab exists for component: T3RU       All Micro Results     None                Medications at discharge  including reasons for change and indications for new ones:   Current Discharge Medication List      START taking these medications    Details   albuterol (PROVENTIL VENTOLIN) 2.5 mg /3 mL (0.083 %) nebu 3 mL by Nebulization route every four (4) hours as needed (cough, wheezing, more shortness of breath). Indications: a chronic lung disease where the airways become partially blocked with mucus called COPD  Qty: 30 Nebule, Refills: 3      benzonatate (TESSALON) 100 mg capsule Take 1 Cap by mouth two (2) times daily as needed for Cough for up to 30 days. Indications: cough  Qty: 30 Cap, Refills: 1      furosemide (LASIX) 20 mg tablet Take 1 Tab by mouth daily. Indications: Fluid in the Lungs due to Chronic Heart Failure  Qty: 30 Tab, Refills: 2         CONTINUE these medications which have CHANGED    Details   amiodarone (CORDARONE) 200 mg tablet Take 1 Tab by mouth daily. Indications: ventricular fibrillation, a heart rhythm disorder  Qty: 60 Tab, Refills: 2      losartan (COZAAR) 50 mg tablet Take 1 Tab by mouth nightly. 1 pill po daily  Indications: chronic heart failure, high blood pressure  Qty: 30 Tab, Refills: 3         CONTINUE these medications which have NOT CHANGED    Details   pantoprazole (PROTONIX) 40 mg tablet Take 40 mg by mouth daily. 1 pill daily 30 minutes before breakfast.  Indications: indigestion      atorvastatin (LIPITOR) 40 mg tablet Take 40 mg by mouth nightly. 1 pill daily      tiotropium-olodaterol (STIOLTO RESPIMAT) 2.5-2.5 mcg/actuation inhaler Take 2 Puffs by inhalation daily. 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 Qty: 30 Tab, Refills: 3      isosorbide mononitrate ER (IMDUR) 30 mg tablet Take 1 Tab by mouth every morning. Qty: 30 Tab, Refills: 3      nitroglycerin (NITROSTAT) 0.4 mg SL tablet 1 Tab by SubLINGual route every five (5) minutes as needed for Chest Pain. Up to 3 doses. Qty: 1 Bottle, Refills: 3      aspirin delayed-release 81 mg tablet Take 81 mg by mouth daily.  1 pill po daily      metoprolol succinate (TOPROL-XL) 25 mg XL tablet Take 25 mg by mouth daily. 1 pill po daily      albuterol (VENTOLIN HFA) 90 mcg/Actuation inhaler Take 1 Puff by inhalation as needed. CYANOCOBALAMIN/FA/PYRIDOXINE (FOLBEE PO) Take 1 Tab by mouth daily. multivitamin (ONE A DAY) tablet Take 1 Tab by mouth daily.          STOP taking these medications       cyanocobalamin 1,000 mcg tablet Comments:   Reason for Stopping:         folic acid (FOLVITE) 1 mg tablet Comments:   Reason for Stopping:         thiamine (VITAMIN B-1) 50 mg tablet Comments:   Reason for Stopping:         amoxicillin (AMOXIL) 500 mg capsule Comments:   Reason for Stopping:         PSEUDOEPHEDRINE/CPM/METHYLSCOP (ALLERGY AM-PM PO) Comments:   Reason for Stopping:         Ibuprofen-Diphenhydramine (ADVIL PM) 200-38 mg Tab Comments:   Reason for Stopping:                       Pending laboratory work and tests: none    Activity: Activity as tolerated    Diet: Cardiac Diet and Low fat, Low cholesterol    Wound Care: None needed        Erick Moritz, MD  10/16/2019  12:49 PM

## 2019-10-16 NOTE — CONSULTS
[facility]  Pulmonary, Critical Care, and Sleep Medicine    Initial Patient Consult    Name: Samy Prakash MRN: [id number]   : 1940 Hospital: [facility]   Date: 10/16/2019        IMPRESSION:   Acute on chronic combined systolic and diastolic CHF: Patient was recently hospitalized with CHF exacerbation- Systolic heart failure, however due to creatinine elevation patient was not discharged on diuretics. Patient has new hypoxia and ongoing dyspnea, as well as bilateral lower lobe rales, and CT scan showing significant pleural effusions and pulmonary edema, consistent with CHF exacerbation. CT scan today with 1. Worsening right lung infiltrate/edema and increasing right pleural effusion. Slightly worse edema at the left lung base. 2. Cardiomegaly with no pericardial effusion. Acute hypoxia: Etiology due to above  COPD, gold risk category B, gold stage I: No evidence of COPD exacerbation, symptoms are stable  Lung nodules: Seen on lung cancer screening CT from 18, subcentimeter, largest was noted to be 4 mm. Not visualized on CTA from recent hospitalization, due to pulmonary edema. Repeat CT scan planned for 2019  Non-CF bronchiectasis: COPD secondary to  Chronic cough etiology most likely due to above but also has a component of bronchiectasis  COPD cachexia: Body mass index is 17.67 kg/m². with albumin of 2.5  Hx of CAD s/p CABG, AICD  Chronic GERD with esophageal thickening on last CT scan:  Pt seen by GI, planned for EGD but cancelled since pt not cleared to come off of Plavix per Cardiology  History of tobacco use: Prior 0.75 pack/day smoker, 50 total pack-year smoking history. Patient quit after discharge for most recent hospitalization on 10/1/2019.       RECOMMENDATIONS:   · Home O2 and diuretics per cards  · D/c  pulmicort, will add spiriva while inpatient- On home Stiolto, to be restarted on discharge  · Dupenelope prn  · Out patient testing- Polysomnogram scheduled  · Follows with our group- Dr. Gabino Martin  · OT, PT, OOB and ambulate    Will sign off     Subjective:     10/16 update  - Pt doing well. States  He is ready for discharge. On NC, no distress. HPI:    This patient has been seen and evaluated at the request of Ludmila Stover  for Acute Hypoxic Respiratory failure. Patient is a 67 y.o. male with HTN, CAD with CABG, chronic BiV heart failure last EF 15-20% recent ICD placed, COPD, active tobacco smoker, hyperlipidemia, chronic alcoholism, on chronic doxycycline for eye infection who presents with shortness of breath. Patient woke up from sleep just now quite short of breath, he was not lying completely flat, he was just discharged from a similar event. No chest pain, but has been having a wet cough, feeling like he has a rattle in his chest, no fevers noted, no leg swelling noted. Has been taking all of his medications as instructed  He was scheduled for Sleep study tonight at [facility] Sleep lab. Patient was last seen in our clinic on 5/15/2019 and more recently on 10/11/2019 by Dr. Gabino Martin. In the interval, pt had a CHF exacerbation requiring hospitalization to [facility] from 9/24/2019 through 10/1/2019. Patient reports that he was diuresed during this hospitalization, however diuretics were held due to abnormal renal function. Patient reports he was on oxygen during the entire hospitalization, but was not discharged on oxygen. Since discharge, patient reports that his breathing is not at baseline. He reports dyspnea with mild to moderate exertion. His symptoms are better than when he had to go to the hospital, however he notes that he still is very limited. He reports only being able to walk less than 1 block, unable to climb 1 flight of steps before having to stop. Alleviated with rest, no other modifying factors.   Patient reports he is quit smoking in the interval.  Patient reports that he saw his PCP, Dr. Marj Melendez, who prescribed amoxicillin. Patient denies any worsening cough, purulent sputum, fevers, chills, night sweats. Patient reports chronic sputum of white color. Patient does report excessive daytime sleepiness, reports having to take a nap during the daytime, very sleepy. Patient reports he has been told he snores occasionally. Pt denies any wt loss, appetite changes, hemoptysis, chest pain/angina, night sweats, LE swelling, fevers, chills, night sweats, N/V/D  I have reviewed all of the available data including the patient's previous history external records and radiological imaging available for review. In addition applicable cardiology and other lab data were also reviewed. Past Medical History:   Diagnosis Date    AICD (automatic cardioverter/defibrillator) present 10/13/2019    Alcohol abuse     quit February 2011    Binocular visual disturbance 10/31/2013    CAD (coronary artery disease)     CAD (coronary artery disease), native coronary artery 1/3/05    LAD 70-80% mid; Cx-diffuse 80%; OM1-70% prox; RCA 40-50% mid; 100% RPLB with collaterals    Cardiac cath 08/19/2013    Severe, diffuse calcification. dLM 30%. mLAD 80%. o/pD1 80%. o/pRamus 80%. dCX 85%. dRCA 80%.  m-dRPLB 75%. EF 50%. Mod to severe inferobasal hypk.  Cardiac echocardiogram 01/19/2011    EF 50-55%. Gr 1 DDfx. RVSP 30 mmHg.  Cardiac nuclear imaging test 06/27/2011    No evidence of ischemia or previous infarction. EF 60%. Neg EKG on max EST. Ex time 6 mins.     Chronic kidney disease     Chronic lung disease     Chronic obstructive pulmonary disease ([address]     Coronary artery disease     Diastolic CHF, chronic (HCC)     LV EF 55% (Echo Jan 2011)    Diverticula of colon 12/17/2013    Emphysematous COPD ([address]     PFTs March 2009; mild-mode obstruction, + bronchodil response; decreased DLCO    GERD (gastroesophageal reflux disease)     with erosive esophagitis history    Heart failure ([address]  History of renal failure     2 years ago, which completely resolved    Hypercholesterolemia     Hypertension     Hypertensive heart disease with CHF ([address]     MI (myocardial infarction) ([address]     Inferior wall    Paresthesia and pain of both upper extremities 10/31/2013    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 Caterina Glover, 4/9/14.  Tobacco abuse     Unspecified hereditary and idiopathic peripheral neuropathy 12/4/2013      Past Surgical History:   Procedure Laterality Date    HX CORONARY ARTERY BYPASS GRAFT  about 2013    HX HEART CATHETERIZATION  11/2013    NH INSJ/RPLCMT PERM DFB W/TRNSVNS LDS 1/DUAL CHMBR Left 9/30/2019    INSERT ICD DUAL performed by Danii Hooks MD at [facility] CATH LAB      Prior to Admission medications    Medication Sig Start Date End Date Taking? Authorizing Provider   cyanocobalamin 1,000 mcg tablet Take 1,000 mcg by mouth daily. 1 pill po daily  Indications: Prevention of Vitamin B12 Deficiency   Yes Provider, Historical   pantoprazole (PROTONIX) 40 mg tablet Take 40 mg by mouth daily. 1 pill daily 30 minutes before breakfast.  Indications: indigestion   Yes Provider, Historical   amiodarone (CORDARONE) 200 mg tablet Take 1 Tab by mouth every twelve (12) hours. 10/1/19  Yes Graham Blakely MD   atorvastatin (LIPITOR) 40 mg tablet Take 40 mg by mouth nightly. 1 pill daily   Yes Provider, Historical   tiotropium-olodaterol (STIOLTO RESPIMAT) 2.5-2.5 mcg/actuation inhaler Take 2 Puffs by inhalation daily. 5/15/19  Yes Peter Macias MD   folic acid (FOLVITE) 1 mg tablet Take 1 mg by mouth two (2) times a day. 1 pill po daily   Yes Provider, Historical   clopidogrel (PLAVIX) 75 mg tab Take 1 Tab by mouth daily.  2/8/19  Yes Etelvina Vazquez,  isosorbide mononitrate ER (IMDUR) 30 mg tablet Take 1 Tab by mouth every morning. 2/8/19  Yes Kaila Jimenez, DO   nitroglycerin (NITROSTAT) 0.4 mg SL tablet 1 Tab by SubLINGual route every five (5) minutes as needed for Chest Pain. Up to 3 doses. 2/8/19  Yes Kaila Jimenez, DO   aspirin delayed-release 81 mg tablet Take 81 mg by mouth daily. 1 pill po daily   Yes Provider, Historical   losartan (COZAAR) 50 mg tablet Take 50 mg by mouth daily. 1 pill po daily 12/4/18  Yes Provider, Historical   metoprolol succinate (TOPROL-XL) 25 mg XL tablet Take 25 mg by mouth daily. 1 pill po daily 9/13/17  Yes Provider, Historical   thiamine (VITAMIN B-1) 50 mg tablet Take 50 mg by mouth daily. 1 pill po daily   Yes Provider, Historical   albuterol (VENTOLIN HFA) 90 mcg/Actuation inhaler Take 1 Puff by inhalation as needed. Yes Provider, Historical   CYANOCOBALAMIN/FA/PYRIDOXINE (FOLBEE PO) Take 1 Tab by mouth daily. 3/23/11  Yes Provider, Historical   multivitamin (ONE A DAY) tablet Take 1 Tab by mouth daily. Yes Provider, Historical   amoxicillin (AMOXIL) 500 mg capsule Take 500 mg by mouth three (3) times daily. Take 1 capsule by mouth thre times a daily 10/8/19 10/15/19  Triston Soto MD   PSEUDOEPHEDRINE/CPM/METHYLSCOP (ALLERGY AM-PM PO) Take  by mouth daily as needed. Provider, Historical   Ibuprofen-Diphenhydramine (ADVIL PM) 200-38 mg Tab Take 200 mg by mouth as needed for Pain.  1 pill po q 6 hours as needed for pain   6/22/11   Provider, Historical     Allergies   Allergen Reactions    Vicodin [Hydrocodone-Acetaminophen] Rash      Social History     Tobacco Use    Smoking status: Current Every Day Smoker     Packs/day: 0.50     Years: 55.00     Pack years: 27.50     Types: Cigarettes     Start date: 3/13/2014    Smokeless tobacco: Never Used   Substance Use Topics    Alcohol use: Yes     Comment: every other day drinker      Family History   Problem Relation Age of Onset    Heart Disease Maternal Uncle     Diabetes Father     Hypertension Father       Current Facility-Administered Medications   Medication Dose Route Frequency    losartan (COZAAR) tablet 50 mg  50 mg Oral PCL    [START ON 10/17/2019] isosorbide mononitrate ER (IMDUR) tablet 30 mg  30 mg Oral 7am    [START ON 10/17/2019] amiodarone (CORDARONE) tablet 200 mg  200 mg Oral DAILY    furosemide (LASIX) tablet 20 mg  20 mg Oral DAILY    magnesium oxide (MAG-OX) tablet 400 mg  400 mg Oral DAILY    tiotropium (SPIRIVA) inhalation capsule 18 mcg  1 Cap Inhalation QAM RT    albuterol-ipratropium (DUO-NEB) 2.5 MG-0.5 MG/3 ML  3 mL Nebulization TID RT    vit B12-levomefolate calcium-vit B6 (FOLBIC RF, FOLTX) 2-1.13-25 mg tablet 1 Tab  1 Tab Oral DAILY    therapeutic multivitamin (THERAGRAN) tablet 1 Tab  1 Tab Oral DAILY    thiamine HCL (B-1) tablet 50 mg  50 mg Oral DAILY    metoprolol succinate (TOPROL-XL) XL tablet 25 mg  25 mg Oral DAILY    aspirin delayed-release tablet 81 mg  81 mg Oral DAILY    clopidogrel (PLAVIX) tablet 75 mg  75 mg Oral DAILY    folic acid (FOLVITE) tablet 1 mg  1 mg Oral BID    atorvastatin (LIPITOR) tablet 40 mg  40 mg Oral QHS    cyanocobalamin tablet 1,000 mcg  1,000 mcg Oral DAILY    sodium chloride (NS) flush 5-40 mL  5-40 mL IntraVENous Q8H    influenza vaccine - (6 mos+)(PF) (FLUARIX/FLULAVAL/FLUZONE QUAD) injection 0.5 mL  0.5 mL IntraMUSCular PRIOR TO DISCHARGE    pantoprazole (PROTONIX) tablet 40 mg  40 mg Oral ACB&D       Review of Systems:  A comprehensive review of systems was negative except for that written in the HPI.     Objective:   Vital Signs:    Visit Vitals  /59 (BP 1 Location: Left arm, BP Patient Position: At rest)   Pulse 65   Temp (P) 98.1 °F (36.7 °C)   Resp 17   Ht 5' 5\" (1.651 m)   Wt 45.3 kg (99 lb 12.8 oz)   SpO2 100%   BMI 16.61 kg/m²       O2 Device: Room air   O2 Flow Rate (L/min): 3 l/min   Temp (24hrs), Av.3 °F (36.8 °C), Min:98.1 °F (36.7 °C), Max:98.7 °F (37.1 °C)       Intake/Output:   Last shift:      No intake/output data recorded. Last 3 shifts: 10/14 1901 - 10/16 0700  In: 50 [I.V.:50]  Out: 700 [Urine:700]    Intake/Output Summary (Last 24 hours) at 10/16/2019 1239  Last data filed at 10/15/2019 1803  Gross per 24 hour   Intake 50 ml   Output 600 ml   Net -550 ml      Physical Exam:   General:  Alert, cooperative, no distress, appears stated age. Cachetic   Lungs:   Rales at the bases, no wheezing. Chest wall:  No tenderness or deformity. Left infraclavicular AICD   Heart:  Regular rate and rhythm, S1, S2 normal, no murmur, click, rub or gallop. Abdomen:   Soft, non-tender. Bowel sounds normal. No masses,  No organomegaly. Extremities: Extremities normal, atraumatic, no cyanosis or edema. Pulses: 2+ and symmetric all extremities. Neurologic: Grossly nonfocal     Data review:     Recent Results (from the past 24 hour(s))   METABOLIC PANEL, BASIC    Collection Time: 10/16/19  3:43 AM   Result Value Ref Range    Sodium 135 (L) 136 - 145 mmol/L    Potassium 4.2 3.5 - 5.5 mmol/L    Chloride 100 100 - 111 mmol/L    CO2 28 21 - 32 mmol/L    Anion gap 7 3.0 - 18 mmol/L    Glucose 99 74 - 99 mg/dL    BUN 19 (H) 7.0 - 18 MG/DL    Creatinine 1.18 0.6 - 1.3 MG/DL    BUN/Creatinine ratio 16 12 - 20      GFR est AA >60 >60 ml/min/1.73m2    GFR est non-AA 60 (L) >60 ml/min/1.73m2    Calcium 8.2 (L) 8.5 - 10.1 MG/DL   MAGNESIUM    Collection Time: 10/16/19  3:43 AM   Result Value Ref Range    Magnesium 1.8 1.6 - 2.6 mg/dL     PFT :5/15/2019    Normal Flows except for reduced FEF 25-75 to 63% predicted, DLCO 42% predicted    Imaging:  I have personally reviewed the patients radiographs and have reviewed the reports:  XR Results (most recent):  Results from Hospital Encounter encounter on 10/13/19   XR CHEST PORT    Narrative EXAM: PORTABLE CHEST    CLINICAL HISTORY/INDICATION: f/u infiltrates       COMPARISON: 10/13/2019.     TECHNIQUE: Single AP view    FINDINGS: Portable chest x-ray 10/14/2019 at 1130 hours reveals evidence of previous  cardiac surgery. Implanted cardiac device noted. Prominent interstitial  infiltrate noted in the right upper lobe with mild volume loss. Mild generalized  interstitial thickening noted. Minimal pleural effusions or thickening noted in  both lung bases. The cardiac device are in the right atrium and right  ventricular apex. There is no pneumothorax. Impression IMPRESSION: Comparison to the previous study reveals significant improvement in  the right pleural effusion and right-sided infiltrates. No new finding  identified. Rapidity of change suggests treatment of volume overload. CT Results (most recent):  Results from Hospital Encounter encounter on 10/13/19   CT CHEST WO CONT    Narrative CT chest without IV contrast    HISTORY: CHF exacerbation. All CT scans at this facility are performed using dose optimization technique as  appropriate to a performed exam, to include automated exposure control,  adjustment of the mA and/or kV according to patient size (including appropriate  matching for site specific examination) or use of iterative reconstruction  technique. Comparison CT September 24, 2019. Increasing to large right pleural effusion. More severe infiltrate/edema in the  right upper lobe, right lower lobe and also in the right middle lobe. Underlying emphysema. Fascial thickening and scarring in the left upper lobe,  stable. Small left upper lateral collateral complex collection. No change in the  left pleural effusion in the lower chest. Cystic changes in the left lung base. Slightly worse edema base. Aortic calcification. Cardiomegaly with no significant pericardial effusion. Upper abdomen shows cholelithiasis. No ascites or free air. Impression IMPRESSION:  1. Worsening right lung infiltrate/edema and increasing right pleural effusion. Slightly worse edema at the left lung base.   2. Cardiomegaly with no pericardial effusion. 3. Cholelithiasis. 09/24/19   ECHO ADULT FOLLOW-UP OR LIMITED 09/25/2019 9/25/2019    Narrative · Left Ventricle: Normal cavity size and wall thickness. Severe systolic   dysfunction. Estimated left ventricular ejection fraction is 16 - 20%. Visually measured ejection fraction. Abnormal left ventricular wall   motion. · Mitral Valve: Mild mitral valve regurgitation is present. · Tricuspid Valve: Mild tricuspid valve regurgitation is present. · Pulmonic Valve: Mild pulmonic valve regurgitation is present. · Aortic Valve: Mild aortic valve regurgitation is present. · Pulmonary Artery: Pulmonary arterial systolic pressure is 33 mmHg. · IVC/Hepatic Veins: Mildly elevated central venous pressure (5-10 mmHg);   IVC diameter is less than 21 mm and collapses less than 50% with   respiration. · This was a limited echo, but EF has significant decreased compared to   prior study. Signed by: Ernestina Gallardo DO         Complex decision making was made in the evaluation and management plans during this consultation. More than 50% of time was spent in counseling and coordination of care including review of data and discussion with other team members.          Roz Felming MD

## 2019-10-16 NOTE — PROGRESS NOTES
Problem: Falls - Risk of  Goal: *Absence of Falls  Description  Document Tray Benites Fall Risk and appropriate interventions in the flowsheet.   Outcome: Progressing Towards Goal  Note:   Fall Risk Interventions:  Mobility Interventions: Bed/chair exit alarm, OT consult for ADLs, Patient to call before getting OOB         Medication Interventions: Bed/chair exit alarm    Elimination Interventions: Call light in reach, Patient to call for help with toileting needs    History of Falls Interventions: Door open when patient unattended         Problem: Patient Education: Go to Patient Education Activity  Goal: Patient/Family Education  Outcome: Progressing Towards Goal     Problem: General Medical Care Plan  Goal: *Vital signs within specified parameters  Outcome: Progressing Towards Goal  Goal: *Labs within defined limits  Outcome: Progressing Towards Goal  Goal: *Absence of infection signs and symptoms  Outcome: Progressing Towards Goal  Goal: *Optimal pain control at patient's stated goal  Outcome: Progressing Towards Goal  Goal: *Skin integrity maintained  Outcome: Progressing Towards Goal  Goal: *Fluid volume balance  Outcome: Progressing Towards Goal  Goal: *Optimize nutritional status  Outcome: Progressing Towards Goal  Goal: *Anxiety reduced or absent  Outcome: Progressing Towards Goal  Goal: *Progressive mobility and function (eg: ADL's)  Outcome: Progressing Towards Goal     Problem: Patient Education: Go to Patient Education Activity  Goal: Patient/Family Education  Outcome: Progressing Towards Goal     Problem: Pain  Goal: *Control of Pain  Outcome: Progressing Towards Goal  Goal: *PALLIATIVE CARE:  Alleviation of Pain  Outcome: Progressing Towards Goal     Problem: Patient Education: Go to Patient Education Activity  Goal: Patient/Family Education  Outcome: Progressing Towards Goal     Problem: General Infection Care Plan (Adult and Pediatric)  Goal: Improvement in signs and symptoms of infection  Outcome: Progressing Towards Goal  Goal: *Optimize nutritional status  Outcome: Progressing Towards Goal     Problem: Patient Education: Go to Patient Education Activity  Goal: Patient/Family Education  Outcome: Progressing Towards Goal     Problem: Heart Failure: Day 1  Goal: Off Pathway (Use only if patient is Off Pathway)  Outcome: Progressing Towards Goal  Goal: Activity/Safety  Outcome: Progressing Towards Goal  Goal: Consults, if ordered  Outcome: Progressing Towards Goal  Goal: Diagnostic Test/Procedures  Outcome: Progressing Towards Goal  Goal: Nutrition/Diet  Outcome: Progressing Towards Goal  Goal: Discharge Planning  Outcome: Progressing Towards Goal  Goal: Medications  Outcome: Progressing Towards Goal  Goal: Respiratory  Outcome: Progressing Towards Goal  Goal: Treatments/Interventions/Procedures  Outcome: Progressing Towards Goal  Goal: Psychosocial  Outcome: Progressing Towards Goal  Goal: *Oxygen saturation within defined limits  Outcome: Progressing Towards Goal  Goal: *Hemodynamically stable  Outcome: Progressing Towards Goal  Goal: *Optimal pain control at patient's stated goal  Outcome: Progressing Towards Goal  Goal: *Anxiety reduced or absent  Outcome: Progressing Towards Goal     Problem: Heart Failure: Day 2  Goal: Off Pathway (Use only if patient is Off Pathway)  Outcome: Progressing Towards Goal  Goal: Activity/Safety  Outcome: Progressing Towards Goal  Goal: Consults, if ordered  Outcome: Progressing Towards Goal  Goal: Diagnostic Test/Procedures  Outcome: Progressing Towards Goal  Goal: Nutrition/Diet  Outcome: Progressing Towards Goal  Goal: Discharge Planning  Outcome: Progressing Towards Goal  Goal: Medications  Outcome: Progressing Towards Goal  Goal: Respiratory  Outcome: Progressing Towards Goal  Goal: Treatments/Interventions/Procedures  Outcome: Progressing Towards Goal  Goal: Psychosocial  Outcome: Progressing Towards Goal  Goal: *Oxygen saturation within defined limits  Outcome: Progressing Towards Goal  Goal: *Hemodynamically stable  Outcome: Progressing Towards Goal  Goal: *Optimal pain control at patient's stated goal  Outcome: Progressing Towards Goal  Goal: *Anxiety reduced or absent  Outcome: Progressing Towards Goal  Goal: *Demonstrates progressive activity  Outcome: Progressing Towards Goal     Problem: Heart Failure: Day 3  Goal: Off Pathway (Use only if patient is Off Pathway)  Outcome: Progressing Towards Goal  Goal: Activity/Safety  Outcome: Progressing Towards Goal  Goal: Diagnostic Test/Procedures  Outcome: Progressing Towards Goal  Goal: Nutrition/Diet  Outcome: Progressing Towards Goal  Goal: Discharge Planning  Outcome: Progressing Towards Goal  Goal: Medications  Outcome: Progressing Towards Goal  Goal: Respiratory  Outcome: Progressing Towards Goal  Goal: Treatments/Interventions/Procedures  Outcome: Progressing Towards Goal  Goal: Psychosocial  Outcome: Progressing Towards Goal  Goal: *Oxygen saturation within defined limits  Outcome: Progressing Towards Goal  Goal: *Hemodynamically stable  Outcome: Progressing Towards Goal  Goal: *Optimal pain control at patient's stated goal  Outcome: Progressing Towards Goal  Goal: *Anxiety reduced or absent  Outcome: Progressing Towards Goal  Goal: *Demonstrates progressive activity  Outcome: Progressing Towards Goal     Problem: Heart Failure: Day 4  Goal: Off Pathway (Use only if patient is Off Pathway)  Outcome: Progressing Towards Goal  Goal: Activity/Safety  Outcome: Progressing Towards Goal  Goal: Diagnostic Test/Procedures  Outcome: Progressing Towards Goal  Goal: Nutrition/Diet  Outcome: Progressing Towards Goal  Goal: Discharge Planning  Outcome: Progressing Towards Goal  Goal: Medications  Outcome: Progressing Towards Goal  Goal: Respiratory  Outcome: Progressing Towards Goal  Goal: Treatments/Interventions/Procedures  Outcome: Progressing Towards Goal  Goal: Psychosocial  Outcome: Progressing Towards Goal  Goal: *Oxygen saturation within defined limits  Outcome: Progressing Towards Goal  Goal: *Hemodynamically stable  Outcome: Progressing Towards Goal  Goal: *Optimal pain control at patient's stated goal  Outcome: Progressing Towards Goal  Goal: *Anxiety reduced or absent  Outcome: Progressing Towards Goal  Goal: *Demonstrates progressive activity  Outcome: Progressing Towards Goal     Problem: Heart Failure: Day 5  Goal: Off Pathway (Use only if patient is Off Pathway)  Outcome: Progressing Towards Goal  Goal: Activity/Safety  Outcome: Progressing Towards Goal  Goal: Diagnostic Test/Procedures  Outcome: Progressing Towards Goal  Goal: Nutrition/Diet  Outcome: Progressing Towards Goal  Goal: Discharge Planning  Outcome: Progressing Towards Goal  Goal: Medications  Outcome: Progressing Towards Goal  Goal: Respiratory  Outcome: Progressing Towards Goal  Goal: Treatments/Interventions/Procedures  Outcome: Progressing Towards Goal  Goal: Psychosocial  Outcome: Progressing Towards Goal     Problem: Heart Failure: Discharge Outcomes  Goal: *Demonstrates ability to perform prescribed activity without shortness of breath or discomfort  Outcome: Progressing Towards Goal  Goal: *Left ventricular function assessment completed prior to or during stay, or planned for post-discharge  Outcome: Progressing Towards Goal  Goal: *ACEI prescribed if LVEF less than 40% and no contraindications or ARB prescribed  Outcome: Progressing Towards Goal  Goal: *Verbalizes understanding and describes prescribed diet  Outcome: Progressing Towards Goal  Goal: *Verbalizes understanding/describes prescribed medications  Outcome: Progressing Towards Goal  Goal: *Describes available resources and support systems  Description  (eg: Home Health, Palliative Care, Advanced Medical Directive)  Outcome: Progressing Towards Goal  Goal: *Describes smoking cessation resources  Outcome: Progressing Towards Goal  Goal: *Understands and describes signs and symptoms to report to providers(Stroke Metric)  Outcome: Progressing Towards Goal  Goal: *Describes/verbalizes understanding of follow-up/return appt  Description  (eg: to physicians, diabetes treatment coordinator, and other resources  Outcome: Progressing Towards Goal  Goal: *Describes importance of continuing daily weights and changes to report to physician  Outcome: Progressing Towards Goal

## 2019-10-16 NOTE — PROGRESS NOTES
Discharge instructions given verbally and written to patient and pt's esvin Larsen) all questions answered Iv, tele,and armband discontinued prescriptions given to be filled at pt's local pharmacy,medicalequipment portable O2 tank will accompany patient home. As part of the discharge instructions, medications already given today were discussed with the patient. The next dose due of all ordered meds was highlighted as part of the medication discharge instructions. Discussed with the patient the importance of taking medications as directed, as well as the side effects and adverse reactions to medications ordered.

## 2019-10-16 NOTE — PROGRESS NOTES
Oxygen and nebulizer orders noted. Notified Andrin[facility] of [facility]    Discharge order noted for today. Pt has been accepted to [facility] agency. Met with patient  and are agreeable to the transition plan today. Transport has been arranged through pt's family. Patient's discharge home health  orders have been forwarded to [facility] home health  agency. Updated bedside RN, La,  to the transition plan. Discharge information has been documented on the AVS.     Important Message from [id number]" reviewed and explained with the patient and/or representative at bedside and signature was obtained. A signed copy provided to patient/representative. Original signed document placed in patient's chart.          NICOLAS Mccloud RN  Care Management  Pager: [phone]

## 2019-10-16 NOTE — DISCHARGE INSTRUCTIONS
Patient armband removed and shredded  MyChart Activation    Thank you for requesting access to Webcollage. Please follow the instructions below to securely access and download your online medical record. Webcollage allows you to send messages to your doctor, view your test results, renew your prescriptions, schedule appointments, and more. How Do I Sign Up? 1. In your internet browser, go to [email or web address]  2. Click on the First Time User? Click Here link in the Sign In box. You will be redirect to the New Member Sign Up page. 3. Enter your Webcollage Access Code exactly as it appears below. You will not need to use this code after youve completed the sign-up process. If you do not sign up before the expiration date, you must request a new code. Webcollage Access Code: Activation code not generated  Current Webcollage Status: Patient Declined (This is the date your Webcollage access code will )    4. Enter the last four digits of your Social Security Number (xxxx) and Date of Birth (mm/dd/yyyy) as indicated and click Submit. You will be taken to the next sign-up page. 5. Create a Webcollage ID. This will be your Webcollage login ID and cannot be changed, so think of one that is secure and easy to remember. 6. Create a Webcollage password. You can change your password at any time. 7. Enter your Password Reset Question and Answer. This can be used at a later time if you forget your password. 8. Enter your e-mail address. You will receive e-mail notification when new information is available in [address] 9. Click Sign Up. You can now view and download portions of your medical record. 10. Click the Download Summary menu link to download a portable copy of your medical information. Additional Information    If you have questions, please visit the Frequently Asked Questions section of the Webcollage website at [email or web address] Remember, Webcollage is NOT to be used for urgent needs.  For medical emergencies, dial 911. DISCHARGE SUMMARY from Nurse    PATIENT INSTRUCTIONS:    What to do at Home:  Recommended activity: Activity as tolerated, with [facility] home health care    If you experience any of the following symptoms chest pain, shortness of breath, fever, chills, elevated temperature greater than 100.9F, excessive nausea or vomiting, please follow up with nearest Emergency room. *  Please give a list of your current medications to your Primary Care Provider. *  Please update this list whenever your medications are discontinued, doses are      changed, or new medications (including over-the-counter products) are added. *  Please carry medication information at all times in case of emergency situations. These are general instructions for a healthy lifestyle:    No smoking/ No tobacco products/ Avoid exposure to second hand smoke  Surgeon General's Warning:  Quitting smoking now greatly reduces serious risk to your health. Obesity, smoking, and sedentary lifestyle greatly increases your risk for illness    A healthy diet, regular physical exercise & weight monitoring are important for maintaining a healthy lifestyle    You may be retaining fluid if you have a history of heart failure or if you experience any of the following symptoms:  Weight gain of 3 pounds or more overnight or 5 pounds in a week, increased swelling in our hands or feet or shortness of breath while lying flat in bed. Please call your doctor as soon as you notice any of these symptoms; do not wait until your next office visit. The discharge information has been reviewed with the patient. The patient verbalized understanding. Discharge medications reviewed with the patient and appropriate educational materials and side effects teaching were provided.   ___________________________________________________________________________________________________________________________________  Discharge Instructions    Patient: Manuel May MRN: [id number]  CSN: [id number]    YOB: 1940  Age: 67 y.o. Sex: male    DOA: 10/13/2019 LOS:  LOS: 3 days   Discharge Date:      ACUTE DIAGNOSES:    1. Acute on chronic combined systolic and diastolic heart failure, resolving  2. Hypertensive emergency, resolved   3. Acute hypoxic respiratory failure, on continuous oxygen supplement at 3 liters  4. CAD with CABGx5, NEGATIVE TROPONIN   5. Ischemic cardiomyopathy, on AICD. Stable   6. Wide-complex tachycardia with AICD in place, stable    7. Emphysematous COPD, stable   8. H/o tobacco smoking with smoker cough, improved  9. Thrombocytosis, reactive   10. Cachexia with severe protein calorie malnutrition  11. Hyponatremia      DISCHARGE MEDICATIONS:         · It is important that you take the medication exactly as they are prescribed. · Keep your medication in the bottles provided by the pharmacist and keep a list of the medication names, dosages, and times to be taken in your wallet. · Do not take other medications without consulting your doctor. DIET:  Cardiac Diet and Low fat, Low cholesterol, AVOID SALTY FOOD   Limit your fluid intake to 1.5 to 2 liters daily    ACTIVITY: Activity as tolerated  DO NOT SMOKE WHILE USING OXYGEN SUPPLEMENT     ADDITIONAL INFORMATION: If you experience any of the following symptoms then please call your primary care physician or return to the emergency room if you cannot get hold of your doctor: Fever, chills, nausea, vomiting, diarrhea, change in mentation, falling, bleeding, shortness of breath. FOLLOW UP CARE:  Dr. Triston Soto MD  you are to call and set up an appointment to see them in 4-7 days. Follow-up with Dr. Tejas Justice, cardiologist in 1-2 weeks  Follow up with [facility] pulmonary in 2 weeks       Information obtained by :  I understand that if any problems occur once I am at home I am to contact my physician.     I understand and acknowledge receipt of the instructions indicated above.                                                                                                                                            Physician's or R.N.'s Signature                                                                  Date/Time                                                                                                                                              Patient or Representative Signature                                                          Date/Time    Rj Nance MD  10/16/2019  12:44 PM

## 2019-10-16 NOTE — HOME CARE
Patient active to [facility]. Resumption of care orders for SN, PT, and OT. Referral updated and emailed to central office. Patient received portable oxygen and nebulizer prior to discharge from [facility] per DME liaison. [facility] will follow.      Bing Sheikh LPN   [facility] Liaison  [phone]

## 2019-10-16 NOTE — ROUTINE PROCESS
Bedside and Verbal shift change report given to Modesto Cancino (oncoming nurse) by Adele Schroeder RN (offgoing nurse). Report given with SBAR, Kardex, Intake/Output, MAR, Accordion and Recent Results.

## 2019-10-16 NOTE — PROGRESS NOTES
[facility]  -  Progress Note      Patient: Veronica Vicente MRN: [id number]  SSN: [id number]    YOB: 1940  Age: 67 y.o. Sex: male      Admit Date: 10/13/2019    Assessment:     -Presented with shortness of breath, and elevated BNP in the >20,000 range. Hypertensive as well which is likely reason for the failure. He is starting to feel better already with the O2 on. Will continue to mildly diurese.  -Echo 9/25/2019: EF 16-20% with akinetic basal inferior, basal inferolateral, mid inferior, mid inferolateral, apical anterior, apical septal, apical inferior, apical lateral and apical walls  -Echo 1/24/2019: EF 46-50% with hypokinetic basal inferior, basal inferolateral, mid inferior, mid inferolateral and apical inferior wall segments, grade 2 diastolic dysfunction  -CAD, Cardiac cath 2/1/19 with following findings:   · Severe native CAD with distal left main heavily calcified lesion   · Patent LIMA-LAD  · 2 sequential vein grafts (all 4 vein grafts to diagonal, OM, RPDA, RPL branch) occluded at aortotomy site  · Subtotal mid RCA occludion with NICO 0-I flow, some collaterals from left coronary system  · Native diagonal 85% stenosis  · Native OM 85% stenosis  -Elevated troponin  -Wide-complex tachycardia, most consistent with bundle branch VT  -Hypomagnesemia, resolved  -HTN  -Hypercholesterolemia  -Hx tobacco and alcohol abuse  -Previously DNR status, now full code.     Primary cardiologist is Dr. Aguilar Draft:     -Will decrease Amiodarone to 200 mg daily from BID dosing.  -Started on PO diuretics today.  -Continued on ASA, Plavix, Lipitor, Toprol, Losartan, Imdur.  -Dispo planning per primary team.  Pt should follow-up in our office within the next 3-4 weeks. Subjective:     No new complaints.       Objective:      Patient Vitals for the past 8 hrs:   Temp Pulse Resp BP SpO2   10/16/19 0800 98.2 °F (36.8 °C) 65 17 110/59 100 %   10/16/19 0736     96 %   10/16/19 0723     90 %   10/16/19 0430 98.4 °F (36.9 °C) 62 16 122/79 97 %         Patient Vitals for the past 96 hrs:   Weight   10/16/19 0811 99 lb 12.8 oz (45.3 kg)   10/13/19 0443 105 lb (47.6 kg)         Intake/Output Summary (Last 24 hours) at 10/16/2019 1114  Last data filed at 10/15/2019 1803  Gross per 24 hour   Intake 50 ml   Output 600 ml   Net -550 ml       Physical Exam:  General:  alert, cooperative, no distress, appears stated age  Neck:  supple  Lungs:  Basilar inspiratory rales  Heart:  Regular rate and rhythm  Abdomen:  abdomen is soft without significant tenderness, masses, organomegaly or guarding  Extremities:  Atraumatic, no edema     Data Review:     Labs: Results:       Chemistry Recent Labs     10/16/19  0343 10/15/19  0405 10/14/19  0357   GLU 99 90 88   * 134* 140   K 4.2 4.2 4.2    102 105   CO2 28 30 28   BUN 19* 18 12   CREA 1.18 1.24 1.16   CA 8.2* 8.4* 8.8   MG 1.8 1.5*  --    AGAP 7 2* 7   BUCR 16 15 10*      CBC w/Diff Recent Labs     10/15/19  0405   WBC 8.1   RBC 2.98*   HGB 10.0*   HCT 29.8*   *      Lipid Panel Lab Results   Component Value Date/Time    Cholesterol, total 144 05/03/2019 10:26 AM    HDL Cholesterol 86 (H) 05/03/2019 10:26 AM    LDL, calculated 18 05/03/2019 10:26 AM    VLDL, calculated 40 05/03/2019 10:26 AM    Triglyceride 200 (H) 05/03/2019 10:26 AM    CHOL/HDL Ratio 1.7 05/03/2019 10:26 AM      Thyroid Studies Lab Results   Component Value Date/Time    TSH 4.31 (H) 09/24/2019 09:57 AM

## 2019-10-16 NOTE — PROGRESS NOTES
Thank you for your referral for home oxygen and nebulizer. Delivered equipment to patients room. Patient understands that [facility] will also complete a home setup for oxygen after DC home.     Ro Alberto Liaison  [facility]

## 2019-10-17 PROCEDURE — 3331090002 HH PPS REVENUE DEBIT

## 2019-10-17 PROCEDURE — 3331090001 HH PPS REVENUE CREDIT

## 2019-10-18 ENCOUNTER — HOME CARE VISIT (OUTPATIENT)
Dept: SCHEDULING | Facility: HOME HEALTH | Age: 79
End: 2019-10-18
Payer: MEDICARE

## 2019-10-18 ENCOUNTER — PATIENT OUTREACH (OUTPATIENT)
Dept: CASE MANAGEMENT | Age: 79
End: 2019-10-18

## 2019-10-18 DIAGNOSIS — J47.9 BRONCHIECTASIS WITHOUT COMPLICATION (HCC): ICD-10-CM

## 2019-10-18 DIAGNOSIS — J44.9 COPD, GROUP B, BY GOLD 2017 CLASSIFICATION (HCC): ICD-10-CM

## 2019-10-18 DIAGNOSIS — J43.2 CENTRILOBULAR EMPHYSEMA (HCC): ICD-10-CM

## 2019-10-18 DIAGNOSIS — R05.3 CHRONIC COUGH: ICD-10-CM

## 2019-10-18 DIAGNOSIS — R06.02 SHORTNESS OF BREATH: ICD-10-CM

## 2019-10-18 DIAGNOSIS — I50.43 ACUTE ON CHRONIC COMBINED SYSTOLIC AND DIASTOLIC CONGESTIVE HEART FAILURE (HCC): Primary | ICD-10-CM

## 2019-10-18 PROCEDURE — 3331090001 HH PPS REVENUE CREDIT

## 2019-10-18 PROCEDURE — G0299 HHS/HOSPICE OF RN EA 15 MIN: HCPCS

## 2019-10-18 PROCEDURE — 3331090002 HH PPS REVENUE DEBIT

## 2019-10-18 NOTE — Clinical Note
Dr. Norman Pugh. Kori Frank and Ms. Mickey Maza NP Transition of Care note for your review. Please see Challenges reviewed with provider. Thank you [facility] Transitions Nurse [phone].

## 2019-10-18 NOTE — PROGRESS NOTES
Hospital Discharge Follow-Up      Date/Time:  10/18/2019 8:33 AM    Patient was admitted to [facility] on 10-13-19 and discharged on 10/16/19  For:     Discharge Diagnoses:    1.  Acute on chronic combined systolic and diastolic heart failure, resolving  2.  Hypertensive emergency, resolved   3.  Acute hypoxic respiratory failure, on continuous oxygen supplement at 3 liters  4.  CAD with CABGx5, NEGATIVE TROPONIN   5.  Ischemic cardiomyopathy, on AICD. Stable   6.  Wide-complex tachycardia with AICD in place, stable    7.  Emphysematous COPD, stable   8.  H/o tobacco smoking with smoker cough, improved  9.  Thrombocytosis, reactive   10.  Cachexia with severe protein calorie malnutrition  11.  Hyponatremia  * Per DC summary of 10/16/19 by Dr. Marietta Rinne      . The physician discharge summary was available at the time of outreach. Patient was contacted within 1 business days of discharge. Top Challenges reviewed with the provider   Patient discharged with home oxygen and nebulizer. Patient referred to pulmonary physician for possible portable oxygen concentrator. Per Chart Review:   19  ICD placed see EMR for additional info. As needed. Method of communication with provider : Chart routed    Inpatient RRAT score: 21    Was this a readmission? yes   Patient stated reason for the readmission: Shortness of breath     Patient reports:   \" I feel fine now\"     Nurse Navigator (NN) contacted the patient by telephone to perform post hospital discharge assessment. Verified name and  with patient as identifiers. Provided introduction to self, and explanation of the Nurse Navigator role. Reviewed discharge instructions and red flags with patient who verbalized understanding. Patient given an opportunity to ask questions and does not have any further questions or concerns at this time. The patient agrees to contact the PCP office for questions related to their healthcare.  NN provided contact information for future reference. Disease Specific:   N/A    Summary of patient's top problems:  1. Patient s/p hospital Discharge   2. Patient admitted within 30 days of hospital discharge. Home Health orders at discharge:   Yes     [address]:   [facility]     Date of initial visit:   Care Transitions Nurse called [facility]. Referral has been received.  is working on possible home visit for today 10-17-19. Durable Medical Equipment ordered/company:     Oxygen   Nebulizer machine     Durable Medical Equipment received: Yes     - Per [employer] reporesentative and patient, DMe equipment has been received. Barriers to care? None noted at this time . Advance Care Planning:   Does patient have an Advance Directive:    Per EMR Notation:  On file     Medication(s):   New Medications at Discharge:     START taking these medications     Details   albuterol (PROVENTIL VENTOLIN) 2.5 mg /3 mL (0.083 %) nebu 3 mL by Nebulization route every four (4) hours as needed (cough, wheezing, more shortness of breath). Indications: a chronic lung disease where the airways become partially blocked with mucus called COPD  Qty: 30 Nebule, Refills: 3       benzonatate (TESSALON) 100 mg capsule Take 1 Cap by mouth two (2) times daily as needed for Cough for up to 30 days. Indications: cough  Qty: 30 Cap, Refills: 1       furosemide (LASIX) 20 mg tablet Take 1 Tab by mouth daily. Indications: Fluid in the Lungs due to Chronic Heart Failure  Qty: 30 Tab, Refills: 2           Changed Medications at Discharge:   CONTINUE these medications which have CHANGED     Details   amiodarone (CORDARONE) 200 mg tablet Take 1 Tab by mouth daily. Indications: ventricular fibrillation, a heart rhythm disorder  Qty: 60 Tab, Refills: 2       losartan (COZAAR) 50 mg tablet Take 1 Tab by mouth nightly.  1 pill po daily  Indications: chronic heart failure, high blood pressure  Qty: 30 Tab, Refills: 3             Discontinued Medications at Discharge:     STOP taking these medications         cyanocobalamin 1,000 mcg tablet Comments:   Reason for Stopping:            folic acid (FOLVITE) 1 mg tablet Comments:   Reason for Stopping:            thiamine (VITAMIN B-1) 50 mg tablet Comments:   Reason for Stopping:            amoxicillin (AMOXIL) 500 mg capsule Comments:   Reason for Stopping:            PSEUDOEPHEDRINE/CPM/METHYLSCOP (ALLERGY AM-PM PO) Comments:   Reason for Stopping:                    Medication reconciliation was performed with patient, who verbalizes understanding of administration of home medications. There were no barriers to obtaining medications identified at this time. Referral to Pharm D needed: no     Current Outpatient Medications   Medication Sig    amiodarone (CORDARONE) 200 mg tablet Take 1 Tab by mouth daily. Indications: ventricular fibrillation, a heart rhythm disorder    losartan (COZAAR) 50 mg tablet Take 1 Tab by mouth nightly. 1 pill po daily  Indications: chronic heart failure, high blood pressure    albuterol (PROVENTIL VENTOLIN) 2.5 mg /3 mL (0.083 %) nebu 3 mL by Nebulization route every four (4) hours as needed (cough, wheezing, more shortness of breath). Indications: a chronic lung disease where the airways become partially blocked with mucus called COPD    benzonatate (TESSALON) 100 mg capsule Take 1 Cap by mouth two (2) times daily as needed for Cough for up to 30 days. Indications: cough    furosemide (LASIX) 20 mg tablet Take 1 Tab by mouth daily. Indications: Fluid in the Lungs due to Chronic Heart Failure    pantoprazole (PROTONIX) 40 mg tablet Take 40 mg by mouth daily. 1 pill daily 30 minutes before breakfast.  Indications: indigestion    atorvastatin (LIPITOR) 40 mg tablet Take 40 mg by mouth nightly. 1 pill daily    tiotropium-olodaterol (STIOLTO RESPIMAT) 2.5-2.5 mcg/actuation inhaler Take 2 Puffs by inhalation daily.     clopidogrel (PLAVIX) 75 mg tab Take 1 Tab by mouth daily.  isosorbide mononitrate ER (IMDUR) 30 mg tablet Take 1 Tab by mouth every morning.  nitroglycerin (NITROSTAT) 0.4 mg SL tablet 1 Tab by SubLINGual route every five (5) minutes as needed for Chest Pain. Up to 3 doses.  aspirin delayed-release 81 mg tablet Take 81 mg by mouth daily. 1 pill po daily    metoprolol succinate (TOPROL-XL) 25 mg XL tablet Take 25 mg by mouth daily. 1 pill po daily    albuterol (VENTOLIN HFA) 90 mcg/Actuation inhaler Take 1 Puff by inhalation as needed.  CYANOCOBALAMIN/FA/PYRIDOXINE (FOLBEE PO) Take 1 Tab by mouth daily.  multivitamin (ONE A DAY) tablet Take 1 Tab by mouth daily. No current facility-administered medications for this visit. There are no discontinued medications. [facility] follow up appointment(s):   Future Appointments   Date Time Provider Tan Torres   10/20/2019  8:30 PM [facility] 3 [facility]   10/24/2019  1:00 PM Ivan Kee NP [address]   11/7/2019 10:00 AM Claire Haywood MD [address] [place] [address]   12/18/2019 12:40 PM Toño Perdomo DO [address] [place] [address]   1/15/2020  3:20 PM [facility], PACER [facility] RICKI SCHED      Non-[facility] follow up appointment(s):   - None noted at this time. [facility]:   Out of service area    Goals      Attends follow-up appointments as directed. Target Date:  11/18/19     10/18/2019   Patient to follow up with:     - PCP follow up appointment scheduled for 10/22/19  - Cardiology   - Pulmonary     Care Transitions Nurse ( CTN) called [facility] and asked staff to please contact patient to schedule post hospital followup appointments. Per patient ulmonary office called and follow up appointment has been scheduled.  Prevent complications post hospitalization.       Target Date:  11/18/19     10/18/2019     Patient and/or family members were alerted to the availability of physician or other advanced practioners after hours, weekends, and holidays. Please call the PCP office phone number and speak with the answering service for assistance. Patient encouraged to call 911 as needed. Nurse Navigator contact information provided for follow up as needed. 10/18/2019   Care Transitions Nurse ( CTN) reviewed the following with patient per discharge instructoins:   -Diet:  Low fat, low cholesterol, and to avoid salty food   - Limit fluid intake to 1.5 to 2 liters daily   - Do not smoke while using Oxygen    Patient is aware of dietary and fluid restriction. Patient states that he no longer smokes. Patient congratulated for smoking cessation.  Supportive resources in place to maintain patient in the community (ie. Home Health, DME equipment, refer to, medication assistant plan, etc.)      Target Date:  11/18/19     10/18/2019   [address] [address] to provide skilled home care services. 10/18/2019   Patient has Home Oxygen and nebulizer machine provided by [employer]. Patient reports he has approximately two smaller  portable Oxygen tanks. 10/18/2019   Patient states he had [facility] filled at [facility] Understands red flags post discharge. Target Date:  11/18/19     10/18/2019   Discussed Red flags with patient as follows:   - Severe shortness of breath   - Chest Pain   - Passing Out   To Please call 911 for emergency assistance. Patient states he will call 911 as needed. Patient made aware that he can contact physician offices after, hours, evenings, weekends, and holidays as needed for non-emergency questions/concerns. Please call 911 for emergency assistance as needed.

## 2019-10-18 NOTE — PROGRESS NOTES
Order placed for Home Supplemental Oxygen, per Verbal Order from Dr. Mirela Richards on 10/18/2019. Last office visit: 10/11/19  Follow up Visit: Due Aug. 2020    Provider is aware of last office visit and follow up. No further action requested from provider.

## 2019-10-19 PROCEDURE — 3331090001 HH PPS REVENUE CREDIT

## 2019-10-19 PROCEDURE — 3331090002 HH PPS REVENUE DEBIT

## 2019-10-20 ENCOUNTER — HOSPITAL ENCOUNTER (OUTPATIENT)
Dept: SLEEP MEDICINE | Age: 79
Discharge: HOME | End: 2019-10-20
Payer: MEDICARE

## 2019-10-20 DIAGNOSIS — J90 PLEURAL EFFUSION, BILATERAL: ICD-10-CM

## 2019-10-20 DIAGNOSIS — J44.9 OSA AND COPD OVERLAP SYNDROME (HCC): ICD-10-CM

## 2019-10-20 DIAGNOSIS — G47.33 OSA AND COPD OVERLAP SYNDROME (HCC): ICD-10-CM

## 2019-10-20 DIAGNOSIS — R06.02 SHORTNESS OF BREATH: ICD-10-CM

## 2019-10-20 DIAGNOSIS — I50.43 ACUTE ON CHRONIC COMBINED SYSTOLIC AND DIASTOLIC CONGESTIVE HEART FAILURE (HCC): ICD-10-CM

## 2019-10-20 DIAGNOSIS — J44.9 COPD, GROUP B, BY GOLD 2017 CLASSIFICATION (HCC): ICD-10-CM

## 2019-10-20 DIAGNOSIS — R05.3 CHRONIC COUGH: ICD-10-CM

## 2019-10-20 DIAGNOSIS — R06.09 DYSPNEA ON EXERTION: ICD-10-CM

## 2019-10-20 DIAGNOSIS — J47.9 BRONCHIECTASIS WITHOUT COMPLICATION (HCC): ICD-10-CM

## 2019-10-20 DIAGNOSIS — Z87.891 PERSONAL HISTORY OF TOBACCO USE, PRESENTING HAZARDS TO HEALTH: ICD-10-CM

## 2019-10-20 PROCEDURE — 95810 POLYSOM 6/> YRS 4/> PARAM: CPT

## 2019-10-20 PROCEDURE — 3331090002 HH PPS REVENUE DEBIT

## 2019-10-20 PROCEDURE — 3331090001 HH PPS REVENUE CREDIT

## 2019-10-21 ENCOUNTER — HOME CARE VISIT (OUTPATIENT)
Dept: HOME HEALTH SERVICES | Facility: HOME HEALTH | Age: 79
End: 2019-10-21
Payer: MEDICARE

## 2019-10-21 VITALS — DIASTOLIC BLOOD PRESSURE: 80 MMHG | SYSTOLIC BLOOD PRESSURE: 140 MMHG | HEART RATE: 60 BPM

## 2019-10-21 PROCEDURE — 3331090002 HH PPS REVENUE DEBIT

## 2019-10-21 PROCEDURE — G0152 HHCP-SERV OF OT,EA 15 MIN: HCPCS

## 2019-10-21 PROCEDURE — 3331090001 HH PPS REVENUE CREDIT

## 2019-10-22 ENCOUNTER — HOME CARE VISIT (OUTPATIENT)
Dept: SCHEDULING | Facility: HOME HEALTH | Age: 79
End: 2019-10-22
Payer: MEDICARE

## 2019-10-22 ENCOUNTER — HOME CARE VISIT (OUTPATIENT)
Dept: HOME HEALTH SERVICES | Facility: HOME HEALTH | Age: 79
End: 2019-10-22
Payer: MEDICARE

## 2019-10-22 PROCEDURE — 3331090002 HH PPS REVENUE DEBIT

## 2019-10-22 PROCEDURE — 3331090001 HH PPS REVENUE CREDIT

## 2019-10-22 PROCEDURE — G0151 HHCP-SERV OF PT,EA 15 MIN: HCPCS

## 2019-10-23 ENCOUNTER — HOME CARE VISIT (OUTPATIENT)
Dept: HOME HEALTH SERVICES | Facility: HOME HEALTH | Age: 79
End: 2019-10-23
Payer: MEDICARE

## 2019-10-23 VITALS
DIASTOLIC BLOOD PRESSURE: 68 MMHG | OXYGEN SATURATION: 99 % | BODY MASS INDEX: 16.89 KG/M2 | HEART RATE: 67 BPM | SYSTOLIC BLOOD PRESSURE: 127 MMHG | WEIGHT: 101.5 LBS

## 2019-10-23 VITALS
HEART RATE: 61 BPM | SYSTOLIC BLOOD PRESSURE: 116 MMHG | TEMPERATURE: 96.9 F | DIASTOLIC BLOOD PRESSURE: 70 MMHG | OXYGEN SATURATION: 98 %

## 2019-10-23 PROCEDURE — G0495 RN CARE TRAIN/EDU IN HH: HCPCS

## 2019-10-23 PROCEDURE — 3331090002 HH PPS REVENUE DEBIT

## 2019-10-23 PROCEDURE — 3331090001 HH PPS REVENUE CREDIT

## 2019-10-24 ENCOUNTER — HOME CARE VISIT (OUTPATIENT)
Dept: SCHEDULING | Facility: HOME HEALTH | Age: 79
End: 2019-10-24
Payer: MEDICARE

## 2019-10-24 ENCOUNTER — OFFICE VISIT (OUTPATIENT)
Dept: PULMONOLOGY | Age: 79
End: 2019-10-24

## 2019-10-24 VITALS
SYSTOLIC BLOOD PRESSURE: 131 MMHG | HEIGHT: 65 IN | HEART RATE: 72 BPM | TEMPERATURE: 98.2 F | BODY MASS INDEX: 17.19 KG/M2 | DIASTOLIC BLOOD PRESSURE: 70 MMHG | WEIGHT: 103.2 LBS | OXYGEN SATURATION: 98 % | RESPIRATION RATE: 20 BRPM

## 2019-10-24 VITALS
RESPIRATION RATE: 18 BRPM | SYSTOLIC BLOOD PRESSURE: 132 MMHG | HEART RATE: 87 BPM | DIASTOLIC BLOOD PRESSURE: 78 MMHG | TEMPERATURE: 98.7 F | OXYGEN SATURATION: 98 %

## 2019-10-24 DIAGNOSIS — Z87.891 HISTORY OF TOBACCO USE: ICD-10-CM

## 2019-10-24 DIAGNOSIS — I50.9 CONGESTIVE HEART FAILURE, UNSPECIFIED HF CHRONICITY, UNSPECIFIED HEART FAILURE TYPE (HCC): ICD-10-CM

## 2019-10-24 DIAGNOSIS — J44.9 CHRONIC OBSTRUCTIVE PULMONARY DISEASE, UNSPECIFIED COPD TYPE (HCC): Primary | ICD-10-CM

## 2019-10-24 DIAGNOSIS — I25.119 CORONARY ARTERY DISEASE INVOLVING NATIVE HEART WITH ANGINA PECTORIS, UNSPECIFIED VESSEL OR LESION TYPE (HCC): ICD-10-CM

## 2019-10-24 DIAGNOSIS — R64 CACHEXIA (HCC): ICD-10-CM

## 2019-10-24 PROCEDURE — G0158 HHC OT ASSISTANT EA 15: HCPCS

## 2019-10-24 PROCEDURE — 3331090002 HH PPS REVENUE DEBIT

## 2019-10-24 PROCEDURE — 3331090001 HH PPS REVENUE CREDIT

## 2019-10-24 NOTE — PROGRESS NOTES
[facility]  Pulmonary, Critical Care, and Sleep Medicine      Pulmonary Office Progress Notes    Name: Robinson Sanders     : 1940     Date: 10/24/2019        Subjective:     10/24/2019          HPI    Robinson Sanders  is a 67 y.o. male with PMH of CHF,CAD,AICD and COPD who presents for hospital follow up visit. He was admitted to [facility] on 10/13/19-10/16/19 for achute on chronic combined systolic and diastolic heart failure. After treatment with IV lasix and bronchodilators he was discharged home on supplemental O2. Today he appears comfortable, in no distress, and reports that his symptoms have improved significantly since his hospital discharge. He continues to have some shortness of breath with exertion however this is alleviated with supplemental oxygen at 3 L/min, rest and albuterol as needed. He denies cough, wheezing, chest pain or hemoptysis. No fever, chills or orthopnea; no leg/calf pain or swelling and no decreased appetite or weight loss. His last echo on 2019 demonstrated severe systolic dysfunction with an EF of 16 to 20% and a PASP of 33 mmHg. His last PFTs were completed on 5/15/2019 and demonstrated moderate obstructive defect with reduced diffusion capacity. He is a former smoker of cigarettes with a 27.5-pack-year history and quit in 2019. Past Medical History:   Diagnosis Date    AICD (automatic cardioverter/defibrillator) present 10/13/2019    Alcohol abuse     quit 2011    Binocular visual disturbance 10/31/2013    CAD (coronary artery disease)     CAD (coronary artery disease), native coronary artery 1/3/05    LAD 70-80% mid; Cx-diffuse 80%; OM1-70% prox; RCA 40-50% mid; 100% RPLB with collaterals    Cardiac cath 2013    Severe, diffuse calcification. dLM 30%. mLAD 80%. o/pD1 80%. o/pRamus 80%. dCX 85%. dRCA 80%.  m-dRPLB 75%. EF 50%. Mod to severe inferobasal hypk.       Cardiac echocardiogram 01/19/2011    EF 50-55%. Gr 1 DDfx. RVSP 30 mmHg.  Cardiac nuclear imaging test 06/27/2011    No evidence of ischemia or previous infarction. EF 60%. Neg EKG on max EST. Ex time 6 mins.  Chronic kidney disease     Chronic lung disease     Chronic obstructive pulmonary disease (HCC)     Coronary artery disease     Diastolic CHF, chronic (HCC)     LV EF 55% (Echo Jan 2011)    Diverticula of colon 12/17/2013    Emphysematous COPD ([address]     PFTs March 2009; mild-mode obstruction, + bronchodil response; decreased DLCO    GERD (gastroesophageal reflux disease)     with erosive esophagitis history    Heart failure ([address]     History of renal failure     2 years ago, which completely resolved    Hypercholesterolemia     Hypertension     Hypertensive heart disease with CHF ([address]     MI (myocardial infarction) ([address]     Inferior wall    Paresthesia and pain of both upper extremities 10/31/2013    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 Andre Hall, 4/9/14.  Tobacco abuse     Unspecified hereditary and idiopathic peripheral neuropathy 12/4/2013       Allergies   Allergen Reactions    Vicodin [Hydrocodone-Acetaminophen] Rash       Current Outpatient Medications   Medication Sig Dispense Refill    amiodarone (CORDARONE) 200 mg tablet Take 1 Tab by mouth daily. Indications: ventricular fibrillation, a heart rhythm disorder 60 Tab 2    losartan (COZAAR) 50 mg tablet Take 1 Tab by mouth nightly. 1 pill po daily  Indications: chronic heart failure, high blood pressure 30 Tab 3    albuterol (PROVENTIL VENTOLIN) 2.5 mg /3 mL (0.083 %) nebu 3 mL by Nebulization route every four (4) hours as needed (cough, wheezing, more shortness of breath).  Indications: a chronic lung disease where the airways become partially blocked with mucus called COPD 30 Nebule 3    benzonatate (TESSALON) 100 mg capsule Take 1 Cap by mouth two (2) times daily as needed for Cough for up to 30 days. Indications: cough 30 Cap 1    furosemide (LASIX) 20 mg tablet Take 1 Tab by mouth daily. Indications: Fluid in the Lungs due to Chronic Heart Failure 30 Tab 2    pantoprazole (PROTONIX) 40 mg tablet Take 40 mg by mouth daily. 1 pill daily 30 minutes before breakfast.  Indications: indigestion      atorvastatin (LIPITOR) 40 mg tablet Take 40 mg by mouth nightly. 1 pill daily      tiotropium-olodaterol (STIOLTO RESPIMAT) 2.5-2.5 mcg/actuation inhaler Take 2 Puffs by inhalation daily. 1 Inhaler 0    clopidogrel (PLAVIX) 75 mg tab Take 1 Tab by mouth daily. 30 Tab 3    isosorbide mononitrate ER (IMDUR) 30 mg tablet Take 1 Tab by mouth every morning. 30 Tab 3    nitroglycerin (NITROSTAT) 0.4 mg SL tablet 1 Tab by SubLINGual route every five (5) minutes as needed for Chest Pain. Up to 3 doses. 1 Bottle 3    aspirin delayed-release 81 mg tablet Take 81 mg by mouth daily. 1 pill po daily      metoprolol succinate (TOPROL-XL) 25 mg XL tablet Take 25 mg by mouth daily. 1 pill po daily      albuterol (VENTOLIN HFA) 90 mcg/Actuation inhaler Take 1 Puff by inhalation as needed.  CYANOCOBALAMIN/FA/PYRIDOXINE (FOLBEE PO) Take 1 Tab by mouth daily.  multivitamin (ONE A DAY) tablet Take 1 Tab by mouth daily.  acetaminophen (TYLENOL EXTRA STRENGTH) 500 mg tablet Take 500 mg by mouth every six (6) hours as needed for Pain.  1 pill poq 6 hours as needed prn pain         Review of Systems:    HEENT: No epistaxis, no nasal drainage, no difficulty in swallowing, no redness in eyes  Respiratory: As stated above in HPI  Cardiovascular: no chest pain, no palpitations, no chronic leg edema, no syncope  Gastrointestinal: no abd pain, no vomiting, no diarrhea, no bleeding symptoms  Genitourinary: No urinary symptoms or hematuria  Integument/breast: No ulcers or rashes  Musculoskeletal: No leg / calf pain  Neurological: No focal weakness, no seizures, no headaches  Behvioral/Psych: No anxiety, no depression  Constitutional: No fever, chills or night sweats. No decreased appetite or weight loss     Objective:     Visit Vitals  /70 (BP 1 Location: Right arm, BP Patient Position: Sitting)   Pulse 72   Temp 98.2 °F (36.8 °C) (Oral)   Resp 20   Ht 5' 5\" (1.651 m)   Wt 46.8 kg (103 lb 3.2 oz)   SpO2 98%   BMI 17.17 kg/m²        PHYSICAL EXAM      General: Oriented to person, place, and time. Well-developed, well-nourished, and in no distress. Cachexia      Head:   Normocephalic, without obvious abnormality, atraumatic       Eyes:   Pupils reactive, conjunctivae / corneas clear. EOM's intact, no scleral icterus       Nose:   Nares normal, no drainage. Throat:    Lips, mucosa and tongue normal. Teeth and gums normal       Neck:   Supple, symmetrical, trachea midline. No adenopathy or thyroid swelling; no carotid bruit or JVD. CVS:    Regular rate and rhythm. S1S2 normal,  no murmurs       RS:      Symmetrical chest rise, moderate AE bilaterally. Lung sounds clear to auscultation bilaterally. No wheezing, rales or rhonchi, no accessory muscle use      Abd:     Soft, non-tender.   No hepatosplenomegaly                                                     Neuro:   non focal, awake, alert and oriented to person, place, time and situation    Extrm:   no leg edema,  clubbing or cyanosis       Skin:   no rash    Data review:                     PULMONARY FUNCTION TESTS    Date FVC FEV1  FEV1/FVC [id number] TLC RV RV/TLC VC DLCO   5/15/19 95 90 68 63 76 49 65 99 46                                             Imaging:  I have personally reviewed the patients radiographs and have reviewed the reports:  XR Results (most recent):  Results from Hospital Encounter encounter on 10/13/19   XR CHEST PORT    Narrative EXAM: PORTABLE CHEST    CLINICAL HISTORY/INDICATION: f/u infiltrates       COMPARISON: 10/13/2019. TECHNIQUE: Single AP view    FINDINGS:     Portable chest x-ray 10/14/2019 at 1130 hours reveals evidence of previous  cardiac surgery. Implanted cardiac device noted. Prominent interstitial  infiltrate noted in the right upper lobe with mild volume loss. Mild generalized  interstitial thickening noted. Minimal pleural effusions or thickening noted in  both lung bases. The cardiac device are in the right atrium and right  ventricular apex. There is no pneumothorax. Impression IMPRESSION: Comparison to the previous study reveals significant improvement in  the right pleural effusion and right-sided infiltrates. No new finding  identified. Rapidity of change suggests treatment of volume overload. CT Results (most recent):  Results from Hospital Encounter encounter on 10/13/19   CT CHEST WO CONT    Narrative CT chest without IV contrast    HISTORY: CHF exacerbation. All CT scans at this facility are performed using dose optimization technique as  appropriate to a performed exam, to include automated exposure control,  adjustment of the mA and/or kV according to patient size (including appropriate  matching for site specific examination) or use of iterative reconstruction  technique. Comparison CT September 24, 2019. Increasing to large right pleural effusion. More severe infiltrate/edema in the  right upper lobe, right lower lobe and also in the right middle lobe. Underlying emphysema. Fascial thickening and scarring in the left upper lobe,  stable. Small left upper lateral collateral complex collection. No change in the  left pleural effusion in the lower chest. Cystic changes in the left lung base. Slightly worse edema base. Aortic calcification. Cardiomegaly with no significant pericardial effusion. Upper abdomen shows cholelithiasis. No ascites or free air. Impression IMPRESSION:  1.  Worsening right lung infiltrate/edema and increasing right pleural effusion. Slightly worse edema at the left lung base. 2. Cardiomegaly with no pericardial effusion. 3. Cholelithiasis. Patient Active Problem List   Diagnosis Code    Tobacco abuse Z72.0    Hypercholesterolemia E78.00    CAD (coronary artery disease), native coronary artery I25.10    Hypertensive heart disease with CHF ([facility] I11.0    Emphysematous COPD ([facility] J43.9    Alcohol abuse F10.10    Paresthesia and pain of both upper extremities R20.2, M79.601, M79.602    Binocular visual disturbance H53.30    Unspecified hereditary and idiopathic peripheral neuropathy G60.9    Diverticula of colon K57.30    S/P CABG x 5 Z95.1    Congestive heart failure (CHF) (HCC) I50.9    Wide-complex tachycardia (HCC) I47.2    Acute on chronic combined systolic and diastolic ACC/AHA stage C congestive heart failure (HCC) I50.43    CHF exacerbation (HCC) I50.9    AICD (automatic cardioverter/defibrillator) present Z95.810       IMPRESSION:   · COPD- symptom control with LABA/LAMA and albuterol as needed  · Respiratory failure - s/p hospitalization for CHF, now requiring  Supplemental oxygen at 3 LPM daily  · Severe systolic dysfunction / CHF - EF 16-20%, hx of CABG and AICD  · CAD  · History of tobacco use  · Pulmonary nodules - several small nodules with the larges measuring 4 mm on LD CT from 12/2018. Recommended repeat imaging in 12/2019  · Cachexia / underweight - BMI 17.17      RECOMMENDATIONS:     · Continue  Stiolto Respimat daily    and albuterol PRN.   Discussed appropriate use of respiratory  maintenance and rescue  medications with patient  · Continue supplemental O2 @ 3 LPM daily  · Continue smoking cessation  · Repeat LD CT in 12/2019  · Follow up with cardiology as scheduled  · Sleep study completed - will call patient with results  · Follow up in pulmonary clinic in  3 months     or sooner with worsening of symptoms  · Recommend healthy diet / weight / exercise as tolerated  · Report to ER with chest pain or difficulty breathing. Please note that this dictation was completed with [employer], the computer voice recognition software. Quite often unanticipated grammatical, syntax, homophones, and other interpretive errors are inadvertently transcribed by the computer software. Please disregard these errors. Please excuse any errors that have escaped final proofreading.               Gomez Everett, NP

## 2019-10-24 NOTE — PATIENT INSTRUCTIONS
? Continue Stiolto Respimat daily, rinse mouth out after use. ? Continue albuterol nebulizer treatment or rescue inhaler, 2 inhalations every 4-6 hours as needed for shortness of breath, wheezing or cough · Follow up with cardiology as scheduled · Repeat chest CT ordered for 12/2019 · Continue supplemental oxygen @ 3LPM daily · Will call regarding sleep study results ? Recommend healthy diet/weight and exercise as tolerated ? Follow-up in pulmonary clinic in 3 months or sooner with worsening of symptoms ? Report to the ER with chest pain or difficulty breathing

## 2019-10-24 NOTE — PROGRESS NOTES
Tiffanie Mcknight presents today for   Chief Complaint   Patient presents with   [facility] Follow Up     CT done 10/13, CXR done 10/14    COPD       Is someone accompanying this pt? Marcia Hallman    Is the patient using any DME equipment during [address]? No     -DME Company n/a     Depression Screening:  3 most recent PHQ Screens 10/24/2019   Little interest or pleasure in doing things Not at all   Feeling down, depressed, irritable, or hopeless Not at all   Total Score PHQ 2 0       Learning Assessment:  Learning Assessment 3/12/2014   PRIMARY LEARNER Patient   HIGHEST LEVEL OF EDUCATION - PRIMARY LEARNER  [place] [id number] [facility] CAREGIVER No   PRIMARY LANGUAGE ENGLISH    NEED No   LEARNER PREFERENCE PRIMARY OTHER (COMMENT)   ANSWERED BY Patient   RELATIONSHIP SELF       Abuse Screening:  No flowsheet data found. Fall Risk  Fall Risk Assessment, last 12 mths 10/24/2019   Able to walk? Yes   Fall in past 12 months? No   Fall with injury? -   Number of falls in past 12 months -   Fall Risk Score -         Coordination of Care:  1. Have you been to the ER, urgent care clinic since your last visit? Hospitalized since your last visit? Yes; Name: [facility]    2. Have you seen or consulted any other health care providers outside of the [address] [place] [address] since your last visit? Include any pap smears or colon screening.  Dr Marj Melendez PCP

## 2019-10-25 ENCOUNTER — HOME CARE VISIT (OUTPATIENT)
Dept: SCHEDULING | Facility: HOME HEALTH | Age: 79
End: 2019-10-25
Payer: MEDICARE

## 2019-10-25 PROCEDURE — G0157 HHC PT ASSISTANT EA 15: HCPCS

## 2019-10-25 PROCEDURE — 3331090002 HH PPS REVENUE DEBIT

## 2019-10-25 PROCEDURE — 3331090001 HH PPS REVENUE CREDIT

## 2019-10-26 PROCEDURE — 3331090001 HH PPS REVENUE CREDIT

## 2019-10-26 PROCEDURE — 3331090002 HH PPS REVENUE DEBIT

## 2019-10-27 VITALS
SYSTOLIC BLOOD PRESSURE: 124 MMHG | DIASTOLIC BLOOD PRESSURE: 76 MMHG | HEART RATE: 82 BPM | TEMPERATURE: 97.6 F | OXYGEN SATURATION: 98 %

## 2019-10-27 PROCEDURE — 3331090001 HH PPS REVENUE CREDIT

## 2019-10-27 PROCEDURE — 3331090002 HH PPS REVENUE DEBIT

## 2019-10-28 ENCOUNTER — HOME CARE VISIT (OUTPATIENT)
Dept: SCHEDULING | Facility: HOME HEALTH | Age: 79
End: 2019-10-28
Payer: MEDICARE

## 2019-10-28 VITALS
DIASTOLIC BLOOD PRESSURE: 85 MMHG | HEART RATE: 85 BPM | TEMPERATURE: 96.7 F | SYSTOLIC BLOOD PRESSURE: 159 MMHG | OXYGEN SATURATION: 99 %

## 2019-10-28 PROCEDURE — 3331090002 HH PPS REVENUE DEBIT

## 2019-10-28 PROCEDURE — G0157 HHC PT ASSISTANT EA 15: HCPCS

## 2019-10-28 PROCEDURE — 3331090001 HH PPS REVENUE CREDIT

## 2019-10-29 ENCOUNTER — HOME CARE VISIT (OUTPATIENT)
Dept: SCHEDULING | Facility: HOME HEALTH | Age: 79
End: 2019-10-29
Payer: MEDICARE

## 2019-10-29 DIAGNOSIS — G47.31 COMPLEX SLEEP APNEA SYNDROME: Primary | ICD-10-CM

## 2019-10-29 DIAGNOSIS — G47.50 PARASOMNIA, UNSPECIFIED TYPE: ICD-10-CM

## 2019-10-29 DIAGNOSIS — I42.9 CARDIOMYOPATHY, UNSPECIFIED TYPE (HCC): ICD-10-CM

## 2019-10-29 DIAGNOSIS — R06.3 CHEYNE-STOKES BREATHING: ICD-10-CM

## 2019-10-29 PROCEDURE — 3331090002 HH PPS REVENUE DEBIT

## 2019-10-29 PROCEDURE — 3331090001 HH PPS REVENUE CREDIT

## 2019-10-29 PROCEDURE — G0158 HHC OT ASSISTANT EA 15: HCPCS

## 2019-10-29 NOTE — PROGRESS NOTES
The patient underwent sleep testing on 10/20/19. Recommendations listed below. Please refer to full report for specific details. Cardiac tracing appeared to be normal sinus rhythm with intermittent bundle branch block. No alpha intrusion, or EEG abnormalities noted. Supplemental oxygen was not used during this study. Positive Airway Pressure was not used during this study. In summary, there were several findings during this study. Mild complex sleep apnea with  was noted during this study.  increased towards the later portion of this study. REM supine sleep was achieved during this study. Four cycles of REM sleep were achieve. Loss of  REM atonia with the patient talking, moaning and mild leg movement noted. No violent or other unusual sleep behavior noted. The PLM index was elevated. In the proper clinical setting, these findings can support a diagnosis of a periodic limb movement disorder, periodic limb movements of sleep or chronic pain conditions such as a radiculopathy. Further clinical correlation is required. Cardiac findings noted above. DIAGNOSIS: 1) Complex Sleep Apnea (CSA) 2) Cheyne Headley Respirations ()  3) Parasomnia- loss of REM atonia      RECOMMENDATIONS:      Recommend in lab PAP titration study. Based on last cardiac echo, patient is not a candidate for ASV.  Optimize underlying cause for .  Monitor for signs and/or symptoms of evolving REM Sleep Behavior Disorder (RBD)   Clinical correlation of elevated PLM index.  Other non-invasive treatment options are recommended were applicable and include the following: weight reduction, smoking cessation, body position training, and modification of alcohol ingestion and/or sedating agents.  If these treatments are not possible or effective, an oral appliance may be an alternative and/or adjuvant non-invasive treatment.     If non-invasive treatments are not possible or effective, consideration may be given to possible corrective surgical options.  Healthy sleep habit education and reinforcement will be reviewed with the patient.  Individuals are encouraged to obtain 7-9 hours of sleep per day.   safety is encouraged. Drowsy and/or inattentive driving should be avoided.  Follow up with referring provider as directed. Cardiac Echo:  09/24/19   ECHO ADULT FOLLOW-UP OR LIMITED 09/25/2019 9/25/2019    Narrative · Left Ventricle: Normal cavity size and wall thickness. Severe systolic   dysfunction. Estimated left ventricular ejection fraction is 16 - 20%. Visually measured ejection fraction. Abnormal left ventricular wall   motion. · Mitral Valve: Mild mitral valve regurgitation is present. · Tricuspid Valve: Mild tricuspid valve regurgitation is present. · Pulmonic Valve: Mild pulmonic valve regurgitation is present. · Aortic Valve: Mild aortic valve regurgitation is present. · Pulmonary Artery: Pulmonary arterial systolic pressure is 33 mmHg. · IVC/Hepatic Veins: Mildly elevated central venous pressure (5-10 mmHg);   IVC diameter is less than 21 mm and collapses less than 50% with   respiration. · This was a limited echo, but EF has significant decreased compared to   prior study.         Signed by: DO Kvng Caballero DO, [facility]    [facility]  Pulmonary, Critical Care, and Sleep Medicine

## 2019-10-30 ENCOUNTER — HOME CARE VISIT (OUTPATIENT)
Dept: SCHEDULING | Facility: HOME HEALTH | Age: 79
End: 2019-10-30
Payer: MEDICARE

## 2019-10-30 VITALS
HEART RATE: 83 BPM | OXYGEN SATURATION: 98 % | TEMPERATURE: 94.7 F | DIASTOLIC BLOOD PRESSURE: 84 MMHG | SYSTOLIC BLOOD PRESSURE: 137 MMHG

## 2019-10-30 VITALS
DIASTOLIC BLOOD PRESSURE: 78 MMHG | SYSTOLIC BLOOD PRESSURE: 118 MMHG | TEMPERATURE: 98.7 F | HEART RATE: 87 BPM | RESPIRATION RATE: 17 BRPM | OXYGEN SATURATION: 97 %

## 2019-10-30 PROCEDURE — 3331090002 HH PPS REVENUE DEBIT

## 2019-10-30 PROCEDURE — G0157 HHC PT ASSISTANT EA 15: HCPCS

## 2019-10-30 PROCEDURE — 3331090001 HH PPS REVENUE CREDIT

## 2019-10-31 ENCOUNTER — HOME CARE VISIT (OUTPATIENT)
Dept: SCHEDULING | Facility: HOME HEALTH | Age: 79
End: 2019-10-31
Payer: MEDICARE

## 2019-10-31 ENCOUNTER — HOSPITAL ENCOUNTER (OUTPATIENT)
Dept: SLEEP MEDICINE | Age: 79
Discharge: HOME | End: 2019-10-31
Payer: MEDICARE

## 2019-10-31 DIAGNOSIS — G47.50 PARASOMNIA, UNSPECIFIED TYPE: ICD-10-CM

## 2019-10-31 DIAGNOSIS — R06.3 CHEYNE-STOKES BREATHING: ICD-10-CM

## 2019-10-31 DIAGNOSIS — G47.31 COMPLEX SLEEP APNEA SYNDROME: ICD-10-CM

## 2019-10-31 DIAGNOSIS — I42.9 CARDIOMYOPATHY, UNSPECIFIED TYPE (HCC): ICD-10-CM

## 2019-10-31 PROCEDURE — 95811 POLYSOM 6/>YRS CPAP 4/> PARM: CPT

## 2019-10-31 PROCEDURE — 3331090001 HH PPS REVENUE CREDIT

## 2019-10-31 PROCEDURE — 3331090002 HH PPS REVENUE DEBIT

## 2019-10-31 PROCEDURE — G0158 HHC OT ASSISTANT EA 15: HCPCS

## 2019-11-01 VITALS
DIASTOLIC BLOOD PRESSURE: 84 MMHG | OXYGEN SATURATION: 97 % | TEMPERATURE: 98.7 F | HEART RATE: 88 BPM | RESPIRATION RATE: 18 BRPM | SYSTOLIC BLOOD PRESSURE: 132 MMHG

## 2019-11-01 VITALS
SYSTOLIC BLOOD PRESSURE: 162 MMHG | HEART RATE: 62 BPM | WEIGHT: 105.8 LBS | HEIGHT: 65 IN | BODY MASS INDEX: 17.63 KG/M2 | DIASTOLIC BLOOD PRESSURE: 85 MMHG

## 2019-11-01 PROCEDURE — 3331090001 HH PPS REVENUE CREDIT

## 2019-11-01 PROCEDURE — 3331090002 HH PPS REVENUE DEBIT

## 2019-11-02 PROCEDURE — 3331090002 HH PPS REVENUE DEBIT

## 2019-11-02 PROCEDURE — 3331090001 HH PPS REVENUE CREDIT

## 2019-11-03 PROCEDURE — 3331090002 HH PPS REVENUE DEBIT

## 2019-11-03 PROCEDURE — 3331090001 HH PPS REVENUE CREDIT

## 2019-11-04 ENCOUNTER — HOME CARE VISIT (OUTPATIENT)
Dept: HOME HEALTH SERVICES | Facility: HOME HEALTH | Age: 79
End: 2019-11-04
Payer: MEDICARE

## 2019-11-04 ENCOUNTER — HOME CARE VISIT (OUTPATIENT)
Dept: SCHEDULING | Facility: HOME HEALTH | Age: 79
End: 2019-11-04
Payer: MEDICARE

## 2019-11-04 VITALS
SYSTOLIC BLOOD PRESSURE: 110 MMHG | OXYGEN SATURATION: 97 % | DIASTOLIC BLOOD PRESSURE: 60 MMHG | TEMPERATURE: 98.3 F | HEART RATE: 72 BPM

## 2019-11-04 PROCEDURE — G0152 HHCP-SERV OF OT,EA 15 MIN: HCPCS

## 2019-11-04 PROCEDURE — 3331090002 HH PPS REVENUE DEBIT

## 2019-11-04 PROCEDURE — 3331090001 HH PPS REVENUE CREDIT

## 2019-11-04 PROCEDURE — G0157 HHC PT ASSISTANT EA 15: HCPCS

## 2019-11-05 DIAGNOSIS — G47.52 REM SLEEP BEHAVIOR DISORDER: ICD-10-CM

## 2019-11-05 DIAGNOSIS — G47.33 OSA (OBSTRUCTIVE SLEEP APNEA): Primary | ICD-10-CM

## 2019-11-05 DIAGNOSIS — Z95.810 AICD (AUTOMATIC CARDIOVERTER/DEFIBRILLATOR) PRESENT: ICD-10-CM

## 2019-11-05 DIAGNOSIS — R00.0 WIDE-COMPLEX TACHYCARDIA: ICD-10-CM

## 2019-11-05 PROCEDURE — 3331090001 HH PPS REVENUE CREDIT

## 2019-11-05 PROCEDURE — 3331090002 HH PPS REVENUE DEBIT

## 2019-11-05 NOTE — PROGRESS NOTES
The patient underwent sleep testing on 10/31/19. Recommendations listed below. Please refer to full report for specific details. Cardiac tracing appeared to be a predominantly atrial paced rhythm with intermittent runs of wide complex tachycardia with rabbit ear appearance suggestive of a bundle branch block. Alpha intrusion or EEG abnormalities were not. Supplemental oxygen was not used during this study. Overall, the patient appeared to tolerate study well but there were several findings and the patient reported sleeping worse than normal.  DANIEL physiology appeared well optimized at a CPAP setting of 8. REM rebound occurred towards the later part of this study. During this late prolonged REM period, loss of REM atonia was noted. The patient was talking, sitting up and moving during REM sleep. No violent behaviors noted. These findings are supportive of a REM sleep behavior disorder. REM supine sleep was achieved. PRESSURES USED DURING THE STUDY: 5, 6, 7, 8 cwp. DIAGNOSIS: 1) Obstructive Sleep Apnea  2) REM Sleep Behavior Disorder (RBD)  3) Wide Complex Tachycardia- Intermittent  4) AICD/Pacemaker    RECOMMENDATIONS:      Recommend starting patient on CPAP 8 cwp.  Monitor sleep sleep behaviors and review with patient safe sleeping environement.  Other non-invasive treatment options are recommended were applicable and include the following: weight reduction, smoking cessation, body position training, and modification of alcohol ingestion and/or sedating agents.  If these treatments are not possible or effective, an oral appliance may be an alternative and/or adjuvant non-invasive treatment.  If non-invasive treatments are not possible or effective, consideration may be given to possible corrective surgical options.  Healthy sleep habit education and reinforcement will be reviewed with the patient.  Individuals are encouraged to obtain 7-9 hours of sleep per day.      safety is encouraged. Drowsy and/or inattentive driving should be avoided.  Follow up with referring provider as directed.     Rae Sawant DO, [facility]    [facility]  Pulmonary, Critical Care, and Sleep Medicine

## 2019-11-06 ENCOUNTER — TELEPHONE (OUTPATIENT)
Dept: PULMONOLOGY | Age: 79
End: 2019-11-06

## 2019-11-06 ENCOUNTER — HOME CARE VISIT (OUTPATIENT)
Dept: SCHEDULING | Facility: HOME HEALTH | Age: 79
End: 2019-11-06
Payer: MEDICARE

## 2019-11-06 VITALS
TEMPERATURE: 97.8 F | OXYGEN SATURATION: 97 % | DIASTOLIC BLOOD PRESSURE: 60 MMHG | SYSTOLIC BLOOD PRESSURE: 118 MMHG | HEART RATE: 69 BPM

## 2019-11-06 PROCEDURE — G0157 HHC PT ASSISTANT EA 15: HCPCS

## 2019-11-06 PROCEDURE — 3331090001 HH PPS REVENUE CREDIT

## 2019-11-06 PROCEDURE — 3331090002 HH PPS REVENUE DEBIT

## 2019-11-06 NOTE — PROGRESS NOTES
Patient currently using [facility] for home oxygen, CPAP order faxed to [facility] (Attn: Kitty). [phone].

## 2019-11-07 ENCOUNTER — CLINICAL SUPPORT (OUTPATIENT)
Dept: CARDIOLOGY CLINIC | Age: 79
End: 2019-11-07

## 2019-11-07 ENCOUNTER — OFFICE VISIT (OUTPATIENT)
Dept: CARDIOLOGY CLINIC | Age: 79
End: 2019-11-07

## 2019-11-07 VITALS
DIASTOLIC BLOOD PRESSURE: 60 MMHG | WEIGHT: 107 LBS | HEART RATE: 69 BPM | SYSTOLIC BLOOD PRESSURE: 110 MMHG | BODY MASS INDEX: 17.83 KG/M2 | HEIGHT: 65 IN | OXYGEN SATURATION: 97 %

## 2019-11-07 DIAGNOSIS — I11.9 HYPERTENSIVE HEART DISEASE WITHOUT HEART FAILURE: ICD-10-CM

## 2019-11-07 DIAGNOSIS — R07.9 CHEST PAIN, UNSPECIFIED TYPE: ICD-10-CM

## 2019-11-07 DIAGNOSIS — I50.32 DIASTOLIC CHF, CHRONIC (HCC): Primary | ICD-10-CM

## 2019-11-07 DIAGNOSIS — I25.10 CORONARY ARTERY DISEASE INVOLVING NATIVE CORONARY ARTERY OF NATIVE HEART WITHOUT ANGINA PECTORIS: ICD-10-CM

## 2019-11-07 DIAGNOSIS — Z95.810 AICD (AUTOMATIC CARDIOVERTER/DEFIBRILLATOR) PRESENT: ICD-10-CM

## 2019-11-07 DIAGNOSIS — I50.32 DIASTOLIC CHF, CHRONIC (HCC): ICD-10-CM

## 2019-11-07 DIAGNOSIS — R06.09 DOE (DYSPNEA ON EXERTION): Primary | ICD-10-CM

## 2019-11-07 DIAGNOSIS — Z95.1 S/P CABG X 5: ICD-10-CM

## 2019-11-07 PROCEDURE — 3331090002 HH PPS REVENUE DEBIT

## 2019-11-07 PROCEDURE — 3331090001 HH PPS REVENUE CREDIT

## 2019-11-07 NOTE — PROGRESS NOTES
Wound access at this visit clean dry intact, patient advised to clean with warm soapy water rinse and pat dry no scrubbing, no creams, no ointments, no lotions on site. Monitor for signs and symptoms of infection, fever, drainage, odor, warm to touch and give office a call with any concerns. Patient advised no heavy lifting no pushing, no pulling for 4 weeks post implant. Dr. Jaskaran Moran accessed wound . Verbal order and read back per PACER HV CSI Hold eliquis until  11/11/2019. Restart eliquis at lower dose 2.5mg twice daily Follow up with Dr. Keshawn Sheikh on 12/18/2019 as scheduled. This has been fully explained to the patient, who indicates understanding.

## 2019-11-07 NOTE — PROGRESS NOTES
HPI: A 69-year old male here for follow up. He was in the hospital in early September with bundle branch VT. He underwent dual chamber AICD implant. He was admitted about five weeks after with hypertension and heart failure for which his visit is here today. His blood pressure was controlled and he was diuresed. Since discharge he has been feeling relatively well. He denies any new chest pain, dyspnea, PND, orthopnea, edema. He has been taking Lasix daily. His incision is well healed. Impression/Plan:  1. Recent admission October 2019 for acute and chronic systolic heart failure. 2. Ischemic cardiomyopathy with EF 16-20% September 2019 with multiple wall motion abnormalities. 3. History of extensive coronary artery disease with previous CABG and heart catheterization February 2019 with severe native disease with patent LIMA to LAD, but both sequential vein grafts occluded. There was native diagonal and OM disease at 85% and was medically treated since then. 4. Hypertension now controlled. 5. Remote tobacco and alcohol use. At this point, he is on amiodarone for his history of VT. His AICD is functioning normally. His major reason for follow up today is his recent heart failure exacerbation; it appears compensated. It may be reasonable to consider PCI to his OM and diagonals given his drop in EF. If his primary cardiologist Dr. Erna Bolton agrees, this could be arranged as an outpatient and he would need a heart catheterization to further evaluate his anatomy. Given his weight of 107 pounds and history, redo CABG in my opinion would likely be of very high risk although PCI also would be inherently risky as he has severe native vessel disease. I will continue with current medications and again he will follow up with Dr. Erna Bloton. PRIMARY REASON FOR FOLLOWUP IS HEART FAILURE DISCHARGE, NOT AICD TODAY.     Total care time spent in reviewing the case, multiple EMR databases, physician notes, procedure notes, examining the patient, and documentation, is 40 minutes.      Discussed in details with patient. All questions were answered to their full satisfaction. Risk, benefit and alternatives were discussed. More than 50% time spent in counseling and coordination of care. Past Medical History:   Diagnosis Date    AICD (automatic cardioverter/defibrillator) present 10/13/2019    Alcohol abuse     quit February 2011    Binocular visual disturbance 10/31/2013    CAD (coronary artery disease)     CAD (coronary artery disease), native coronary artery 1/3/05    LAD 70-80% mid; Cx-diffuse 80%; OM1-70% prox; RCA 40-50% mid; 100% RPLB with collaterals    Cardiac cath 08/19/2013    Severe, diffuse calcification. dLM 30%. mLAD 80%. o/pD1 80%. o/pRamus 80%. dCX 85%. dRCA 80%.  m-dRPLB 75%. EF 50%. Mod to severe inferobasal hypk.  Cardiac echocardiogram 01/19/2011    EF 50-55%. Gr 1 DDfx. RVSP 30 mmHg.  Cardiac nuclear imaging test 06/27/2011    No evidence of ischemia or previous infarction. EF 60%. Neg EKG on max EST. Ex time 6 mins.     Chronic kidney disease     Chronic lung disease     Chronic obstructive pulmonary disease (HCC)     Coronary artery disease     Diastolic CHF, chronic (HCC)     LV EF 55% (Echo Jan 2011)    Diverticula of colon 12/17/2013    Emphysematous COPD ([address]     PFTs March 2009; mild-mode obstruction, + bronchodil response; decreased DLCO    GERD (gastroesophageal reflux disease)     with erosive esophagitis history    Heart failure ([facility])     History of renal failure     2 years ago, which completely resolved    Hypercholesterolemia     Hypertension     Hypertensive heart disease with CHF ([address]     MI (myocardial infarction) ([address]     Inferior wall    Paresthesia and pain of both upper extremities 10/31/2013    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 Jovanni David, 4/9/14.  Tobacco abuse     Unspecified hereditary and idiopathic peripheral neuropathy 12/4/2013       Current Outpatient Medications   Medication Sig Dispense Refill    OXYGEN-AIR DELIVERY SYSTEMS 3 L/min by Nasal route continuous.  acetaminophen (TYLENOL EXTRA STRENGTH) 500 mg tablet Take 500 mg by mouth every six (6) hours as needed for Pain. 1 pill poq 6 hours as needed prn pain      amiodarone (CORDARONE) 200 mg tablet Take 1 Tab by mouth daily. Indications: ventricular fibrillation, a heart rhythm disorder 60 Tab 2    losartan (COZAAR) 50 mg tablet Take 1 Tab by mouth nightly. 1 pill po daily  Indications: chronic heart failure, high blood pressure 30 Tab 3    albuterol (PROVENTIL VENTOLIN) 2.5 mg /3 mL (0.083 %) nebu 3 mL by Nebulization route every four (4) hours as needed (cough, wheezing, more shortness of breath). Indications: a chronic lung disease where the airways become partially blocked with mucus called COPD 30 Nebule 3    benzonatate (TESSALON) 100 mg capsule Take 1 Cap by mouth two (2) times daily as needed for Cough for up to 30 days. Indications: cough 30 Cap 1    furosemide (LASIX) 20 mg tablet Take 1 Tab by mouth daily. Indications: Fluid in the Lungs due to Chronic Heart Failure 30 Tab 2    pantoprazole (PROTONIX) 40 mg tablet Take 40 mg by mouth daily. 1 pill daily 30 minutes before breakfast.  Indications: indigestion      atorvastatin (LIPITOR) 40 mg tablet Take 40 mg by mouth nightly. 1 pill daily      tiotropium-olodaterol (STIOLTO RESPIMAT) 2.5-2.5 mcg/actuation inhaler Take 2 Puffs by inhalation daily. 1 Inhaler 0    clopidogrel (PLAVIX) 75 mg tab Take 1 Tab by mouth daily. 30 Tab 3    isosorbide mononitrate ER (IMDUR) 30 mg tablet Take 1 Tab by mouth every morning.  30 Tab 3    nitroglycerin (NITROSTAT) 0.4 mg SL tablet 1 Tab by SubLINGual route every five (5) minutes as needed for Chest Pain. Up to 3 doses. 1 Bottle 3    aspirin delayed-release 81 mg tablet Take 81 mg by mouth daily. 1 pill po daily      metoprolol succinate (TOPROL-XL) 25 mg XL tablet Take 25 mg by mouth daily. 1 pill po daily      albuterol (VENTOLIN HFA) 90 mcg/Actuation inhaler Take 1 Puff by inhalation as needed.  CYANOCOBALAMIN/FA/PYRIDOXINE (FOLBEE PO) Take 1 Tab by mouth daily.  multivitamin (ONE A DAY) tablet Take 1 Tab by mouth daily. Social History   reports that he has been smoking cigarettes. He started smoking about 5 years ago. He has a 27.50 pack-year smoking history. He has never used smokeless tobacco.   reports that he drinks alcohol. Family History  family history includes Diabetes in his father; Heart Disease in his maternal uncle; Hypertension in his father. Review of Systems  Except as stated above include:  Constitutional: Negative for fever, chills and malaise/fatigue. HEENT: No congestion or recent URI. Gastrointestinal: No nausea, vomiting, abdominal pain, bloody stools. Pulmonary:  Negative except as stated above. Cardiac:  Negative except as stated above. Musculoskeletal: Negative except as stated above. Neurological:  No localized symptoms. Skin:  Negative except as stated above. Psych:  Negative except as stated above. Endocrine:  Negative except as stated above. PHYSICAL EXAM  BP Readings from Last 3 Encounters:   11/07/19 110/60   11/06/19 118/60   11/04/19 110/60     Pulse Readings from Last 3 Encounters:   11/07/19 69   11/06/19 69   11/04/19 72     Wt Readings from Last 3 Encounters:   11/07/19 48.5 kg (107 lb)   10/31/19 48 kg (105 lb 12.8 oz)   10/24/19 46.8 kg (103 lb 3.2 oz)     General:   Well developed, well groomed. Head/Neck:   No jugular venous distention     No carotid bruits. No evidence of xanthelasma. Lungs:   No respiratory distress. Clear bilaterally.   Heart:    Regular rate and rhythm. Normal S1/S2. Palpation of heart with normal point of maximum impulse. No significant murmurs, rubs or gallops. Left aicd intact. Abdomen:   Soft and nontender. No palpable abdominal mass or bruits. Extremities:   Intact peripheral pulses. No significant edema. Neurological:   Alert and oriented to person, place, time. No focal neurological deficit visually. Skin:   No obvious rash    Blood Pressure Metric:  Monitor recommended and adjustments stated if needed.

## 2019-11-07 NOTE — PROGRESS NOTES
Carleene Babinski presents today for   Chief Complaint   Patient presents with   [facility] Follow Up     follow up - no cardiac complaints        Carleene Babinski preferred language for health care discussion is english/other. Is someone accompanying this pt? no    Is the patient using any DME equipment during [address]? no    Depression Screening:  3 most recent PHQ Screens 10/24/2019   Little interest or pleasure in doing things Not at all   Feeling down, depressed, irritable, or hopeless Not at all   Total Score PHQ 2 0       Learning Assessment:  Learning Assessment 3/12/2014   PRIMARY LEARNER Patient   HIGHEST LEVEL OF EDUCATION - PRIMARY LEARNER  [place] [id number] [facility] CAREGIVER No   PRIMARY LANGUAGE ENGLISH    NEED No   LEARNER PREFERENCE PRIMARY OTHER (COMMENT)   ANSWERED BY Patient   RELATIONSHIP SELF       Abuse Screening:  No flowsheet data found. Fall Risk  Fall Risk Assessment, last 12 mths 10/24/2019   Able to walk? Yes   Fall in past 12 months? No   Fall with injury? -   Number of falls in past 12 months -   Fall Risk Score -       Pt currently taking Anticoagulant therapy? ASA 81mg and Plavix     Coordination of Care:  1. Have you been to the ER, urgent care clinic since your last visit? Hospitalized since your last visit? 10/13 - 10/16 for Acute CHF and 9/24 - 10/1 for SOB and device implant     2. Have you seen or consulted any other health care providers outside of the [address] [place] [address] since your last visit? Include any pap smears or colon screening.  no

## 2019-11-08 PROCEDURE — 3331090001 HH PPS REVENUE CREDIT

## 2019-11-08 PROCEDURE — 3331090002 HH PPS REVENUE DEBIT

## 2019-11-09 PROCEDURE — 3331090002 HH PPS REVENUE DEBIT

## 2019-11-09 PROCEDURE — 3331090001 HH PPS REVENUE CREDIT

## 2019-11-10 PROCEDURE — 3331090002 HH PPS REVENUE DEBIT

## 2019-11-10 PROCEDURE — 3331090001 HH PPS REVENUE CREDIT

## 2019-11-11 PROCEDURE — 3331090001 HH PPS REVENUE CREDIT

## 2019-11-11 PROCEDURE — 3331090002 HH PPS REVENUE DEBIT

## 2019-11-12 ENCOUNTER — HOME CARE VISIT (OUTPATIENT)
Dept: SCHEDULING | Facility: HOME HEALTH | Age: 79
End: 2019-11-12
Payer: MEDICARE

## 2019-11-12 VITALS
OXYGEN SATURATION: 95 % | HEART RATE: 87 BPM | DIASTOLIC BLOOD PRESSURE: 70 MMHG | SYSTOLIC BLOOD PRESSURE: 122 MMHG | TEMPERATURE: 97.1 F

## 2019-11-12 PROCEDURE — 3331090001 HH PPS REVENUE CREDIT

## 2019-11-12 PROCEDURE — 3331090002 HH PPS REVENUE DEBIT

## 2019-11-12 PROCEDURE — 3331090003 HH PPS REVENUE ADJ

## 2019-11-12 PROCEDURE — G0151 HHCP-SERV OF PT,EA 15 MIN: HCPCS

## 2019-11-12 NOTE — PROGRESS NOTES
I have personally seen and evaluated the device findings. Interrogation reviewed and I agree with assessment.     Veena Cardona

## 2019-11-27 ENCOUNTER — TELEPHONE (OUTPATIENT)
Dept: PULMONOLOGY | Age: 79
End: 2019-11-27

## 2020-01-01 NOTE — PROGRESS NOTES
Rosalva Loaiza presents today for a 2 month check-up. He was last seen by Dr. Chelita Cristobal on 11/7/19 and Dr. Luca Fan in Feb. 2019. He states that he missed his December 2019 appointment with Dr. Luca Fan as he was unable to drive to the office that day. He was hospitalized in Sept 2019 for bundle branch VT. He underwent dual chamber AICD implant. In Oct. 2019, he was hospitalized again for acute on chronic heart failure. His last echo was done on 12/10/19 and it showed an EF of 21-25% (which was significantly decreased compared to previous echo), abnormal LV wall motion. He is a 67year old male with history of HCVD, hypercholesterolemia, chronic diastolic heart failure, and CAD (s/p CABG x 5 in April 2014). His last echo was done in Jan. 2019 and it showed an EF of 46-50%, abnormal wall motion, and grade 2 diastolic dysfunction. His last stress test was performed in December 2018 and it was markedly abnormal.  It showed 2 areas of possible ischemia and he had positive TID at 1.28. His calculated ejection fraction at that time was 33%. He underwent cardiac catheterization on February 1, 2019 and it demonstrated the following:  ·  Severe native CAD with distal left main 90%, heavily calcified. · LIMA to LAD is patent. · 2 sequential vein grafts (All 4 vein grafts to diagonal, OM, RPDA, RPL branch) occluded at aortotomy site. · Subtotal mid RCA occlusion with NICO 0-I flow, some collaterals from left coronary system. Native diagonal 85% stenosis, native OM 85% stenosis. Possible options included continued medical therapy, redo bypass surgery, or selective angioplasty. Angioplasty and stent will be difficult in light of diffuse disease, left main, and heavy calcification. Since his visit with Dr. Chelita Cristobal in November 2019, he states that he has been feeling well. He has not had any worsening shortness of breath or exhibited signs of volume overload.   He is compliant with his medications and states that his AICD has not fired. He states that he monitors his vital signs at home and has noticed that his systolic blood pressures have been in the 150s and occasionally up to 160 mmHg. He has not had any chest pain or shortness of breath at rest.  He has mild occasional dyspnea on exertion. He has oxygen at 3 L per nasal cannula that he states he uses just when he needs it. Denies chest pain, tightness, heaviness, and palpitations. Denies shortness of breath at rest, dyspnea on exertion, orthopnea and PND. Denies abdominal bloating. Denies lightheadedness, dizziness, and syncope. Denies lower extremity edema and claudication. Denies nausea, vomiting, diarrhea, melena, hematochezia. Denies hematuria, urgency, frequency. Denies fever, chills. PMH:  Past Medical History:   Diagnosis Date    AICD (automatic cardioverter/defibrillator) present 10/13/2019    Alcohol abuse     quit February 2011    Binocular visual disturbance 10/31/2013    CAD (coronary artery disease)     CAD (coronary artery disease), native coronary artery 1/3/05    LAD 70-80% mid; Cx-diffuse 80%; OM1-70% prox; RCA 40-50% mid; 100% RPLB with collaterals    Cardiac cath 08/19/2013    Severe, diffuse calcification. dLM 30%. mLAD 80%. o/pD1 80%. o/pRamus 80%. dCX 85%. dRCA 80%.  m-dRPLB 75%. EF 50%. Mod to severe inferobasal hypk.  Cardiac echocardiogram 01/19/2011    EF 50-55%. Gr 1 DDfx. RVSP 30 mmHg.  Cardiac nuclear imaging test 06/27/2011    No evidence of ischemia or previous infarction. EF 60%. Neg EKG on max EST. Ex time 6 mins.     Chronic kidney disease     Chronic lung disease     Chronic obstructive pulmonary disease ([facility]     Coronary artery disease     Diastolic CHF, chronic (HCC)     LV EF 55% (Echo Jan 2011)    Diverticula of colon 12/17/2013    Emphysematous COPD ([facility]     PFTs March 2009; mild-mode obstruction, + bronchodil response; decreased DLCO    GERD (gastroesophageal reflux disease)     with erosive esophagitis history    Heart failure (HCC)     History of renal failure     2 years ago, which completely resolved    Hypercholesterolemia     Hypertension     Hypertensive heart disease with CHF ([address]     MI (myocardial infarction) ([facility]     Inferior wall    Paresthesia and pain of both upper extremities 10/31/2013    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 Godwin Mcneal, 4/9/14.  Tobacco abuse     Unspecified hereditary and idiopathic peripheral neuropathy 12/4/2013       PSH:  Past Surgical History:   Procedure Laterality Date    HX CORONARY ARTERY BYPASS GRAFT  about 2013    HX HEART CATHETERIZATION  11/2013    LA INSJ/RPLCMT PERM DFB W/TRNSVNS LDS 1/DUAL CHMBR Left 9/30/2019    INSERT ICD DUAL performed by Mary Lin MD at [address] [place] [address]       MEDS:  Current Outpatient Medications   Medication Sig    OXYGEN-AIR DELIVERY SYSTEMS 3 L/min by Nasal route continuous.  acetaminophen (TYLENOL EXTRA STRENGTH) 500 mg tablet Take 500 mg by mouth every six (6) hours as needed for Pain. 1 pill poq 6 hours as needed prn pain    amiodarone (CORDARONE) 200 mg tablet Take 1 Tab by mouth daily. Indications: ventricular fibrillation, a heart rhythm disorder    losartan (COZAAR) 50 mg tablet Take 1 Tab by mouth nightly. 1 pill po daily  Indications: chronic heart failure, high blood pressure    albuterol (PROVENTIL VENTOLIN) 2.5 mg /3 mL (0.083 %) nebu 3 mL by Nebulization route every four (4) hours as needed (cough, wheezing, more shortness of breath). Indications: a chronic lung disease where the airways become partially blocked with mucus called COPD    furosemide (LASIX) 20 mg tablet Take 1 Tab by mouth daily.  Indications: Fluid in the Lungs due to Chronic Heart Failure    pantoprazole (PROTONIX) 40 mg tablet Take 40 mg by mouth daily. 1 pill daily 30 minutes before breakfast.  Indications: indigestion    atorvastatin (LIPITOR) 40 mg tablet Take 40 mg by mouth nightly. 1 pill daily    tiotropium-olodaterol (STIOLTO RESPIMAT) 2.5-2.5 mcg/actuation inhaler Take 2 Puffs by inhalation daily.  clopidogrel (PLAVIX) 75 mg tab Take 1 Tab by mouth daily.  isosorbide mononitrate ER (IMDUR) 30 mg tablet Take 1 Tab by mouth every morning.  nitroglycerin (NITROSTAT) 0.4 mg SL tablet 1 Tab by SubLINGual route every five (5) minutes as needed for Chest Pain. Up to 3 doses.  aspirin delayed-release 81 mg tablet Take 81 mg by mouth daily. 1 pill po daily    metoprolol succinate (TOPROL-XL) 25 mg XL tablet Take 25 mg by mouth daily. 1 pill po daily    albuterol (VENTOLIN HFA) 90 mcg/Actuation inhaler Take 1 Puff by inhalation as needed.  CYANOCOBALAMIN/FA/PYRIDOXINE (FOLBEE PO) Take 1 Tab by mouth daily.  multivitamin (ONE A DAY) tablet Take 1 Tab by mouth daily. No current facility-administered medications for this visit. Allergies and Sensitivities:  Allergies   Allergen Reactions    Vicodin [Hydrocodone-Acetaminophen] Rash       Family History:  Family History   Problem Relation Age of Onset    Heart Disease Maternal Uncle     Diabetes Father     Hypertension Father        Social History:  He  reports that he has been smoking cigarettes. He started smoking about 5 years ago. He has a 27.50 pack-year smoking history. He has never used smokeless tobacco.  He  reports current alcohol use. Physical:  Visit Vitals  /76 (BP 1 Location: Right arm, BP Patient Position: Sitting)   Pulse 68   Resp 16   Ht 5' 5\" (1.651 m)   Wt 47.6 kg (105 lb)   SpO2 99%   BMI 17.47 kg/m²     His weight is down 2 pounds since his last visit. His blood pressure when I rechecked it was 156/78.     Exam:  Neck:  Supple, no JVD, no carotid bruits  CV:  Normal S1 and  S2, no murmurs, rubs, or gallops noted  Lungs:  Clear to ausculation throughout, no wheezes or rales  Abd:  Soft, non-tender, non-distended with good bowel sounds. No hepatosplenomegaly  Extremities:  No edema      Data:  EKG:  Read by Oneil Jaime DO. Atrially paced rhythm with normal ventricular conduction. Old IW MI. LVH by precordial voltage criteria      LABS:  Lab Results   Component Value Date/Time    Sodium 135 (L) 10/16/2019 03:43 AM    Potassium 4.2 10/16/2019 03:43 AM    Chloride 100 10/16/2019 03:43 AM    CO2 28 10/16/2019 03:43 AM    Glucose 99 10/16/2019 03:43 AM    BUN 19 (H) 10/16/2019 03:43 AM    Creatinine 1.18 10/16/2019 03:43 AM     Lab Results   Component Value Date/Time    Cholesterol, total 144 05/03/2019 10:26 AM    HDL Cholesterol 86 (H) 05/03/2019 10:26 AM    LDL, calculated 18 05/03/2019 10:26 AM    Triglyceride 200 (H) 05/03/2019 10:26 AM    CHOL/HDL Ratio 1.7 05/03/2019 10:26 AM     Lab Results   Component Value Date/Time    ALT (SGPT) 18 10/13/2019 04:45 AM         Impression/Plan:  1.  CAD, s/p CABG x 5 in April 2014  2. Hypercholesterolemia, on atorvastatin 40 mg  3. Chronic systolic heart failure, appears compensated  4. HCVD, blood pressure slightly elevated     Mr. Jennifer Villalta was seen today for a check-up as he missed his follow-up appointment with Dr. Inderjit Ambrosio in December 2019. He states at the time of the appointment, he was unable to drive to the office and therefore was not able to come in for his appointment. He states that he has been feeling well. He denies any cardiac complaints. He has not had any chest pain or unusual shortness of breath. He is only using his oxygen at 3 L per nasal cannula as needed. His room air oxygen saturation today was 99%. Breath sounds are clear and he does not exhibit any signs of volume overload at this time. He has been monitoring his blood pressure, heart rate, oxygen saturation at home.   He states that he has noticed systolic blood pressures climbing into the 150s and up to 160 mmHg. His heart rate has been mainly controlled and he states that on occasion he has noticed his heart rate just slightly above 100 for a brief periods of time. He is compliant with his medication regimen. At this time, I will increase his losartan to 100 mg daily. He is scheduled to follow-up with his primary care physician, Dr. Mojgan Morales, on January 9, 2020. His blood pressure can be reevaluated at that time and further adjustments can be made as he deems necessary. Mr. John Land states that he recently had lab work done in preparation for his appointment with Dr. Mojgan Morales. He has been scheduled to see Dr. Yemi Walker in March 2020. He was reminded that he needs to send a [facility] transmission to our office in February. Congestive heart failure teaching reinforced today. Advised to limit sodium intake to no more than 2000mg per day and to also limit fluid intake to 48 ounces per day (unless otherwise specified). Advised to weigh daily every morning and record weights. Instructed to call our office if progressive weight gain is noted over a 2 to 3 day period of time, shortness of breath increases, or if abdominal bloating, nausea, fatigue, or increased lower extremity edema is noted. Ada Magana MSN, FNP-BC    Please note:  Portions of this chart were created with Dragon medical speech to text program.  Unrecognized errors may be present.

## 2020-01-03 ENCOUNTER — OFFICE VISIT (OUTPATIENT)
Dept: CARDIOLOGY CLINIC | Age: 80
End: 2020-01-03

## 2020-01-03 VITALS
OXYGEN SATURATION: 99 % | RESPIRATION RATE: 16 BRPM | HEIGHT: 65 IN | WEIGHT: 105 LBS | DIASTOLIC BLOOD PRESSURE: 76 MMHG | HEART RATE: 68 BPM | BODY MASS INDEX: 17.49 KG/M2 | SYSTOLIC BLOOD PRESSURE: 154 MMHG

## 2020-01-03 DIAGNOSIS — I50.32 DIASTOLIC CHF, CHRONIC (HCC): ICD-10-CM

## 2020-01-03 DIAGNOSIS — Z95.1 S/P CABG X 5: ICD-10-CM

## 2020-01-03 DIAGNOSIS — Z95.810 AICD (AUTOMATIC CARDIOVERTER/DEFIBRILLATOR) PRESENT: ICD-10-CM

## 2020-01-03 DIAGNOSIS — I25.10 CORONARY ARTERY DISEASE INVOLVING NATIVE CORONARY ARTERY OF NATIVE HEART WITHOUT ANGINA PECTORIS: ICD-10-CM

## 2020-01-03 DIAGNOSIS — I50.9 ACUTE ON CHRONIC CONGESTIVE HEART FAILURE, UNSPECIFIED HEART FAILURE TYPE (HCC): Primary | ICD-10-CM

## 2020-01-03 DIAGNOSIS — E78.00 HYPERCHOLESTEROLEMIA: ICD-10-CM

## 2020-01-03 NOTE — PROGRESS NOTES
Identified pt with two pt identifiers(name and ). Reviewed record in preparation for visit and have obtained necessary documentation. Chief Complaint   Patient presents with    CHF     2 month Follow up      Patent denies having any chest pain today. Health Maintenance Due   Topic    DTaP/Tdap/Td series (1 - Tdap)    Shingrix Vaccine Age 50> (1 of 2)    GLAUCOMA SCREENING Q2Y     Pneumococcal 65+ years (1 of 1 - PPSV23)    MEDICARE YEARLY EXAM     Influenza Age 5 to Adult        Coordination of Care Questionnaire:  :   1) Have you been to an emergency room, urgent care, or hospitalized since your last visit? If yes, where when, and reason for visit? no       2. Have seen or consulted any other health care provider since your last visit? If yes, where when, and reason for visit? NO      3) Do you have an Advanced Directive/ Living Will in place? YES  If yes, do we have a copy on file YES        Learning Assessment 3/12/2014   PRIMARY LEARNER Patient   HIGHEST LEVEL OF EDUCATION - PRIMARY LEARNER  SOME COLLEGE   CO-LEARNER CAREGIVER No   PRIMARY LANGUAGE ENGLISH    NEED No   LEARNER PREFERENCE PRIMARY OTHER (COMMENT)   ANSWERED BY Patient   RELATIONSHIP SELF        3 most recent PHQ Screens 1/3/2020   Little interest or pleasure in doing things Not at all   Feeling down, depressed, irritable, or hopeless Not at all   Total Score PHQ 2 0        Abuse Screening Questionnaire 1/3/2020   Do you ever feel afraid of your partner? N   Are you in a relationship with someone who physically or mentally threatens you? N   Is it safe for you to go home? Y        Fall Risk Assessment, last 12 mths 1/3/2020   Able to walk? Yes   Fall in past 12 months?  No   Fall with injury? -   Number of falls in past 12 months -   Fall Risk Score -

## 2020-01-03 NOTE — PATIENT INSTRUCTIONS
Increase losartan to 100mg daily. Take 2 of your losartan 50mg tablets once a day.   All other medications to remain the same  Blood pressure to be re-evaluated during your appointment with Dr. Og Dumont on 1/9/2020  Follow-up with Dr Diamond Cee as scheduled and as needed  Weigh daily and record  Limit sodium intake to 2000mg per day  Limit fluid intake to no more than  6, eight ounce glasses of any type of fluids per day (total of 48 ounces per day)  Call if you notice sudden or progressive weight gain (3-5 pounds in 2-3 days), increasing shortness of breath, abdominal bloating, increasing lower extremity edema, inability to lie flat or on your normal number of pillows, having to sit up to catch your breath, fatigue, increased somnolence (sleeping more), poor appetite

## 2020-01-30 ENCOUNTER — HOSPITAL ENCOUNTER (INPATIENT)
Age: 80
LOS: 3 days | Discharge: HOME HEALTH | DRG: 291 | End: 2020-02-03
Attending: EMERGENCY MEDICINE | Admitting: INTERNAL MEDICINE
Payer: MEDICARE

## 2020-01-30 ENCOUNTER — APPOINTMENT (OUTPATIENT)
Dept: GENERAL RADIOLOGY | Age: 80
DRG: 291 | End: 2020-01-30
Attending: EMERGENCY MEDICINE
Payer: MEDICARE

## 2020-01-30 DIAGNOSIS — I50.43 ACUTE ON CHRONIC COMBINED SYSTOLIC AND DIASTOLIC CONGESTIVE HEART FAILURE (HCC): ICD-10-CM

## 2020-01-30 DIAGNOSIS — N30.00 ACUTE CYSTITIS WITHOUT HEMATURIA: ICD-10-CM

## 2020-01-30 DIAGNOSIS — J18.9 COMMUNITY ACQUIRED PNEUMONIA OF RIGHT LOWER LOBE OF LUNG: Primary | ICD-10-CM

## 2020-01-30 LAB
ALBUMIN SERPL-MCNC: 3 G/DL (ref 3.4–5)
ALBUMIN/GLOB SERPL: 0.6 {RATIO} (ref 0.8–1.7)
ALP SERPL-CCNC: 133 U/L (ref 45–117)
ALT SERPL-CCNC: 16 U/L (ref 16–61)
ANION GAP SERPL CALC-SCNC: 8 MMOL/L (ref 3–18)
APPEARANCE UR: CLEAR
ARTERIAL PATENCY WRIST A: ABNORMAL
AST SERPL-CCNC: 24 U/L (ref 10–38)
BACTERIA URNS QL MICRO: ABNORMAL /HPF
BASE DEFICIT BLDV-SCNC: 1 MMOL/L
BASOPHILS # BLD: 0.1 K/UL (ref 0–0.1)
BASOPHILS NFR BLD: 1 % (ref 0–2)
BDY SITE: ABNORMAL
BILIRUB SERPL-MCNC: 0.4 MG/DL (ref 0.2–1)
BILIRUB UR QL: NEGATIVE
BNP SERPL-MCNC: ABNORMAL PG/ML (ref 0–1800)
BUN SERPL-MCNC: 16 MG/DL (ref 7–18)
BUN/CREAT SERPL: 15 (ref 12–20)
CALCIUM SERPL-MCNC: 8.4 MG/DL (ref 8.5–10.1)
CHLORIDE SERPL-SCNC: 108 MMOL/L (ref 100–111)
CO2 SERPL-SCNC: 26 MMOL/L (ref 21–32)
COLOR UR: YELLOW
CREAT SERPL-MCNC: 1.09 MG/DL (ref 0.6–1.3)
DIFFERENTIAL METHOD BLD: ABNORMAL
EOSINOPHIL # BLD: 0.1 K/UL (ref 0–0.4)
EOSINOPHIL NFR BLD: 1 % (ref 0–5)
ERYTHROCYTE [DISTWIDTH] IN BLOOD BY AUTOMATED COUNT: 18 % (ref 11.6–14.5)
GAS FLOW.O2 O2 DELIVERY SYS: ABNORMAL L/MIN
GAS FLOW.O2 SETTING OXYMISER: 4 L/M
GLOBULIN SER CALC-MCNC: 5.3 G/DL (ref 2–4)
GLUCOSE SERPL-MCNC: 164 MG/DL (ref 74–99)
GLUCOSE UR STRIP.AUTO-MCNC: NEGATIVE MG/DL
HCO3 BLDV-SCNC: 24.7 MMOL/L (ref 23–28)
HCT VFR BLD AUTO: 33 % (ref 36–48)
HGB BLD-MCNC: 11.1 G/DL (ref 13–16)
HGB UR QL STRIP: NEGATIVE
KETONES UR QL STRIP.AUTO: NEGATIVE MG/DL
LACTATE BLD-SCNC: 2.37 MMOL/L (ref 0.4–2)
LEUKOCYTE ESTERASE UR QL STRIP.AUTO: ABNORMAL
LYMPHOCYTES # BLD: 1.9 K/UL (ref 0.9–3.6)
LYMPHOCYTES NFR BLD: 18 % (ref 21–52)
MCH RBC QN AUTO: 32.8 PG (ref 24–34)
MCHC RBC AUTO-ENTMCNC: 33.6 G/DL (ref 31–37)
MCV RBC AUTO: 97.6 FL (ref 74–97)
MONOCYTES # BLD: 0.7 K/UL (ref 0.05–1.2)
MONOCYTES NFR BLD: 6 % (ref 3–10)
NEUTS SEG # BLD: 8.1 K/UL (ref 1.8–8)
NEUTS SEG NFR BLD: 74 % (ref 40–73)
NITRITE UR QL STRIP.AUTO: POSITIVE
PCO2 BLDV: 42.7 MMHG (ref 41–51)
PH BLDV: 7.37 [PH] (ref 7.32–7.42)
PH UR STRIP: 5 [PH] (ref 5–8)
PLATELET # BLD AUTO: 332 K/UL (ref 135–420)
PMV BLD AUTO: 9.4 FL (ref 9.2–11.8)
PO2 BLDV: 29 MMHG (ref 25–40)
POTASSIUM SERPL-SCNC: 2.9 MMOL/L (ref 3.5–5.5)
PROT SERPL-MCNC: 8.3 G/DL (ref 6.4–8.2)
PROT UR STRIP-MCNC: ABNORMAL MG/DL
RBC # BLD AUTO: 3.38 M/UL (ref 4.7–5.5)
RBC #/AREA URNS HPF: NEGATIVE /HPF (ref 0–5)
SAO2 % BLDV: 54 % (ref 65–88)
SERVICE CMNT-IMP: ABNORMAL
SODIUM SERPL-SCNC: 142 MMOL/L (ref 136–145)
SP GR UR REFRACTOMETRY: 1.01 (ref 1–1.03)
SPECIMEN TYPE: ABNORMAL
TROPONIN I SERPL-MCNC: 0.02 NG/ML (ref 0–0.04)
UROBILINOGEN UR QL STRIP.AUTO: 0.2 EU/DL (ref 0.2–1)
WBC # BLD AUTO: 10.8 K/UL (ref 4.6–13.2)
WBC URNS QL MICRO: ABNORMAL /HPF (ref 0–4)

## 2020-01-30 PROCEDURE — 99285 EMERGENCY DEPT VISIT HI MDM: CPT

## 2020-01-30 PROCEDURE — 85025 COMPLETE CBC W/AUTO DIFF WBC: CPT

## 2020-01-30 PROCEDURE — 82803 BLOOD GASES ANY COMBINATION: CPT

## 2020-01-30 PROCEDURE — 87077 CULTURE AEROBIC IDENTIFY: CPT

## 2020-01-30 PROCEDURE — 71045 X-RAY EXAM CHEST 1 VIEW: CPT

## 2020-01-30 PROCEDURE — 74011250636 HC RX REV CODE- 250/636: Performed by: EMERGENCY MEDICINE

## 2020-01-30 PROCEDURE — 96375 TX/PRO/DX INJ NEW DRUG ADDON: CPT

## 2020-01-30 PROCEDURE — 81001 URINALYSIS AUTO W/SCOPE: CPT

## 2020-01-30 PROCEDURE — 96374 THER/PROPH/DIAG INJ IV PUSH: CPT

## 2020-01-30 PROCEDURE — 87186 SC STD MICRODIL/AGAR DIL: CPT

## 2020-01-30 PROCEDURE — 80053 COMPREHEN METABOLIC PANEL: CPT

## 2020-01-30 PROCEDURE — 93005 ELECTROCARDIOGRAM TRACING: CPT

## 2020-01-30 PROCEDURE — 83880 ASSAY OF NATRIURETIC PEPTIDE: CPT

## 2020-01-30 PROCEDURE — 74011000250 HC RX REV CODE- 250: Performed by: EMERGENCY MEDICINE

## 2020-01-30 PROCEDURE — 83605 ASSAY OF LACTIC ACID: CPT

## 2020-01-30 PROCEDURE — 74011250637 HC RX REV CODE- 250/637: Performed by: EMERGENCY MEDICINE

## 2020-01-30 PROCEDURE — 87086 URINE CULTURE/COLONY COUNT: CPT

## 2020-01-30 PROCEDURE — 87040 BLOOD CULTURE FOR BACTERIA: CPT

## 2020-01-30 PROCEDURE — 84484 ASSAY OF TROPONIN QUANT: CPT

## 2020-01-30 RX ORDER — NITROGLYCERIN 0.4 MG/1
0.4 TABLET SUBLINGUAL
Status: DISPENSED | OUTPATIENT
Start: 2020-01-30 | End: 2020-01-31

## 2020-01-30 RX ORDER — AZITHROMYCIN 250 MG/1
500 TABLET, FILM COATED ORAL
Status: COMPLETED | OUTPATIENT
Start: 2020-01-30 | End: 2020-01-30

## 2020-01-30 RX ORDER — NITROGLYCERIN 0.4 MG/1
0.4 TABLET SUBLINGUAL
Status: ACTIVE | OUTPATIENT
Start: 2020-01-30 | End: 2020-01-31

## 2020-01-30 RX ORDER — NITROGLYCERIN 0.4 MG/1
0.4 TABLET SUBLINGUAL
Status: COMPLETED | OUTPATIENT
Start: 2020-01-30 | End: 2020-01-30

## 2020-01-30 RX ORDER — POTASSIUM CHLORIDE 7.45 MG/ML
10 INJECTION INTRAVENOUS
Status: COMPLETED | OUTPATIENT
Start: 2020-01-30 | End: 2020-01-31

## 2020-01-30 RX ORDER — FUROSEMIDE 10 MG/ML
40 INJECTION INTRAMUSCULAR; INTRAVENOUS
Status: COMPLETED | OUTPATIENT
Start: 2020-01-30 | End: 2020-01-30

## 2020-01-30 RX ORDER — SODIUM CHLORIDE 0.9 % (FLUSH) 0.9 %
5-10 SYRINGE (ML) INJECTION AS NEEDED
Status: DISCONTINUED | OUTPATIENT
Start: 2020-01-30 | End: 2020-02-03 | Stop reason: HOSPADM

## 2020-01-30 RX ADMIN — METHYLPREDNISOLONE SODIUM SUCCINATE 80 MG: 40 INJECTION, POWDER, FOR SOLUTION INTRAMUSCULAR; INTRAVENOUS at 20:07

## 2020-01-30 RX ADMIN — FUROSEMIDE 40 MG: 10 INJECTION, SOLUTION INTRAMUSCULAR; INTRAVENOUS at 22:42

## 2020-01-30 RX ADMIN — NITROGLYCERIN 1 INCH: 20 OINTMENT TOPICAL at 20:05

## 2020-01-30 RX ADMIN — POTASSIUM BICARBONATE 40 MEQ: 782 TABLET, EFFERVESCENT ORAL at 22:47

## 2020-01-30 RX ADMIN — AZITHROMYCIN 500 MG: 250 TABLET, FILM COATED ORAL at 22:46

## 2020-01-30 RX ADMIN — CEFTRIAXONE SODIUM 1 G: 1 INJECTION, POWDER, FOR SOLUTION INTRAMUSCULAR; INTRAVENOUS at 22:42

## 2020-01-30 RX ADMIN — POTASSIUM CHLORIDE 10 MEQ: 7.46 INJECTION, SOLUTION INTRAVENOUS at 22:47

## 2020-01-30 RX ADMIN — NITROGLYCERIN 0.4 MG: 0.4 TABLET SUBLINGUAL at 20:05

## 2020-01-31 PROBLEM — N39.0 UTI (URINARY TRACT INFECTION): Status: ACTIVE | Noted: 2020-01-31

## 2020-01-31 PROBLEM — I50.40 COMBINED CONGESTIVE SYSTOLIC AND DIASTOLIC HEART FAILURE (HCC): Status: ACTIVE | Noted: 2020-01-31

## 2020-01-31 PROBLEM — J18.9 RIGHT LOWER LOBE PNEUMONIA: Status: ACTIVE | Noted: 2020-01-31

## 2020-01-31 LAB
ATRIAL RATE: 102 BPM
CALCULATED P AXIS, ECG09: 5 DEGREES
CALCULATED R AXIS, ECG10: -33 DEGREES
CALCULATED T AXIS, ECG11: 106 DEGREES
CK MB CFR SERPL CALC: 5.4 % (ref 0–4)
CK MB SERPL-MCNC: 3.4 NG/ML (ref 5–25)
CK SERPL-CCNC: 63 U/L (ref 39–308)
DIAGNOSIS, 93000: NORMAL
LACTATE BLD-SCNC: 1.54 MMOL/L (ref 0.4–2)
P-R INTERVAL, ECG05: 172 MS
POTASSIUM SERPL-SCNC: 5.7 MMOL/L (ref 3.5–5.5)
Q-T INTERVAL, ECG07: 368 MS
QRS DURATION, ECG06: 118 MS
QTC CALCULATION (BEZET), ECG08: 479 MS
TROPONIN I SERPL-MCNC: 0.07 NG/ML (ref 0–0.04)
VENTRICULAR RATE, ECG03: 102 BPM

## 2020-01-31 PROCEDURE — 36415 COLL VENOUS BLD VENIPUNCTURE: CPT

## 2020-01-31 PROCEDURE — 77030027138 HC INCENT SPIROMETER -A

## 2020-01-31 PROCEDURE — 74011250637 HC RX REV CODE- 250/637: Performed by: INTERNAL MEDICINE

## 2020-01-31 PROCEDURE — 65660000000 HC RM CCU STEPDOWN

## 2020-01-31 PROCEDURE — 74011000250 HC RX REV CODE- 250: Performed by: INTERNAL MEDICINE

## 2020-01-31 PROCEDURE — 77010033678 HC OXYGEN DAILY

## 2020-01-31 PROCEDURE — 84132 ASSAY OF SERUM POTASSIUM: CPT

## 2020-01-31 PROCEDURE — 74011250636 HC RX REV CODE- 250/636: Performed by: INTERNAL MEDICINE

## 2020-01-31 PROCEDURE — 74011250636 HC RX REV CODE- 250/636: Performed by: EMERGENCY MEDICINE

## 2020-01-31 PROCEDURE — 94640 AIRWAY INHALATION TREATMENT: CPT

## 2020-01-31 PROCEDURE — 83605 ASSAY OF LACTIC ACID: CPT

## 2020-01-31 PROCEDURE — 82550 ASSAY OF CK (CPK): CPT

## 2020-01-31 RX ORDER — ISOSORBIDE MONONITRATE 30 MG/1
30 TABLET, EXTENDED RELEASE ORAL
Status: DISCONTINUED | OUTPATIENT
Start: 2020-01-31 | End: 2020-02-03 | Stop reason: HOSPADM

## 2020-01-31 RX ORDER — ASPIRIN 81 MG/1
81 TABLET ORAL DAILY
Status: DISCONTINUED | OUTPATIENT
Start: 2020-01-31 | End: 2020-02-03 | Stop reason: HOSPADM

## 2020-01-31 RX ORDER — CLOPIDOGREL BISULFATE 75 MG/1
75 TABLET ORAL DAILY
Status: DISCONTINUED | OUTPATIENT
Start: 2020-01-31 | End: 2020-02-03 | Stop reason: HOSPADM

## 2020-01-31 RX ORDER — ACETAMINOPHEN 500 MG
500 TABLET ORAL
Status: DISCONTINUED | OUTPATIENT
Start: 2020-01-31 | End: 2020-02-03 | Stop reason: HOSPADM

## 2020-01-31 RX ORDER — THERA TABS 400 MCG
1 TAB ORAL DAILY
Status: DISCONTINUED | OUTPATIENT
Start: 2020-01-31 | End: 2020-02-03 | Stop reason: HOSPADM

## 2020-01-31 RX ORDER — POTASSIUM CHLORIDE 20 MEQ/1
40 TABLET, EXTENDED RELEASE ORAL EVERY 4 HOURS
Status: COMPLETED | OUTPATIENT
Start: 2020-01-31 | End: 2020-01-31

## 2020-01-31 RX ORDER — ATORVASTATIN CALCIUM 40 MG/1
40 TABLET, FILM COATED ORAL
Status: DISCONTINUED | OUTPATIENT
Start: 2020-01-31 | End: 2020-02-03 | Stop reason: HOSPADM

## 2020-01-31 RX ORDER — LOSARTAN POTASSIUM 25 MG/1
50 TABLET ORAL
Status: DISCONTINUED | OUTPATIENT
Start: 2020-01-31 | End: 2020-02-01

## 2020-01-31 RX ORDER — IPRATROPIUM BROMIDE AND ALBUTEROL SULFATE 2.5; .5 MG/3ML; MG/3ML
3 SOLUTION RESPIRATORY (INHALATION)
Status: DISCONTINUED | OUTPATIENT
Start: 2020-01-31 | End: 2020-02-02

## 2020-01-31 RX ORDER — PANTOPRAZOLE SODIUM 40 MG/1
40 TABLET, DELAYED RELEASE ORAL DAILY
Status: DISCONTINUED | OUTPATIENT
Start: 2020-01-31 | End: 2020-02-03 | Stop reason: HOSPADM

## 2020-01-31 RX ORDER — NITROGLYCERIN 0.4 MG/1
0.4 TABLET SUBLINGUAL
Status: DISCONTINUED | OUTPATIENT
Start: 2020-01-31 | End: 2020-02-03 | Stop reason: HOSPADM

## 2020-01-31 RX ORDER — FUROSEMIDE 20 MG/1
20 TABLET ORAL DAILY
Status: DISCONTINUED | OUTPATIENT
Start: 2020-01-31 | End: 2020-01-31

## 2020-01-31 RX ORDER — POTASSIUM CHLORIDE 20 MEQ/1
20 TABLET, EXTENDED RELEASE ORAL 2 TIMES DAILY
Status: DISCONTINUED | OUTPATIENT
Start: 2020-01-31 | End: 2020-02-01

## 2020-01-31 RX ORDER — FUROSEMIDE 10 MG/ML
40 INJECTION INTRAMUSCULAR; INTRAVENOUS 2 TIMES DAILY
Status: DISCONTINUED | OUTPATIENT
Start: 2020-01-31 | End: 2020-02-01

## 2020-01-31 RX ORDER — ENOXAPARIN SODIUM 100 MG/ML
40 INJECTION SUBCUTANEOUS EVERY 24 HOURS
Status: DISCONTINUED | OUTPATIENT
Start: 2020-01-31 | End: 2020-02-03 | Stop reason: HOSPADM

## 2020-01-31 RX ORDER — AMIODARONE HYDROCHLORIDE 200 MG/1
200 TABLET ORAL DAILY
Status: DISCONTINUED | OUTPATIENT
Start: 2020-01-31 | End: 2020-02-03 | Stop reason: HOSPADM

## 2020-01-31 RX ORDER — METOPROLOL SUCCINATE 25 MG/1
25 TABLET, EXTENDED RELEASE ORAL DAILY
Status: DISCONTINUED | OUTPATIENT
Start: 2020-01-31 | End: 2020-02-03 | Stop reason: HOSPADM

## 2020-01-31 RX ADMIN — POTASSIUM CHLORIDE 40 MEQ: 20 TABLET, EXTENDED RELEASE ORAL at 06:06

## 2020-01-31 RX ADMIN — POTASSIUM CHLORIDE 20 MEQ: 20 TABLET, EXTENDED RELEASE ORAL at 18:57

## 2020-01-31 RX ADMIN — METOPROLOL SUCCINATE 25 MG: 50 TABLET, EXTENDED RELEASE ORAL at 10:30

## 2020-01-31 RX ADMIN — Medication 81 MG: at 08:06

## 2020-01-31 RX ADMIN — POTASSIUM CHLORIDE 20 MEQ: 20 TABLET, EXTENDED RELEASE ORAL at 08:05

## 2020-01-31 RX ADMIN — LOSARTAN POTASSIUM 50 MG: 25 TABLET, FILM COATED ORAL at 23:25

## 2020-01-31 RX ADMIN — IPRATROPIUM BROMIDE AND ALBUTEROL SULFATE 3 ML: .5; 3 SOLUTION RESPIRATORY (INHALATION) at 12:56

## 2020-01-31 RX ADMIN — CEFTRIAXONE SODIUM 1 G: 1 INJECTION, POWDER, FOR SOLUTION INTRAMUSCULAR; INTRAVENOUS at 23:32

## 2020-01-31 RX ADMIN — ATORVASTATIN CALCIUM 40 MG: 40 TABLET, FILM COATED ORAL at 23:25

## 2020-01-31 RX ADMIN — ACETAMINOPHEN 500 MG: 500 TABLET ORAL at 06:06

## 2020-01-31 RX ADMIN — PANTOPRAZOLE SODIUM 40 MG: 40 TABLET, DELAYED RELEASE ORAL at 08:06

## 2020-01-31 RX ADMIN — FUROSEMIDE 40 MG: 10 INJECTION, SOLUTION INTRAMUSCULAR; INTRAVENOUS at 18:57

## 2020-01-31 RX ADMIN — CLOPIDOGREL BISULFATE 75 MG: 75 TABLET ORAL at 08:05

## 2020-01-31 RX ADMIN — IPRATROPIUM BROMIDE AND ALBUTEROL SULFATE 3 ML: .5; 3 SOLUTION RESPIRATORY (INHALATION) at 06:12

## 2020-01-31 RX ADMIN — ENOXAPARIN SODIUM 40 MG: 40 INJECTION SUBCUTANEOUS at 06:12

## 2020-01-31 RX ADMIN — IPRATROPIUM BROMIDE AND ALBUTEROL SULFATE 3 ML: .5; 3 SOLUTION RESPIRATORY (INHALATION) at 21:55

## 2020-01-31 RX ADMIN — POTASSIUM CHLORIDE 40 MEQ: 20 TABLET, EXTENDED RELEASE ORAL at 00:20

## 2020-01-31 RX ADMIN — POTASSIUM CHLORIDE 10 MEQ: 7.46 INJECTION, SOLUTION INTRAVENOUS at 00:14

## 2020-01-31 RX ADMIN — ISOSORBIDE MONONITRATE 30 MG: 30 TABLET, EXTENDED RELEASE ORAL at 06:06

## 2020-01-31 RX ADMIN — LOSARTAN POTASSIUM 50 MG: 25 TABLET, FILM COATED ORAL at 06:11

## 2020-01-31 RX ADMIN — AMIODARONE HYDROCHLORIDE 200 MG: 200 TABLET ORAL at 08:06

## 2020-01-31 RX ADMIN — FUROSEMIDE 40 MG: 10 INJECTION, SOLUTION INTRAMUSCULAR; INTRAVENOUS at 08:06

## 2020-01-31 RX ADMIN — THERA TABS 1 TABLET: TAB at 08:06

## 2020-01-31 NOTE — ED NOTES
TRANSFER - OUT REPORT:    Verbal report given to Britany Trinidad on Rosalva Loaiza  being transferred to  for routine progression of care       Report consisted of patients Situation, Background, Assessment and   Recommendations(SBAR). Information from the following report(s) SBAR was reviewed with the receiving nurse. Lines:   Peripheral IV 01/30/20 Right Hand (Active)   Site Assessment Clean, dry, & intact 1/30/2020  7:20 PM   Phlebitis Assessment 0 1/30/2020  7:20 PM   Infiltration Assessment 0 1/30/2020  7:20 PM   Dressing Status Clean, dry, & intact 1/30/2020  7:20 PM   Dressing Type Transparent 1/30/2020  7:20 PM        Opportunity for questions and clarification was provided. Patient transported with:   Monitor  O2 @ 4 liters  Registered Nurse.

## 2020-01-31 NOTE — H&P
History & Physical    Patient: Geovanni Flanagan MRN: [id number]  CSN: [id number]    YOB: 1940  Age: 67 y.o. Sex: male      DOA: 1/30/2020    Chief Complaint:   Chief Complaint   Patient presents with    Respiratory Distress       Assessment/Plan     67years old presented with complaint of shortness of breath, weight gain and dyspnea with minimal exertion  Suspect precipitated secondary to noncompliance with meds, not taking Lasix and other meds for 2 days and elevated blood pressure    Acute on chronic decompensated combined CHF, with EF of 15 to 20% per echo in September/19  Is advised to comply with his meds  Admitted to telemetry  IV Lasix  Duo nebs  IV Rocephin for UTI  Monitor intake and output  Daily weight    CAD, with history of CABG  AICD, and history of wide-complex tachycardia  He is chest pain-free  Continue home meds and outpatient follow-up    Hypokalemia  Potassium replacement and repeat level    COPD on home oxygen, 3 L at rest  Nebs  Continue home oxygen  Outpatient follow-up    CKD stage II-III  Monitor renal function while on diuretics    Hypertension  Blood pressure was elevated  Continue home meds and monitor blood pressure    Discussed with patient the hospital admission plan of care. Pt understood and agreed with plan. Patient requested modified CODE STATUS, agreeable to CPR and meds, however no shocks or intubation    HPI:     Geovanni Flanagan is a 67 y.o. with multiple medical problems including CAD, history of CABG, hypertension, combined CHF with EF of 15 to 20% per his echo in September which had deteriorated from EF of 45% to 50% in January, COPD, AICD, history of wide-complex tachycardia, history of alcohol use and tobacco use who been emergency department with 1 to 2 days history of having shortness of breath, weight gain and increasing dyspnea with minimal exertion.   He said that he stopped his Lasix to avoid going to the bathroom so often because he had medical appointments over the last 2 days. He however stopped also taking other home meds. He denies having chest pain, nausea, vomiting or abdominal pain. No fever, chills or new cough. Noted to have potassium of 2.9, a positive UA and abnormal chest x-ray showing CHF and question of right lower lobe infiltrates per preliminary report. He is being admitted for decompensated CHF and UTI. His blood pressure was elevated upon presentation at 176/96. Past Medical History:   Diagnosis Date    AICD (automatic cardioverter/defibrillator) present 10/13/2019    Alcohol abuse     quit February 2011    Binocular visual disturbance 10/31/2013    CAD (coronary artery disease)     CAD (coronary artery disease), native coronary artery 1/3/05    LAD 70-80% mid; Cx-diffuse 80%; OM1-70% prox; RCA 40-50% mid; 100% RPLB with collaterals    Cardiac cath 08/19/2013    Severe, diffuse calcification. dLM 30%. mLAD 80%. o/pD1 80%. o/pRamus 80%. dCX 85%. dRCA 80%.  m-dRPLB 75%. EF 50%. Mod to severe inferobasal hypk.  Cardiac echocardiogram 01/19/2011    EF 50-55%. Gr 1 DDfx. RVSP 30 mmHg.  Cardiac nuclear imaging test 06/27/2011    No evidence of ischemia or previous infarction. EF 60%. Neg EKG on max EST. Ex time 6 mins.     Chronic kidney disease     Chronic lung disease     Chronic obstructive pulmonary disease ([address]     Coronary artery disease     Diastolic CHF, chronic (HCC)     LV EF 55% (Echo Jan 2011)    Diverticula of colon 12/17/2013    Emphysematous COPD ([address]     PFTs March 2009; mild-mode obstruction, + bronchodil response; decreased DLCO    GERD (gastroesophageal reflux disease)     with erosive esophagitis history    Heart failure (HCC)     History of renal failure     2 years ago, which completely resolved    Hypercholesterolemia     Hypertension     Hypertensive heart disease with CHF ([address]     MI (myocardial infarction) ([address]     Inferior wall    Paresthesia and pain of both upper extremities 10/31/2013    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 Charline Eric, 14.  Tobacco abuse     Unspecified hereditary and idiopathic peripheral neuropathy 2013       Past Surgical History:   Procedure Laterality Date    HX CORONARY ARTERY BYPASS GRAFT  about     HX HEART CATHETERIZATION  2013    AR INSJ/RPLCMT PERM DFB W/TRNSVNS LDS 1/DUAL CHMBR Left 2019    INSERT ICD DUAL performed by Yanira Hugo MD at [facility] CATH [facility]       Family History   Problem Relation Age of Onset    Heart Disease Maternal Uncle     Diabetes Father     Hypertension Father        Social History     Socioeconomic History    Marital status:      Spouse name: Not on file    Number of children: Not on file    Years of education: Not on file    Highest education level: Not on file   Tobacco Use    Smoking status: Former Smoker     Packs/day: 0.50     Years: 55.00     Pack years: 27.50     Types: Cigarettes     Start date:      Last attempt to quit: 2019     Years since quittin.0    Smokeless tobacco: Never Used   Substance and Sexual Activity    Alcohol use: Yes     Comment: every other day drinker    Drug use: No    Sexual activity: Not Currently     Partners: Female       Prior to Admission medications    Medication Sig Start Date End Date Taking? Authorizing Provider   OXYGEN-AIR DELIVERY SYSTEMS 3 L/min by Nasal route continuous. 10/16/19   Provider, Historical   acetaminophen (TYLENOL EXTRA STRENGTH) 500 mg tablet Take 500 mg by mouth every six (6) hours as needed for Pain. 1 pill poq 6 hours as needed prn pain    Provider, Historical   amiodarone (CORDARONE) 200 mg tablet Take 1 Tab by mouth daily.  Indications: ventricular fibrillation, a heart rhythm disorder 10/16/19   Gaurav Campos MD   losartan (COZAAR) 50 mg tablet Take 1 Tab by mouth nightly. 1 pill po daily  Indications: chronic heart failure, high blood pressure 10/16/19   Gaurav Campos MD   albuterol (PROVENTIL VENTOLIN) 2.5 mg /3 mL (0.083 %) nebu 3 mL by Nebulization route every four (4) hours as needed (cough, wheezing, more shortness of breath). Indications: a chronic lung disease where the airways become partially blocked with mucus called COPD 10/16/19   Gaurav Campos MD   furosemide (LASIX) 20 mg tablet Take 1 Tab by mouth daily. Indications: Fluid in the Lungs due to Chronic Heart Failure 10/16/19   Gaurav Campos MD   pantoprazole (PROTONIX) 40 mg tablet Take 40 mg by mouth daily. 1 pill daily 30 minutes before breakfast.  Indications: indigestion    Provider, Historical   atorvastatin (LIPITOR) 40 mg tablet Take 40 mg by mouth nightly. 1 pill daily    Provider, Historical   tiotropium-olodaterol (STIOLTO RESPIMAT) 2.5-2.5 mcg/actuation inhaler Take 2 Puffs by inhalation daily. 5/15/19   Shira Ray MD   clopidogrel (PLAVIX) 75 mg tab Take 1 Tab by mouth daily. 2/8/19   Zahira Ivan DO   isosorbide mononitrate ER (IMDUR) 30 mg tablet Take 1 Tab by mouth every morning. 2/8/19   Zahira Ivan DO   nitroglycerin (NITROSTAT) 0.4 mg SL tablet 1 Tab by SubLINGual route every five (5) minutes as needed for Chest Pain. Up to 3 doses. 2/8/19   Zahira Ivan DO   aspirin delayed-release 81 mg tablet Take 81 mg by mouth daily. 1 pill po daily    Provider, Historical   metoprolol succinate (TOPROL-XL) 25 mg XL tablet Take 25 mg by mouth daily. 1 pill po daily 9/13/17   Provider, Historical   albuterol (VENTOLIN HFA) 90 mcg/Actuation inhaler Take 1 Puff by inhalation as needed. Provider, Historical   CYANOCOBALAMIN/FA/PYRIDOXINE (FOLBEE PO) Take 1 Tab by mouth daily. 3/23/11   Provider, Historical   multivitamin (ONE A DAY) tablet Take 1 Tab by mouth daily.     Provider, Historical Allergies   Allergen Reactions    Vicodin [Hydrocodone-Acetaminophen] Rash         Review of Systems  GENERAL: Weight gain, no fever  HEENT: No change in vision, no earache, tinnitus, sore throat or sinus congestion. NECK: No pain or stiffness. PULMONARY: See HPI   Cardiovascular: See HPI   GASTROINTESTINAL: No abdominal pain, nausea, vomiting or diarrhea, melena or bright red blood per rectum. GENITOURINARY: No urinary frequency, urgency, hesitancy or dysuria. MUSCULOSKELETAL: No joint or muscle pain, no back pain, no recent trauma. DERMATOLOGIC: No rash, no itching, no lesions. ENDOCRINE: No heat or cold intolerance. HEMATOLOGICAL: No anemia or easy bruising or bleeding. NEUROLOGIC: No headache, seizures, numbness, tingling or weakness. Physical Exam:     Physical Exam:  Visit Vitals  /75   Pulse 100   Temp 98.4 °F (36.9 °C)   Resp 29   SpO2 98%      O2 Device: CPAP mask    Temp (24hrs), Av.3 °F (36.8 °C), Min:98.2 °F (36.8 °C), Max:98.4 °F (36.9 °C)    No intake/output data recorded. No intake/output data recorded. General:  Alert, cooperative, no distress, appears stated age. Head: Normocephalic, without obvious abnormality, atraumatic. Eyes:  Conjunctivae/corneas clear. PERRL, EOMs intact. Nose: Nares normal. No drainage or sinus tenderness. Neck: Supple, symmetrical, trachea midline, no adenopathy, thyroid: no enlargement, no carotid bruit and no JVD. Lungs:    Has basilar crackles   Heart:  Regular rate and rhythm, S1, S2 normal.     Abdomen: Soft, non-tender. Bowel sounds normal.    Extremities: Extremities normal, atraumatic, no cyanosis or edema. Pulses: 2+ and symmetric all extremities. Skin:  No rashes or lesions   Neurologic: AAOx3, No focal motor or sensory deficit.        Labs Reviewed:  Recent Results (from the past 24 hour(s))   CBC WITH AUTOMATED DIFF    Collection Time: 20  7:32 PM   Result Value Ref Range    WBC 10.8 4.6 - 13.2 K/uL    RBC 3.38 (L) 4.70 - 5.50 M/uL    HGB 11.1 (L) 13.0 - 16.0 g/dL    HCT 33.0 (L) 36.0 - 48.0 %    MCV 97.6 (H) 74.0 - 97.0 FL    MCH 32.8 24.0 - 34.0 PG    MCHC 33.6 31.0 - 37.0 g/dL    RDW 18.0 (H) 11.6 - 14.5 %    PLATELET 496 473 - 937 K/uL    MPV 9.4 9.2 - 11.8 FL    NEUTROPHILS 74 (H) 40 - 73 %    LYMPHOCYTES 18 (L) 21 - 52 %    MONOCYTES 6 3 - 10 %    EOSINOPHILS 1 0 - 5 %    BASOPHILS 1 0 - 2 %    ABS. NEUTROPHILS 8.1 (H) 1.8 - 8.0 K/UL    ABS. LYMPHOCYTES 1.9 0.9 - 3.6 K/UL    ABS. MONOCYTES 0.7 0.05 - 1.2 K/UL    ABS. EOSINOPHILS 0.1 0.0 - 0.4 K/UL    ABS. BASOPHILS 0.1 0.0 - 0.1 K/UL    DF AUTOMATED     METABOLIC PANEL, COMPREHENSIVE    Collection Time: 01/30/20  7:32 PM   Result Value Ref Range    Sodium 142 136 - 145 mmol/L    Potassium 2.9 (LL) 3.5 - 5.5 mmol/L    Chloride 108 100 - 111 mmol/L    CO2 26 21 - 32 mmol/L    Anion gap 8 3.0 - 18 mmol/L    Glucose 164 (H) 74 - 99 mg/dL    BUN 16 7.0 - 18 MG/DL    Creatinine 1.09 0.6 - 1.3 MG/DL    BUN/Creatinine ratio 15 12 - 20      GFR est AA >60 >60 ml/min/1.73m2    GFR est non-AA >60 >60 ml/min/1.73m2    Calcium 8.4 (L) 8.5 - 10.1 MG/DL    Bilirubin, total 0.4 0.2 - 1.0 MG/DL    ALT (SGPT) 16 16 - 61 U/L    AST (SGOT) 24 10 - 38 U/L    Alk.  phosphatase 133 (H) 45 - 117 U/L    Protein, total 8.3 (H) 6.4 - 8.2 g/dL    Albumin 3.0 (L) 3.4 - 5.0 g/dL    Globulin 5.3 (H) 2.0 - 4.0 g/dL    A-G Ratio 0.6 (L) 0.8 - 1.7     TROPONIN I    Collection Time: 01/30/20  7:32 PM   Result Value Ref Range    Troponin-I, QT 0.02 0.0 - 0.045 NG/ML   NT-PRO BNP    Collection Time: 01/30/20  7:32 PM   Result Value Ref Range    NT pro-BNP 10,599 (H) 0 - 1,800 PG/ML   POC VENOUS BLOOD GAS    Collection Time: 01/30/20  7:59 PM   Result Value Ref Range    Device: NASAL CANNULA      Flow rate (POC) 4 L/M    pH, venous (POC) 7.370 7.32 - 7.42      pCO2, venous (POC) 42.7 41 - 51 MMHG    pO2, venous (POC) 29 25 - 40 mmHg    HCO3, venous (POC) 24.7 23.0 - 28.0 MMOL/L sO2, venous (POC) 54 (L) 65 - 88 %    Base deficit, venous (POC) 1 mmol/L    Allens test (POC) N/A      Site OTHER      Specimen type (POC) VENOUS BLOOD      Performed by Dixie Meehan W/ RFLX MICROSCOPIC    Collection Time: 01/30/20 11:00 PM   Result Value Ref Range    Color YELLOW      Appearance CLEAR      Specific gravity 1.014 1.005 - 1.030      pH (UA) 5.0 5.0 - 8.0      Protein TRACE (A) NEG mg/dL    Glucose NEGATIVE  NEG mg/dL    Ketone NEGATIVE  NEG mg/dL    Bilirubin NEGATIVE  NEG      Blood NEGATIVE  NEG      Urobilinogen 0.2 0.2 - 1.0 EU/dL    Nitrites POSITIVE (A) NEG      Leukocyte Esterase MODERATE (A) NEG     URINE MICROSCOPIC ONLY    Collection Time: 01/30/20 11:00 PM   Result Value Ref Range    WBC 50 to 60 0 - 4 /hpf    RBC NEGATIVE  0 - 5 /hpf    Bacteria 4+ (A) NEG /hpf   POC LACTIC ACID    Collection Time: 01/30/20 11:02 PM   Result Value Ref Range    Lactic Acid (POC) 2.37 (HH) 0.40 - 2.00 mmol/L       Chest x-ray  Report pending  Reported with cardiomegaly and question of right lower lobe infiltrates by ED    Thomas Del Toro MD  1/30/2020 11:58 PM

## 2020-01-31 NOTE — CONSULTS
[facility] - Consult Note    Consultation request by Dr. Abby Alegre for CHF. Date of  Admission: 1/30/2020  6:54 PM   Primary Care Physician:  Oscar Valadez MD     Assessment:     -Acute on chronic systolic heart failure, last admission October 2019, not taking lasix at home for a few days, likely trigger  -Hypokalemia, replacing  -UTI, on antbx  -Ischemic cardiomyopathy with EF 16-20% September 2019 with multiple wall motion abnormalities.  -History of extensive coronary artery disease with previous CABG and heart catheterization February 2019 with severe native disease with patent LIMA to LAD, but both sequential vein grafts occluded. There was native diagonal and OM disease at 85% and was medically treated. -[employer] dual chamber AICD 9/2019, normal function this function, no events by check 1/31/20  -h/o bundle branch VT maintained on amiodarone  -h/o COPD on chronic oxygen therapy at home  -h/o HTN  -Remote tobacco and alcohol use. -Intermittent tobacco use    Primary cardiologist Dr. Joanne Rondon:     Patient admitted with worsening systolic heart failure after not taking lasix a few days. I stressed need to take daily given underlying cardiomyopathy. Plan IV diuresis, replace K. AICD checked with normal function, no events. Limited echo for EF. Will follow closely with you, hopefully home in 1-2 days when stable. History of Present Illness: This is a 67 y.o. male admitted for Right lower lobe pneumonia ([facility] [address] [J18.1]  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 (HCC) [I50.40]  UTI (urinary tract infection) [N39.0]. Patient complains of: Increasing dyspnea for past 24 hours. Stopped his lasix a few days ago. No chest pain, syncope. Mild orthopnea, PND. No edema. Still with occasional tobacco use.     Cardiac risk factors: Known CAD    Review of Symptoms:  Except as stated above include:  Constitutional:  Negative  Ears, nose, throat: Negative  Respiratory:  dyspnea  Cardiovascular:  negative  Gastrointestinal: negative  Genitourinary:  negative  Musculoskeletal:  Negative  Neurological:  Negative  Dermatological:  Negative  Hematological: Negative  Endocrinological: Negative  Allergy: Negative  Psychological:  Negative     Past Medical History:     Past Medical History:   Diagnosis Date    AICD (automatic cardioverter/defibrillator) present 10/13/2019    Alcohol abuse     quit February 2011    Binocular visual disturbance 10/31/2013    CAD (coronary artery disease)     CAD (coronary artery disease), native coronary artery 1/3/05    LAD 70-80% mid; Cx-diffuse 80%; OM1-70% prox; RCA 40-50% mid; 100% RPLB with collaterals    Cardiac cath 08/19/2013    Severe, diffuse calcification. dLM 30%. mLAD 80%. o/pD1 80%. o/pRamus 80%. dCX 85%. dRCA 80%.  m-dRPLB 75%. EF 50%. Mod to severe inferobasal hypk.  Cardiac echocardiogram 01/19/2011    EF 50-55%. Gr 1 DDfx. RVSP 30 mmHg.  Cardiac nuclear imaging test 06/27/2011    No evidence of ischemia or previous infarction. EF 60%. Neg EKG on max EST. Ex time 6 mins.     Chronic kidney disease     Chronic lung disease     Chronic obstructive pulmonary disease (HCC)     Coronary artery disease     Diastolic CHF, chronic ([facility])     LV EF 55% (Echo Jan 2011)    Diverticula of colon 12/17/2013    Emphysematous COPD ([address]     PFTs March 2009; mild-mode obstruction, + bronchodil response; decreased DLCO    GERD (gastroesophageal reflux disease)     with erosive esophagitis history    Heart failure ([facility])     History of renal failure     2 years ago, which completely resolved    Hypercholesterolemia     Hypertension     Hypertensive heart disease with CHF ([address]     MI (myocardial infarction) ([address]     Inferior wall    Paresthesia and pain of both upper extremities 10/31/2013    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 Wei Lai, 14.  Tobacco abuse     Unspecified hereditary and idiopathic peripheral neuropathy 2013         Social History:     Social History     Socioeconomic History    Marital status:      Spouse name: Not on file    Number of children: Not on file    Years of education: Not on file    Highest education level: Not on file   Tobacco Use    Smoking status: Former Smoker     Packs/day: 0.50     Years: 55.00     Pack years: 27.50     Types: Cigarettes     Start date:      Last attempt to quit: 2019     Years since quittin.0    Smokeless tobacco: Never Used   Substance and Sexual Activity    Alcohol use: Yes     Comment: every other day drinker    Drug use: No    Sexual activity: Not Currently     Partners: Female        Family History:     Family History   Problem Relation Age of Onset    Heart Disease Maternal Uncle     Diabetes Father     Hypertension Father         Medications:      Allergies   Allergen Reactions    Vicodin [Hydrocodone-Acetaminophen] Rash        Current Facility-Administered Medications   Medication Dose Route Frequency    therapeutic multivitamin (THERAGRAN) tablet 1 Tab  1 Tab Oral DAILY    metoprolol succinate (TOPROL-XL) XL tablet 25 mg  25 mg Oral DAILY    aspirin delayed-release tablet 81 mg  81 mg Oral DAILY    clopidogreL (PLAVIX) tablet 75 mg  75 mg Oral DAILY    nitroglycerin (NITROSTAT) tablet 0.4 mg  0.4 mg SubLINGual Q5MIN PRN    atorvastatin (LIPITOR) tablet 40 mg  40 mg Oral QHS    pantoprazole (PROTONIX) tablet 40 mg  40 mg Oral DAILY    amiodarone (CORDARONE) tablet 200 mg  200 mg Oral DAILY    losartan (COZAAR) tablet 50 mg  50 mg Oral QHS    acetaminophen (TYLENOL) tablet 500 mg  500 mg Oral Q6H PRN    albuterol-ipratropium (DUO-NEB) 2.5 MG-0.5 MG/3 ML  3 mL Nebulization Q6H RT    isosorbide mononitrate ER (IMDUR) tablet 30 mg  30 mg Oral 7am    furosemide (LASIX) injection 40 mg  40 mg IntraVENous BID    potassium chloride (K-DUR, KLOR-CON) SR tablet 20 mEq  20 mEq Oral BID    enoxaparin (LOVENOX) injection 40 mg  40 mg SubCUTAneous Q24H    cefTRIAXone (ROCEPHIN) 1 g in sterile water (preservative free) 10 mL IV syringe  1 g IntraVENous Q24H    sodium chloride (NS) flush 5-10 mL  5-10 mL IntraVENous PRN     Current Outpatient Medications   Medication Sig    OXYGEN-AIR DELIVERY SYSTEMS 3 L/min by Nasal route continuous.  acetaminophen (TYLENOL EXTRA STRENGTH) 500 mg tablet Take 500 mg by mouth every six (6) hours as needed for Pain. 1 pill poq 6 hours as needed prn pain    amiodarone (CORDARONE) 200 mg tablet Take 1 Tab by mouth daily. Indications: ventricular fibrillation, a heart rhythm disorder    losartan (COZAAR) 50 mg tablet Take 1 Tab by mouth nightly. 1 pill po daily  Indications: chronic heart failure, high blood pressure    albuterol (PROVENTIL VENTOLIN) 2.5 mg /3 mL (0.083 %) nebu 3 mL by Nebulization route every four (4) hours as needed (cough, wheezing, more shortness of breath). Indications: a chronic lung disease where the airways become partially blocked with mucus called COPD    furosemide (LASIX) 20 mg tablet Take 1 Tab by mouth daily. Indications: Fluid in the Lungs due to Chronic Heart Failure    pantoprazole (PROTONIX) 40 mg tablet Take 40 mg by mouth daily. 1 pill daily 30 minutes before breakfast.  Indications: indigestion    atorvastatin (LIPITOR) 40 mg tablet Take 40 mg by mouth nightly. 1 pill daily    tiotropium-olodaterol (STIOLTO RESPIMAT) 2.5-2.5 mcg/actuation inhaler Take 2 Puffs by inhalation daily.  clopidogrel (PLAVIX) 75 mg tab Take 1 Tab by mouth daily.  isosorbide mononitrate ER (IMDUR) 30 mg tablet Take 1 Tab by mouth every morning.     nitroglycerin (NITROSTAT) 0.4 mg SL tablet 1 Tab by SubLINGual route every five (5) minutes as needed for Chest Pain. Up to 3 doses.  aspirin delayed-release 81 mg tablet Take 81 mg by mouth daily. 1 pill po daily    metoprolol succinate (TOPROL-XL) 25 mg XL tablet Take 25 mg by mouth daily. 1 pill po daily    albuterol (VENTOLIN HFA) 90 mcg/Actuation inhaler Take 1 Puff by inhalation as needed.  CYANOCOBALAMIN/FA/PYRIDOXINE (FOLBEE PO) Take 1 Tab by mouth daily.  multivitamin (ONE A DAY) tablet Take 1 Tab by mouth daily. Physical Exam:     Visit Vitals  /83   Pulse 63   Temp 98.4 °F (36.9 °C)   Resp 25   SpO2 100%     BP Readings from Last 3 Encounters:   01/31/20 132/83   01/03/20 154/76   11/12/19 122/70     Pulse Readings from Last 3 Encounters:   01/31/20 63   01/03/20 68   11/12/19 87     Wt Readings from Last 3 Encounters:   01/03/20 47.6 kg (105 lb)   11/07/19 48.5 kg (107 lb)   10/31/19 48 kg (105 lb 12.8 oz)       General:  alert, cooperative, no distress, cachectic  Neck:  nontender  Lungs:  clear to auscultation bilaterally  Heart:  regular rate and rhythm, S1, S2 normal, no murmur, click, rub or gallop, left aicd pocket intact  Abdomen:  abdomen is soft without significant tenderness, masses, organomegaly or guarding  Extremities:  extremities normal, atraumatic, no cyanosis or edema  Skin: Warm and dry.  no hyperpigmentation, vitiligo, or suspicious lesions  Neuro: alert, oriented x3, affect appropriate, no focal neurological deficits, moves all extremities well, no involuntary movements, reflexes at knee and ankle intact  Psych: non focal     Data Review:     Recent Labs     01/30/20 1932   WBC 10.8   HGB 11.1*   HCT 33.0*        Recent Labs     01/30/20 1932      K 2.9*      CO2 26   *   BUN 16   CREA 1.09   CA 8.4*   ALB 3.0*   SGOT 24   ALT 16       Results for orders placed or performed during the hospital encounter of 01/30/20   EKG, 12 LEAD, INITIAL   Result Value Ref Range    Ventricular Rate 102 BPM    Atrial Rate 102 BPM    P-R Interval 172 ms    QRS Duration 118 ms    Q-T Interval 368 ms    QTC Calculation (Bezet) 479 ms    Calculated P Axis 5 degrees    Calculated R Axis -33 degrees    Calculated T Axis 106 degrees    Diagnosis       Sinus tachycardia with frequent premature ventricular complexes  Left axis deviation  Inferior infarct , age undetermined  Abnormal ECG  When compared with ECG of 13-OCT-2019 04:46,  premature ventricular complexes are now present  WA interval has decreased  Nonspecific T wave abnormality, improved in Inferior leads  Confirmed by [employer] ([id number]) on 1/31/2020 7:34:51 AM     Results for orders placed or performed in visit on 01/03/20   AMB POC EKG ROUTINE W/ 12 LEADS, INTER & REP    Narrative    Read by Huyen Arrington DO. Atrially paced rhythm with normal ventricular conduction. Old IW MI. LVH by precordial voltage criteria.        All Cardiac Markers in the last 24 hours:    Lab Results   Component Value Date/Time    TROIQ 0.02 01/30/2020 07:32 PM       Last Lipid:    Lab Results   Component Value Date/Time    Cholesterol, total 144 05/03/2019 10:26 AM    HDL Cholesterol 86 (H) 05/03/2019 10:26 AM    LDL, calculated 18 05/03/2019 10:26 AM    Triglyceride 200 (H) 05/03/2019 10:26 AM    CHOL/HDL Ratio 1.7 05/03/2019 10:26 AM       Signed By: Henrietta Tobar MD     January 31, 2020

## 2020-01-31 NOTE — ED TRIAGE NOTES
Pt arrived by EMS with C-pap in place. Per EMS pt became increasing SOB at around 1pm this afternoon after Alev[facility]. Pt has COPD/CHF and wears home O2 3-4 liters. Pt was given alb/atro neb, nitro SL X2 and placed no c-pap. Pt noted with Rales and htn 200/100. Pt reported he was starting to feel better.

## 2020-01-31 NOTE — ED NOTES
1900:  Pt arrived by EMS on C-pap, this writer requested for resp to be called to place pt on bi-pap    1915:  Pt remains on C-pap awaiting for resp to place pt on bi-pap    1930:  resp at bedside and taken off of C-pap and placed on 4L NC with SpO2 97%. Pt reports as long as he is not moving he feels okay.   Verbal bedside report given to [facility] RN, ending this assignment with this writer

## 2020-01-31 NOTE — PROGRESS NOTES
SUBJECTIVE:    Shortness of breath is present. He states he forgot to take his Lasix at least for 3 days. Dry cough present. No chest pain currently or discomfort. No nausea or vomiting. No abdominal pain. No headaches or dizziness. States he is being followed by Dr. Breanna Arechiga and Dr. Shannen Lundberg. Has been on 3 to 4 L of oxygen at home due to COPD. OBJECTIVE:    /83   Pulse 63   Temp 98.4 °F (36.9 °C)   Resp 25   SpO2 100%       Intake/Output Summary (Last 24 hours) at 1/31/2020 1610  Last data filed at 1/30/2020 2347  Gross per 24 hour   Intake 100 ml   Output    Net 100 ml     General appearance - alert, well appearing, and in no distress  Eyes/head-atraumatic, no pallor, sclera anicteric  Nose - no obvious nasal discharge. Neck - supple, no JVD, trachea is midline  Chest - decreased air entry noted in bases, no wheezes currently  Heart - S1 and S2 normal, PPM present  Abdomen - soft, nontender, nondistended, Bowel sounds present  Neurological - alert, oriented, normal speech, no focal findings noted  Musculoskeletal - no joint tenderness or swelling of knees  Extremities - no pedal edema noted    ASSESSMENT:    1. Worsening dyspnea on exertion due to #2  2. Acute on chronic systolic CHF with EF of 15 to 20% due to medical noncompliance  3. Chronic hypoxic respiratory failure on home oxygen  4. UTI  5. COPD without any exacerbation  6. Medical noncompliance  7. Hypokalemia  8. Coronary artery disease  9.   Ischemic cardiomyopathy status post AICD    PLAN:    Chest x-ray report reviewed  Cardiology consulted  Replace potassium   we will do cardiac enzymes x2 more  Continue current antibiotic and follow urine culture  Pending blood culture  Continue Lasix and monitor  Strict I&O's    CMP:   Lab Results   Component Value Date/Time     01/30/2020 07:32 PM    K 2.9 (LL) 01/30/2020 07:32 PM     01/30/2020 07:32 PM    CO2 26 01/30/2020 07:32 PM    AGAP 8 01/30/2020 07:32 PM    GLU 164 (H) 01/30/2020 07:32 PM    BUN 16 01/30/2020 07:32 PM    CREA 1.09 01/30/2020 07:32 PM    GFRAA >60 01/30/2020 07:32 PM    GFRNA >60 01/30/2020 07:32 PM    CA 8.4 (L) 01/30/2020 07:32 PM    ALB 3.0 (L) 01/30/2020 07:32 PM    TP 8.3 (H) 01/30/2020 07:32 PM    GLOB 5.3 (H) 01/30/2020 07:32 PM    AGRAT 0.6 (L) 01/30/2020 07:32 PM    SGOT 24 01/30/2020 07:32 PM    ALT 16 01/30/2020 07:32 PM     CBC:   Lab Results   Component Value Date/Time    WBC 10.8 01/30/2020 07:32 PM    HGB 11.1 (L) 01/30/2020 07:32 PM    HCT 33.0 (L) 01/30/2020 07:32 PM     01/30/2020 07:32 PM     All Cardiac Markers in the last 24 hours:   Lab Results   Component Value Date/Time    TROIQ 0.02 01/30/2020 07:32 PM

## 2020-01-31 NOTE — ED PROVIDER NOTES
EMERGENCY DEPARTMENT HISTORY AND PHYSICAL EXAM    7:16 PM      Date: 1/30/2020  Patient Name: Sundar Teresa    History of Presenting Illness     Chief Complaint   Patient presents with    Respiratory Distress         History Provided By: Patient      Additional History (Context): Sundar Teresa is a 67 y.o. male with hypertension, hyperlipidemia and CAD s/p 5V CABG, CKD, emphysematous COPD who presents with sob. Always has, is on 3L home o2 and 4L when ambulating, however has been much more sob with activity over past day or so. Some cough but not significant, no fevers or producing sputum.  Has not noticed he is gaining weight    PCP: Deanna Sprague MD    Current Facility-Administered Medications   Medication Dose Route Frequency Provider Last Rate Last Dose    potassium chloride (K-DUR, KLOR-CON) SR tablet 40 mEq  40 mEq Oral Q4H Osman Muhammad MD   40 mEq at 01/31/20 0020    therapeutic multivitamin (THERAGRAN) tablet 1 Tab  1 Tab Oral DAILY Osman Muhammad MD        metoprolol succinate (TOPROL-XL) XL tablet 25 mg  25 mg Oral DAILY Mohamed, Ronni Eisenmenger, MD        aspirin delayed-release tablet 81 mg  81 mg Oral DAILY Osman Muhammad MD        clopidogreL (PLAVIX) tablet 75 mg  75 mg Oral DAILY Osman Muhammad MD        nitroglycerin (NITROSTAT) tablet 0.4 mg  0.4 mg SubLINGual Q5MIN PRN Osman Muhammad MD        atorvastatin (LIPITOR) tablet 40 mg  40 mg Oral QHS Osman Muhammad MD        pantoprazole (PROTONIX) tablet 40 mg  40 mg Oral DAILY Osman Muhammad MD        amiodarone (CORDARONE) tablet 200 mg  200 mg Oral DAILY Osman Muhammad MD        losartan (COZAAR) tablet 50 mg  50 mg Oral QHS Osman Muhammad MD        acetaminophen (TYLENOL) tablet 500 mg  500 mg Oral Q6H PRN Osman Muhammad MD        albuterol-ipratropium (DUO-NEB) 2.5 MG-0.5 MG/3 ML  3 mL Nebulization Q6H RT Osman Muhammad MD        isosorbide mononitrate ER (IMDUR) tablet 30 mg  30 mg Oral 7am Osman Muhammad MD        furosemide (LASIX) injection 40 mg  40 mg IntraVENous BID Osman Muhammad MD        potassium chloride (K-DUR, KLOR-CON) SR tablet 20 mEq  20 mEq Oral BID Osman Muhammad MD        enoxaparin (LOVENOX) injection 40 mg  40 mg SubCUTAneous Q24H Osman Muhammad MD        cefTRIAXone (ROCEPHIN) 1 g in sterile water (preservative free) 10 mL IV syringe  1 g IntraVENous Q24H Osman Muhammad MD        nitroglycerin (NITROSTAT) tablet 0.4 mg  0.4 mg SubLINGual NOW Bárbara Simons MD   Stopped at 01/30/20 2006    nitroglycerin (NITROSTAT) tablet 0.4 mg  0.4 mg SubLINGual Bárbara Decker MD   Stopped at 01/30/20 2006    sodium chloride (NS) flush 5-10 mL  5-10 mL IntraVENous PRN Bárbara Simons MD         Current Outpatient Medications   Medication Sig Dispense Refill    OXYGEN-AIR DELIVERY SYSTEMS 3 L/min by Nasal route continuous.  acetaminophen (TYLENOL EXTRA STRENGTH) 500 mg tablet Take 500 mg by mouth every six (6) hours as needed for Pain. 1 pill poq 6 hours as needed prn pain      amiodarone (CORDARONE) 200 mg tablet Take 1 Tab by mouth daily. Indications: ventricular fibrillation, a heart rhythm disorder 60 Tab 2    losartan (COZAAR) 50 mg tablet Take 1 Tab by mouth nightly. 1 pill po daily  Indications: chronic heart failure, high blood pressure 30 Tab 3    albuterol (PROVENTIL VENTOLIN) 2.5 mg /3 mL (0.083 %) nebu 3 mL by Nebulization route every four (4) hours as needed (cough, wheezing, more shortness of breath). Indications: a chronic lung disease where the airways become partially blocked with mucus called COPD 30 Nebule 3    furosemide (LASIX) 20 mg tablet Take 1 Tab by mouth daily. Indications: Fluid in the Lungs due to Chronic Heart Failure 30 Tab 2    pantoprazole (PROTONIX) 40 mg tablet Take 40 mg by mouth daily.  1 pill daily 30 minutes before breakfast.  Indications: indigestion      atorvastatin (LIPITOR) 40 mg tablet Take 40 mg by mouth nightly. 1 pill daily      tiotropium-olodaterol (STIOLTO RESPIMAT) 2.5-2.5 mcg/actuation inhaler Take 2 Puffs by inhalation daily. 1 Inhaler 0    clopidogrel (PLAVIX) 75 mg tab Take 1 Tab by mouth daily. 30 Tab 3    isosorbide mononitrate ER (IMDUR) 30 mg tablet Take 1 Tab by mouth every morning. 30 Tab 3    nitroglycerin (NITROSTAT) 0.4 mg SL tablet 1 Tab by SubLINGual route every five (5) minutes as needed for Chest Pain. Up to 3 doses. 1 Bottle 3    aspirin delayed-release 81 mg tablet Take 81 mg by mouth daily. 1 pill po daily      metoprolol succinate (TOPROL-XL) 25 mg XL tablet Take 25 mg by mouth daily. 1 pill po daily      albuterol (VENTOLIN HFA) 90 mcg/Actuation inhaler Take 1 Puff by inhalation as needed.  CYANOCOBALAMIN/FA/PYRIDOXINE (FOLBEE PO) Take 1 Tab by mouth daily.  multivitamin (ONE A DAY) tablet Take 1 Tab by mouth daily. Past History     Past Medical History:  Past Medical History:   Diagnosis Date    AICD (automatic cardioverter/defibrillator) present 10/13/2019    Alcohol abuse     quit February 2011    Binocular visual disturbance 10/31/2013    CAD (coronary artery disease)     CAD (coronary artery disease), native coronary artery 1/3/05    LAD 70-80% mid; Cx-diffuse 80%; OM1-70% prox; RCA 40-50% mid; 100% RPLB with collaterals    Cardiac cath 08/19/2013    Severe, diffuse calcification. dLM 30%. mLAD 80%. o/pD1 80%. o/pRamus 80%. dCX 85%. dRCA 80%.  m-dRPLB 75%. EF 50%. Mod to severe inferobasal hypk.  Cardiac echocardiogram 01/19/2011    EF 50-55%. Gr 1 DDfx. RVSP 30 mmHg.  Cardiac nuclear imaging test 06/27/2011    No evidence of ischemia or previous infarction. EF 60%. Neg EKG on max EST. Ex time 6 mins.     Chronic kidney disease     Chronic lung disease     Chronic obstructive pulmonary disease ([address]     Coronary artery disease     Diastolic CHF, chronic (HCC)     LV EF 55% (Echo Jan 2011)    Diverticula of colon 2013    Emphysematous COPD ([facility] [address]     PFTs 2009; mild-mode obstruction, + bronchodil response; decreased DLCO    GERD (gastroesophageal reflux disease)     with erosive esophagitis history    Heart failure (HCC)     History of renal failure     2 years ago, which completely resolved    Hypercholesterolemia     Hypertension     Hypertensive heart disease with CHF ([facility] [address]     MI (myocardial infarction) ([facility] [address]     Inferior wall    Paresthesia and pain of both upper extremities 10/31/2013    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 Doc Madsen, 14.  Tobacco abuse     Unspecified hereditary and idiopathic peripheral neuropathy 2013       Past Surgical History:  Past Surgical History:   Procedure Laterality Date    HX CORONARY ARTERY BYPASS GRAFT  about     HX HEART CATHETERIZATION  2013    NM INSJ/RPLCMT PERM DFB W/TRNSVNS LDS 1/DUAL CHMBR Left 2019    INSERT ICD DUAL performed by Sherice Macedo MD at [facility] CATH LAB       Family History:  Family History   Problem Relation Age of Onset    Heart Disease Maternal Uncle     Diabetes Father     Hypertension Father        Social History:  Social History     Tobacco Use    Smoking status: Former Smoker     Packs/day: 0.50     Years: 55.00     Pack years: 27.50     Types: Cigarettes     Start date:      Last attempt to quit: 2019     Years since quittin.0    Smokeless tobacco: Never Used   Substance Use Topics    Alcohol use: Yes     Comment: every other day drinker    Drug use: No       Allergies: Allergies   Allergen Reactions    Vicodin [Hydrocodone-Acetaminophen] Rash         Review of Systems       Review of Systems   Constitutional: Negative.   Negative for activity change, chills and fever.   HENT: Negative. Negative for ear pain, hearing loss and sore throat. Eyes: Negative. Negative for pain and visual disturbance. Respiratory: Positive for shortness of breath. Negative for cough and chest tightness. Cardiovascular: Negative. Negative for chest pain and leg swelling. Gastrointestinal: Negative. Negative for abdominal distention and abdominal pain. Genitourinary: Negative. Negative for dysuria and hematuria. Musculoskeletal: Negative. Skin: Negative. Negative for rash. Neurological: Negative. Negative for dizziness and headaches. Psychiatric/Behavioral: Negative. Negative for agitation and behavioral problems. Physical Exam     Visit Vitals  /75   Pulse 100   Temp 98.4 °F (36.9 °C)   Resp 29   SpO2 98%         Physical Exam  Constitutional:       Appearance: He is well-developed. HENT:      Head: Normocephalic and atraumatic. Eyes:      General:         Right eye: No discharge. Left eye: No discharge. Pupils: Pupils are equal, round, and reactive to light. Neck:      Musculoskeletal: Normal range of motion and neck supple. Vascular: No JVD. Trachea: No tracheal deviation. Cardiovascular:      Rate and Rhythm: Normal rate and regular rhythm. Heart sounds: Normal heart sounds. No murmur. Pulmonary:      Effort: Respiratory distress present. Breath sounds: Wheezing and rales present. Comments: Speaking in 5 word sentences, feeling better already  Abdominal:      General: Bowel sounds are normal. There is no distension. Palpations: Abdomen is soft. Tenderness: There is no abdominal tenderness. There is no rebound. Musculoskeletal: Normal range of motion. General: No tenderness or deformity. Skin:     General: Skin is warm and dry. Findings: No erythema or rash. Neurological:      Mental Status: He is alert and oriented to person, place, and time.       Cranial Nerves: No cranial nerve deficit. Comments: 5/5 strength UE/LE, 5/5 sensation UE/LE     Psychiatric:         Behavior: Behavior normal.           Diagnostic Study Results     Labs -  Recent Results (from the past 12 hour(s))   CBC WITH AUTOMATED DIFF    Collection Time: 01/30/20  7:32 PM   Result Value Ref Range    WBC 10.8 4.6 - 13.2 K/uL    RBC 3.38 (L) 4.70 - 5.50 M/uL    HGB 11.1 (L) 13.0 - 16.0 g/dL    HCT 33.0 (L) 36.0 - 48.0 %    MCV 97.6 (H) 74.0 - 97.0 FL    MCH 32.8 24.0 - 34.0 PG    MCHC 33.6 31.0 - 37.0 g/dL    RDW 18.0 (H) 11.6 - 14.5 %    PLATELET 153 208 - 487 K/uL    MPV 9.4 9.2 - 11.8 FL    NEUTROPHILS 74 (H) 40 - 73 %    LYMPHOCYTES 18 (L) 21 - 52 %    MONOCYTES 6 3 - 10 %    EOSINOPHILS 1 0 - 5 %    BASOPHILS 1 0 - 2 %    ABS. NEUTROPHILS 8.1 (H) 1.8 - 8.0 K/UL    ABS. LYMPHOCYTES 1.9 0.9 - 3.6 K/UL    ABS. MONOCYTES 0.7 0.05 - 1.2 K/UL    ABS. EOSINOPHILS 0.1 0.0 - 0.4 K/UL    ABS. BASOPHILS 0.1 0.0 - 0.1 K/UL    DF AUTOMATED     METABOLIC PANEL, COMPREHENSIVE    Collection Time: 01/30/20  7:32 PM   Result Value Ref Range    Sodium 142 136 - 145 mmol/L    Potassium 2.9 (LL) 3.5 - 5.5 mmol/L    Chloride 108 100 - 111 mmol/L    CO2 26 21 - 32 mmol/L    Anion gap 8 3.0 - 18 mmol/L    Glucose 164 (H) 74 - 99 mg/dL    BUN 16 7.0 - 18 MG/DL    Creatinine 1.09 0.6 - 1.3 MG/DL    BUN/Creatinine ratio 15 12 - 20      GFR est AA >60 >60 ml/min/1.73m2    GFR est non-AA >60 >60 ml/min/1.73m2    Calcium 8.4 (L) 8.5 - 10.1 MG/DL    Bilirubin, total 0.4 0.2 - 1.0 MG/DL    ALT (SGPT) 16 16 - 61 U/L    AST (SGOT) 24 10 - 38 U/L    Alk.  phosphatase 133 (H) 45 - 117 U/L    Protein, total 8.3 (H) 6.4 - 8.2 g/dL    Albumin 3.0 (L) 3.4 - 5.0 g/dL    Globulin 5.3 (H) 2.0 - 4.0 g/dL    A-G Ratio 0.6 (L) 0.8 - 1.7     TROPONIN I    Collection Time: 01/30/20  7:32 PM   Result Value Ref Range    Troponin-I, QT 0.02 0.0 - 0.045 NG/ML   NT-PRO BNP    Collection Time: 01/30/20  7:32 PM   Result Value Ref Range    NT pro-BNP 10,599 (H) 0 - 1,800 PG/ML   POC VENOUS BLOOD GAS    Collection Time: 01/30/20  7:59 PM   Result Value Ref Range    Device: NASAL CANNULA      Flow rate (POC) 4 L/M    pH, venous (POC) 7.370 7.32 - 7.42      pCO2, venous (POC) 42.7 41 - 51 MMHG    pO2, venous (POC) 29 25 - 40 mmHg    HCO3, venous (POC) 24.7 23.0 - 28.0 MMOL/L    sO2, venous (POC) 54 (L) 65 - 88 %    Base deficit, venous (POC) 1 mmol/L    Allens test (POC) N/A      Site OTHER      Specimen type (POC) VENOUS BLOOD      Performed by Javier Witt    URINALYSIS W/ RFLX MICROSCOPIC    Collection Time: 01/30/20 11:00 PM   Result Value Ref Range    Color YELLOW      Appearance CLEAR      Specific gravity 1.014 1.005 - 1.030      pH (UA) 5.0 5.0 - 8.0      Protein TRACE (A) NEG mg/dL    Glucose NEGATIVE  NEG mg/dL    Ketone NEGATIVE  NEG mg/dL    Bilirubin NEGATIVE  NEG      Blood NEGATIVE  NEG      Urobilinogen 0.2 0.2 - 1.0 EU/dL    Nitrites POSITIVE (A) NEG      Leukocyte Esterase MODERATE (A) NEG     URINE MICROSCOPIC ONLY    Collection Time: 01/30/20 11:00 PM   Result Value Ref Range    WBC 50 to 60 0 - 4 /hpf    RBC NEGATIVE  0 - 5 /hpf    Bacteria 4+ (A) NEG /hpf   POC LACTIC ACID    Collection Time: 01/30/20 11:02 PM   Result Value Ref Range    Lactic Acid (POC) 2.37 (HH) 0.40 - 2.00 mmol/L       Radiologic Studies -   XR CHEST PORT    (Results Pending)         Medical Decision Making   I am the first provider for this patient. I reviewed the vital signs, available nursing notes, past medical history, past surgical history, family history and social history. Vital Signs-Reviewed the patient's vital signs. EKG: Interpreted by the EP.    Time Interpreted: 1945   Rate: 102   Rhythm: Sinus Tachycardia    Interpretation:LAD, wide qrs, no obvious ischemia, few PVCs   Comparison: unchanged    Records Reviewed: Nursing Notes and Old Medical Records      Provider Notes (Medical Decision Making):     MDM  Number of Diagnoses or Management Options  Acute cystitis without hematuria:   Acute on chronic combined systolic and diastolic congestive heart failure ([address]):   Community acquired pneumonia of right lower lobe of lung [facility]):   Diagnosis management comments: Differential Diagnosis: copd exac, chf exac, pna    Pt with crackles, resp distress, hx of both copd and HFpEF, will get cxr, bnp, trop, cbc, treat for both until more clear. Suspect HFpEF exac since not significantly wheezing. Doubt PE at this point. On cpap, wll convert to his home o2 and assess his resp status, check vbg    Reevaluation: Patient has what appears to be a right lower lobe pneumonia, also has a BNP of 10,000 with some signs of vascular congestion on his chest x-ray, he has been ambulated and did not drop his saturations but did become very tachypneic into the 40s, he is tolerating being off of BiPAP, I have given Lasix, ceftriaxone and azithromycin, his urinalysis also shows a UTI which will be covered by the antibiotics for the pneumonia. I think he needs to come into the hospital based on his tachypnea on walking, the hospitalist will come down and see him              For Hospitalized Patients:    1. Critical Care Time: Critical Care Time:  The services I provided to this patient were to treat and/or prevent clinically significant deterioration that could result in the failure of one or more body systems and/or organ systems due to resp distress. Services included the following:  -reviewing nursing notes and old charts  -vital sign assessments  -direct patient care  -medication orders and management  -interpreting and reviewing diagnostic studies/labs  -re-evaluations  -documentation time    Aggregate critical care time was 38 minutes, which includes only time during which I was engaged in work directly related to the patient's care as described above, whether I was at bedside or elsewhere in the Emergency Department.  It did not include time spent performing other reported procedures or the services of residents, students, nurses, or advance practice providers. Jane Cantu MD    7:43 PM         Diagnosis     Clinical Impression:   1. Community acquired pneumonia of right lower lobe of lung ([facility] [address]    2. Acute on chronic combined systolic and diastolic congestive heart failure ([facility] [address]    3. Acute cystitis without hematuria        Disposition: admit    Follow-up Information    None          Patient's Medications   Start Taking    No medications on file   Continue Taking    ACETAMINOPHEN (TYLENOL EXTRA STRENGTH) 500 MG TABLET    Take 500 mg by mouth every six (6) hours as needed for Pain. 1 pill poq 6 hours as needed prn pain    ALBUTEROL (PROVENTIL VENTOLIN) 2.5 MG /3 ML (0.083 %) NEBU    3 mL by Nebulization route every four (4) hours as needed (cough, wheezing, more shortness of breath). Indications: a chronic lung disease where the airways become partially blocked with mucus called COPD    ALBUTEROL (VENTOLIN HFA) 90 MCG/ACTUATION INHALER    Take 1 Puff by inhalation as needed. AMIODARONE (CORDARONE) 200 MG TABLET    Take 1 Tab by mouth daily. Indications: ventricular fibrillation, a heart rhythm disorder    ASPIRIN DELAYED-RELEASE 81 MG TABLET    Take 81 mg by mouth daily. 1 pill po daily    ATORVASTATIN (LIPITOR) 40 MG TABLET    Take 40 mg by mouth nightly. 1 pill daily    CLOPIDOGREL (PLAVIX) 75 MG TAB    Take 1 Tab by mouth daily. CYANOCOBALAMIN/FA/PYRIDOXINE (FOLBEE PO)    Take 1 Tab by mouth daily. FUROSEMIDE (LASIX) 20 MG TABLET    Take 1 Tab by mouth daily. Indications: Fluid in the Lungs due to Chronic Heart Failure    ISOSORBIDE MONONITRATE ER (IMDUR) 30 MG TABLET    Take 1 Tab by mouth every morning. LOSARTAN (COZAAR) 50 MG TABLET    Take 1 Tab by mouth nightly. 1 pill po daily  Indications: chronic heart failure, high blood pressure    METOPROLOL SUCCINATE (TOPROL-XL) 25 MG XL TABLET    Take 25 mg by mouth daily.  1 pill po daily    MULTIVITAMIN (ONE A DAY) TABLET    Take 1 Tab by mouth daily. NITROGLYCERIN (NITROSTAT) 0.4 MG SL TABLET    1 Tab by SubLINGual route every five (5) minutes as needed for Chest Pain. Up to 3 doses. OXYGEN-AIR DELIVERY SYSTEMS    3 L/min by Nasal route continuous. PANTOPRAZOLE (PROTONIX) 40 MG TABLET    Take 40 mg by mouth daily. 1 pill daily 30 minutes before breakfast.  Indications: indigestion    TIOTROPIUM-OLODATEROL (STIOLTO RESPIMAT) 2.5-2.5 MCG/ACTUATION INHALER    Take 2 Puffs by inhalation daily. These Medications have changed    No medications on file   Stop Taking    No medications on file     _______________________________    Please note that this dictation was completed with [employer], the computer voice recognition software. Quite often unanticipated grammatical, syntax, homophones, and other interpretive errors are inadvertently transcribed by the computer software. Please disregard these errors. Please excuse any errors that have escaped final proofreading.

## 2020-01-31 NOTE — PROGRESS NOTES
conducted an initial consultation and Spiritual Assessment for Daly Oglesby, who is a 67 y. o.,male. Patients Primary Language is: [place]. According to the patients EMR Yazdanism Affiliation is: [facility].     The reason the Patient came to the hospital is:   Patient Active Problem List    Diagnosis Date Noted    UTI (urinary tract infection) 01/31/2020    Right lower lobe pneumonia ([address] 01/31/2020    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 ([address] 01/31/2020    CHF exacerbation ([address] 10/13/2019    AICD (automatic cardioverter/defibrillator) present 10/13/2019    Congestive heart failure (CHF) ([address] 09/24/2019    Wide-complex tachycardia ([address] 09/24/2019    Acute on chronic combined systolic and diastolic ACC/AHA stage C congestive heart failure ([address] 09/24/2019    S/P CABG x 5 04/10/2014    Diverticula of colon 12/17/2013    Unspecified hereditary and idiopathic peripheral neuropathy 12/04/2013    Paresthesia and pain of both upper extremities 10/31/2013    Binocular visual disturbance 10/31/2013    Hypertensive heart disease with CHF ([address]     Emphysematous COPD ([address]     Alcohol abuse     Tobacco abuse     Hypercholesterolemia     CAD (coronary artery disease), native coronary artery 01/03/2005        The  provided the following Interventions:  Initiated a relationship of care and support. Explored issues of jose, spirituality and/or Yazidi needs while hospitalized. Listened empathically. Provided chaplaincy education. Offered prayer and assurance of continued prayers on patient's behalf. Chart reviewed. The following outcomes were achieved:  Patient shared some information about their medical narrative and spiritual journey/beliefs. Patient processed feeling about current hospitalization. Patient expressed gratitude for the 's visit.     Assessment:  Patient did not indicate any spiritual or Yazidi issues which require Spiritual Care Services interventions at this time. Patient does not have any Druze/cultural needs that will affect patients preferences in health care. Plan:  Chaplains will continue to follow and will provide pastoral care on an as needed or requested basis.  recommends bedside caregivers page  on duty if patient shows signs of acute spiritual or emotional distress.     [address]  [phone]

## 2020-02-01 ENCOUNTER — APPOINTMENT (OUTPATIENT)
Dept: NON INVASIVE DIAGNOSTICS | Age: 80
DRG: 291 | End: 2020-02-01
Attending: INTERNAL MEDICINE
Payer: MEDICARE

## 2020-02-01 ENCOUNTER — APPOINTMENT (OUTPATIENT)
Dept: GENERAL RADIOLOGY | Age: 80
DRG: 291 | End: 2020-02-01
Attending: INTERNAL MEDICINE
Payer: MEDICARE

## 2020-02-01 LAB
ANION GAP SERPL CALC-SCNC: 6 MMOL/L (ref 3–18)
BACTERIA SPEC CULT: ABNORMAL
BACTERIA SPEC CULT: ABNORMAL
BUN SERPL-MCNC: 26 MG/DL (ref 7–18)
BUN/CREAT SERPL: 14 (ref 12–20)
CALCIUM SERPL-MCNC: 8.4 MG/DL (ref 8.5–10.1)
CHLORIDE SERPL-SCNC: 109 MMOL/L (ref 100–111)
CK MB CFR SERPL CALC: 5.8 % (ref 0–4)
CK MB SERPL-MCNC: 3.8 NG/ML (ref 5–25)
CK SERPL-CCNC: 66 U/L (ref 39–308)
CO2 SERPL-SCNC: 23 MMOL/L (ref 21–32)
CREAT SERPL-MCNC: 1.84 MG/DL (ref 0.6–1.3)
ECHO LV EDV TEICHHOLZ: 0.6 ML
ECHO LV ESV TEICHHOLZ: 0.43 ML
ECHO LV INTERNAL DIMENSION DIASTOLIC: 4.62 CM (ref 4.2–5.9)
ECHO LV INTERNAL DIMENSION SYSTOLIC: 4.02 CM
ECHO LV IVSD: 0.91 CM (ref 0.6–1)
ECHO LV MASS 2D: 163.6 G (ref 88–224)
ECHO LV MASS INDEX 2D: 107 G/M2 (ref 49–115)
ECHO LV POSTERIOR WALL DIASTOLIC: 0.93 CM (ref 0.6–1)
ECHO PULMONARY ARTERY SYSTOLIC PRESSURE (PASP): 41 MMHG
ECHO TV REGURGITANT MAX VELOCITY: 309.4 CM/S
ECHO TV REGURGITANT PEAK GRADIENT: 38.3 MMHG
ERYTHROCYTE [DISTWIDTH] IN BLOOD BY AUTOMATED COUNT: 18 % (ref 11.6–14.5)
GLUCOSE SERPL-MCNC: 101 MG/DL (ref 74–99)
HCT VFR BLD AUTO: 32.5 % (ref 36–48)
HGB BLD-MCNC: 10.5 G/DL (ref 13–16)
LVFS 2D: 12.89 %
LVSV (TEICH): 18.12 ML
MCH RBC QN AUTO: 32.5 PG (ref 24–34)
MCHC RBC AUTO-ENTMCNC: 32.3 G/DL (ref 31–37)
MCV RBC AUTO: 100.6 FL (ref 74–97)
PLATELET # BLD AUTO: 270 K/UL (ref 135–420)
PMV BLD AUTO: 10 FL (ref 9.2–11.8)
POTASSIUM SERPL-SCNC: 5.6 MMOL/L (ref 3.5–5.5)
POTASSIUM SERPL-SCNC: 6 MMOL/L (ref 3.5–5.5)
RBC # BLD AUTO: 3.23 M/UL (ref 4.7–5.5)
SERVICE CMNT-IMP: ABNORMAL
SODIUM SERPL-SCNC: 138 MMOL/L (ref 136–145)
TROPONIN I SERPL-MCNC: 0.05 NG/ML (ref 0–0.04)
WBC # BLD AUTO: 10 K/UL (ref 4.6–13.2)

## 2020-02-01 PROCEDURE — 85027 COMPLETE CBC AUTOMATED: CPT

## 2020-02-01 PROCEDURE — 77010033678 HC OXYGEN DAILY

## 2020-02-01 PROCEDURE — 71046 X-RAY EXAM CHEST 2 VIEWS: CPT

## 2020-02-01 PROCEDURE — 74011000250 HC RX REV CODE- 250: Performed by: INTERNAL MEDICINE

## 2020-02-01 PROCEDURE — 36415 COLL VENOUS BLD VENIPUNCTURE: CPT

## 2020-02-01 PROCEDURE — 74011250636 HC RX REV CODE- 250/636: Performed by: INTERNAL MEDICINE

## 2020-02-01 PROCEDURE — 74011250637 HC RX REV CODE- 250/637: Performed by: INTERNAL MEDICINE

## 2020-02-01 PROCEDURE — 82550 ASSAY OF CK (CPK): CPT

## 2020-02-01 PROCEDURE — 93308 TTE F-UP OR LMTD: CPT

## 2020-02-01 PROCEDURE — 65660000000 HC RM CCU STEPDOWN

## 2020-02-01 PROCEDURE — 84132 ASSAY OF SERUM POTASSIUM: CPT

## 2020-02-01 PROCEDURE — 94640 AIRWAY INHALATION TREATMENT: CPT

## 2020-02-01 RX ORDER — SODIUM POLYSTYRENE SULFONATE 15 G/60ML
15 SUSPENSION ORAL; RECTAL
Status: COMPLETED | OUTPATIENT
Start: 2020-02-01 | End: 2020-02-01

## 2020-02-01 RX ORDER — VANCOMYCIN HYDROCHLORIDE
1250 ONCE
Status: COMPLETED | OUTPATIENT
Start: 2020-02-01 | End: 2020-02-01

## 2020-02-01 RX ADMIN — AMIODARONE HYDROCHLORIDE 200 MG: 200 TABLET ORAL at 08:18

## 2020-02-01 RX ADMIN — CLOPIDOGREL BISULFATE 75 MG: 75 TABLET ORAL at 08:18

## 2020-02-01 RX ADMIN — CEFTRIAXONE SODIUM 1 G: 1 INJECTION, POWDER, FOR SOLUTION INTRAMUSCULAR; INTRAVENOUS at 21:45

## 2020-02-01 RX ADMIN — VANCOMYCIN HYDROCHLORIDE 1250 MG: 10 INJECTION, POWDER, LYOPHILIZED, FOR SOLUTION INTRAVENOUS at 12:38

## 2020-02-01 RX ADMIN — ATORVASTATIN CALCIUM 40 MG: 40 TABLET, FILM COATED ORAL at 21:45

## 2020-02-01 RX ADMIN — METOPROLOL SUCCINATE 25 MG: 50 TABLET, EXTENDED RELEASE ORAL at 08:18

## 2020-02-01 RX ADMIN — FUROSEMIDE 40 MG: 10 INJECTION, SOLUTION INTRAMUSCULAR; INTRAVENOUS at 08:26

## 2020-02-01 RX ADMIN — IPRATROPIUM BROMIDE AND ALBUTEROL SULFATE 3 ML: .5; 3 SOLUTION RESPIRATORY (INHALATION) at 19:28

## 2020-02-01 RX ADMIN — PANTOPRAZOLE SODIUM 40 MG: 40 TABLET, DELAYED RELEASE ORAL at 08:18

## 2020-02-01 RX ADMIN — THERA TABS 1 TABLET: TAB at 08:18

## 2020-02-01 RX ADMIN — ENOXAPARIN SODIUM 40 MG: 40 INJECTION SUBCUTANEOUS at 08:27

## 2020-02-01 RX ADMIN — ISOSORBIDE MONONITRATE 30 MG: 30 TABLET, EXTENDED RELEASE ORAL at 06:38

## 2020-02-01 RX ADMIN — SODIUM POLYSTYRENE SULFONATE 15 G: 15 SUSPENSION ORAL; RECTAL at 11:09

## 2020-02-01 RX ADMIN — Medication 81 MG: at 08:18

## 2020-02-01 NOTE — PROGRESS NOTES
Dr. Jose Taylor was informed of Results for Arron Reid (MRN [id number]) as of 1/31/2020 20:36   Ref. Range 1/31/2020 19:15   Potassium Latest Ref Range: 3.5 - 5.5 mmol/L 5.7 (H)   CK Latest Ref Range: 39 - 308 U/L 63   CK-MB Index Latest Ref Range: 0.0 - 4.0 % 5.4 (H)   CK - MB Latest Ref Range: <3.6 ng/ml 3.4   Troponin-I, Qt. Latest Ref Range: 0.0 - 0.045 NG/ML 0.07 (H)   Patient denies chest pain. No new order was received. At [address], Dr. Catherine Manning was informed of Results for Arron Reid (MRN [id number]) as of 2/1/2020 04:05   Ref. Range 2/1/2020 01:39   Potassium Latest Ref Range: 3.5 - 5.5 mmol/L 6.0 (H)   Chloride Latest Ref Range: 100 - 111 mmol/L 109   CO2 Latest Ref Range: 21 - 32 mmol/L 23   Anion gap Latest Ref Range: 3.0 - 18 mmol/L 6   Glucose Latest Ref Range: 74 - 99 mg/dL 101 (H)   BUN Latest Ref Range: 7.0 - 18 MG/DL 26 (H)   Creatinine Latest Ref Range: 0.6 - 1.3 MG/DL 1.84 (H)   BUN/Creatinine ratio Latest Ref Range: 12 - 20   14   Calcium Latest Ref Range: 8.5 - 10.1 MG/DL 8.4 (L)   GFR est non-AA Latest Ref Range: >60 ml/min/1.73m2 36 (L)   GFR est AA Latest Ref Range: >60 ml/min/1.73m2 43 (L)   CK Latest Ref Range: 39 - 308 U/L 66   CK-MB Index Latest Ref Range: 0.0 - 4.0 % 5.8 (H)   CK - MB Latest Ref Range: <3.6 ng/ml 3.8 (H)   Troponin-I, Qt. Latest Ref Range: 0.0 - 0.045 NG/ML 0.05 (H)   New order to hold potassium was received.

## 2020-02-01 NOTE — ROUTINE PROCESS
Bedside and Verbal shift change report given to Chrissy Carrasquillo RN (oncoming nurse) by Verna Fong RN (offgoing nurse). Report included the following information SBAR, Kardex, MAR and Recent Results. SITUATION: 
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  
   
Hospital Problems  Date Reviewed: 1/3/2020 Codes Class Noted POA  
 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 
 Past Medical History:  
Past Medical History:  
Diagnosis Date  AICD (automatic cardioverter/defibrillator) present 10/13/2019  Alcohol abuse   
 quit February 2011  Binocular visual disturbance 10/31/2013  CAD (coronary artery disease)  CAD (coronary artery disease), native coronary artery 1/3/05 LAD 70-80% mid; Cx-diffuse 80%; OM1-70% prox; RCA 40-50% mid; 100% RPLB with collaterals  Cardiac cath 08/19/2013 Severe, diffuse calcification. dLM 30%. mLAD 80%. o/pD1 80%. o/pRamus 80%. dCX 85%. dRCA 80%.  m-dRPLB 75%. EF 50%. Mod to severe inferobasal hypk.  Cardiac echocardiogram 01/19/2011 EF 50-55%. Gr 1 DDfx. RVSP 30 mmHg.  Cardiac nuclear imaging test 06/27/2011 No evidence of ischemia or previous infarction. EF 60%. Neg EKG on max EST. Ex time 6 mins.  Chronic kidney disease  Chronic lung disease  Chronic obstructive pulmonary disease ([facility]  Coronary artery disease  Diastolic CHF, chronic ([facility] LV EF 55% (Echo Jan 2011)  Diverticula of colon 12/17/2013  Emphysematous COPD ([facility] PFTs March 2009; mild-mode obstruction, + bronchodil response; decreased DLCO  
 GERD (gastroesophageal reflux disease)   
 with erosive esophagitis history  Heart failure ([facility]  History of renal failure 2 years ago, which completely resolved  Hypercholesterolemia  Hypertension  Hypertensive heart disease with CHF ([facility]  MI (myocardial infarction) ([facility] Inferior wall  Paresthesia and pain of both upper extremities 10/31/2013  S/P CABG x 5 4/10/2014 LIMA to LAD, and a sequential saphenous vein graft to the 1st diagonal of the LAD and to the ramus intermedius, and another sequential saphenous vein graft to the posterior descending branch and the posterolateral branch of the RCA, Dr. Charline Eric, 4/9/14.  Tobacco abuse  Unspecified hereditary and idiopathic peripheral neuropathy 12/4/2013 Patient taking anticoagulants yes Patient has a defibrillator: no  
 History of shots YES for example, flu, pneumonia, tetanus Isolation History NO for example, MRSA, CDiff ASSESSMENT: 
Changes in Assessment Throughout Shift: None Significant Changes in 24 hours (for example, RR/code, fall) Patient has Central Line: no  
Patient has Omalley Cath: no  
Mobility Issues PT 
IV Patency OR Checklist 
Pending Tests Last Vitals: 
Vitals w/ MEWS Score (last day) Date/Time MEWS Score Pulse Resp Temp BP Level of Consciousness SpO2  
 02/01/20 0758  1  69  18  98.1 °F (36.7 °C)  145/89  Alert  100 % 02/01/20 0756            Alert    
 02/01/20 0415  1  69  16  97.9 °F (36.6 °C)  132/89  Alert  100 % 01/31/20 2323  1  66  20  97.1 °F (36.2 °C)  139/84  Alert  96 % 01/31/20 2040  2  63  22  97 °F (36.1 °C)  136/83  Alert  99 % 01/31/20 1630    73  30    134/80    100 %  01/31/20 1600            Alert    
 01/31/20 1545    Evi Garcia      Alert    
 01/31/20 1500            Alert    
 01/31/20 1400            Alert    
 01/31/20 1030    63      132/83      
 01/31/20 0930    68  25    135/78    100 % 01/31/20 0830    65  25    134/87    99 % 01/31/20 0806    70      143/70      
 01/31/20 0800        98.5 °F (36.9 °C)    Alert    
 01/31/20 0530    83  21    (!) 146/95  Alert  100 % 01/31/20 0500    82  24    (!) 147/93    100 % 01/31/20 0330    82  26    (!) 139/91    100 % 01/31/20 0200    89      144/86    100 % PAIN Pain Assessment Pain Intensity 1: 0 (02/01/20 0756) Patient Stated Pain Goal: 0 Intervention effective: N/A Time of last intervention: N/A Reassessment Completed: yes Other actions taken for pain: Distraction Last 3 Weights: 
Last 3 Recorded Weights in this Encounter 02/01/20 0425 Weight: 49.5 kg (109 lb 1.6 oz) Weight change: INTAKE/OUPUT Current Shift: 02/01 0701 - 02/01 1900 In: 100 [P.O.:100] Out: 0 Last three shifts: 01/30 1901 - 02/01 0700 In: 290 [P.O.:180; I.V.:110] Out: 1302 [Urine:1300] RECOMMENDATIONS AND DISCHARGE PLANNING Patient needs and requests: None Pending tests/procedures: labs Discharge plan for patient: Home Discharge planning Needs or Barriers: None Estimated Discharge Date: 2/04/2020 Posted on Whiteboard in [facility] Room: yes \"HEALS\" SAFETY CHECK A safety check occurred in the patient's room between off going nurse and oncoming nurse listed above. The safety check included the below items: 
 
H High Alert Medications Verify all high alert medication drips (heparin, PCA, etc.) E Equipment Suction is set up for ALL patients (with yanker) Red plugs utilized for all equipment (IV pumps, etc.) WOWs wiped down at end of shift. Room stocked with oxygen, suction, and other unit-specific supplies A Alarms Bed alarm is set for fall risk patients Ensure chair alarm is in place and activated if patient is up in a chair L Lines Check IV for any infiltration Omalley bag is empty if patient has a Omalley Tubing and IV bags are labeled Rozanna Bonds Safety Room is clean, patient is clean, and equipment is clean. Hallways are clear from equipment besides carts. Fall bracelet on for fall risk patients Ensure room is clear and free of clutter Suction is set up for ALL patients (with natali) Hallways are clear from equipment besides carts. Isolation precautions followed, supplies available outside room, sign posted Param Carr RN

## 2020-02-01 NOTE — PROGRESS NOTES
Bedside shift change report given to [facility] (oncoming nurse) by Angel Seymour RN (offgoing nurse). Report included the following information SBAR, Kardex and MAR.

## 2020-02-01 NOTE — PROGRESS NOTES
Problem: Patient Education: Go to Patient Education Activity  Goal: Patient/Family Education  Outcome: Progressing Towards Goal     Problem: Heart Failure: Day 1  Goal: Off Pathway (Use only if patient is Off Pathway)  Outcome: Progressing Towards Goal  Goal: Activity/Safety  Outcome: Progressing Towards Goal  Goal: Consults, if ordered  Outcome: Progressing Towards Goal  Goal: Diagnostic Test/Procedures  Outcome: Progressing Towards Goal  Goal: Nutrition/Diet  Outcome: Progressing Towards Goal  Goal: Discharge Planning  Outcome: Progressing Towards Goal  Goal: Medications  Outcome: Progressing Towards Goal  Goal: Respiratory  Outcome: Progressing Towards Goal  Goal: Treatments/Interventions/Procedures  Outcome: Progressing Towards Goal  Goal: Psychosocial  Outcome: Progressing Towards Goal  Goal: *Oxygen saturation within defined limits  Outcome: Progressing Towards Goal  Goal: *Hemodynamically stable  Outcome: Progressing Towards Goal  Goal: *Optimal pain control at patient's stated goal  Outcome: Progressing Towards Goal  Goal: *Anxiety reduced or absent  Outcome: Progressing Towards Goal     Problem: Heart Failure: Day 2  Goal: Off Pathway (Use only if patient is Off Pathway)  Outcome: Progressing Towards Goal  Goal: Activity/Safety  Outcome: Progressing Towards Goal  Goal: Consults, if ordered  Outcome: Progressing Towards Goal  Goal: Diagnostic Test/Procedures  Outcome: Progressing Towards Goal  Goal: Nutrition/Diet  Outcome: Progressing Towards Goal  Goal: Discharge Planning  Outcome: Progressing Towards Goal  Goal: Medications  Outcome: Progressing Towards Goal  Goal: Respiratory  Outcome: Progressing Towards Goal  Goal: Treatments/Interventions/Procedures  Outcome: Progressing Towards Goal  Goal: Psychosocial  Outcome: Progressing Towards Goal  Goal: *Oxygen saturation within defined limits  Outcome: Progressing Towards Goal  Goal: *Hemodynamically stable  Outcome: Progressing Towards Goal  Goal: *Optimal pain control at patient's stated goal  Outcome: Progressing Towards Goal  Goal: *Anxiety reduced or absent  Outcome: Progressing Towards Goal  Goal: *Demonstrates progressive activity  Outcome: Progressing Towards Goal     Problem: Heart Failure: Day 3  Goal: Off Pathway (Use only if patient is Off Pathway)  Outcome: Progressing Towards Goal  Goal: Activity/Safety  Outcome: Progressing Towards Goal  Goal: Diagnostic Test/Procedures  Outcome: Progressing Towards Goal  Goal: Nutrition/Diet  Outcome: Progressing Towards Goal  Goal: Discharge Planning  Outcome: Progressing Towards Goal  Goal: Medications  Outcome: Progressing Towards Goal  Goal: Respiratory  Outcome: Progressing Towards Goal  Goal: Treatments/Interventions/Procedures  Outcome: Progressing Towards Goal  Goal: Psychosocial  Outcome: Progressing Towards Goal  Goal: *Oxygen saturation within defined limits  Outcome: Progressing Towards Goal  Goal: *Hemodynamically stable  Outcome: Progressing Towards Goal  Goal: *Optimal pain control at patient's stated goal  Outcome: Progressing Towards Goal  Goal: *Anxiety reduced or absent  Outcome: Progressing Towards Goal  Goal: *Demonstrates progressive activity  Outcome: Progressing Towards Goal     Problem: Heart Failure: Day 4  Goal: Off Pathway (Use only if patient is Off Pathway)  Outcome: Progressing Towards Goal  Goal: Activity/Safety  Outcome: Progressing Towards Goal  Goal: Diagnostic Test/Procedures  Outcome: Progressing Towards Goal  Goal: Nutrition/Diet  Outcome: Progressing Towards Goal  Goal: Discharge Planning  Outcome: Progressing Towards Goal  Goal: Medications  Outcome: Progressing Towards Goal  Goal: Respiratory  Outcome: Progressing Towards Goal  Goal: Treatments/Interventions/Procedures  Outcome: Progressing Towards Goal  Goal: Psychosocial  Outcome: Progressing Towards Goal  Goal: *Oxygen saturation within defined limits  Outcome: Progressing Towards Goal  Goal: *Hemodynamically stable  Outcome: Progressing Towards Goal  Goal: *Optimal pain control at patient's stated goal  Outcome: Progressing Towards Goal  Goal: *Anxiety reduced or absent  Outcome: Progressing Towards Goal  Goal: *Demonstrates progressive activity  Outcome: Progressing Towards Goal     Problem: Heart Failure: Day 5  Goal: Off Pathway (Use only if patient is Off Pathway)  Outcome: Progressing Towards Goal  Goal: Activity/Safety  Outcome: Progressing Towards Goal  Goal: Diagnostic Test/Procedures  Outcome: Progressing Towards Goal  Goal: Nutrition/Diet  Outcome: Progressing Towards Goal  Goal: Discharge Planning  Outcome: Progressing Towards Goal  Goal: Medications  Outcome: Progressing Towards Goal  Goal: Respiratory  Outcome: Progressing Towards Goal  Goal: Treatments/Interventions/Procedures  Outcome: Progressing Towards Goal  Goal: Psychosocial  Outcome: Progressing Towards Goal     Problem: Heart Failure: Discharge Outcomes  Goal: *Demonstrates ability to perform prescribed activity without shortness of breath or discomfort  Outcome: Progressing Towards Goal  Goal: *Left ventricular function assessment completed prior to or during stay, or planned for post-discharge  Outcome: Progressing Towards Goal  Goal: *ACEI prescribed if LVEF less than 40% and no contraindications or ARB prescribed  Outcome: Progressing Towards Goal  Goal: *Verbalizes understanding and describes prescribed diet  Outcome: Progressing Towards Goal  Goal: *Verbalizes understanding/describes prescribed medications  Outcome: Progressing Towards Goal  Goal: *Describes available resources and support systems  Description  (eg: Home Health, Palliative Care, Advanced Medical Directive)  Outcome: Progressing Towards Goal  Goal: *Describes smoking cessation resources  Outcome: Progressing Towards Goal  Goal: *Understands and describes signs and symptoms to report to providers(Stroke Metric)  Outcome: Progressing Towards Goal  Goal: *Describes/verbalizes understanding of follow-up/return appt  Description  (eg: to physicians, diabetes treatment coordinator, and other resources  Outcome: Progressing Towards Goal  Goal: *Describes importance of continuing daily weights and changes to report to physician  Outcome: Progressing Towards Goal

## 2020-02-01 NOTE — PROGRESS NOTES
[facility] - Progress Note  Admit Date: 1/30/2020    Assessment:     Hospital Problems  Date Reviewed: 1/3/2020          Codes Class Noted POA    UTI (urinary tract infection) ICD-10-CM: N39.0  ICD-9-CM: 599.0  1/31/2020 Unknown        Right lower lobe pneumonia (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 [address] ICD-10-CM: I50.40  ICD-9-CM: 428.40  1/31/2020 Unknown               -Acute on chronic systolic heart failure, last admission October 2019, not taking lasix at home for a few days, likely trigger  -Hypokalemia, replacing  -UTI, on antbx  -Ischemic cardiomyopathy with EF 16-20% September 2019 with multiple wall motion abnormalities.  -History of extensive coronary artery disease with previous CABG and heart catheterization February 2019 with severe native disease with patent LIMA to LAD, but both sequential vein grafts occluded. There was native diagonal and OM disease at 85% and was medically treated. -[employer] dual chamber AICD 9/2019, normal function this admission, no events by check 1/31/20  -h/o bundle branch VT maintained on amiodarone  -h/o COPD on chronic oxygen therapy at home  -h/o HTN  -Remote tobacco and alcohol use. -Intermittent tobacco use     Primary cardiologist Dr. Wang Regalado:     - Will hold on IV Diuretics with rise in creatinine and Potassium  - Repeat echo performed today, EF without significant change  - Continue with ASA, Atorvastatin, Plavix, Amiodarone, Toprol    Subjective:     No new complaints.      Objective:      Patient Vitals for the past 8 hrs:   Temp Pulse Resp BP SpO2   02/01/20 0848    132/89    02/01/20 0758 98.1 °F (36.7 °C) 69 18 145/89 100 %   02/01/20 0415 97.9 °F (36.6 °C) 69 16 132/89 100 %         Patient Vitals for the past 96 hrs:   Weight   02/01/20 0848 109 lb (49.4 kg)   02/01/20 0425 109 lb 1.6 oz (49.5 kg)                    Intake/Output Summary (Last 24 hours) at 2/1/2020 1006  Last data filed at 2/1/2020 0827  Gross per 24 hour   Intake 410 ml   Output 1302 ml   Net -892 ml       Physical Exam:  General:  alert, cooperative, no distress  Neck:  nontender, no JVD  Lungs:  clear to auscultation bilaterally  Heart:  regular rate and rhythm, S1, S2 normal, no murmur, click, rub or gallop  Abdomen:  abdomen is soft without significant tenderness, masses, organomegaly or guarding  Extremities:  extremities normal, atraumatic, no cyanosis or edema    Data Review:     Labs: Results:       Chemistry Recent Labs     02/01/20 0139 01/31/20  1915 01/30/20 1932   *  --  164*     --  142   K 6.0* 5.7* 2.9*     --  108   CO2 23  --  26   BUN 26*  --  16   CREA 1.84*  --  1.09   CA 8.4*  --  8.4*   AGAP 6  --  8   BUCR 14  --  15   AP  --   --  133*   TP  --   --  8.3*   ALB  --   --  3.0*   GLOB  --   --  5.3*   AGRAT  --   --  0.6*      CBC w/Diff Recent Labs     02/01/20 0139 01/30/20 1932   WBC 10.0 10.8   RBC 3.23* 3.38*   HGB 10.5* 11.1*   HCT 32.5* 33.0*    332   GRANS  --  74*   LYMPH  --  18*   EOS  --  1      Cardiac Enzymes Lab Results   Component Value Date/Time    CPK 66 02/01/2020 01:39 AM    CPK 63 01/31/2020 07:15 PM    CKMB 3.8 (H) 02/01/2020 01:39 AM    CKMB 3.4 01/31/2020 07:15 PM    CKND1 5.8 (H) 02/01/2020 01:39 AM    CKND1 5.4 (H) 01/31/2020 07:15 PM    TROIQ 0.05 (H) 02/01/2020 01:39 AM    TROIQ 0.07 (H) 01/31/2020 07:15 PM      Coagulation No results for input(s): PTP, INR, APTT, INREXT in the last 72 hours.     Lipid Panel Lab Results   Component Value Date/Time    Cholesterol, total 144 05/03/2019 10:26 AM    HDL Cholesterol 86 (H) 05/03/2019 10:26 AM    LDL, calculated 18 05/03/2019 10:26 AM    VLDL, calculated 40 05/03/2019 10:26 AM    Triglyceride 200 (H) 05/03/2019 10:26 AM    CHOL/HDL Ratio 1.7 05/03/2019 10:26 AM      BNP No results found for: BNP, BNPP, XBNPT   Liver Enzymes Recent Labs     01/30/20  1932   TP 8.3* ALB 3.0*   *   SGOT 24      Digoxin    Thyroid Studies Lab Results   Component Value Date/Time    TSH 4.31 (H) 09/24/2019 09:57 AM          Signed By: Martínez Watson.  Saul Coe PA-C     February 1, 2020

## 2020-02-01 NOTE — PROGRESS NOTES
[facility]- Initial Progress Note    Pharmacy Consult ordered by Dr. Amor Madera     Indication: HAP    Patient clinical status and labs ordered/reviewed. Pt Weight Weight: 49.4 kg (109 lb)   Serum Creatinine Lab Results   Component Value Date/Time    Creatinine 1.84 (H) 02/01/2020 01:39 AM       Creatinine Clearance Estimated Creatinine Clearance: 23.1 mL/min (A) (based on SCr of 1.84 mg/dL (H)). BUN Lab Results   Component Value Date/Time    BUN 26 (H) 02/01/2020 01:39 AM       WBC Lab Results   Component Value Date/Time    WBC 10.0 02/01/2020 01:39 AM      Temperature Temp: 98.1 °F (36.7 °C)   HR Pulse (Heart Rate): 69     BP BP: 132/89           Kinetic Dosing Parameters:   Vd = 41L     K = .023 hr-1              t ½ = 29    Drug Levels:   Vancomycin    No results for input(s): VANCP, VANCT, VANCR, VANRA in the last 72 hours. Gentamicin   No results for input(s): GENP, GENT in the last 72 hours. No lab exists for component:  GENR   Tobramycin   No results for input(s): TOBP, TOBT, TOBR in the last 72 hours. Amikacin   No results for input(s): Shamar Diones in the last 72 hours.     No lab exists for component:  DEWEY Almonte DAMIKR     Dose for naïve patient was initiated at: Vancomycin 1250mg x1 then 1000mg q36h    Continue to monitor    Sign: KIAH Cohen [facility] [place]  Date: 2/1/2020  Time: 10:41 AM

## 2020-02-01 NOTE — PROGRESS NOTES
SUBJECTIVE:    Shortness of breath is present but much better than yesterday no cough other than dry cough. Denies any chest pain or abdominal pain. No nausea or vomiting. No headaches or dizziness. OBJECTIVE:    /89   Pulse 69   Temp 98.1 °F (36.7 °C)   Resp 18   Ht 5' 5\" (1.651 m)   Wt 49.4 kg (109 lb)   SpO2 100%   BMI 18.14 kg/m²       Intake/Output Summary (Last 24 hours) at 2/1/2020 0917  Last data filed at 2/1/2020 0827  Gross per 24 hour   Intake 410 ml   Output 1302 ml   Net -892 ml     General appearance - alert, well appearing, and in no distress  Eyes/head-atraumatic, no pallor, sclera anicteric  Nose - no obvious nasal discharge. Neck - supple, no JVD, trachea is midline  Chest - decreased air entry noted in bases, no wheezes currently  Heart - S1 and S2 normal, PPM present  Abdomen - soft, nontender, nondistended, Bowel sounds present  Neurological - alert, oriented, normal speech, no focal findings noted  Musculoskeletal - no joint tenderness or swelling of knees  Extremities - no pedal edema noted    ASSESSMENT:    1. Worsening dyspnea on exertion due to #2, better today  2. Acute on chronic systolic CHF with EF of 15 to 20% due to medical noncompliance, compensated  3. Chronic hypoxic respiratory failure on home oxygen  4. UTI  5. COPD without any exacerbation  6. Medical noncompliance  7. Hyperkalemia  8. Coronary artery disease  9. Ischemic cardiomyopathy status post AICD  10. Acute on stage III chronic kidney disease due to diuretics  11.   GPC bacteremia could be contamination    PLAN:    Chest x-ray will be repeated  Cardiology input noted about stopping Lasix and monitor  Patient was given Kayexalate earlier and will repeat potassium level now  Repeat blood culture tomorrow morning  Continue Rocephin and vancomycin  Urine culture was contaminated  PT and OT to see this patient    CMP:   Lab Results   Component Value Date/Time     02/01/2020 01:39 AM    AZRA 6.0 (H) 02/01/2020 01:39 AM     02/01/2020 01:39 AM    CO2 23 02/01/2020 01:39 AM    AGAP 6 02/01/2020 01:39 AM     (H) 02/01/2020 01:39 AM    BUN 26 (H) 02/01/2020 01:39 AM    CREA 1.84 (H) 02/01/2020 01:39 AM    GFRAA 43 (L) 02/01/2020 01:39 AM    GFRNA 36 (L) 02/01/2020 01:39 AM    CA 8.4 (L) 02/01/2020 01:39 AM     CBC:   Lab Results   Component Value Date/Time    WBC 10.0 02/01/2020 01:39 AM    HGB 10.5 (L) 02/01/2020 01:39 AM    HCT 32.5 (L) 02/01/2020 01:39 AM     02/01/2020 01:39 AM     All Cardiac Markers in the last 24 hours:   Lab Results   Component Value Date/Time    CPK 66 02/01/2020 01:39 AM    CPK 63 01/31/2020 07:15 PM    CKMB 3.8 (H) 02/01/2020 01:39 AM    CKMB 3.4 01/31/2020 07:15 PM    CKND1 5.8 (H) 02/01/2020 01:39 AM    CKND1 5.4 (H) 01/31/2020 07:15 PM    TROIQ 0.05 (H) 02/01/2020 01:39 AM    TROIQ 0.07 (H) 01/31/2020 07:15 PM

## 2020-02-01 NOTE — PROGRESS NOTES
Problem: Patient Education: Go to Patient Education Activity  Goal: Patient/Family Education  Outcome: Progressing Towards Goal     Problem: Heart Failure: Day 1  Goal: Off Pathway (Use only if patient is Off Pathway)  Outcome: Progressing Towards Goal  Goal: Activity/Safety  Outcome: Progressing Towards Goal  Goal: Consults, if ordered  Outcome: Progressing Towards Goal  Goal: Diagnostic Test/Procedures  Outcome: Progressing Towards Goal  Goal: Nutrition/Diet  Outcome: Progressing Towards Goal  Goal: Discharge Planning  Outcome: Progressing Towards Goal  Goal: Medications  Outcome: Progressing Towards Goal  Goal: Respiratory  Outcome: Progressing Towards Goal  Goal: Treatments/Interventions/Procedures  Outcome: Progressing Towards Goal  Goal: Psychosocial  Outcome: Progressing Towards Goal  Goal: *Oxygen saturation within defined limits  Outcome: Progressing Towards Goal  Goal: *Hemodynamically stable  Outcome: Progressing Towards Goal  Goal: *Optimal pain control at patient's stated goal  Outcome: Progressing Towards Goal  Goal: *Anxiety reduced or absent  Outcome: Progressing Towards Goal     Problem: Patient Education: Go to Patient Education Activity  Goal: Patient/Family Education  Outcome: Progressing Towards Goal     Problem: Pneumonia: Day 1  Goal: Off Pathway (Use only if patient is Off Pathway)  Outcome: Progressing Towards Goal  Goal: Activity/Safety  Outcome: Progressing Towards Goal  Goal: Consults, if ordered  Outcome: Progressing Towards Goal  Goal: Diagnostic Test/Procedures  Outcome: Progressing Towards Goal  Goal: Nutrition/Diet  Outcome: Progressing Towards Goal  Goal: Medications  Outcome: Progressing Towards Goal  Goal: Respiratory  Outcome: Progressing Towards Goal  Goal: Treatments/Interventions/Procedures  Outcome: Progressing Towards Goal  Goal: Psychosocial  Outcome: Progressing Towards Goal  Goal: *Oxygen saturation within defined limits  Outcome: Progressing Towards Goal  Goal: *Hemodynamically stable  Outcome: Progressing Towards Goal  Goal: *Demonstrates progressive activity  Outcome: Progressing Towards Goal  Goal: *Tolerating diet  Outcome: Progressing Towards Goal

## 2020-02-02 LAB
ANION GAP SERPL CALC-SCNC: 7 MMOL/L (ref 3–18)
BASOPHILS # BLD: 0 K/UL (ref 0–0.1)
BASOPHILS NFR BLD: 1 % (ref 0–2)
BUN SERPL-MCNC: 24 MG/DL (ref 7–18)
BUN/CREAT SERPL: 15 (ref 12–20)
CALCIUM SERPL-MCNC: 8.2 MG/DL (ref 8.5–10.1)
CHLORIDE SERPL-SCNC: 108 MMOL/L (ref 100–111)
CO2 SERPL-SCNC: 23 MMOL/L (ref 21–32)
CREAT SERPL-MCNC: 1.63 MG/DL (ref 0.6–1.3)
DIFFERENTIAL METHOD BLD: ABNORMAL
EOSINOPHIL # BLD: 0.2 K/UL (ref 0–0.4)
EOSINOPHIL NFR BLD: 3 % (ref 0–5)
ERYTHROCYTE [DISTWIDTH] IN BLOOD BY AUTOMATED COUNT: 17.5 % (ref 11.6–14.5)
GLUCOSE SERPL-MCNC: 108 MG/DL (ref 74–99)
HCT VFR BLD AUTO: 28.1 % (ref 36–48)
HGB BLD-MCNC: 9.1 G/DL (ref 13–16)
LYMPHOCYTES # BLD: 2.1 K/UL (ref 0.9–3.6)
LYMPHOCYTES NFR BLD: 29 % (ref 21–52)
MCH RBC QN AUTO: 32.3 PG (ref 24–34)
MCHC RBC AUTO-ENTMCNC: 32.4 G/DL (ref 31–37)
MCV RBC AUTO: 99.6 FL (ref 74–97)
MONOCYTES # BLD: 0.7 K/UL (ref 0.05–1.2)
MONOCYTES NFR BLD: 10 % (ref 3–10)
NEUTS SEG # BLD: 4.1 K/UL (ref 1.8–8)
NEUTS SEG NFR BLD: 57 % (ref 40–73)
PLATELET # BLD AUTO: 220 K/UL (ref 135–420)
PMV BLD AUTO: 10 FL (ref 9.2–11.8)
POTASSIUM SERPL-SCNC: 4 MMOL/L (ref 3.5–5.5)
RBC # BLD AUTO: 2.82 M/UL (ref 4.7–5.5)
SODIUM SERPL-SCNC: 138 MMOL/L (ref 136–145)
WBC # BLD AUTO: 7 K/UL (ref 4.6–13.2)

## 2020-02-02 PROCEDURE — 94761 N-INVAS EAR/PLS OXIMETRY MLT: CPT

## 2020-02-02 PROCEDURE — 87040 BLOOD CULTURE FOR BACTERIA: CPT

## 2020-02-02 PROCEDURE — 65660000000 HC RM CCU STEPDOWN

## 2020-02-02 PROCEDURE — 80048 BASIC METABOLIC PNL TOTAL CA: CPT

## 2020-02-02 PROCEDURE — 36415 COLL VENOUS BLD VENIPUNCTURE: CPT

## 2020-02-02 PROCEDURE — 74011250637 HC RX REV CODE- 250/637: Performed by: INTERNAL MEDICINE

## 2020-02-02 PROCEDURE — 74011250636 HC RX REV CODE- 250/636: Performed by: INTERNAL MEDICINE

## 2020-02-02 PROCEDURE — 74011000250 HC RX REV CODE- 250: Performed by: INTERNAL MEDICINE

## 2020-02-02 PROCEDURE — 94640 AIRWAY INHALATION TREATMENT: CPT

## 2020-02-02 PROCEDURE — 85025 COMPLETE CBC W/AUTO DIFF WBC: CPT

## 2020-02-02 PROCEDURE — 74011250637 HC RX REV CODE- 250/637: Performed by: PHYSICIAN ASSISTANT

## 2020-02-02 RX ORDER — BENZONATATE 100 MG/1
100 CAPSULE ORAL
Status: DISCONTINUED | OUTPATIENT
Start: 2020-02-02 | End: 2020-02-03 | Stop reason: HOSPADM

## 2020-02-02 RX ORDER — FUROSEMIDE 20 MG/1
20 TABLET ORAL DAILY
Status: DISCONTINUED | OUTPATIENT
Start: 2020-02-02 | End: 2020-02-03 | Stop reason: HOSPADM

## 2020-02-02 RX ORDER — IPRATROPIUM BROMIDE AND ALBUTEROL SULFATE 2.5; .5 MG/3ML; MG/3ML
3 SOLUTION RESPIRATORY (INHALATION)
Status: DISCONTINUED | OUTPATIENT
Start: 2020-02-03 | End: 2020-02-03

## 2020-02-02 RX ADMIN — IPRATROPIUM BROMIDE AND ALBUTEROL SULFATE 3 ML: .5; 3 SOLUTION RESPIRATORY (INHALATION) at 09:25

## 2020-02-02 RX ADMIN — CLOPIDOGREL BISULFATE 75 MG: 75 TABLET ORAL at 08:01

## 2020-02-02 RX ADMIN — METOPROLOL SUCCINATE 25 MG: 50 TABLET, EXTENDED RELEASE ORAL at 08:00

## 2020-02-02 RX ADMIN — BENZONATATE 100 MG: 100 CAPSULE ORAL at 16:45

## 2020-02-02 RX ADMIN — AMIODARONE HYDROCHLORIDE 200 MG: 200 TABLET ORAL at 08:01

## 2020-02-02 RX ADMIN — EPOETIN ALFA-EPBX 12000 UNITS: 10000 INJECTION, SOLUTION INTRAVENOUS; SUBCUTANEOUS at 16:41

## 2020-02-02 RX ADMIN — ATORVASTATIN CALCIUM 40 MG: 40 TABLET, FILM COATED ORAL at 22:00

## 2020-02-02 RX ADMIN — IPRATROPIUM BROMIDE AND ALBUTEROL SULFATE 3 ML: .5; 3 SOLUTION RESPIRATORY (INHALATION) at 02:14

## 2020-02-02 RX ADMIN — IPRATROPIUM BROMIDE AND ALBUTEROL SULFATE 3 ML: .5; 3 SOLUTION RESPIRATORY (INHALATION) at 14:14

## 2020-02-02 RX ADMIN — PANTOPRAZOLE SODIUM 40 MG: 40 TABLET, DELAYED RELEASE ORAL at 08:01

## 2020-02-02 RX ADMIN — FUROSEMIDE 20 MG: 20 TABLET ORAL at 11:39

## 2020-02-02 RX ADMIN — ENOXAPARIN SODIUM 40 MG: 40 INJECTION SUBCUTANEOUS at 08:01

## 2020-02-02 RX ADMIN — Medication 81 MG: at 08:00

## 2020-02-02 RX ADMIN — ISOSORBIDE MONONITRATE 30 MG: 30 TABLET, EXTENDED RELEASE ORAL at 07:49

## 2020-02-02 RX ADMIN — THERA TABS 1 TABLET: TAB at 08:01

## 2020-02-02 RX ADMIN — IPRATROPIUM BROMIDE AND ALBUTEROL SULFATE 3 ML: .5; 3 SOLUTION RESPIRATORY (INHALATION) at 20:17

## 2020-02-02 NOTE — PROGRESS NOTES
Per last note with Chastity \"At this point, he is on amiodarone for his history of VT. His AICD is functioning normally.     His major reason for follow up today is his recent heart failure exacerbation; it appears compensated. It may be reasonable to consider PCI to his OM and diagonals given his drop in EF. If his primary cardiologist Dr. Sarah Mchugh agrees, this could be arranged as an outpatient and he would need a heart catheterization to further evaluate his anatomy. Given his weight of 107 pounds and history, redo CABG in my opinion would likely be of very high risk although PCI also would be inherently risky as he has severe native vessel disease. I will continue with current medications and again he will follow up with Dr. Sarah Mchugh. \"

## 2020-02-02 NOTE — PROGRESS NOTES
SUBJECTIVE:    Feeling somewhat better. Dyspnea on exertion improving. Dry cough present. No nausea vomiting. Denies any chest pain or abdominal pain. No headaches or dizziness. Has been on home oxygen 3 to 4 L at home. OBJECTIVE:    /74 (BP 1 Location: Left arm, BP Patient Position: At rest)   Pulse 68   Temp 98 °F (36.7 °C)   Resp 18   Ht 5' 5\" (1.651 m)   Wt 49.4 kg (109 lb)   SpO2 100%   BMI 18.14 kg/m²       Intake/Output Summary (Last 24 hours) at 2/2/2020 1346  Last data filed at 2/2/2020 1236  Gross per 24 hour   Intake 970 ml   Output 900 ml   Net 70 ml     General appearance - alert, well appearing, and in no distress  Chest - decreased air entry noted in bases, no wheezes currently  Heart - S1 and S2 normal, PPM present  Abdomen - soft, nontender, nondistended, Bowel sounds present  Neurological - alert, oriented, normal speech, no focal findings noted  Extremities - no pedal edema noted    ASSESSMENT:    1. Worsening dyspnea on exertion due to #2, much better today. 2.  Acute on chronic systolic CHF with EF of 15 to 20% due to medical noncompliance, compensated  3. Chronic hypoxic respiratory failure on home oxygen, stable  4. UTI status post treatment  5. COPD without any exacerbation  6. Medical noncompliance  7. Hyperkalemia, resolved  8. Coronary artery disease  9. Ischemic cardiomyopathy status post AICD  10. Acute on stage III chronic kidney disease due to diuretics, better  11.   GPC bacteremia could be contamination, resolved    PLAN:    Repeat chest x-ray report reviewed  Tessalon Perles for cough  Discontinue Rocephin and continue vancomycin until patient is seen by ID tomorrow  PT and OT to follow this patient  Cardiology input noted  Discharge planning for tomorrow    CMP:   Lab Results   Component Value Date/Time     02/02/2020 03:24 AM    K 4.0 02/02/2020 03:24 AM     02/02/2020 03:24 AM    CO2 23 02/02/2020 03:24 AM    AGAP 7 02/02/2020 03:24 AM  (H) 02/02/2020 03:24 AM    BUN 24 (H) 02/02/2020 03:24 AM    CREA 1.63 (H) 02/02/2020 03:24 AM    GFRAA 50 (L) 02/02/2020 03:24 AM    GFRNA 41 (L) 02/02/2020 03:24 AM    CA 8.2 (L) 02/02/2020 03:24 AM     CBC:   Lab Results   Component Value Date/Time    WBC 7.0 02/02/2020 03:24 AM    HGB 9.1 (L) 02/02/2020 03:24 AM    HCT 28.1 (L) 02/02/2020 03:24 AM     02/02/2020 03:24 AM     Disclaimer: Sections of this note are dictated using utilizing voice recognition software. Minor typographical errors may be present. If questions arise, please do not hesitate to contact me or call our department.

## 2020-02-02 NOTE — PROGRESS NOTES
Bedside shift change report given to [address] (oncoming nurse) by Zakiya Spence RN (offgoing nurse). Report included the following information SBAR, Kardex and MAR.

## 2020-02-02 NOTE — PROGRESS NOTES
Problem: Pneumonia: Day 1  Goal: Activity/Safety  Outcome: Progressing Towards Goal     Problem: Pneumonia: Day 1  Goal: *Oxygen saturation within defined limits  Outcome: Progressing Towards Goal

## 2020-02-02 NOTE — PROGRESS NOTES
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  -History of extensive coronary artery disease with previous CABG and heart catheterization February 2019 with severe native disease with patent LIMA to LAD, but both sequential vein grafts occluded. There was native diagonal and OM disease at 85% and was medically treated.   -[employer] dual chamber AICD 9/2019, normal function this admission, no events by check 1/31/20  -h/o bundle branch VT maintained on amiodarone  -h/o COPD on chronic oxygen therapy at home  -h/o HTN  -Remote tobacco and alcohol use. -Intermittent tobacco use     Primary cardiologist Dr. Celi Mcbride:      - Will resume maintenance diuretics today, creatinine and Potassium improved  - Mild wheezing on exam, continue with scheduled duonebs  - Continue with ASA, Atorvastatin, Plavix, Amiodarone, Toprol    Subjective:     No new complaints.      Objective:      Patient Vitals for the past 8 hrs:   Temp Pulse Resp BP SpO2   02/02/20 0927     96 %   02/02/20 0736 98.3 °F (36.8 °C) 83 19 140/79 96 %   02/02/20 0350 97 °F (36.1 °C) 72 24 136/67 96 %   02/02/20 0216     96 %         Patient Vitals for the past 96 hrs:   Weight   02/01/20 0848 109 lb (49.4 kg)   02/01/20 0425 109 lb 1.6 oz (49.5 kg) Intake/Output Summary (Last 24 hours) at 2/2/2020 0941  Last data filed at 2/2/2020 0824  Gross per 24 hour   Intake 980 ml   Output 1525 ml   Net -545 ml       Physical Exam:  General:  alert, cooperative, no distress  Neck:  nontender, no JVD  Lungs:  Trace wheezing  Heart:  regular rate and rhythm, S1, S2 normal, no murmur, click, rub or gallop  Abdomen:  abdomen is soft without significant tenderness, masses, organomegaly or guarding  Extremities:  extremities normal, atraumatic, no cyanosis or edema    Data Review:     Labs: Results:       Chemistry Recent Labs     02/02/20  0324 02/01/20  1300 02/01/20  0139 01/30/20 1932   *  --  101*  --  164*     --  138  --  142   K 4.0 5.6* 6.0*   < > 2.9*     --  109  --  108   CO2 23  --  23  --  26   BUN 24*  --  26*  --  16   CREA 1.63*  --  1.84*  --  1.09   CA 8.2*  --  8.4*  --  8.4*   AGAP 7  --  6  --  8   BUCR 15  --  14  --  15   AP  --   --   --   --  133*   TP  --   --   --   --  8.3*   ALB  --   --   --   --  3.0*   GLOB  --   --   --   --  5.3*   AGRAT  --   --   --   --  0.6*    < > = values in this interval not displayed. CBC w/Diff Recent Labs     02/02/20 0324 02/01/20 0139 01/30/20 1932   WBC 7.0 10.0 10.8   RBC 2.82* 3.23* 3.38*   HGB 9.1* 10.5* 11.1*   HCT 28.1* 32.5* 33.0*    270 332   GRANS 57  --  74*   LYMPH 29  --  18*   EOS 3  --  1      Cardiac Enzymes No results found for: CPK, CK, CKMMB, CKMB, RCK3, CKMBT, CKNDX, CKND1, EDWARD, TROPT, TROIQ, ALLAN, TROPT, TNIPOC, BNP, BNPP   Coagulation No results for input(s): PTP, INR, APTT, INREXT in the last 72 hours.     Lipid Panel Lab Results   Component Value Date/Time    Cholesterol, total 144 05/03/2019 10:26 AM    HDL Cholesterol 86 (H) 05/03/2019 10:26 AM    LDL, calculated 18 05/03/2019 10:26 AM    VLDL, calculated 40 05/03/2019 10:26 AM    Triglyceride 200 (H) 05/03/2019 10:26 AM    CHOL/HDL Ratio 1.7 05/03/2019 10:26 AM      BNP No results found for: BNP, BNPP, XBNPT   Liver Enzymes Recent Labs     01/30/20 1932   TP 8.3*   ALB 3.0*   *   SGOT 24      Digoxin    Thyroid Studies Lab Results   Component Value Date/Time    TSH 4.31 (H) 09/24/2019 09:57 AM          Signed By: Niecy Carr.  Elaina Brothers     February 2, 2020

## 2020-02-02 NOTE — PROGRESS NOTES
Bedside and Verbal shift change report given to DAVID Gillespie (oncoming nurse) by Gretchen Vargas (offgoing nurse). Report included the following information SBAR, Kardex, ED Summary, OR Summary, Procedure Summary, Intake/Output, MAR, Recent Results and Med Rec Status.

## 2020-02-02 NOTE — PROGRESS NOTES
Problem: Heart Failure: Day 1  Goal: Nutrition/Diet  Outcome: Progressing Towards Goal     Problem: Heart Failure: Day 1  Goal: Respiratory  Outcome: Progressing Towards Goal     Problem: Heart Failure: Day 1  Goal: Psychosocial  Outcome: Progressing Towards Goal

## 2020-02-02 NOTE — PROGRESS NOTES
PT order received and chart reviewed. Pt has active best rest orders. Please discontinue bedrest order to allow full participation in therapy evaluation.  Thank you for this referral.    Cristi Jaimes PT, DPT

## 2020-02-02 NOTE — PROGRESS NOTES
OT orders received and chart reviewed. Patient is currently on bedrest at this time. Please update patients' activity level prior to OT evaluation. Thank you.     Zander Christensen OTR/LUIS

## 2020-02-02 NOTE — PROGRESS NOTES
Per last note with Chastity \"At this point, he is on amiodarone for his history of VT. His AICD is functioning normally.     His major reason for follow up today is his recent heart failure exacerbation; it appears compensated. It may be reasonable to consider PCI to his OM and diagonals given his drop in EF. If his primary cardiologist Dr. Lizette Danielle agrees, this could be arranged as an outpatient and he would need a heart catheterization to further evaluate his anatomy. Given his weight of 107 pounds and history, redo CABG in my opinion would likely be of very high risk although PCI also would be inherently risky as he has severe native vessel disease. I will continue with current medications and again he will follow up with Dr. Lizette Danielle. \"

## 2020-02-03 ENCOUNTER — HOME HEALTH ADMISSION (OUTPATIENT)
Dept: HOME HEALTH SERVICES | Facility: HOME HEALTH | Age: 80
End: 2020-02-03
Payer: MEDICARE

## 2020-02-03 VITALS
HEIGHT: 65 IN | RESPIRATION RATE: 17 BRPM | OXYGEN SATURATION: 100 % | WEIGHT: 118.83 LBS | HEART RATE: 68 BPM | TEMPERATURE: 98.2 F | DIASTOLIC BLOOD PRESSURE: 70 MMHG | SYSTOLIC BLOOD PRESSURE: 131 MMHG | BODY MASS INDEX: 19.8 KG/M2

## 2020-02-03 LAB
ANION GAP SERPL CALC-SCNC: 4 MMOL/L (ref 3–18)
BACTERIA SPEC CULT: ABNORMAL
BUN SERPL-MCNC: 17 MG/DL (ref 7–18)
BUN/CREAT SERPL: 11 (ref 12–20)
CALCIUM SERPL-MCNC: 8 MG/DL (ref 8.5–10.1)
CHLORIDE SERPL-SCNC: 107 MMOL/L (ref 100–111)
CO2 SERPL-SCNC: 26 MMOL/L (ref 21–32)
CREAT SERPL-MCNC: 1.58 MG/DL (ref 0.6–1.3)
GLUCOSE SERPL-MCNC: 96 MG/DL (ref 74–99)
GRAM STN SPEC: ABNORMAL
GRAM STN SPEC: ABNORMAL
HCT VFR BLD AUTO: 27.6 % (ref 36–48)
HGB BLD-MCNC: 8.8 G/DL (ref 13–16)
POTASSIUM SERPL-SCNC: 3.8 MMOL/L (ref 3.5–5.5)
SERVICE CMNT-IMP: ABNORMAL
SODIUM SERPL-SCNC: 137 MMOL/L (ref 136–145)

## 2020-02-03 PROCEDURE — 74011250636 HC RX REV CODE- 250/636: Performed by: INTERNAL MEDICINE

## 2020-02-03 PROCEDURE — 85018 HEMOGLOBIN: CPT

## 2020-02-03 PROCEDURE — 36415 COLL VENOUS BLD VENIPUNCTURE: CPT

## 2020-02-03 PROCEDURE — 74011250637 HC RX REV CODE- 250/637: Performed by: PHYSICIAN ASSISTANT

## 2020-02-03 PROCEDURE — 74011250637 HC RX REV CODE- 250/637: Performed by: INTERNAL MEDICINE

## 2020-02-03 PROCEDURE — 97116 GAIT TRAINING THERAPY: CPT

## 2020-02-03 PROCEDURE — 80048 BASIC METABOLIC PNL TOTAL CA: CPT

## 2020-02-03 PROCEDURE — 97165 OT EVAL LOW COMPLEX 30 MIN: CPT

## 2020-02-03 PROCEDURE — 97161 PT EVAL LOW COMPLEX 20 MIN: CPT

## 2020-02-03 RX ORDER — IPRATROPIUM BROMIDE AND ALBUTEROL SULFATE 2.5; .5 MG/3ML; MG/3ML
3 SOLUTION RESPIRATORY (INHALATION)
Status: DISCONTINUED | OUTPATIENT
Start: 2020-02-03 | End: 2020-02-03 | Stop reason: HOSPADM

## 2020-02-03 RX ORDER — FUROSEMIDE 20 MG/1
TABLET ORAL
Qty: 35 TAB | Refills: 0 | Status: SHIPPED | OUTPATIENT
Start: 2020-02-03 | End: 2021-07-19

## 2020-02-03 RX ADMIN — METOPROLOL SUCCINATE 25 MG: 50 TABLET, EXTENDED RELEASE ORAL at 08:53

## 2020-02-03 RX ADMIN — PANTOPRAZOLE SODIUM 40 MG: 40 TABLET, DELAYED RELEASE ORAL at 08:52

## 2020-02-03 RX ADMIN — CLOPIDOGREL BISULFATE 75 MG: 75 TABLET ORAL at 08:53

## 2020-02-03 RX ADMIN — ENOXAPARIN SODIUM 40 MG: 40 INJECTION SUBCUTANEOUS at 08:53

## 2020-02-03 RX ADMIN — THERA TABS 1 TABLET: TAB at 08:53

## 2020-02-03 RX ADMIN — Medication 81 MG: at 08:52

## 2020-02-03 RX ADMIN — SODIUM CHLORIDE 1000 MG: 900 INJECTION, SOLUTION INTRAVENOUS at 00:10

## 2020-02-03 RX ADMIN — AMIODARONE HYDROCHLORIDE 200 MG: 200 TABLET ORAL at 08:52

## 2020-02-03 RX ADMIN — ISOSORBIDE MONONITRATE 30 MG: 30 TABLET, EXTENDED RELEASE ORAL at 08:52

## 2020-02-03 RX ADMIN — FUROSEMIDE 20 MG: 20 TABLET ORAL at 08:53

## 2020-02-03 NOTE — DISCHARGE INSTRUCTIONS
Patient Education   Patient Education   Patient Education   Patient {ARMBANDS:[id number]}Patient Education   Furosemide (By mouth)   Furosemide (ysgi-YF-ev-mide)  Treats fluid retention (edema) and high blood pressure. This medicine is a diuretic (water pill). Brand Name(s): Active-Medicated Specimen Collection Kit, Diuscreen Multi-Drug Medicated Test Kit, Lasix, RX-Specimen Collection Kit, Specimen Collection Kit   There may be other brand names for this medicine. When This Medicine Should Not Be Used: This medicine is not right for everyone. Do not use it if you had an allergic reaction to furosemide. How to Use This Medicine:   Liquid, Tablet  · Take your medicine as directed. Your dose may need to be changed several times to find what works best for you. · You may take this medicine with food if it upsets your stomach. · Oral liquid: Measure the oral liquid medicine with a marked measuring spoon, oral syringe, or medicine cup. · Tablet: Swallow the tablet whole. Do not crush, break, or chew it. · Missed dose: Take a dose as soon as you remember. If it is almost time for your next dose, wait until then and take a regular dose. Do not take extra medicine to make up for a missed dose. · Store the medicine in a closed container at room temperature, away from heat, moisture, and direct light. Drugs and Foods to Avoid:   Ask your doctor or pharmacist before using any other medicine, including over-the-counter medicines, vitamins, and herbal products. · Some medicines can affect how furosemide works.  Tell your doctor if you are also using any of the following:  ¨ Cisplatin, cyclosporine, digoxin, ethacrynic acid, licorice, lithium, methotrexate, or phenytoin  ¨ Adrenocorticotropic hormone (ACTH)  ¨ Laxative  ¨ Medicine to treat an infection  ¨ NSAID pain or arthritis medicine (including aspirin, diclofenac, ibuprofen, indomethacin, naproxen)  ¨ Other blood pressure medicines  ¨ Steroid medicine (including dexamethasone, hydrocortisone, methylprednisolone, prednisolone, prednisone)  ¨ Thyroid medicine  · If you also take sucralfate, allow at least 2 hours between the time you take furosemide and the time you take sucralfate. · Alcohol, narcotic pain medicine, or sleeping pills may cause you to feel more lightheaded, dizzy, or faint when used with this medicine. Warnings While Using This Medicine:   · Tell your doctor if you are pregnant or breastfeeding, or if you have kidney disease, liver disease (including cirrhosis), diabetes, gout, low blood pressure, lupus, an enlarged prostate, trouble urinating, or an allergy to sulfa drugs. Tell your doctor if you are on a low-salt diet. · This medicine may cause the following problems:   ¨ Low levels of minerals in your blood, such as potassium and sodium  ¨ Blood sugar level changes  ¨ Hearing problems  · Make sure any doctor or dentist who treats you knows that you are using this medicine. · This medicine could lower your blood pressure too much, especially when you first use it or if you are dehydrated. Stand or sit up slowly if you feel lightheaded or dizzy. · This medicine may make your skin more sensitive to sunlight. Wear sunscreen. Do not use sunlamps or tanning beds. · Your doctor will do lab tests at regular visits to check on the effects of this medicine. Keep all appointments. · Keep all medicine out of the reach of children. Never share your medicine with anyone.   Possible Side Effects While Using This Medicine:   Call your doctor right away if you notice any of these side effects:  · Allergic reaction: Itching or hives, swelling in your face or hands, swelling or tingling in your mouth or throat, chest tightness, trouble breathing  · Blistering, peeling, red skin rash  · Confusion, weakness, muscle twitching  · Dry mouth, increased thirst, muscle cramps, uneven heartbeat  · Sudden and severe stomach pain, nausea, vomiting, fever, lightheadedness  · Hearing loss, ringing in the ears  · Lightheadedness, dizziness, fainting  · Severe diarrhea  · Unusual bleeding or bruising  · Yellow skin or eyes  If you notice these less serious side effects, talk with your doctor:   · Loss of appetite, stomach cramps  If you notice other side effects that you think are caused by this medicine, tell your doctor. Call your doctor for medical advice about side effects. You may report side effects to FDA at 6-905-PVU-5018  © 2017 [address] [employer] Information is for End User's use only and may not be sold, redistributed or otherwise used for commercial purposes. The above information is an  only. It is not intended as medical advice for individual conditions or treatments. Talk to your doctor, nurse or pharmacist before following any medical regimen to see if it is safe and effective for you. Advance Care Planning for Heart Failure: Care Instructions  Your Care Instructions    If you have heart failure, taking care of yourself will help you feel better and live longer. But the disease often gets worse over time. So it may be a good idea to plan for the future now while you are active and able to communicate your wishes. If you do this kind of planning, it does not mean that you are giving up. It's simply the best way to make sure that you get the care and treatment that you want. It can also make things much easier for your loved ones. What can you do to plan for the end of life? · Talk openly and honestly with your family and doctor. This is the best way to understand the decisions you will need to make as your health changes. Know that you can always change your mind. · Ask your doctor about common life-support treatments. These include tube feedings, breathing machines, and fluids given through a vein (IV). It may be easier to decide if you want them after you are sure you understand what they are.   · Think about preparing written papers that state your wishes. These papers are called advance directives. If you do this, there will not be any confusion about your wishes. · If you are near the end of your life and you have a heart device such as a pacemaker, talk to your doctor about turning it off. Include this in your advance directive. · Ask a friend or family member to make decisions for you when you no longer can. Talk to this person about the kinds of treatments you want or don't want. Make sure this person understands your wishes. · You may decide to try life-supporting treatments for a limited time. This can help your doctor see if they will help. You may also decide that you want your doctor to do only certain things to keep you alive. It's important to be very clear about what you do and don't want. · Ask your doctor for an estimate of how long you may live. Your doctor may not always know. But he or she can tell you what usually happens with heart failure. Which papers should you prepare? Advance directives are legal papers that tell doctors how to care for you at the end of your life. They may include a living will and a durable power of . You don't need a  to write these papers. If you prepare these papers, be sure to give a copy to your doctor. It's also important to give a copy to a family member or close friend. · Think about a do-not-resuscitate order, or DNR. This order asks that no extra treatments be used to save your life if your heart stops. These treatments include electrical shock to restart your heart. They also include a machine to breathe for you and medicines to save your life. If you decide to have a DNR order, ask your doctor to write it. Then put it in your home where everyone can see it. · Think about a living will. It explains your wishes in case you are in a coma or cannot communicate. Living benavides tell doctors to use or not use treatments to keep you alive.  You must have one or two witnesses or a notary in the room when you sign this form. · Think about naming a person to make decisions about your care if you are not able to. This is called a durable power of . Most people ask a close friend or family member. · Ask your doctor or your state health department about advance directives. They may have forms for each of these types of papers. · All of these papers are simple to change. Tell your doctor what you want to change. Ask him or her to make a note in your file. Then give your family updated copies. Where can you learn more? Go to http://bozena-ciera.info/. Enter C022 in the search box to learn more about \"Advance Care Planning for Heart Failure: Care Instructions. \"  Current as of: April 9, 2019  Content Version: 12.2  © 8083-6646 [employer]. Care instructions adapted under license by [employer] (which disclaims liability or warranty for this information). If you have questions about a medical condition or this instruction, always ask your healthcare professional. [place] [address] any warranty or liability for your use of this information. Pneumonia: Care Instructions  Your Care Instructions    Pneumonia is an infection of the lungs. Most cases are caused by infections from bacteria or viruses. Pneumonia may be mild or very severe. If it is caused by bacteria, you will be treated with antibiotics. It may take a few weeks to a few months to recover fully from pneumonia, depending on how sick you were and whether your overall health is good. Follow-up care is a key part of your treatment and safety. Be sure to make and go to all appointments, and call your doctor if you are having problems. It's also a good idea to know your test results and keep a list of the medicines you take. How can you care for yourself at home? · Take your antibiotics exactly as directed.  Do not stop taking the medicine just because you are feeling better. You need to take the full course of antibiotics. · Take your medicines exactly as prescribed. Call your doctor if you think you are having a problem with your medicine. · Get plenty of rest and sleep. You may feel weak and tired for a while, but your energy level will improve with time. · To prevent dehydration, drink plenty of fluids, enough so that your urine is light yellow or clear like water. Choose water and other caffeine-free clear liquids until you feel better. If you have kidney, heart, or liver disease and have to limit fluids, talk with your doctor before you increase the amount of fluids you drink. · Take care of your cough so you can rest. A cough that brings up mucus from your lungs is common with pneumonia. It is one way your body gets rid of the infection. But if coughing keeps you from resting or causes severe fatigue and chest-wall pain, talk to your doctor. He or she may suggest that you take a medicine to reduce the cough. · Use a vaporizer or humidifier to add moisture to your bedroom. Follow the directions for cleaning the machine. · Do not smoke or allow others to smoke around you. Smoke will make your cough last longer. If you need help quitting, talk to your doctor about stop-smoking programs and medicines. These can increase your chances of quitting for good. · Take an over-the-counter pain medicine, such as acetaminophen (Tylenol), ibuprofen (Advil, Motrin), or naproxen (Aleve). Read and follow all instructions on the label. · Do not take two or more pain medicines at the same time unless the doctor told you to. Many pain medicines have acetaminophen, which is Tylenol. Too much acetaminophen (Tylenol) can be harmful. · If you were given a spirometer to measure how well your lungs are working, use it as instructed. This can help your doctor tell how your recovery is going.   · To prevent pneumonia in the future, talk to your doctor about getting a flu vaccine (once a year) and a pneumococcal vaccine (one time only for most people). When should you call for help? Call 911 anytime you think you may need emergency care. For example, call if:    · You have severe trouble breathing.    Call your doctor now or seek immediate medical care if:    · You cough up dark brown or bloody mucus (sputum).     · You have new or worse trouble breathing.     · You are dizzy or lightheaded, or you feel like you may faint.    Watch closely for changes in your health, and be sure to contact your doctor if:    · You have a new or higher fever.     · You are coughing more deeply or more often.     · You are not getting better after 2 days (48 hours).     · You do not get better as expected. Where can you learn more? Go to http://bozena-ciera.info/. Enter [id number] in the search box to learn more about \"Pneumonia: Care Instructions. \"  Current as of: June 9, 2019  Content Version: 12.2  © 6515-3664 [employer]. Care instructions adapted under license by [employer] (which disclaims liability or warranty for this information). If you have questions about a medical condition or this instruction, always ask your healthcare professional. [address] any warranty or liability for your use of this information. Urinary Tract Infections in Men: Care Instructions  Your Care Instructions    A urinary tract infection, or UTI, is a general term for an infection anywhere between the kidneys and the tip of the penis. UTIs can also be a result of a prostate problem. Most cause pain or burning when you urinate. Most UTIs are caused by bacteria and can be cured with antibiotics. It is important to complete your treatment so that the infection does not get worse. Follow-up care is a key part of your treatment and safety.  Be sure to make and go to all appointments, and call your doctor if you are having problems. It's also a good idea to know your test results and keep a list of the medicines you take. How can you care for yourself at home? · Take your antibiotics as prescribed. Do not stop taking them just because you feel better. You need to take the full course of antibiotics. · Take your medicines exactly as prescribed. Your doctor may have prescribed a medicine, such as phenazopyridine (Pyridium), to help relieve pain when you urinate. This turns your urine orange. You may stop taking it when your symptoms get better. But be sure to take all of your antibiotics, which treat the infection. · Drink extra water for the next day or two. This will help make the urine less concentrated and help wash out the bacteria causing the infection. (If you have kidney, heart, or liver disease and have to limit your fluids, talk with your doctor before you increase your fluid intake.)  · Avoid drinks that are carbonated or have caffeine. They can irritate the bladder. · Urinate often. Try to empty your bladder each time. · To relieve pain, take a hot bath or lay a heating pad (set on low) over your lower belly or genital area. Never go to sleep with a heating pad in place. To help prevent UTIs  · Drink plenty of fluids, enough so that your urine is light yellow or clear like water. If you have kidney, heart, or liver disease and have to limit fluids, talk with your doctor before you increase the amount of fluids you drink. · Urinate when you have the urge. Do not hold your urine for a long time. Urinate before you go to sleep. · Keep your penis clean. Catheter care  If you have a drainage tube (catheter) in place, the following steps will help you care for it. · Always wash your hands before and after touching your catheter. · Check the area around the urethra for inflammation or signs of infection. Signs of infection include irritated, swollen, red, or tender skin, or pus around the catheter.   · Clean the area around the catheter with soap and water two times a day. Dry with a clean towel afterward. · Do not apply powder or lotion to the skin around the catheter. To empty the urine collection bag  · Wash your hands with soap and water. · Without touching the drain spout, remove the spout from its sleeve at the bottom of the collection bag. Open the valve on the spout. · Let the urine flow out of the bag and into the toilet or a container. Do not let the tubing or drain spout touch anything. · After you empty the bag, clean the end of the drain spout with tissue and water. Close the valve and put the drain spout back into its sleeve at the bottom of the collection bag. · Wash your hands with soap and water. When should you call for help? Call your doctor now or seek immediate medical care if:    · Symptoms such as a fever, chills, nausea, or vomiting get worse or happen for the first time.     · You have new pain in your back just below your rib cage. This is called flank pain.     · There is new blood or pus in your urine.     · You are not able to take or keep down your antibiotics.    Watch closely for changes in your health, and be sure to contact your doctor if:    · You are not getting better after taking an antibiotic for 2 days.     · Your symptoms go away but then come back. Where can you learn more? Go to http://bozena-ciera.info/. Enter M769 in the search box to learn more about \"Urinary Tract Infections in Men: Care Instructions. \"  Current as of: December 19, 2018  Content Version: 12.2  © 0860-7536 [employer]. Care instructions adapted under license by [employer] (which disclaims liability or warranty for this information). If you have questions about a medical condition or this instruction, always ask your healthcare professional. [address] any warranty or liability for your use of this information.

## 2020-02-03 NOTE — PROGRESS NOTES
The Plan for Transition of Care is related to the following treatment goals: Home with home health [facility])    The patient  was provided with a choice of provider and agrees with the discharge plan. [x] Yes [] No    Freedom of choice list was provided with basic dialogue that supports the patient's individualized plan of care/goals, treatment preferences and shares the quality data associated with the providers. [x] Yes [] No.    The official referral was made to [facility] home health. Patient's family will transport home, at the time of discharge. This writer will continue to monitor for discharge planning to ensure a safe discharge home from [facility]. Porter Sam MSTOSHA  Care Manager  Pager#: [phone]

## 2020-02-03 NOTE — PROGRESS NOTES
Problem: Patient Education: Go to Patient Education Activity  Goal: Patient/Family Education  Outcome: Progressing Towards Goal     Problem: Heart Failure: Day 1  Goal: Off Pathway (Use only if patient is Off Pathway)  Outcome: Progressing Towards Goal  Goal: Activity/Safety  Outcome: Progressing Towards Goal  Goal: Consults, if ordered  Outcome: Progressing Towards Goal  Goal: Diagnostic Test/Procedures  Outcome: Progressing Towards Goal  Goal: Nutrition/Diet  Outcome: Progressing Towards Goal  Goal: Discharge Planning  Outcome: Progressing Towards Goal  Goal: Medications  Outcome: Progressing Towards Goal  Goal: Respiratory  Outcome: Progressing Towards Goal  Goal: Treatments/Interventions/Procedures  Outcome: Progressing Towards Goal  Goal: Psychosocial  Outcome: Progressing Towards Goal  Goal: *Oxygen saturation within defined limits  Outcome: Progressing Towards Goal  Goal: *Hemodynamically stable  Outcome: Progressing Towards Goal  Goal: *Optimal pain control at patient's stated goal  Outcome: Progressing Towards Goal  Goal: *Anxiety reduced or absent  Outcome: Progressing Towards Goal     Problem: Heart Failure: Day 2  Goal: Off Pathway (Use only if patient is Off Pathway)  Outcome: Progressing Towards Goal  Goal: Activity/Safety  Outcome: Progressing Towards Goal  Goal: Consults, if ordered  Outcome: Progressing Towards Goal  Goal: Diagnostic Test/Procedures  Outcome: Progressing Towards Goal  Goal: Nutrition/Diet  Outcome: Progressing Towards Goal  Goal: Discharge Planning  Outcome: Progressing Towards Goal  Goal: Medications  Outcome: Progressing Towards Goal  Goal: Respiratory  Outcome: Progressing Towards Goal  Goal: Treatments/Interventions/Procedures  Outcome: Progressing Towards Goal  Goal: Psychosocial  Outcome: Progressing Towards Goal  Goal: *Oxygen saturation within defined limits  Outcome: Progressing Towards Goal  Goal: *Hemodynamically stable  Outcome: Progressing Towards Goal  Goal: *Optimal pain control at patient's stated goal  Outcome: Progressing Towards Goal  Goal: *Anxiety reduced or absent  Outcome: Progressing Towards Goal  Goal: *Demonstrates progressive activity  Outcome: Progressing Towards Goal     Problem: Heart Failure: Day 3  Goal: Off Pathway (Use only if patient is Off Pathway)  Outcome: Progressing Towards Goal  Goal: Activity/Safety  Outcome: Progressing Towards Goal  Goal: Diagnostic Test/Procedures  Outcome: Progressing Towards Goal  Goal: Nutrition/Diet  Outcome: Progressing Towards Goal  Goal: Discharge Planning  Outcome: Progressing Towards Goal  Goal: Medications  Outcome: Progressing Towards Goal  Goal: Respiratory  Outcome: Progressing Towards Goal  Goal: Treatments/Interventions/Procedures  Outcome: Progressing Towards Goal  Goal: Psychosocial  Outcome: Progressing Towards Goal  Goal: *Oxygen saturation within defined limits  Outcome: Progressing Towards Goal  Goal: *Hemodynamically stable  Outcome: Progressing Towards Goal  Goal: *Optimal pain control at patient's stated goal  Outcome: Progressing Towards Goal  Goal: *Anxiety reduced or absent  Outcome: Progressing Towards Goal  Goal: *Demonstrates progressive activity  Outcome: Progressing Towards Goal     Problem: Heart Failure: Day 4  Goal: Off Pathway (Use only if patient is Off Pathway)  Outcome: Progressing Towards Goal  Goal: Activity/Safety  Outcome: Progressing Towards Goal  Goal: Diagnostic Test/Procedures  Outcome: Progressing Towards Goal  Goal: Nutrition/Diet  Outcome: Progressing Towards Goal  Goal: Discharge Planning  Outcome: Progressing Towards Goal  Goal: Medications  Outcome: Progressing Towards Goal  Goal: Respiratory  Outcome: Progressing Towards Goal  Goal: Treatments/Interventions/Procedures  Outcome: Progressing Towards Goal  Goal: Psychosocial  Outcome: Progressing Towards Goal  Goal: *Oxygen saturation within defined limits  Outcome: Progressing Towards Goal  Goal: *Hemodynamically stable  Outcome: Progressing Towards Goal  Goal: *Optimal pain control at patient's stated goal  Outcome: Progressing Towards Goal  Goal: *Anxiety reduced or absent  Outcome: Progressing Towards Goal  Goal: *Demonstrates progressive activity  Outcome: Progressing Towards Goal     Problem: Heart Failure: Day 5  Goal: Off Pathway (Use only if patient is Off Pathway)  Outcome: Progressing Towards Goal  Goal: Activity/Safety  Outcome: Progressing Towards Goal  Goal: Diagnostic Test/Procedures  Outcome: Progressing Towards Goal  Goal: Nutrition/Diet  Outcome: Progressing Towards Goal  Goal: Discharge Planning  Outcome: Progressing Towards Goal  Goal: Medications  Outcome: Progressing Towards Goal  Goal: Respiratory  Outcome: Progressing Towards Goal  Goal: Treatments/Interventions/Procedures  Outcome: Progressing Towards Goal  Goal: Psychosocial  Outcome: Progressing Towards Goal     Problem: Heart Failure: Discharge Outcomes  Goal: *Demonstrates ability to perform prescribed activity without shortness of breath or discomfort  Outcome: Progressing Towards Goal  Goal: *Left ventricular function assessment completed prior to or during stay, or planned for post-discharge  Outcome: Progressing Towards Goal  Goal: *ACEI prescribed if LVEF less than 40% and no contraindications or ARB prescribed  Outcome: Progressing Towards Goal  Goal: *Verbalizes understanding and describes prescribed diet  Outcome: Progressing Towards Goal  Goal: *Verbalizes understanding/describes prescribed medications  Outcome: Progressing Towards Goal  Goal: *Describes available resources and support systems  Description  (eg: Home Health, Palliative Care, [employer] Directive)  Outcome: Progressing Towards Goal  Goal: *Describes smoking cessation resources  Outcome: Progressing Towards Goal  Goal: *Understands and describes signs and symptoms to report to providers(Stroke Metric)  Outcome: Progressing Towards Goal  Goal: *Describes/verbalizes understanding of follow-up/return appt  Description  (eg: to physicians, diabetes treatment coordinator, and other resources  Outcome: Progressing Towards Goal  Goal: *Describes importance of continuing daily weights and changes to report to physician  Outcome: Progressing Towards Goal     Problem: Patient Education: Go to Patient Education Activity  Goal: Patient/Family Education  Outcome: Progressing Towards Goal     Problem: Pneumonia: Day 1  Goal: Off Pathway (Use only if patient is Off Pathway)  Outcome: Progressing Towards Goal  Goal: Activity/Safety  Outcome: Progressing Towards Goal  Goal: Consults, if ordered  Outcome: Progressing Towards Goal  Goal: Diagnostic Test/Procedures  Outcome: Progressing Towards Goal  Goal: Nutrition/Diet  Outcome: Progressing Towards Goal  Goal: Medications  Outcome: Progressing Towards Goal  Goal: Respiratory  Outcome: Progressing Towards Goal  Goal: Treatments/Interventions/Procedures  Outcome: Progressing Towards Goal  Goal: Psychosocial  Outcome: Progressing Towards Goal  Goal: *Oxygen saturation within defined limits  Outcome: Progressing Towards Goal  Goal: *Influenza vaccine administered (October-March)  Outcome: Progressing Towards Goal  Goal: *Pneumoccocal vaccine administered  Outcome: Progressing Towards Goal  Goal: *Hemodynamically stable  Outcome: Progressing Towards Goal  Goal: *Demonstrates progressive activity  Outcome: Progressing Towards Goal  Goal: *Tolerating diet  Outcome: Progressing Towards Goal     Problem: Pneumonia: Day 2  Goal: Off Pathway (Use only if patient is Off Pathway)  Outcome: Progressing Towards Goal  Goal: Activity/Safety  Outcome: Progressing Towards Goal  Goal: Consults, if ordered  Outcome: Progressing Towards Goal  Goal: Diagnostic Test/Procedures  Outcome: Progressing Towards Goal  Goal: Nutrition/Diet  Outcome: Progressing Towards Goal  Goal: Discharge Planning  Outcome: Progressing Towards Goal  Goal: Medications  Outcome: Progressing Towards Goal  Goal: Respiratory  Outcome: Progressing Towards Goal  Goal: Treatments/Interventions/Procedures  Outcome: Progressing Towards Goal  Goal: Psychosocial  Outcome: Progressing Towards Goal  Goal: *Oxygen saturation within defined limits  Outcome: Progressing Towards Goal  Goal: *Hemodynamically stable  Outcome: Progressing Towards Goal  Goal: *Demonstrates progressive activity  Outcome: Progressing Towards Goal  Goal: *Tolerating diet  Outcome: Progressing Towards Goal  Goal: *Optimal pain control at patient's stated goal  Outcome: Progressing Towards Goal     Problem: Pneumonia: Day 3  Goal: Off Pathway (Use only if patient is Off Pathway)  Outcome: Progressing Towards Goal  Goal: Activity/Safety  Outcome: Progressing Towards Goal  Goal: Consults, if ordered  Outcome: Progressing Towards Goal  Goal: Diagnostic Test/Procedures  Outcome: Progressing Towards Goal  Goal: Nutrition/Diet  Outcome: Progressing Towards Goal  Goal: Discharge Planning  Outcome: Progressing Towards Goal  Goal: Medications  Outcome: Progressing Towards Goal  Goal: Respiratory  Outcome: Progressing Towards Goal  Goal: Treatments/Interventions/Procedures  Outcome: Progressing Towards Goal  Goal: Psychosocial  Outcome: Progressing Towards Goal  Goal: *Oxygen saturation within defined limits  Outcome: Progressing Towards Goal  Goal: *Hemodynamically stable  Outcome: Progressing Towards Goal  Goal: *Demonstrates progressive activity  Outcome: Progressing Towards Goal  Goal: *Tolerating diet  Outcome: Progressing Towards Goal  Goal: *Describes available resources and support systems  Outcome: Progressing Towards Goal  Goal: *Optimal pain control at patient's stated goal  Outcome: Progressing Towards Goal     Problem: Pneumonia: Day 4  Goal: Off Pathway (Use only if patient is Off Pathway)  Outcome: Progressing Towards Goal  Goal: Activity/Safety  Outcome: Progressing Towards Goal  Goal: Nutrition/Diet  Outcome: Progressing Towards Goal  Goal: Discharge Planning  Outcome: Progressing Towards Goal  Goal: Medications  Outcome: Progressing Towards Goal  Goal: Respiratory  Outcome: Progressing Towards Goal  Goal: Treatments/Interventions/Procedures  Outcome: Progressing Towards Goal  Goal: Psychosocial  Outcome: Progressing Towards Goal     Problem: Pneumonia: Discharge Outcomes  Goal: *Demonstrates progressive activity  Outcome: Progressing Towards Goal  Goal: *Describes follow-up/return visits to physicians  Outcome: Progressing Towards Goal  Goal: *Tolerating diet  Outcome: Progressing Towards Goal  Goal: *Verbalizes name, dosage, time, side effects, and number of days to continue medications  Outcome: Progressing Towards Goal  Goal: *Influenza immunization  Outcome: Progressing Towards Goal  Goal: *Pneumococcal immunization  Outcome: Progressing Towards Goal  Goal: *Respiratory status at baseline  Outcome: Progressing Towards Goal  Goal: *Vital signs within defined limits  Outcome: Progressing Towards Goal  Goal: *Describes available resources and support systems  Outcome: Progressing Towards Goal  Goal: *Optimal pain control at patient's stated goal  Outcome: Progressing Towards Goal     Problem: Patient Education: Go to Patient Education Activity  Goal: Patient/Family Education  Outcome: Progressing Towards Goal     Problem: Nutrition Deficit  Goal: *Optimize nutritional status  Outcome: Progressing Towards Goal     Problem: Patient Education: Go to Patient Education Activity  Goal: Patient/Family Education  Outcome: Progressing Towards Goal     Problem: Patient Education: Go to Patient Education Activity  Goal: Patient/Family Education  Outcome: Progressing Towards Goal

## 2020-02-03 NOTE — PROGRESS NOTES
ADULT PROTOCOL: JET AEROSOL ASSESSMENT    Patient  Magda Lugo     67 y.o.   male     2/2/2020  11:08 PM    Breath Sounds Pre Procedure:  Breath Sounds Bilateral: Clear, Diminished                                            Breath Sounds Post Procedure: Breath Sounds Bilateral: Clear, Diminished                                               Breathing pattern: Pre procedure  Breathing Pattern: Regular          Post procedure  Breathing Pattern: Regular    Cough: Pre procedure  Cough: Non-productive               Post procedure Cough: Non-productive    Heart Rate: Pre procedure Pulse: 63           Post procedure Pulse: 66    Resp Rate: Pre procedure  Respirations: 12           Post procedure          Nebulizer Therapy: Current medications Aerosolized Medications: DuoNeb       Problem List:   Patient Active Problem List   Diagnosis Code    Tobacco abuse Z72.0    Hypercholesterolemia E78.00    CAD (coronary artery disease), native coronary artery I25.10    Hypertensive heart disease with CHF ([facility] I11.0    Emphysematous COPD ([facility] 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    Alcohol abuse F10.10    Paresthesia and pain of both upper extremities R20.2, M79.601, M79.602    Binocular visual disturbance H53.30    Unspecified hereditary and idiopathic peripheral neuropathy G60.9    Diverticula of colon K57.30    S/P CABG x 5 Z95.1    Congestive heart failure (CHF) (HCC) I50.9    Wide-complex tachycardia (HCC) I47.2    Acute on chronic combined systolic and diastolic ACC/AHA stage C congestive heart failure (HCC) I50.43    CHF exacerbation (HCC) I50.9    AICD (automatic cardioverter/defibrillator) present Z95.810    UTI (urinary tract infection) N39.0    Right lower lobe pneumonia (HCC) J18.1    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 (HCC) I50.40       Patient alert and cooperative to use MDI: Yes    Home Respiratory Therapy Regimen/Frequency:  YES  Medication   Device  Frequency     SEVERITY INDEX:    ITEM 0 1 2 3 4 Score   Respiratory Pattern and or Rate Regular  10-19 Regular  20-24   24-30    30-34 Severe SOB or   Greater than 35 0   Breath Sounds Clear Occasional Wheeze Mild Wheezing Moderate Wheezing  wheezing/Absent breath sounds 0   Shortness of Breath None Dyspnea on Exertion Dyspnea at Rest Moderate Shortness of Breath at Rest Severe Shortness of Breath - Limited Speech 0       Total Score:  0    * Scoring Guidelines  0-4 pts:  PRN-BID   5-7 pts:  BID, TID, QID  8-9 pts:  TID, QID, Q6  10-12 pts:  Q4-Q6  * - Guidelines used with clinical judgement. PRN Treatments can be ordered to supplement scheduled treatments. Regardless of score, frequency should not be less than normal home regimen.     Recommended Order/Frequency:  TID    Comments:          Respiratory Therapist: Jorge Olvera RT

## 2020-02-03 NOTE — PROGRESS NOTES
[facility]  -  Progress Note      Patient: Uma Santos MRN: [id number]  SSN: [id number]    YOB: 1940  Age: 67 y.o. Sex: male      Admit Date: 1/30/2020    Assessment:     Hospital Problems  Date Reviewed: 1/3/2020          Codes Class Noted POA    UTI (urinary tract infection) ICD-10-CM: N39.0  ICD-9-CM: 599.0  1/31/2020 Unknown        Right lower lobe pneumonia [facility]) ICD-10-CM: J18.1  ICD-9-CM: 985  1/31/2020 Unknown        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 ([facility] [address] [address] ICD-10-CM: I50.40  ICD-9-CM: 428.40  1/31/2020 Unknown            -Acute on chronic systolic heart failure, last admission October 2019, not taking lasix at home for a few days, likely trigger  -Hypokalemia, replacing and improved  -UTI, on antbx  -Ischemic cardiomyopathy with EF 16-20% September 2019 with multiple wall motion abnormalities.  -History of extensive coronary artery disease with previous CABG and heart catheterization February 2019 with severe native disease with patent LIMA to LAD, but both sequential vein grafts occluded. There was native diagonal and OM disease at 85% and was medically treated.   -[employer] dual chamber AICD 9/2019, normal function this admission, no events by check 1/31/20  -h/o bundle branch VT maintained on amiodarone  -h/o COPD on chronic oxygen therapy at home  -h/o HTN  -Remote tobacco and alcohol use. -Intermittent tobacco use     Primary cardiologist Dr. Estefania Adair:     CV status stable and likely at baseline. Would walk patient to assess function and tolerance  Continue with all other medication regimens  No now recommendations at this time  Home per primary team.    Subjective:     No new complaints. States he feels \"1000% better\".     Objective:      Patient Vitals for the past 8 hrs:   Temp Pulse Resp BP SpO2   02/03/20 0740 98.3 °F (36.8 °C) 63 18 162/90 100 %   02/03/20 0353 97.4 °F (36.3 °C) 66 20 138/68 96 %         Patient Vitals for the past 96 hrs:   Weight   02/02/20 2147 53.9 kg (118 lb 13.3 oz)   02/01/20 0848 49.4 kg (109 lb)   02/01/20 0425 49.5 kg (109 lb 1.6 oz)         Intake/Output Summary (Last 24 hours) at 2/3/2020 0924  Last data filed at 2/3/2020 0630  Gross per 24 hour   Intake 1520 ml   Output 1000 ml   Net 520 ml       Physical Exam:  General:  alert, cooperative, no distress, appears stated age  Neck:  nontender, no JVD  Lungs:  clear to auscultation bilaterally  Heart:  regular rate and rhythm, S1, S2 normal, no murmur, click, rub or gallop  Abdomen:  abdomen is soft without significant tenderness, masses, organomegaly or guarding  Extremities:  extremities normal, atraumatic, no cyanosis or edema    Data Review:     Labs: Results:       Chemistry Recent Labs     02/03/20  0335 02/02/20  0324 02/01/20  1300 02/01/20  0139   GLU 96 108*  --  101*    138  --  138   K 3.8 4.0 5.6* 6.0*    108  --  109   CO2 26 23  --  23   BUN 17 24*  --  26*   CREA 1.58* 1.63*  --  1.84*   CA 8.0* 8.2*  --  8.4*   AGAP 4 7  --  6   BUCR 11* 15  --  14      CBC w/Diff Recent Labs     02/03/20  0335 02/02/20  0324 02/01/20  0139   WBC  --  7.0 10.0   RBC  --  2.82* 3.23*   HGB 8.8* 9.1* 10.5*   HCT 27.6* 28.1* 32.5*   PLT  --  220 270   GRANS  --  57  --    LYMPH  --  29  --    EOS  --  3  --       Cardiac Enzymes No results found for: CPK, CK, CKMMB, CKMB, RCK3, CKMBT, CKNDX, CKND1, EDWARD, TROPT, TROIQ, ALLAN, TROPT, TNIPOC, BNP, BNPP   Coagulation No results for input(s): PTP, INR, APTT, INREXT in the last 72 hours.     Lipid Panel Lab Results   Component Value Date/Time    Cholesterol, total 144 05/03/2019 10:26 AM    HDL Cholesterol 86 (H) 05/03/2019 10:26 AM    LDL, calculated 18 05/03/2019 10:26 AM    VLDL, calculated 40 05/03/2019 10:26 AM    Triglyceride 200 (H) 05/03/2019 10:26 AM    CHOL/HDL Ratio 1.7 05/03/2019 10:26 AM      BNP No results found for: BNP, BNPP, XBNPT   Liver Enzymes No results for input(s): TP, ALB, TBIL, AP, SGOT, GPT in the last 72 hours.     No lab exists for component: DBIL   Digoxin    Thyroid Studies Lab Results   Component Value Date/Time    TSH 4.31 (H) 09/24/2019 09:57 AM

## 2020-02-03 NOTE — PROGRESS NOTES
Discharge:    Patient will discharge home today (2/3/2020) with family assistance and home health services through [facility]. Patient's family will transport home at the time of discharge. There are no other care management concerns regarding this discharge. This writer will continue to closely monitor for discharge planning to ensure a safe discharge home from [facility]. Porter Chao MSW  Care Manager  Pager#: [phone]

## 2020-02-03 NOTE — HOME CARE
Discharge noted for today. Received home health referral for [facility] for SN, PT, and OT with telehealth. Met with patient, explained services and answered all questions. Demographics verified. Order processed and emailed to central office. Patient has the following DME: stationary oxygen concentrator, portable oxygen concentrator, rollator, nebulizer, and CPAP.  Millicent Parks, [facility] Liaison

## 2020-02-03 NOTE — DISCHARGE SUMMARY
[address]  Face to Face Encounter    Patients Name: Rosy Selby    YOB: 1940    Primary Diagnosis:     1. Dyspnea on exertion due to #2, much better  2. Acute on chronic systolic CHF with EF of 15 to 20% due to medical noncompliance, compensated  3. Chronic hypoxic respiratory failure on home oxygen, stable  4. UTI status post treatment  5. COPD without any exacerbation  6. Medical noncompliance  7. Hyperkalemia, resolved  8. Coronary artery disease  9. Ischemic cardiomyopathy status post AICD  10. Acute on stage III chronic kidney disease due to diuretics, better  11. Staph epidermidis bacteremia could be contamination, resolved    Date of Face to Face:   February 3, 2020                                    Face to Face Encounter findings are related to primary reason for home care:   yes    1. I certify that the patient needs intermittent skilled nursing care, physical therapy and/or speech therapy. I will not be following this patient in the Community and Dr. Gaviota Red MD and dr. Chris Benavides office will be responsible for signing the [address] 2. Initial Orders for Care: Must be completed only if Face to Face MD will n  Gastroenterologist will be ot be signing the [address] Skilled Nursing, Physical Therapy and Occupational Therapy    3. I certify that this patient is homebound for the following reason(s): Requires considerable and taxing effort to leave the home  and Requires the assistance of 1 or more persons to leave the home     4. I certify that this patient is under my care and that I, or a nurse practitioner or  [id number] working with me, had a Face-to-Face Encounter that meets the physician Face-to-Face Encounter requirements.   Document the physical findings from the Face-to-Face Encounter that support the need for skilled services: Has new diagnosis that requires skilled nursing teaching and intervention , Has new medications that requires skilled nursing teaching and monitoring for understanding and compliance  and Has new finding of weakness and altered mobility that requires skilled physical/occupational and/or speech therapy services for evaluation and interventions.      Cleve Yap MD  2/3/2020

## 2020-02-03 NOTE — PROGRESS NOTES
conducted a Follow up consultation and Spiritual Assessment for Coleen Reyez, who is a 67 y. o.,male. The  provided the following Interventions:  Continued the relationship of care and support. Listened empathically. Offered prayer and assurance of continued prayer on patients behalf. Chart reviewed. The following outcomes were achieved:  Patient expressed gratitude for 's visit. Assessment:  There are no further spiritual or Jew issues which require Spiritual Care Services interventions at this time. Plan:  Chaplains will continue to follow and will provide pastoral care on an as needed/requested basis.  recommends bedside caregivers page  on duty if patient shows signs of acute spiritual or emotional distress.        [address] [facility]   [phone]

## 2020-02-03 NOTE — PROGRESS NOTES
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 This writer will continue to monitor for potential home health needs and orders. Likelihood of Readmission:   Moderate                         Do you (patient/family) have any concerns for transition/discharge?  no    Transition of Care Plan:   Patient plans to return home with help from his family. Patient's family will transport home at the time of discharge. Initial assessment completed with patient. Cognitive status of patient: Alert and oriented. Face sheet information confirmed:  yes. The patient designates his daughter Estefania Jimenez), son Laurel Gan), niece Maxwell Tesfaye) and his ex-wife Riri Chawla) to participate in his discharge plan and to receive any needed information. This patient lives in a 1-story house with two of his nieces Maxwell Tesfaye) is one of them. Patient is able to navigate steps as needed. Prior to hospitalization, patient was considered to be independent with ADLs/IADLS : yes . Patient has a current ACP document on file: yes     Patient's family will be available to transport patient home upon discharge. The patient already has a rollator, a cane and a home O2 ([employer]), available in the home. Patient is not currently active with home health. Patient has stayed in a skilled nursing facility or rehab; however, it was not recently. This patient is on dialysis :no    Currently, the discharge plan is to return home with help from his family. Patient's family will also transport home, at the time of discharge. Patient's niece and main contact is (Fransisco Neil, CA#[phone]). Patient's daughter is Hafsa Goodman, #[phone]).  Patient's son is Merline Elsa Valenzuela, #[phone]). Patient's ex-wife is (Viral Zuniga, VQ#[phone]). Patient's PCP is Casper Rehman MD. Patient is insured through Medicare [employer] and he also has . The patient states that he can obtain his medications from the pharmacy, and take his medications as directed. This writer will continue to monitor for discharge planning to ensure a safe discharge home from [facility]. Care Management Interventions  PCP Verified by CM: Yes(Dr. Casper Rehman)  Palliative Care Criteria Met (RRAT>21 & CHF Dx)?: No  Mode of Transport at Discharge: Other (see comment)(Family will transport.  )  Transition of Care Consult (CM Consult): Discharge Planning  Current Support Network: Own Home, Family Lives Nearby(Patient has 2 neices that reside with him.)  Confirm Follow Up Transport: Family  Discharge Location  Discharge Placement: Home with family assistance        Porter Villalta MSW  Care Manager  Pager#: [phone]

## 2020-02-03 NOTE — PROGRESS NOTES
SUBJECTIVE:    Patient was seen in presence of nursing. Hartness of breath is much better. Cough is getting better. No chest pain or abdominal pain. No nausea or vomiting. Dr. Patti Song also stopped by to see him and cleared for the discharge. OBJECTIVE:    /70 (BP 1 Location: Left arm, BP Patient Position: At rest)   Pulse 68   Temp 98.2 °F (36.8 °C)   Resp 17   Ht 5' 5\" (1.651 m)   Wt 53.9 kg (118 lb 13.3 oz)   SpO2 100%   BMI 19.77 kg/m²       Intake/Output Summary (Last 24 hours) at 2/3/2020 1256  Last data filed at 2/3/2020 1043  Gross per 24 hour   Intake 1400 ml   Output 900 ml   Net 500 ml     General appearance - alert, well appearing, and in no distress  Chest - decreased air entry noted in bases, no wheezes currently  Heart - S1 and S2 normal, PPM present  Abdomen - soft, nontender, nondistended, Bowel sounds present  Neurological - alert, oriented, normal speech, no focal findings noted  Extremities - no pedal edema noted    ASSESSMENT:    1. Dyspnea on exertion due to #2, much better  2. Acute on chronic systolic CHF with EF of 15 to 20% due to medical noncompliance, compensated  3. Chronic hypoxic respiratory failure on home oxygen, stable  4. UTI status post treatment  5. COPD without any exacerbation  6. Medical noncompliance  7. Hyperkalemia, resolved  8. Coronary artery disease  9. Ischemic cardiomyopathy status post AICD  10. Acute on stage III chronic kidney disease due to diuretics, better  11.   Staph epidermidis bacteremia could be contamination, resolved    PLAN:    Continue current management  Discharge patient home today with home health care  No need for any vancomycin for #11  Discussed with patient's niece    CMP:   Lab Results   Component Value Date/Time     02/03/2020 03:35 AM    K 3.8 02/03/2020 03:35 AM     02/03/2020 03:35 AM    CO2 26 02/03/2020 03:35 AM    AGAP 4 02/03/2020 03:35 AM    GLU 96 02/03/2020 03:35 AM    BUN 17 02/03/2020 03:35 AM CREA 1.58 (H) 02/03/2020 03:35 AM    GFRAA 52 (L) 02/03/2020 03:35 AM    GFRNA 43 (L) 02/03/2020 03:35 AM    CA 8.0 (L) 02/03/2020 03:35 AM     CBC:   Lab Results   Component Value Date/Time    HGB 8.8 (L) 02/03/2020 03:35 AM    HCT 27.6 (L) 02/03/2020 03:35 AM     Disclaimer: Sections of this note are dictated using utilizing voice recognition software. Minor typographical errors may be present. If questions arise, please do not hesitate to contact me or call our department.

## 2020-02-03 NOTE — PROGRESS NOTES
Bedside shift change report given to [address] (oncoming nurse) by Angel Seymour RN (offgoing nurse). Report included the following information SBAR, Kardex and MAR.

## 2020-02-03 NOTE — PROGRESS NOTES
NUTRITION     Nursing Referral: [facility]      RECOMMENDATIONS / PLAN:     - Continue current nutrition interventions  - Continue RD inpatient monitoring and evaluation. NUTRITION INTERVENTIONS & DIAGNOSIS:     - Meals/snacks: modified composition  - Medical food supplement therapy:  continue  - Nutrition counseling: potassium content in foods on 2/1     Nutrition Diagnosis: Acute disease or condition related malnutrition related to prolonged inadequate energy intake as evidenced by pt with moderate muscle mass and fat losses. Patient meets criteria for Severe Protein Calorie Malnutrition as evidenced by:   ASPEN Malnutrition Criteria  Acute Illness, Chronic Illness, or Social/Enviornmental: Acute illness  Body Fat Loss: Moderate(triceps and buccal region)  Muscle Mass Loss: Moderate(clavicles, calves )  ASPEN Malnutrition Score - Acute Illness: 12  Acute Illness - Malnutrition Diagnosis: Severe malnutrition. ASSESSMENT:     2/3: Pt reported appetite and meal intake are good, improving; consuming >90% of meals. Tolerating diet. Did not consume Ensure drink yet. Supplement + po intake of meals is adequate to meet estimated nutrition needs. Noted plan for discharge today; discussed nutrition recommendations for post discharge. Potassium WNL; improved. Pt denied having any questions regarding nutrition education provide during previous visit    2/1: Pt reported fair/good meal intake PTA; fair since admission, consuming 33% of meals. Agreeable to nutrition supplement; likes Ensure chocolate. Poor dentition, has only a few teeth, but stated is tolerating regular consistency diet. NFPE conducted. Pt had low K level; given replacement. K high, received kayexalate.  Pt asked for information on potassium rich foods; provided information and explained to increase intake if K level is low and to limit intake if K level is high or high/normal.      Nutritional intake adequate to meet patients estimated nutritional needs: Yes    Diet: DIET CARDIAC Regular  DIET NUTRITIONAL SUPPLEMENTS Lunch; ENSURE ENLIVE      Food Allergies:  None known   Current Appetite: Good  Appetite/meal intake prior to admission: fair/ good  Feeding Limitations:  [] Swallowing difficulty    [] Chewing difficulty    [x] Other: Poor dentition, has only a few teeth, but stated is tolerating regular consistency diet. Current Meal Intake:   Patient Vitals for the past 100 hrs:   % Diet Eaten   02/03/20 1321 100 %   02/03/20 1043 80 %   02/02/20 1730 100 %   02/02/20 1236 100 %   02/02/20 0824 25 %   02/01/20 1740 90 %   02/01/20 1249 40 %   02/01/20 0827 50 %       BM: 2/1  Skin Integrity:  No pressure injury or wound noted  Edema:   [] No     [x] Yes   Pertinent Medications: Reviewed: atrovastatin, pantoprazole, MVI     Recent Labs     02/03/20  0335 02/02/20  0324 02/01/20  1300 02/01/20  0139    138  --  138   K 3.8 4.0 5.6* 6.0*    108  --  109   CO2 26 23  --  23   GLU 96 108*  --  101*   BUN 17 24*  --  26*   CREA 1.58* 1.63*  --  1.84*   CA 8.0* 8.2*  --  8.4*       Intake/Output Summary (Last 24 hours) at 2/3/2020 1524  Last data filed at 2/3/2020 1321  Gross per 24 hour   Intake 1760 ml   Output 900 ml   Net 860 ml       Anthropometrics:  Ht Readings from Last 1 Encounters:   02/01/20 5' 5\" (1.651 m)     Last 3 Recorded Weights in this Encounter    02/01/20 0425 02/01/20 0848 02/02/20 2147   Weight: 49.5 kg (109 lb 1.6 oz) 49.4 kg (109 lb) 53.9 kg (118 lb 13.3 oz)     Body mass index is 19.77 kg/m².     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]  UTI (urinary tract infection) [N39.0]  Pertinent PMHx: alcohol abuse quit 2/2011; CAD, CKD, COPD, CHF, GERD, diverticula of colon, hypercholesterolemia, s/p CABG x 5, HTN    Education Needs:        [x] None identified  [] Identified - Not appropriate at this time  []  Identified and addressed - refer to education log  Learning Limitations:   [x] None identified  [] Identified    Cultural, Congregation & ethnic food preferences:  [x] None identified    [] Identified and addressed     ESTIMATED NUTRITION NEEDS:     Calories: 9008-2736 kcal (25-35 kcal/kg) based on  [x] Actual BW: 49 kg      [] IBW   Protein: 54-59 gm (1.1-1.2 gm/kg) based on  [x] Actual BW      [] IBW   Fluid: 1 mL/kcal     MONITORING & EVALUATION:     Nutrition Goal(s):   - PO nutrition intake will meet >75% of patient estimated nutritional needs within the next 7 days. Outcome: Met/Continue   - PO nutrition intake will continue to meet >75% of patients estimated nutritional needs over the next 7 days. Outcome: New/Initial goal      Monitoring:   [x] Food and nutrient intake   [x] Food and nutrient administration  [x] Comparative standards   [x] Nutrition-focused physical findings   [x] Anthropometric Measurements   [x] Treatment/therapy   [x] Biochemical data, medical tests, and procedures        Previous Recommendations (for follow-up assessments only): Implemented      Discharge Planning:  Cardiac diet + nutrition supplement 1-2 x daily as needed  Participated in care planning, discharge planning, & interdisciplinary rounds as appropriate.       Janey Mcgraw, TONY  Pager: [phone]

## 2020-02-03 NOTE — PROGRESS NOTES
PHYSICAL THERAPY EVALUATION AND DISCHARGE    Patient: Yeni De La Rosa (20 y.o. male)  Date: 2/3/2020  Primary Diagnosis: Right lower lobe pneumonia ([facility] [address] [J18.1]  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 ([facility] [address] [I50.40]  UTI (urinary tract infection) [N39.0]        Precautions:   Fall  PLOF: Patient was independent with all mobility without use of an assistive device. Patient lives with his nieces in a single story home with one threshold step to enter. Patient uses 3L supplemental O2 at home, occasionally 4L O2 if he's going to be out and walking. ASSESSMENT :  PT orders received and patient cleared by nursing to participate with therapy. Patient is a 67 y.o. male admitted to the hospital due to UTI. Patient consents to PT evaluation and treatment. Patient received supine in bed with HOB elevated, on 3L O2 via NC. Patient performs supine to sit with modified independence and demonstrates intact sitting balance while at EOB. SpO2 while at EOB on 3L O2: 99%. Patient performs sit <> stand with modified independence, and ambulated 250ft without AD. Patient with decreased ben but otherwise gait WNL, no losses of balance. SpO2 after ambulation while seated in recliner: 96%. Patient educated regarding OOB mobility with nursing while in the hospital and to seat meals while sitting upright. Patient also educated regarding performance of seated therapeutic exercise to promote blood flow. Session ended with patient seated upright in recliner on 3L O2 with call bell in reach and all needs met. Patient cleared by physical therapy for safe DC home. Patient does not require further skilled physical therapy at this level of care. PLAN :  Recommendations and Planned Interventions:   No skilled inpatient physical therapy needs identified at this time.   Discharge Recommendations: Home Health  Further Equipment Recommendations for Discharge: N/A     SUBJECTIVE:   Patient stated \"This is the most I've walked since last Thursday.     OBJECTIVE DATA SUMMARY:     Past Medical History:   Diagnosis Date    AICD (automatic cardioverter/defibrillator) present 10/13/2019    Alcohol abuse     quit February 2011    Binocular visual disturbance 10/31/2013    CAD (coronary artery disease)     CAD (coronary artery disease), native coronary artery 1/3/05    LAD 70-80% mid; Cx-diffuse 80%; OM1-70% prox; RCA 40-50% mid; 100% RPLB with collaterals    Cardiac cath 08/19/2013    Severe, diffuse calcification. dLM 30%. mLAD 80%. o/pD1 80%. o/pRamus 80%. dCX 85%. dRCA 80%.  m-dRPLB 75%. EF 50%. Mod to severe inferobasal hypk.  Cardiac echocardiogram 01/19/2011    EF 50-55%. Gr 1 DDfx. RVSP 30 mmHg.  Cardiac nuclear imaging test 06/27/2011    No evidence of ischemia or previous infarction. EF 60%. Neg EKG on max EST. Ex time 6 mins.  Chronic kidney disease     Chronic lung disease     Chronic obstructive pulmonary disease (HCC)     Coronary artery disease     Diastolic CHF, chronic (HCC)     LV EF 55% (Echo Jan 2011)    Diverticula of colon 12/17/2013    Emphysematous COPD ([address]     PFTs March 2009; mild-mode obstruction, + bronchodil response; decreased DLCO    GERD (gastroesophageal reflux disease)     with erosive esophagitis history    Heart failure ([address]     History of renal failure     2 years ago, which completely resolved    Hypercholesterolemia     Hypertension     Hypertensive heart disease with CHF ([address]     MI (myocardial infarction) ([address]     Inferior wall    Paresthesia and pain of both upper extremities 10/31/2013    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 Francesco Diaz, 4/9/14.       Tobacco abuse     Unspecified hereditary and idiopathic peripheral neuropathy 12/4/2013     Past Surgical History:   Procedure Laterality Date    HX CORONARY ARTERY BYPASS GRAFT  about 2013    HX HEART CATHETERIZATION  11/2013    MI INSJ/RPLCMT PERM DFB W/TRNSVNS LDS 1/DUAL CHMBR Left 9/30/2019    INSERT ICD DUAL performed by Lucero Hernández MD at [facility] CATH LAB     Barriers to Learning/Limitations: None  Compensate with: N/A  Home Situation:   Home Situation  Home Environment: Private residence  # Steps to Enter: 1  One/Two Story Residence: One story  Living Alone: No  Support Systems: Family member(s)  Patient Expects to be Discharged to[de-identified] Private residence  Current DME Used/Available at Home: Matt Highman, straight, Walker, rollator, Oxygen, portable  Tub or Shower Type: Shower  Critical Behavior:  Neurologic State: Alert  Orientation Level: Oriented X4  Cognition: Follows commands  Safety/Judgement: Fall prevention; Insight into deficits  Psychosocial  Patient Behaviors: Calm; Cooperative  Needs Expressed: Educational  Purposeful Interaction: Yes  Pt Identified Daily Priority: Clinical issues (comment)  Caritas Process: Nurture loving kindness  Caring Interventions: Therapeutic modalities  Reassure: Informing  Therapeutic Modalities: Intentional therapeutic touch  Other Caring Modalities: caring rounds   Skin Condition/Temp: Warm;Dry     Skin Integrity: Intact  Skin Integumentary  Skin Color: Appropriate for ethnicity  Skin Condition/Temp: Warm;Dry  Skin Integrity: Intact  Turgor: Non-tenting  Hair Growth: Present  Varicosities: Absent     B LE Strength:    Strength: Within functional limits              B LE Tone & Sensation:              Sensation: Intact           B LE Range Of Motion:  AROM: Within functional limits                 Posture:  Posture (WDL): Exceptions to WDL  Posture Assessment:  Forward head;Kyphosis  Functional Mobility:  Bed Mobility:  Supine to Sit: Modified independent     Transfers:  Sit to Stand: Modified independent  Stand to Sit: Modified independent     Balance:   Sitting: Intact  Standing: Intact; Without support  Wheelchair Mobility:        Ambulation/Gait Training:  Distance (ft): 250 Feet (ft)  Speed/Kathleen: Pace decreased (<100 feet/min)    Therapeutic Exercises:   Reviewed and performed ankle pumps to increase blood flow and circulation. Pain:  Pain level pre-treatment: 0/10   Pain level post-treatment: same  Pain Intervention(s): Medication (see MAR); Rest, Repositioning   Response to intervention: Nurse notified, See doc flow    Activity Tolerance:   Good  Please refer to the flowsheet for vital signs taken during this treatment. After treatment:   [x]         Patient left in no apparent distress sitting up in chair  []         Patient left in no apparent distress in bed  [x]         Call bell left within reach  [x]   Personal items in reach  [x]         Nursing notified [place]y  [x]         MD notified Dr. Lorie Gilford  []         Bed/chair alarm activated  []         SCDs applied      COMMUNICATION/EDUCATION:   [x]         Role of Physical Therapy in the acute care setting. [x]         Fall prevention education was provided and the patient/caregiver indicated understanding. [x]         Patient/family have participated as able in goal setting and plan of care. []         Patient/family agree to work toward stated goals and plan of care. []         Patient understands intent and goals of therapy, but is neutral about his/her participation. []         Patient is unable to participate in goal setting/plan of care: ongoing with therapy staff. [x]         Out of bed with nursing assistance 3-5 times a day. []         Other:     Thank you for this referral.  Marcus Alexander DPT   Time Calculation: 23 mins      Eval Complexity: History: MEDIUM  Complexity : 1-2 comorbidities / personal factors will impact the outcome/ POC Exam:HIGH Complexity : 4+ Standardized tests and measures addressing body structure, function, activity limitation and / or participation in recreation  Presentation: LOW Complexity : Stable, uncomplicated  Clinical Decision Making:Low Complexity    Overall Complexity:LOW      A student participated in this treatment session. Per CMS Medicare statements and guidelines I certify that the following was true:   1. I was present and directly observed the entire session. 2. I made all skilled judgments and clinical decisions regarding care. 3. I am the practitioner responsible for assessment, treatment, and documentation.     Thank you,   Zeke Anderson, PT, DPT

## 2020-02-03 NOTE — PROGRESS NOTES
Bedside and Verbal shift change report given to [facility] (oncoming nurse) by Kyler Parker (offgoing nurse). Report included the following information SBAR, Kardex, ED Summary, OR Summary, Procedure Summary, Intake/Output, MAR, Recent Results and Med Rec Status.

## 2020-02-03 NOTE — DISCHARGE SUMMARY
Physician Discharge Summary       Patient: Rylee Cortez MRN: [id number]  SSN: [id number]    YOB: 1940  Age: 67 y.o. 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 Dyspnea on exertion due to #2, much better  2. Acute on chronic systolic CHF with EF of 56% due to medical noncompliance, compensated  3. Chronic hypoxic respiratory failure on home oxygen, stable  4. UTI status post treatment  5. COPD without any exacerbation  6. Medical noncompliance  7. Hyperkalemia, resolved  8. Coronary artery disease  9. Ischemic cardiomyopathy status post AICD  10. Acute on stage III chronic kidney disease due to diuretics, better  11. Staph epidermidis bacteremia could be contamination, resolved  12. Ischemic cardiomyopathy  13. Severe protein calorie malnutrition     * Hospital Course: The patient presented to the hospital on January 31, 2020 with a complaint of worsening shortness of breath especially on exertion. Please see per hospital admission H&P for further details. Patient was not taking Lasix as prescribed at least 3 days prior to coming to the hospital.  Patient was diagnosed with acute on chronic systolic CHF with EF of 57-77 percentage and was started on IV Lasix with improvement in his symptoms. Initially had hypokalemia and was treated and he developed hyperkalemia subsequently. Hyperkalemia was also treated and currently patient's potassium is normal.  Patient had a chest x-ray done in the emergency room was consistent with CHF.   Repeat chest x-ray showed improvement in his chest x-ray. Patient also had acute on stage III chronic kidney disease which got better and it is back to baseline now. Patient was maintained on 3 to 4 L home oxygen. He has history of COPD but did not have any exacerbation. He was treated with 3 days of IV Rocephin for UTI. His initial blood culture showed contamination repeat blood cultures are negative. He remained afebrile otherwise. His shortness of breath is much better. He was seen by home health care recommended PT and OT. Patient will be discharged home today with the following medications. * Procedures: None  * No surgery found *      Consults: Cardiology    Significant Diagnostic Studies: Chest x-ray x2 and limited echocardiogram [it showed ejection fraction 20-25 percentage]    Discharge Exam:  Please read my progress note from February 3, 2020 for further detail    * Discharge Condition: improved  * Disposition: Home    Discharge Medications:    Current Discharge Medication List      START taking these medications    Details   OTHER BMP on 2/7/20 morning  Call report to PCP   Reason: CHF  Qty: 1 Each, Refills: 0         CONTINUE these medications which have CHANGED    Details   furosemide (LASIX) 20 mg tablet 2 tablets po daily for 5 days then 1 tablet po daily  Indications: fluid in the lungs due to chronic heart failure  Qty: 35 Tab, Refills: 0         CONTINUE these medications which have NOT CHANGED    Details   acetaminophen (TYLENOL EXTRA STRENGTH) 500 mg tablet Take 500 mg by mouth every six (6) hours as needed for Pain. 1 pill poq 6 hours as needed prn pain      albuterol (PROVENTIL VENTOLIN) 2.5 mg /3 mL (0.083 %) nebu 3 mL by Nebulization route every four (4) hours as needed (cough, wheezing, more shortness of breath).  Indications: a chronic lung disease where the airways become partially blocked with mucus called COPD  Qty: 30 Nebule, Refills: 3      albuterol (VENTOLIN HFA) 90 mcg/Actuation inhaler Take 1 Puff by inhalation as needed. OXYGEN-AIR DELIVERY SYSTEMS 3 L/min by Nasal route continuous. amiodarone (CORDARONE) 200 mg tablet Take 1 Tab by mouth daily. Indications: ventricular fibrillation, a heart rhythm disorder  Qty: 60 Tab, Refills: 2      losartan (COZAAR) 50 mg tablet Take 1 Tab by mouth nightly. 1 pill po daily  Indications: chronic heart failure, high blood pressure  Qty: 30 Tab, Refills: 3      pantoprazole (PROTONIX) 40 mg tablet Take 40 mg by mouth daily. 1 pill daily 30 minutes before breakfast.  Indications: indigestion      atorvastatin (LIPITOR) 40 mg tablet Take 40 mg by mouth nightly. 1 pill daily      tiotropium-olodaterol (STIOLTO RESPIMAT) 2.5-2.5 mcg/actuation inhaler Take 2 Puffs by inhalation daily. 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 Qty: 30 Tab, Refills: 3      isosorbide mononitrate ER (IMDUR) 30 mg tablet Take 1 Tab by mouth every morning. Qty: 30 Tab, Refills: 3      nitroglycerin (NITROSTAT) 0.4 mg SL tablet 1 Tab by SubLINGual route every five (5) minutes as needed for Chest Pain. Up to 3 doses. Qty: 1 Bottle, Refills: 3      aspirin delayed-release 81 mg tablet Take 81 mg by mouth daily. 1 pill po daily      metoprolol succinate (TOPROL-XL) 25 mg XL tablet Take 25 mg by mouth daily. 1 pill po daily      CYANOCOBALAMIN/FA/PYRIDOXINE (FOLBEE PO) Take 1 Tab by mouth daily. multivitamin (ONE A DAY) tablet Take 1 Tab by mouth daily. * Follow-up Care/Patient Instructions:   Activity: PT/OT per Home Health  Diet: Cardiac Diet, fluid restrictions 1500 cc a day  Wound Care: None needed    Follow-up Information     Follow up With Specialties Details Why Contact Info    Mona Krabbe, MD Internal Medicine   [facility] [id number]  [phone]          Follow-up Appointments   Procedures    FOLLOW UP VISIT Appointment in: Other (Specify) With PCP in 5 days With dr. Adrianna Mena and his group in 10 days     With PCP in 5 days  With dr. Adrianna Mena and his group in 10 days     Standing Status:   Standing     Number of Occurrences:   1     Order Specific Question:   Appointment in     Answer: Other (Specify)     Disclaimer: Sections of this note are dictated using utilizing voice recognition software. Minor typographical errors may be present. If questions arise, please do not hesitate to contact me or call our department.         Signed:  Jami Coe MD  2/3/2020  12:59 PM

## 2020-02-03 NOTE — PROGRESS NOTES
Problem: Self Care Deficits Care Plan (Adult)  Goal: *Acute Goals and Plan of Care (Insert Text)  Outcome: Resolved/Met   OCCUPATIONAL THERAPY EVALUATION/DISCHARGE    Patient: Deyanira Ugalde (18 y.o. 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 Pt was Mod(I) with basic self-care/ADLs and functional mobility without AD PTA. Pt has a rollator and shower chair to use as needed. Pt uses  3-4L of O2 at baseline. ASSESSMENT AND RECOMMENDATIONS:   Based on the objective data described below, the patient presents he is at functional baseline with basic self-care, presenting he is able to perform ADLs at a Mod(I) level. Pt on 3L O2 via NC. No LOB noted during functional transfers to toilet without AD. Will defer to PT for further functional balance and mobility tasks. Pt denied SOB during session. Educated pt on the role of Ot, evaluation process, pursed lip breathing, fall prevention, and energy conservation techniques with pt demo good understanding. Pt reports he has supportive nieces to assist PRN. Skilled occupational therapy is not indicated at this time. Discharge Recommendations: N/A  Further Equipment Recommendations for Discharge: None; Pt has all the recommended equipment to safely return home. SUBJECTIVE:   Patient stated Alicia hood for working with me    OBJECTIVE DATA SUMMARY:     Past Medical History:   Diagnosis Date    AICD (automatic cardioverter/defibrillator) present 10/13/2019    Alcohol abuse     quit February 2011    Binocular visual disturbance 10/31/2013    CAD (coronary artery disease)     CAD (coronary artery disease), native coronary artery 1/3/05    LAD 70-80% mid; Cx-diffuse 80%;  OM1-70% prox; RCA 40-50% mid; 100% RPLB with collaterals Cardiac cath 08/19/2013    Severe, diffuse calcification. dLM 30%. mLAD 80%. o/pD1 80%. o/pRamus 80%. dCX 85%. dRCA 80%.  m-dRPLB 75%. EF 50%. Mod to severe inferobasal hypk. Cardiac echocardiogram 01/19/2011    EF 50-55%. Gr 1 DDfx. RVSP 30 mmHg. Cardiac nuclear imaging test 06/27/2011    No evidence of ischemia or previous infarction. EF 60%. Neg EKG on max EST. Ex time 6 mins. Chronic kidney disease     Chronic lung disease     Chronic obstructive pulmonary disease (HCC)     Coronary artery disease     Diastolic CHF, chronic (HCC)     LV EF 55% (Echo Jan 2011)    Diverticula of colon 12/17/2013    Emphysematous COPD ([address]     PFTs March 2009; mild-mode obstruction, + bronchodil response; decreased DLCO    GERD (gastroesophageal reflux disease)     with erosive esophagitis history    Heart failure ([address]     History of renal failure     2 years ago, which completely resolved    Hypercholesterolemia     Hypertension     Hypertensive heart disease with CHF ([address]     MI (myocardial infarction) ([address]     Inferior wall    Paresthesia and pain of both upper extremities 10/31/2013    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 Peyton Miles, 4/9/14.       Tobacco abuse     Unspecified hereditary and idiopathic peripheral neuropathy 12/4/2013     Past Surgical History:   Procedure Laterality Date    HX CORONARY ARTERY BYPASS GRAFT  about 2013    HX HEART CATHETERIZATION  11/2013    AR INSJ/RPLCMT PERM DFB W/TRNSVNS LDS 1/DUAL CHMBR Left 9/30/2019    INSERT ICD DUAL performed by Thang Lua MD at [facility] CATH LAB     Barriers to Learning/Limitations: None  Compensate with: visual, verbal, tactile, kinesthetic cues/model    Home Situation:   Home Situation  Home Environment: Private residence  # Steps to Enter: 1  One/Two Story Residence: One story  Living Alone: No  Support Systems: Family member(s)  Patient Expects to be Discharged to[de-identified] Private residence  Current DME Used/Available at Home: None  Tub or Shower Type: Shower  [x]     Right hand dominant   []     Left hand dominant    Cognitive/Behavioral Status:  Neurologic State: Alert  Orientation Level: Appropriate for age;Oriented X4  Cognition: Follows commands  Safety/Judgement: Fall prevention    Skin: Visible skin appeared intact  Edema: None noted    Coordination: BUE  Coordination: Within functional limits  Fine Motor Skills-Upper: Left Intact; Right Intact    Gross Motor Skills-Upper: Left Intact; Right Intact    Balance:  Sitting: Intact  Standing: Intact; Without support    Strength: BUE  Strength: Within functional limits    Tone & Sensation: BUE   Tone: Normal  Sensation: Intact    Range of Motion: BUE  AROM: Within functional limits      Functional Mobility and Transfers for ADLs:  Bed Mobility:  Pt sitting in chair upon arrival.  Scooting: Modified Independent  Transfers:  Sit to Stand: Modified independent  Stand to Sit: Modified independent   Toilet Transfer : Modified independent   Bathroom Mobility: Modified independent    ADL Assessment:  Feeding: Modified independent    Oral Facial Hygiene/Grooming: Modified Independent    Bathing: Modified independent    Upper Body Dressing: Modified independent    Lower Body Dressing: Modified independent    Toileting: Modified independent      ADL Intervention:  Lower Body Dressing Assistance  Dressing Assistance: Modified independent  Socks: Modified independent  Slip on Shoes Without Back: Modified independent  Position Performed: Seated in chair    Cognitive Retraining  Safety/Judgement: Fall prevention    Pain:  Pain level pre-treatment: 0/10   Pain level post-treatment: 0/10   Pain Intervention(s): Medication (see MAR);  Rest, Ice, Repositioning  Response to intervention: Nurse notified, See doc flow    Activity Tolerance:   Good    Please refer to the flowsheet for vital signs taken during this treatment. After treatment:   [x]  Patient left in no apparent distress sitting up in chair  []  Patient left in no apparent distress in bed  [x]  Call bell left within reach  [x]  Nursing notified  []  Caregiver present  []  Bed alarm activated    COMMUNICATION/EDUCATION:   [x]      Role of Occupational Therapy in the acute care setting  [x]      Home safety education was provided and the patient/caregiver indicated understanding. [x]      Patient/family have participated as able and agree with findings and recommendations. []      Patient is unable to participate in plan of care at this time. Thank you for this referral.  Rosa Maria Barksdale MS, OTR/L  Time Calculation: 9 mins      Eval Complexity: History: MEDIUM Complexity : Expanded review of history including physical, cognitive and psychosocial  history ; Examination: LOW Complexity : 1-3 performance deficits relating to physical, cognitive , or psychosocial skils that result in activity limitations and / or participation restrictions ;    Decision Making:LOW Complexity : No comorbidities that affect functional and no verbal or physical assistance needed to complete eval tasks

## 2020-02-03 NOTE — PROGRESS NOTES
Problem: Pneumonia: Day 2  Goal: Activity/Safety  Outcome: Progressing Towards Goal     Problem: Pneumonia: Day 2  Goal: Nutrition/Diet  Outcome: Progressing Towards Goal     Problem: Pneumonia: Day 2  Goal: Treatments/Interventions/Procedures  Outcome: Progressing Towards Goal     Problem: Pneumonia: Day 2  Goal: Medications  Outcome: Progressing Towards Goal

## 2020-02-03 NOTE — CONSULTS
Infectious Disease Consultation Note      Reason: staph epidermidis bacteremia    Current abx Prior abx   Vancomycin since 2/1 Ceftriaxone 1/30-2/2     Lines:       Assessment :    67 y.o. with multiple medical problems including CAD, history of CABG, hypertension, combined CHF with EF of 15 to 20% per his echo in September which had deteriorated from EF of 45% to 50% in January, COPD, AICD, history of wide-complex tachycardia, history of alcohol use and tobacco use who been emergency department with 1 to 2 days history of having shortness of breath, weight gain and increasing dyspnea with minimal exertion. Now with staph epidermidis bacteremia (1/2 set of blood culture on 1/30 positive for staph epidermidis), negative blood culture 2/2/20    After obtaining a detailed history and exam, staph epidermidis bacteremia is likely a contaminant. Lack of fever, leukocytosis, quick improvement argues against true BSI/endovascular infection. Recommendations:    1. D/c vancomycin  2. F/u cardiology recommendations  3. Ok to d/c patient from ID standpoint    Thank you for consultation request. Above plan was discussed in details with patient,  and dr Rojas Elliott. Please call me if any further questions or concerns. Will continue to participate in the care of this patient. HPI:    67 y.o. with multiple medical problems including CAD, history of CABG, hypertension, combined CHF with EF of 15 to 20% per his echo in September which had deteriorated from EF of 45% to 50% in January, COPD, AICD, history of wide-complex tachycardia, history of alcohol use and tobacco use who been emergency department with 1 to 2 days history of having shortness of breath, weight gain and increasing dyspnea with minimal exertion. He said that he stopped his Lasix to avoid going to the bathroom so often because he had medical appointments over the last 2 days. He however stopped also taking other home meds.   He denies having chest pain, nausea, vomiting or abdominal pain. No fever, chills or new cough. Noted to have potassium of 2.9, a positive UA and abnormal chest x-ray showing CHF and question of right lower lobe infiltrates per preliminary report. He was admitted for decompensated CHF and UTI. His blood pressure was elevated upon presentation at 176/96. One set of blood culture 1/31 revealed staph epidermidis. He was started on vancomycin. I have been consulted for further recommendations. Patient denies any subjective fever/chills/dysuria throughout this time. Feels back to his baseline and wishes to go home today. Patient denies headaches, visual disturbances, sore throat, runny nose, earaches, cp,  chills, cough, abdominal pain, diarrhea, burning micturition, pain or weakness in extremities. He denies back pain/flank pain. He denies recent sick contacts. No h/o recent travel. No known h/o MRSA colonization or infection in the past.           Past Medical History:   Diagnosis Date    AICD (automatic cardioverter/defibrillator) present 10/13/2019    Alcohol abuse     quit February 2011    Binocular visual disturbance 10/31/2013    CAD (coronary artery disease)     CAD (coronary artery disease), native coronary artery 1/3/05    LAD 70-80% mid; Cx-diffuse 80%; OM1-70% prox; RCA 40-50% mid; 100% RPLB with collaterals    Cardiac cath 08/19/2013    Severe, diffuse calcification. dLM 30%. mLAD 80%. o/pD1 80%. o/pRamus 80%. dCX 85%. dRCA 80%.  m-dRPLB 75%. EF 50%. Mod to severe inferobasal hypk.  Cardiac echocardiogram 01/19/2011    EF 50-55%. Gr 1 DDfx. RVSP 30 mmHg.  Cardiac nuclear imaging test 06/27/2011    No evidence of ischemia or previous infarction. EF 60%. Neg EKG on max EST. Ex time 6 mins.     Chronic kidney disease     Chronic lung disease     Chronic obstructive pulmonary disease ([address]     Coronary artery disease     Diastolic CHF, chronic (HCC)     LV EF 55% (Echo Jan 2011)    Diverticula of colon 12/17/2013    Emphysematous COPD ([facility]     PFTs March 2009; mild-mode obstruction, + bronchodil response; decreased DLCO    GERD (gastroesophageal reflux disease)     with erosive esophagitis history    Heart failure (HCC)     History of renal failure     2 years ago, which completely resolved    Hypercholesterolemia     Hypertension     Hypertensive heart disease with CHF ([facility]     MI (myocardial infarction) ([facility] [address] [facility]     Inferior wall    Paresthesia and pain of both upper extremities 10/31/2013    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 Fantasma oRa, 4/9/14.  Tobacco abuse     Unspecified hereditary and idiopathic peripheral neuropathy 12/4/2013       Past Surgical History:   Procedure Laterality Date    HX CORONARY ARTERY BYPASS GRAFT  about 2013    HX HEART CATHETERIZATION  11/2013    CT INSJ/RPLCMT PERM DFB W/TRNSVNS LDS 1/DUAL CHMBR Left 9/30/2019    INSERT ICD DUAL performed by Malgorzata Alarcon MD at [facility] CATH LAB       home Medication List    Details   acetaminophen (TYLENOL EXTRA STRENGTH) 500 mg tablet Take 500 mg by mouth every six (6) hours as needed for Pain. 1 pill poq 6 hours as needed prn pain      albuterol (PROVENTIL VENTOLIN) 2.5 mg /3 mL (0.083 %) nebu 3 mL by Nebulization route every four (4) hours as needed (cough, wheezing, more shortness of breath). Indications: a chronic lung disease where the airways become partially blocked with mucus called COPD  Qty: 30 Nebule, Refills: 3      albuterol (VENTOLIN HFA) 90 mcg/Actuation inhaler Take 1 Puff by inhalation as needed. OXYGEN-AIR DELIVERY SYSTEMS 3 L/min by Nasal route continuous. amiodarone (CORDARONE) 200 mg tablet Take 1 Tab by mouth daily.  Indications: ventricular fibrillation, a heart rhythm disorder  Qty: 60 Tab, Refills: 2      losartan (COZAAR) 50 mg tablet Take 1 Tab by mouth nightly. 1 pill po daily  Indications: chronic heart failure, high blood pressure  Qty: 30 Tab, Refills: 3      furosemide (LASIX) 20 mg tablet Take 1 Tab by mouth daily. Indications: Fluid in the Lungs due to Chronic Heart Failure  Qty: 30 Tab, Refills: 2      pantoprazole (PROTONIX) 40 mg tablet Take 40 mg by mouth daily. 1 pill daily 30 minutes before breakfast.  Indications: indigestion      atorvastatin (LIPITOR) 40 mg tablet Take 40 mg by mouth nightly. 1 pill daily      tiotropium-olodaterol (STIOLTO RESPIMAT) 2.5-2.5 mcg/actuation inhaler Take 2 Puffs by inhalation daily. 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 Qty: 30 Tab, Refills: 3      isosorbide mononitrate ER (IMDUR) 30 mg tablet Take 1 Tab by mouth every morning. Qty: 30 Tab, Refills: 3      nitroglycerin (NITROSTAT) 0.4 mg SL tablet 1 Tab by SubLINGual route every five (5) minutes as needed for Chest Pain. Up to 3 doses. Qty: 1 Bottle, Refills: 3      aspirin delayed-release 81 mg tablet Take 81 mg by mouth daily. 1 pill po daily      metoprolol succinate (TOPROL-XL) 25 mg XL tablet Take 25 mg by mouth daily. 1 pill po daily      CYANOCOBALAMIN/FA/PYRIDOXINE (FOLBEE PO) Take 1 Tab by mouth daily. multivitamin (ONE A DAY) tablet Take 1 Tab by mouth daily.              Current Facility-Administered Medications   Medication Dose Route Frequency    albuterol-ipratropium (DUO-NEB) 2.5 MG-0.5 MG/3 ML  3 mL Nebulization Q4H PRN    furosemide (LASIX) tablet 20 mg  20 mg Oral DAILY    benzonatate (TESSALON) capsule 100 mg  100 mg Oral TID PRN    vancomycin (VANCOCIN) 1,000 mg in 0.9% sodium chloride (MBP/ADV) 250 mL adv  1,000 mg IntraVENous Q36H    therapeutic multivitamin (THERAGRAN) tablet 1 Tab  1 Tab Oral DAILY    metoprolol succinate (TOPROL-XL) XL tablet 25 mg  25 mg Oral DAILY    aspirin delayed-release tablet 81 mg  81 mg Oral DAILY    clopidogreL (PLAVIX) tablet 75 mg  75 mg Oral DAILY    nitroglycerin (NITROSTAT) tablet 0.4 mg  0.4 mg SubLINGual Q5MIN PRN    atorvastatin (LIPITOR) tablet 40 mg  40 mg Oral QHS    pantoprazole (PROTONIX) tablet 40 mg  40 mg Oral DAILY    amiodarone (CORDARONE) tablet 200 mg  200 mg Oral DAILY    acetaminophen (TYLENOL) tablet 500 mg  500 mg Oral Q6H PRN    isosorbide mononitrate ER (IMDUR) tablet 30 mg  30 mg Oral 7am    enoxaparin (LOVENOX) injection 40 mg  40 mg SubCUTAneous Q24H    sodium chloride (NS) flush 5-10 mL  5-10 mL IntraVENous PRN       Allergies: Vicodin [hydrocodone-acetaminophen]    Family History   Problem Relation Age of Onset    Heart Disease Maternal Uncle     Diabetes Father     Hypertension Father      Social History     Socioeconomic History    Marital status:      Spouse name: Not on file    Number of children: Not on file    Years of education: Not on file    Highest education level: Not on file   Occupational History    Not on file   Social Needs    Financial resource strain: Not on file    Food insecurity:     Worry: Not on file     Inability: Not on file    Transportation needs:     Medical: Not on file     Non-medical: Not on file   Tobacco Use    Smoking status: Former Smoker     Packs/day: 0.50     Years: 55.00     Pack years: 27.50     Types: Cigarettes     Start date:      Last attempt to quit: 2019     Years since quittin.0    Smokeless tobacco: Never Used   Substance and Sexual Activity    Alcohol use: Yes     Comment: every other day drinker    Drug use: No    Sexual activity: Not Currently     Partners: Female   Lifestyle    Physical activity:     Days per week: Not on file     Minutes per session: Not on file    Stress: Not on file   Relationships    Social connections:     Talks on phone: Not on file     Gets together: Not on file     Attends Anabaptist service: Not on file     Active member of club or organization: Not on file     Attends meetings of clubs or organizations: Not on file     Relationship status: Not on file    Intimate partner violence:     Fear of current or ex partner: Not on file     Emotionally abused: Not on file     Physically abused: Not on file     Forced sexual activity: Not on file   Other Topics Concern    Not on file   Social History Narrative    Not on file     Social History     Tobacco Use   Smoking Status Former Smoker    Packs/day: 0.50    Years: 55.00    Pack years: 27.50    Types: Cigarettes    Start date:     Last attempt to quit: 2019    Years since quittin.0   Smokeless Tobacco Never Used        Temp (24hrs), Av.4 °F (36.9 °C), Min:97.4 °F (36.3 °C), Max:99.5 °F (37.5 °C)    Visit Vitals  /70 (BP 1 Location: Left arm, BP Patient Position: At rest)   Pulse 68   Temp 98.2 °F (36.8 °C)   Resp 17   Ht 5' 5\" (1.651 m)   Wt 53.9 kg (118 lb 13.3 oz)   SpO2 100%   BMI 19.77 kg/m²       ROS: 12 point ROS obtained in details. Pertinent positives as mentioned in HPI,   otherwise negative    Physical Exam:       General:  Alert, cooperative, no distress, appears stated age. Head: Normocephalic, without obvious abnormality, atraumatic. Eyes:  Conjunctivae/corneas clear. PERRL, EOMs intact. Nose: Nares normal. No drainage or sinus tenderness. Neck: Supple, symmetrical, trachea midline, no adenopathy, thyroid: no enlargement, no carotid bruit and no JVD. AICD site left upper chest without erythema or drainage. Lungs:    CTA bilaterally   Heart:  Regular rate and rhythm, S1, S2 normal.     Abdomen: Soft, non-tender. Bowel sounds normal.    Extremities: Extremities normal, atraumatic, no cyanosis or edema. Pulses: 2+ and symmetric all extremities. Skin:  No rashes or lesions   Neurologic: AAOx3, No focal motor or sensory deficit.          Labs: Results:   Chemistry Recent Labs     02/03/20  0335 02/02/20 0324 02/01/20  1300 02/01/20  0139   GLU 96 108*  --  101*    138  --  138   K 3.8 4.0 5.6* 6.0*    108  --  109   CO2 26 23  --  23   BUN 17 24*  --  26*   CREA 1.58* 1.63*  --  1.84*   CA 8.0* 8.2*  --  8.4*   AGAP 4 7  --  6   BUCR 11* 15  --  14      CBC w/Diff Recent Labs     02/03/20 0335 02/02/20  0324 02/01/20  0139   WBC  --  7.0 10.0   RBC  --  2.82* 3.23*   HGB 8.8* 9.1* 10.5*   HCT 27.6* 28.1* 32.5*   PLT  --  220 270   GRANS  --  57  --    LYMPH  --  29  --    EOS  --  3  --       Microbiology Recent Labs     02/02/20  0324   CULT NO GROWTH 1 DAY          RADIOLOGY:    All available imaging studies/reports in [facility] for this admission were reviewed    Dr. Yang Wheatley, Infectious Disease Specialist  [phone]  February 3, 2020  12:50 PM

## 2020-02-04 ENCOUNTER — HOME CARE VISIT (OUTPATIENT)
Dept: HOME HEALTH SERVICES | Facility: HOME HEALTH | Age: 80
End: 2020-02-04

## 2020-02-04 ENCOUNTER — HOME CARE VISIT (OUTPATIENT)
Dept: SCHEDULING | Facility: HOME HEALTH | Age: 80
End: 2020-02-04
Payer: MEDICARE

## 2020-02-04 VITALS
SYSTOLIC BLOOD PRESSURE: 132 MMHG | RESPIRATION RATE: 16 BRPM | HEART RATE: 71 BPM | DIASTOLIC BLOOD PRESSURE: 74 MMHG | OXYGEN SATURATION: 100 % | TEMPERATURE: 98.6 F

## 2020-02-04 PROCEDURE — 3331090001 HH PPS REVENUE CREDIT

## 2020-02-04 PROCEDURE — 3331090002 HH PPS REVENUE DEBIT

## 2020-02-04 PROCEDURE — G0299 HHS/HOSPICE OF RN EA 15 MIN: HCPCS

## 2020-02-04 PROCEDURE — 400013 HH SOC

## 2020-02-05 ENCOUNTER — HOME CARE VISIT (OUTPATIENT)
Dept: SCHEDULING | Facility: HOME HEALTH | Age: 80
End: 2020-02-05
Payer: MEDICARE

## 2020-02-05 VITALS
OXYGEN SATURATION: 97 % | TEMPERATURE: 97.3 F | DIASTOLIC BLOOD PRESSURE: 61 MMHG | SYSTOLIC BLOOD PRESSURE: 131 MMHG | HEART RATE: 68 BPM

## 2020-02-05 LAB
BACTERIA SPEC CULT: NORMAL
SERVICE CMNT-IMP: NORMAL

## 2020-02-05 PROCEDURE — 3331090001 HH PPS REVENUE CREDIT

## 2020-02-05 PROCEDURE — G0152 HHCP-SERV OF OT,EA 15 MIN: HCPCS

## 2020-02-05 PROCEDURE — 3331090002 HH PPS REVENUE DEBIT

## 2020-02-06 ENCOUNTER — HOME CARE VISIT (OUTPATIENT)
Dept: SCHEDULING | Facility: HOME HEALTH | Age: 80
End: 2020-02-06
Payer: MEDICARE

## 2020-02-06 ENCOUNTER — HOME CARE VISIT (OUTPATIENT)
Dept: HOME HEALTH SERVICES | Facility: HOME HEALTH | Age: 80
End: 2020-02-06
Payer: MEDICARE

## 2020-02-06 PROCEDURE — 3331090002 HH PPS REVENUE DEBIT

## 2020-02-06 PROCEDURE — 3331090001 HH PPS REVENUE CREDIT

## 2020-02-06 NOTE — CDMP QUERY
This patient was documented to have severe protein calorie malnutrition by the RD. A nutrition plan was put into place and Ensure was added. After study, did this patient have? ·Severe protein calorie malnutrition ·Moderate malnutrition ·Other________________________ ·Clinically indeterminable Thank you, [address], [facility], [phone]

## 2020-02-06 NOTE — CDMP QUERY
The patient was documented to have history of HTN, CKD stage 3, ischemic 
cardiomyopathy and chronic systolic CHF was admitted for CHF exacerbation and 
treated with IV Lasix. Echo showed normal cavity size and wall thickness. Severe 
systolic dysfunction with EF of 20%. After study was the etiology of the CHF felt to be due to: · Ischemic cardiomypathy · Hypertensive heart disease · Both 
· Other__________________ · Clinically Indeterminable Thank you, [address], [address] [facility], [address]

## 2020-02-07 ENCOUNTER — HOSPITAL ENCOUNTER (OUTPATIENT)
Dept: LAB | Age: 80
Discharge: HOME | End: 2020-02-07
Payer: MEDICARE

## 2020-02-07 ENCOUNTER — HOME CARE VISIT (OUTPATIENT)
Dept: HOME HEALTH SERVICES | Facility: HOME HEALTH | Age: 80
End: 2020-02-07
Payer: MEDICARE

## 2020-02-07 ENCOUNTER — HOME CARE VISIT (OUTPATIENT)
Dept: SCHEDULING | Facility: HOME HEALTH | Age: 80
End: 2020-02-07
Payer: MEDICARE

## 2020-02-07 DIAGNOSIS — N18.30 CHRONIC KIDNEY DISEASE, STAGE III (MODERATE) (HCC): ICD-10-CM

## 2020-02-07 DIAGNOSIS — I50.9 CHF (CONGESTIVE HEART FAILURE) (HCC): ICD-10-CM

## 2020-02-07 LAB
ALBUMIN SERPL-MCNC: 3.1 G/DL (ref 3.4–5)
ANION GAP SERPL CALC-SCNC: 7 MMOL/L (ref 3–18)
ANION GAP SERPL CALC-SCNC: 7 MMOL/L (ref 3–18)
BUN SERPL-MCNC: 25 MG/DL (ref 7–18)
BUN SERPL-MCNC: 25 MG/DL (ref 7–18)
BUN/CREAT SERPL: 17 (ref 12–20)
BUN/CREAT SERPL: 17 (ref 12–20)
CALCIUM SERPL-MCNC: 8.9 MG/DL (ref 8.5–10.1)
CALCIUM SERPL-MCNC: 9.1 MG/DL (ref 8.5–10.1)
CALCIUM SERPL-MCNC: 9.1 MG/DL (ref 8.5–10.1)
CHLORIDE SERPL-SCNC: 98 MMOL/L (ref 100–111)
CHLORIDE SERPL-SCNC: 98 MMOL/L (ref 100–111)
CO2 SERPL-SCNC: 32 MMOL/L (ref 21–32)
CO2 SERPL-SCNC: 32 MMOL/L (ref 21–32)
CREAT SERPL-MCNC: 1.48 MG/DL (ref 0.6–1.3)
CREAT SERPL-MCNC: 1.5 MG/DL (ref 0.6–1.3)
CREAT UR-MCNC: <13 MG/DL (ref 30–125)
ERYTHROCYTE [DISTWIDTH] IN BLOOD BY AUTOMATED COUNT: 16.8 % (ref 11.6–14.5)
FERRITIN SERPL-MCNC: 71 NG/ML (ref 8–388)
GLUCOSE SERPL-MCNC: 94 MG/DL (ref 74–99)
GLUCOSE SERPL-MCNC: 97 MG/DL (ref 74–99)
HCT VFR BLD AUTO: 34 % (ref 36–48)
HGB BLD-MCNC: 10.9 G/DL (ref 13–16)
IRON SATN MFR SERPL: 16 % (ref 20–50)
IRON SERPL-MCNC: 53 UG/DL (ref 50–175)
MCH RBC QN AUTO: 31.7 PG (ref 24–34)
MCHC RBC AUTO-ENTMCNC: 32.1 G/DL (ref 31–37)
MCV RBC AUTO: 98.8 FL (ref 74–97)
MICROALBUMIN UR-MCNC: <0.5 MG/DL (ref 0–3)
MICROALBUMIN/CREAT UR-RTO: ABNORMAL MG/G (ref 0–30)
PHOSPHATE SERPL-MCNC: 4.3 MG/DL (ref 2.5–4.9)
PLATELET # BLD AUTO: 319 K/UL (ref 135–420)
PMV BLD AUTO: 9.5 FL (ref 9.2–11.8)
POTASSIUM SERPL-SCNC: 3.7 MMOL/L (ref 3.5–5.5)
POTASSIUM SERPL-SCNC: 3.8 MMOL/L (ref 3.5–5.5)
PTH-INTACT SERPL-MCNC: 53.8 PG/ML (ref 18.4–88)
RBC # BLD AUTO: 3.44 M/UL (ref 4.7–5.5)
SODIUM SERPL-SCNC: 137 MMOL/L (ref 136–145)
SODIUM SERPL-SCNC: 137 MMOL/L (ref 136–145)
TIBC SERPL-MCNC: 324 UG/DL (ref 250–450)
WBC # BLD AUTO: 5.5 K/UL (ref 4.6–13.2)

## 2020-02-07 PROCEDURE — 83540 ASSAY OF IRON: CPT

## 2020-02-07 PROCEDURE — 80048 BASIC METABOLIC PNL TOTAL CA: CPT

## 2020-02-07 PROCEDURE — 80069 RENAL FUNCTION PANEL: CPT

## 2020-02-07 PROCEDURE — 3331090002 HH PPS REVENUE DEBIT

## 2020-02-07 PROCEDURE — 82043 UR ALBUMIN QUANTITATIVE: CPT

## 2020-02-07 PROCEDURE — 36415 COLL VENOUS BLD VENIPUNCTURE: CPT

## 2020-02-07 PROCEDURE — 82728 ASSAY OF FERRITIN: CPT

## 2020-02-07 PROCEDURE — 83970 ASSAY OF PARATHORMONE: CPT

## 2020-02-07 PROCEDURE — 3331090001 HH PPS REVENUE CREDIT

## 2020-02-07 PROCEDURE — 85027 COMPLETE CBC AUTOMATED: CPT

## 2020-02-07 NOTE — PROGRESS NOTES
Looks like this was ordered by Hospitalist probably for Dr. Arsen Atkinson. Nothing for us to do.  ES

## 2020-02-08 LAB
BACTERIA SPEC CULT: NORMAL
SERVICE CMNT-IMP: NORMAL

## 2020-02-08 PROCEDURE — 3331090002 HH PPS REVENUE DEBIT

## 2020-02-08 PROCEDURE — 3331090001 HH PPS REVENUE CREDIT

## 2020-02-09 PROCEDURE — 3331090002 HH PPS REVENUE DEBIT

## 2020-02-09 PROCEDURE — 3331090001 HH PPS REVENUE CREDIT

## 2020-02-10 PROCEDURE — 3331090001 HH PPS REVENUE CREDIT

## 2020-02-10 PROCEDURE — 3331090002 HH PPS REVENUE DEBIT

## 2020-02-10 NOTE — CDMP QUERY
The patient was documented to have history of HTN, CKD stage 3, ischemic 
cardiomyopathy and chronic systolic CHF was admitted for CHF exacerbation and 
treated with IV Lasix. Echo showed normal cavity size and wall thickness. Severe 
systolic dysfunction with EF of 20%. After study was the etiology of the CHF felt to be due to: 
· CHF due to Ischemic cardiomyopathy · CHF due to Hypertensive heart disease · CHF due to both Ischemic cardiomyopathy and Hypertensive heart disease · Other explanation · Unable to determine Thank you, [address], [address] [facility], [address]

## 2020-02-11 ENCOUNTER — HOME CARE VISIT (OUTPATIENT)
Dept: SCHEDULING | Facility: HOME HEALTH | Age: 80
End: 2020-02-11
Payer: MEDICARE

## 2020-02-11 VITALS
OXYGEN SATURATION: 99 % | SYSTOLIC BLOOD PRESSURE: 136 MMHG | TEMPERATURE: 96.5 F | DIASTOLIC BLOOD PRESSURE: 80 MMHG | HEART RATE: 68 BPM

## 2020-02-11 PROCEDURE — 3331090002 HH PPS REVENUE DEBIT

## 2020-02-11 PROCEDURE — 3331090001 HH PPS REVENUE CREDIT

## 2020-02-11 PROCEDURE — G0151 HHCP-SERV OF PT,EA 15 MIN: HCPCS

## 2020-02-12 PROCEDURE — 3331090002 HH PPS REVENUE DEBIT

## 2020-02-12 PROCEDURE — 3331090001 HH PPS REVENUE CREDIT

## 2020-02-13 PROCEDURE — 3331090002 HH PPS REVENUE DEBIT

## 2020-02-13 PROCEDURE — 3331090001 HH PPS REVENUE CREDIT

## 2020-02-14 ENCOUNTER — HOME CARE VISIT (OUTPATIENT)
Dept: SCHEDULING | Facility: HOME HEALTH | Age: 80
End: 2020-02-14
Payer: MEDICARE

## 2020-02-14 PROCEDURE — G0157 HHC PT ASSISTANT EA 15: HCPCS

## 2020-02-14 PROCEDURE — 3331090001 HH PPS REVENUE CREDIT

## 2020-02-14 PROCEDURE — 3331090002 HH PPS REVENUE DEBIT

## 2020-02-15 PROCEDURE — 3331090002 HH PPS REVENUE DEBIT

## 2020-02-15 PROCEDURE — 3331090001 HH PPS REVENUE CREDIT

## 2020-02-16 PROCEDURE — 3331090002 HH PPS REVENUE DEBIT

## 2020-02-16 PROCEDURE — 3331090001 HH PPS REVENUE CREDIT

## 2020-02-17 ENCOUNTER — HOME CARE VISIT (OUTPATIENT)
Dept: HOME HEALTH SERVICES | Facility: HOME HEALTH | Age: 80
End: 2020-02-17
Payer: MEDICARE

## 2020-02-17 VITALS
RESPIRATION RATE: 18 BRPM | OXYGEN SATURATION: 98 % | HEART RATE: 92 BPM | DIASTOLIC BLOOD PRESSURE: 60 MMHG | SYSTOLIC BLOOD PRESSURE: 110 MMHG | TEMPERATURE: 98.2 F

## 2020-02-17 VITALS
BODY MASS INDEX: 18.3 KG/M2 | HEART RATE: 80 BPM | DIASTOLIC BLOOD PRESSURE: 82 MMHG | WEIGHT: 110 LBS | OXYGEN SATURATION: 99 % | SYSTOLIC BLOOD PRESSURE: 151 MMHG

## 2020-02-17 PROCEDURE — 3331090001 HH PPS REVENUE CREDIT

## 2020-02-17 PROCEDURE — G0495 RN CARE TRAIN/EDU IN HH: HCPCS

## 2020-02-17 PROCEDURE — 3331090002 HH PPS REVENUE DEBIT

## 2020-02-18 ENCOUNTER — HOME CARE VISIT (OUTPATIENT)
Dept: SCHEDULING | Facility: HOME HEALTH | Age: 80
End: 2020-02-18
Payer: MEDICARE

## 2020-02-18 PROCEDURE — 3331090002 HH PPS REVENUE DEBIT

## 2020-02-18 PROCEDURE — 3331090001 HH PPS REVENUE CREDIT

## 2020-02-19 ENCOUNTER — HOME CARE VISIT (OUTPATIENT)
Dept: SCHEDULING | Facility: HOME HEALTH | Age: 80
End: 2020-02-19
Payer: MEDICARE

## 2020-02-19 PROCEDURE — 3331090002 HH PPS REVENUE DEBIT

## 2020-02-19 PROCEDURE — 3331090001 HH PPS REVENUE CREDIT

## 2020-02-20 ENCOUNTER — HOME CARE VISIT (OUTPATIENT)
Dept: SCHEDULING | Facility: HOME HEALTH | Age: 80
End: 2020-02-20
Payer: MEDICARE

## 2020-02-20 PROCEDURE — G0157 HHC PT ASSISTANT EA 15: HCPCS

## 2020-02-20 PROCEDURE — 3331090002 HH PPS REVENUE DEBIT

## 2020-02-20 PROCEDURE — 3331090001 HH PPS REVENUE CREDIT

## 2020-02-21 PROCEDURE — 3331090002 HH PPS REVENUE DEBIT

## 2020-02-21 PROCEDURE — 3331090001 HH PPS REVENUE CREDIT

## 2020-02-22 PROCEDURE — 3331090002 HH PPS REVENUE DEBIT

## 2020-02-22 PROCEDURE — 3331090001 HH PPS REVENUE CREDIT

## 2020-02-23 PROCEDURE — 3331090001 HH PPS REVENUE CREDIT

## 2020-02-23 PROCEDURE — 3331090002 HH PPS REVENUE DEBIT

## 2020-02-24 VITALS
HEART RATE: 78 BPM | DIASTOLIC BLOOD PRESSURE: 78 MMHG | SYSTOLIC BLOOD PRESSURE: 130 MMHG | RESPIRATION RATE: 18 BRPM | TEMPERATURE: 98.2 F | OXYGEN SATURATION: 98 %

## 2020-02-24 PROCEDURE — 3331090001 HH PPS REVENUE CREDIT

## 2020-02-24 PROCEDURE — 3331090002 HH PPS REVENUE DEBIT

## 2020-02-25 ENCOUNTER — HOME CARE VISIT (OUTPATIENT)
Dept: HOME HEALTH SERVICES | Facility: HOME HEALTH | Age: 80
End: 2020-02-25
Payer: MEDICARE

## 2020-02-25 PROCEDURE — 3331090002 HH PPS REVENUE DEBIT

## 2020-02-25 PROCEDURE — 3331090001 HH PPS REVENUE CREDIT

## 2020-02-26 ENCOUNTER — HOSPITAL ENCOUNTER (OUTPATIENT)
Dept: ULTRASOUND IMAGING | Age: 80
Discharge: HOME | End: 2020-02-26
Attending: INTERNAL MEDICINE
Payer: MEDICARE

## 2020-02-26 DIAGNOSIS — N18.30 CHRONIC KIDNEY DISEASE, STAGE 3 (MODERATE): ICD-10-CM

## 2020-02-26 PROCEDURE — 76770 US EXAM ABDO BACK WALL COMP: CPT

## 2020-02-26 PROCEDURE — 3331090001 HH PPS REVENUE CREDIT

## 2020-02-26 PROCEDURE — 3331090002 HH PPS REVENUE DEBIT

## 2020-02-27 ENCOUNTER — HOME CARE VISIT (OUTPATIENT)
Dept: SCHEDULING | Facility: HOME HEALTH | Age: 80
End: 2020-02-27
Payer: MEDICARE

## 2020-02-27 PROCEDURE — 3331090002 HH PPS REVENUE DEBIT

## 2020-02-27 PROCEDURE — G0300 HHS/HOSPICE OF LPN EA 15 MIN: HCPCS

## 2020-02-27 PROCEDURE — 3331090001 HH PPS REVENUE CREDIT

## 2020-02-28 ENCOUNTER — HOME CARE VISIT (OUTPATIENT)
Dept: SCHEDULING | Facility: HOME HEALTH | Age: 80
End: 2020-02-28
Payer: MEDICARE

## 2020-02-28 VITALS
RESPIRATION RATE: 19 BRPM | HEART RATE: 72 BPM | DIASTOLIC BLOOD PRESSURE: 59 MMHG | BODY MASS INDEX: 16.97 KG/M2 | SYSTOLIC BLOOD PRESSURE: 99 MMHG | TEMPERATURE: 97.4 F | WEIGHT: 102 LBS | OXYGEN SATURATION: 99 %

## 2020-02-28 PROCEDURE — 3331090001 HH PPS REVENUE CREDIT

## 2020-02-28 PROCEDURE — G0157 HHC PT ASSISTANT EA 15: HCPCS

## 2020-02-28 PROCEDURE — 3331090002 HH PPS REVENUE DEBIT

## 2020-02-29 PROCEDURE — 3331090001 HH PPS REVENUE CREDIT

## 2020-02-29 PROCEDURE — 3331090002 HH PPS REVENUE DEBIT

## 2020-03-01 PROCEDURE — 3331090001 HH PPS REVENUE CREDIT

## 2020-03-01 PROCEDURE — 3331090002 HH PPS REVENUE DEBIT

## 2020-03-02 ENCOUNTER — HOME CARE VISIT (OUTPATIENT)
Dept: SCHEDULING | Facility: HOME HEALTH | Age: 80
End: 2020-03-02
Payer: MEDICARE

## 2020-03-02 VITALS
SYSTOLIC BLOOD PRESSURE: 102 MMHG | DIASTOLIC BLOOD PRESSURE: 64 MMHG | OXYGEN SATURATION: 94 % | RESPIRATION RATE: 18 BRPM | HEART RATE: 72 BPM | TEMPERATURE: 98.2 F

## 2020-03-02 VITALS
DIASTOLIC BLOOD PRESSURE: 62 MMHG | SYSTOLIC BLOOD PRESSURE: 110 MMHG | OXYGEN SATURATION: 98 % | HEART RATE: 67 BPM | TEMPERATURE: 96.9 F

## 2020-03-02 PROCEDURE — 3331090002 HH PPS REVENUE DEBIT

## 2020-03-02 PROCEDURE — 3331090001 HH PPS REVENUE CREDIT

## 2020-03-02 PROCEDURE — G0151 HHCP-SERV OF PT,EA 15 MIN: HCPCS

## 2020-03-03 PROCEDURE — 3331090002 HH PPS REVENUE DEBIT

## 2020-03-03 PROCEDURE — 3331090001 HH PPS REVENUE CREDIT

## 2020-03-04 ENCOUNTER — HOME CARE VISIT (OUTPATIENT)
Dept: SCHEDULING | Facility: HOME HEALTH | Age: 80
End: 2020-03-04
Payer: MEDICARE

## 2020-03-04 PROCEDURE — 3331090001 HH PPS REVENUE CREDIT

## 2020-03-04 PROCEDURE — 3331090002 HH PPS REVENUE DEBIT

## 2020-03-05 PROCEDURE — 3331090001 HH PPS REVENUE CREDIT

## 2020-03-05 PROCEDURE — 3331090002 HH PPS REVENUE DEBIT

## 2020-03-06 PROCEDURE — 3331090001 HH PPS REVENUE CREDIT

## 2020-03-06 PROCEDURE — 3331090002 HH PPS REVENUE DEBIT

## 2020-03-07 PROCEDURE — 3331090002 HH PPS REVENUE DEBIT

## 2020-03-07 PROCEDURE — 3331090001 HH PPS REVENUE CREDIT

## 2020-03-08 PROCEDURE — 3331090001 HH PPS REVENUE CREDIT

## 2020-03-08 PROCEDURE — 3331090002 HH PPS REVENUE DEBIT

## 2020-03-09 ENCOUNTER — TELEPHONE (OUTPATIENT)
Dept: PULMONOLOGY | Age: 80
End: 2020-03-09

## 2020-03-09 ENCOUNTER — HOSPITAL ENCOUNTER (OUTPATIENT)
Dept: CT IMAGING | Age: 80
Discharge: HOME | End: 2020-03-09
Attending: INTERNAL MEDICINE

## 2020-03-09 DIAGNOSIS — R91.1 LUNG NODULE: ICD-10-CM

## 2020-03-09 DIAGNOSIS — R91.1 LUNG NODULE: Primary | ICD-10-CM

## 2020-03-09 DIAGNOSIS — J47.9 BRONCHIECTASIS WITHOUT COMPLICATION (HCC): ICD-10-CM

## 2020-03-09 DIAGNOSIS — J43.2 CENTRILOBULAR EMPHYSEMA (HCC): ICD-10-CM

## 2020-03-09 DIAGNOSIS — R05.9 COUGH: ICD-10-CM

## 2020-03-09 DIAGNOSIS — R06.09 DYSPNEA ON EXERTION: ICD-10-CM

## 2020-03-09 PROCEDURE — 3331090002 HH PPS REVENUE DEBIT

## 2020-03-09 PROCEDURE — 3331090001 HH PPS REVENUE CREDIT

## 2020-03-09 NOTE — TELEPHONE ENCOUNTER
Homero Rowe from  scheduling states pt CT scheduled for today had to be cxd, order was too old. Needs new order to resched. Please advise [phone].

## 2020-03-09 NOTE — TELEPHONE ENCOUNTER
Order placed for CT Chest (using low dose radiation protocol), per Verbal Order from Dr. Aretha Leyden on 3/9/2020. Last office visit: 10/24/2019 w/ Patrice العلي NP Follow up Visit: After CT Chest 
 
Provider is aware of last office visit and follow up. No further action requested from provider.

## 2020-03-09 NOTE — PROGRESS NOTES
Order in [address] [facility] is not correct. Multiple attempts to contact office by myself and scheduling without success. CT Chest WO completed in 10/2019. If Low Dose Screening needed must have order for that exam - not chest wo.

## 2020-03-10 PROCEDURE — 3331090001 HH PPS REVENUE CREDIT

## 2020-03-10 PROCEDURE — 3331090002 HH PPS REVENUE DEBIT

## 2020-03-11 ENCOUNTER — HOME CARE VISIT (OUTPATIENT)
Dept: SCHEDULING | Facility: HOME HEALTH | Age: 80
End: 2020-03-11
Payer: MEDICARE

## 2020-03-11 PROCEDURE — 400013 HH SOC

## 2020-03-11 PROCEDURE — 3331090002 HH PPS REVENUE DEBIT

## 2020-03-11 PROCEDURE — 3331090001 HH PPS REVENUE CREDIT

## 2020-03-11 PROCEDURE — G0300 HHS/HOSPICE OF LPN EA 15 MIN: HCPCS

## 2020-03-12 ENCOUNTER — HOME CARE VISIT (OUTPATIENT)
Dept: HOME HEALTH SERVICES | Facility: HOME HEALTH | Age: 80
End: 2020-03-12
Payer: MEDICARE

## 2020-03-12 PROCEDURE — 3331090002 HH PPS REVENUE DEBIT

## 2020-03-12 PROCEDURE — 3331090001 HH PPS REVENUE CREDIT

## 2020-03-13 ENCOUNTER — HOME CARE VISIT (OUTPATIENT)
Dept: SCHEDULING | Facility: HOME HEALTH | Age: 80
End: 2020-03-13
Payer: MEDICARE

## 2020-03-13 PROCEDURE — 3331090001 HH PPS REVENUE CREDIT

## 2020-03-13 PROCEDURE — 3331090002 HH PPS REVENUE DEBIT

## 2020-03-13 PROCEDURE — G0157 HHC PT ASSISTANT EA 15: HCPCS

## 2020-03-14 PROCEDURE — 3331090001 HH PPS REVENUE CREDIT

## 2020-03-14 PROCEDURE — 3331090002 HH PPS REVENUE DEBIT

## 2020-03-15 PROCEDURE — 3331090002 HH PPS REVENUE DEBIT

## 2020-03-15 PROCEDURE — 3331090001 HH PPS REVENUE CREDIT

## 2020-03-16 VITALS
HEART RATE: 68 BPM | RESPIRATION RATE: 19 BRPM | SYSTOLIC BLOOD PRESSURE: 100 MMHG | TEMPERATURE: 98 F | DIASTOLIC BLOOD PRESSURE: 60 MMHG | OXYGEN SATURATION: 99 %

## 2020-03-16 VITALS
TEMPERATURE: 97.9 F | OXYGEN SATURATION: 97 % | SYSTOLIC BLOOD PRESSURE: 106 MMHG | DIASTOLIC BLOOD PRESSURE: 58 MMHG | HEART RATE: 58 BPM

## 2020-03-16 PROCEDURE — 3331090002 HH PPS REVENUE DEBIT

## 2020-03-16 PROCEDURE — 3331090001 HH PPS REVENUE CREDIT

## 2020-03-17 ENCOUNTER — HOME CARE VISIT (OUTPATIENT)
Dept: SCHEDULING | Facility: HOME HEALTH | Age: 80
End: 2020-03-17
Payer: MEDICARE

## 2020-03-17 VITALS
DIASTOLIC BLOOD PRESSURE: 68 MMHG | OXYGEN SATURATION: 100 % | HEART RATE: 74 BPM | TEMPERATURE: 97.7 F | SYSTOLIC BLOOD PRESSURE: 108 MMHG

## 2020-03-17 PROCEDURE — 3331090001 HH PPS REVENUE CREDIT

## 2020-03-17 PROCEDURE — 3331090002 HH PPS REVENUE DEBIT

## 2020-03-17 PROCEDURE — G0151 HHCP-SERV OF PT,EA 15 MIN: HCPCS

## 2020-03-18 ENCOUNTER — HOME CARE VISIT (OUTPATIENT)
Dept: SCHEDULING | Facility: HOME HEALTH | Age: 80
End: 2020-03-18
Payer: MEDICARE

## 2020-03-18 VITALS
RESPIRATION RATE: 22 BRPM | OXYGEN SATURATION: 98 % | WEIGHT: 98 LBS | BODY MASS INDEX: 16.31 KG/M2 | TEMPERATURE: 97.4 F | SYSTOLIC BLOOD PRESSURE: 90 MMHG | DIASTOLIC BLOOD PRESSURE: 68 MMHG | HEART RATE: 72 BPM

## 2020-03-18 PROCEDURE — G0299 HHS/HOSPICE OF RN EA 15 MIN: HCPCS

## 2020-03-18 PROCEDURE — 3331090002 HH PPS REVENUE DEBIT

## 2020-03-18 PROCEDURE — 3331090001 HH PPS REVENUE CREDIT

## 2020-03-19 PROCEDURE — 3331090001 HH PPS REVENUE CREDIT

## 2020-03-19 PROCEDURE — 3331090002 HH PPS REVENUE DEBIT

## 2020-03-20 PROCEDURE — 3331090002 HH PPS REVENUE DEBIT

## 2020-03-20 PROCEDURE — 3331090001 HH PPS REVENUE CREDIT

## 2020-03-21 PROCEDURE — 3331090002 HH PPS REVENUE DEBIT

## 2020-03-21 PROCEDURE — 3331090001 HH PPS REVENUE CREDIT

## 2020-03-22 PROCEDURE — 3331090001 HH PPS REVENUE CREDIT

## 2020-03-22 PROCEDURE — 3331090002 HH PPS REVENUE DEBIT

## 2020-03-23 PROCEDURE — 3331090002 HH PPS REVENUE DEBIT

## 2020-03-23 PROCEDURE — 3331090001 HH PPS REVENUE CREDIT

## 2020-03-24 PROCEDURE — 3331090001 HH PPS REVENUE CREDIT

## 2020-03-24 PROCEDURE — 3331090002 HH PPS REVENUE DEBIT

## 2020-03-25 ENCOUNTER — HOME CARE VISIT (OUTPATIENT)
Dept: SCHEDULING | Facility: HOME HEALTH | Age: 80
End: 2020-03-25
Payer: MEDICARE

## 2020-03-25 PROCEDURE — 3331090001 HH PPS REVENUE CREDIT

## 2020-03-25 PROCEDURE — 3331090002 HH PPS REVENUE DEBIT

## 2020-03-25 PROCEDURE — G0300 HHS/HOSPICE OF LPN EA 15 MIN: HCPCS

## 2020-03-26 PROCEDURE — 3331090001 HH PPS REVENUE CREDIT

## 2020-03-26 PROCEDURE — 3331090002 HH PPS REVENUE DEBIT

## 2020-03-27 PROCEDURE — 3331090002 HH PPS REVENUE DEBIT

## 2020-03-27 PROCEDURE — 3331090001 HH PPS REVENUE CREDIT

## 2020-03-28 PROCEDURE — 3331090002 HH PPS REVENUE DEBIT

## 2020-03-28 PROCEDURE — 3331090001 HH PPS REVENUE CREDIT

## 2020-03-29 PROCEDURE — 3331090001 HH PPS REVENUE CREDIT

## 2020-03-29 PROCEDURE — 3331090002 HH PPS REVENUE DEBIT

## 2020-03-30 PROCEDURE — 3331090001 HH PPS REVENUE CREDIT

## 2020-03-30 PROCEDURE — 3331090002 HH PPS REVENUE DEBIT

## 2020-03-31 ENCOUNTER — HOME CARE VISIT (OUTPATIENT)
Dept: SCHEDULING | Facility: HOME HEALTH | Age: 80
End: 2020-03-31
Payer: MEDICARE

## 2020-03-31 VITALS
TEMPERATURE: 97.4 F | OXYGEN SATURATION: 97 % | HEART RATE: 66 BPM | DIASTOLIC BLOOD PRESSURE: 90 MMHG | SYSTOLIC BLOOD PRESSURE: 103 MMHG

## 2020-03-31 VITALS
DIASTOLIC BLOOD PRESSURE: 68 MMHG | RESPIRATION RATE: 18 BRPM | HEART RATE: 68 BPM | SYSTOLIC BLOOD PRESSURE: 108 MMHG | TEMPERATURE: 96.7 F | OXYGEN SATURATION: 96 %

## 2020-03-31 PROCEDURE — G0299 HHS/HOSPICE OF RN EA 15 MIN: HCPCS

## 2020-03-31 PROCEDURE — 3331090001 HH PPS REVENUE CREDIT

## 2020-03-31 PROCEDURE — 3331090002 HH PPS REVENUE DEBIT

## 2020-04-01 PROCEDURE — 3331090002 HH PPS REVENUE DEBIT

## 2020-04-01 PROCEDURE — 3331090001 HH PPS REVENUE CREDIT

## 2020-04-02 PROCEDURE — 3331090001 HH PPS REVENUE CREDIT

## 2020-04-02 PROCEDURE — 3331090002 HH PPS REVENUE DEBIT

## 2020-04-03 PROCEDURE — 3331090002 HH PPS REVENUE DEBIT

## 2020-04-03 PROCEDURE — 3331090001 HH PPS REVENUE CREDIT

## 2020-04-06 ENCOUNTER — HOME CARE VISIT (OUTPATIENT)
Dept: SCHEDULING | Facility: HOME HEALTH | Age: 80
End: 2020-04-06
Payer: MEDICARE

## 2020-04-06 VITALS
HEART RATE: 88 BPM | DIASTOLIC BLOOD PRESSURE: 70 MMHG | SYSTOLIC BLOOD PRESSURE: 114 MMHG | TEMPERATURE: 97.9 F | OXYGEN SATURATION: 99 % | RESPIRATION RATE: 18 BRPM

## 2020-04-15 ENCOUNTER — OFFICE VISIT (OUTPATIENT)
Dept: CARDIOLOGY CLINIC | Age: 80
End: 2020-04-15

## 2020-04-15 ENCOUNTER — VIRTUAL VISIT (OUTPATIENT)
Dept: CARDIOLOGY CLINIC | Age: 80
End: 2020-04-15

## 2020-04-15 DIAGNOSIS — Z95.1 S/P CABG X 5: ICD-10-CM

## 2020-04-15 DIAGNOSIS — E78.00 HYPERCHOLESTEROLEMIA: ICD-10-CM

## 2020-04-15 DIAGNOSIS — Z95.810 AICD (AUTOMATIC CARDIOVERTER/DEFIBRILLATOR) PRESENT: Primary | ICD-10-CM

## 2020-04-15 DIAGNOSIS — I11.9 HYPERTENSIVE HEART DISEASE WITHOUT HEART FAILURE: ICD-10-CM

## 2020-04-15 DIAGNOSIS — Z95.810 AICD (AUTOMATIC CARDIOVERTER/DEFIBRILLATOR) PRESENT: ICD-10-CM

## 2020-04-15 DIAGNOSIS — I50.32 DIASTOLIC CHF, CHRONIC (HCC): Primary | ICD-10-CM

## 2020-04-15 DIAGNOSIS — R06.09 DOE (DYSPNEA ON EXERTION): ICD-10-CM

## 2020-04-15 NOTE — PROGRESS NOTES
I have personally seen and evaluated the device findings. Interrogation reviewed and I agree with assessment.     Nicola Sanford

## 2020-04-15 NOTE — PROGRESS NOTES
Mala Multani is a 67 y.o. male evaluated via telephone on 4/15/2020. Consent:  He and/or health care decision maker is aware that that he may receive a bill for this telephone service, depending on his insurance coverage, and has provided verbal consent to proceed: Yes    HPI:   I saw Ilene Ann virtual video conferencing today in cardiovascular evaluation regarding his chronic coronary artery disease. The interview had to be can pleaded on the telephone since the video connection was interrupted near the end of our encounter.  Mr. Rocky Gamboa is very pleasant small 52-year-old black male with history of hypertensive heart disease, hypercholesterolemia, chronic diastolic heart failure, and coronary artery disease status coronary bypass grafting surgery × 5 in April 2014 with the following bypasses:     1.  LIMA to the LAD  2. Sequential saphenous vein graft to the diagonal and ramus intermedius. 3. Sequential saphenous vein graft to the right posterior descending artery and the right posterior left ventricular branch.     When I saw him last in the office on December 17, 2018 he was having some mild shortness of breath with exertion and some vague chest discomfort with exertion from time to time as well and we decided to do a screening nuclear myocardial perfusion study.  That nuclear myocardial perfusion study was markedly abnormal showing 2 areas of possible ischemia as well as a positive transient ischemic dilatation at 1.28 and a calculated ejection fraction 33% making this a very high risk study.     In view of the above study I referred him to my associate Dr. Sanna Tran for cardiac catheterization       · Severe native CAD with distal left main 90%, heavily calcified. · LIMA to LAD is patent. · 2 sequential vein grafts (All 4 vein grafts to diagonal, OM, RPDA, RPL branch) occluded at aortotomy site.   · Subtotal mid RCA occlusion with NICO 0-I flow, some collaterals from left coronary system. · Native diagonal 85% stenosis, native OM 85% stenosis.     It was felt by Dr. Grajeda Records that due to his severe diffuse native coronary artery disease and the only bypass remaining patent being the left internal mammary artery to the LAD that the best therapy was difficult to determine. He could be considered simply for continued medical therapy, redo bypass surgery, or selective angioplasty although this would be high risk in view of his left main obstruction, heavy calcification, and the diffuseness of his disease. He relates that he was recently in the hospital in February 2020 with a right lower lobe pneumonia, a urinary tract infection, and combined systolic and diastolic heart failure. He is reasonably well compensated in that regard currently and is doing reasonably well at home. However over the past day or so he has noted that his heart rate has been somewhat faster than the 110 to 120 range and sometimes higher and is also noted that his oxygen saturations have been somewhat low. He is usually on 3-1/2 L via nasal cannula all the time and tells me that his saturations are usually 95 to 98%, but over the past day and a half his saturations have been lower and is noted as low as 79% and is currently 85% and his pulse rate when he was checking it is all over the place on his monitor from high 90s to the 140s. He is not complaining of any shortness of breath at rest but he is getting short of breath with any significant activity. He denies any peripheral edema and tells me his weight is really been dropping down because he has been somewhat anorexic and his weight today was 95.2 pounds on his bathroom scale. Encounter Diagnoses   Name Primary?     AICD (automatic cardioverter/defibrillator) present Yes    S/P CABG x 5 with 4/5 grafts occluded at the time of recent cardiac catheterization with LIMA to LAD patent     STEWARD (dyspnea on exertion)     Hypertensive heart disease without heart failure     Hypercholesterolemia        Discussion: This patient appears to be doing reasonably well from a cardiovascular vantage without any overt heart failure but is decreasing O2 sats on 3-1/2 L of O2 the past day or so somewhat concerning. He has been during this COVID-19 crisis, but he did have a visiting nurse evaluating him up until April 7, 2020 at person could potentially be a source of COVID-19 infection. In view of his decreased O2 sats I told him to increase his O2 via nasal cannula to 5 L for now check his O2 sats to see if they will get above 90%. I would also like to get a visiting nurse to go and see him at the latest tomorrow to get some blood work and recheck his O2 saturations on the 5 L and check it again at 3-1/2 L saturations are in the mid 90s on 5 L. I would also like to get some blood work to consist of a CBC BMP as well as we check for COVID-19. Please get this scheduled as soon as possible he also had some laboratory work completed through Dr. Rose Marie Syed office within the last couple of months and I would like to get a copy of that information particularly profile for our records and my review. This patient should be reevaluated Carol if in fact he has signs of leukocytosis or COVID-19 positivity in view of recent onset of decreased O2 saturation on his 3-1/2 L via nasal cannula. I affirm this is a Patient Initiated Episode with an Established Patient who has not had a related appointment within my department in the past 7 days or scheduled within the next 24 hours.     Total Time: minutes: 21-30 minutes    Note: not billable if this call serves to triage the patient into an appointment for the relevant concern      Jackelyn Arroyo DO

## 2020-04-17 ENCOUNTER — TELEPHONE (OUTPATIENT)
Dept: CARDIOLOGY CLINIC | Age: 80
End: 2020-04-17

## 2020-04-17 NOTE — TELEPHONE ENCOUNTER
Per Dr Mago Brown patient needs to recheck is O2 sat again , if low he will need to call the FLU check number to be traige . First sign of coronavirus for some patients is decreased O2 sat , patient is having stat in the 80's on 3 liters. Per Dr Mago Brown he increased O2 to 5 liters. Left message on machine to call the office back

## 2020-05-14 NOTE — PROGRESS NOTES
Karime Hollis presents today for a 1 month follow-up. He was seen by Dr. Dayan Belcher in mid April 2020, initially through a virtual video conference which had to be switched to a telephone encounter due to some technical difficulties. During that visit, he reported decreased oxygen saturations even on increased oxygen supplementation. Dr. Dayan Belcher requested labs, COVID testing, and home health follow-up. It does not appear that any of the testing was performed. He states that he missed his December 2019 appointment with Dr. Dayan Belcher as he was unable to drive to the office that day. He was hospitalized in Sept 2019 for bundle branch VT. He underwent dual chamber AICD implant. In Oct. 2019, he was hospitalized again for acute on chronic heart failure. His last echo was done on 12/10/19 and it showed an EF of 21-25% (which was significantly decreased compared to previous echo), abnormal LV wall motion. He is a 67year old male with history of HCVD, hypercholesterolemia, chronic diastolic heart failure, and CAD (s/p CABG x 5 in April 2014). His last echo was done in Jan. 2019 and it showed an EF of 46-50%, abnormal wall motion, and grade 2 diastolic dysfunction. His last stress test was performed in December 2018 and it was markedly abnormal.  It showed 2 areas of possible ischemia and he had positive TID at 1.28. His calculated ejection fraction at that time was 33%. He underwent cardiac catheterization on February 1, 2019 and it demonstrated the following:  ·  Severe native CAD with distal left main 90%, heavily calcified. · LIMA to LAD is patent. · 2 sequential vein grafts (All 4 vein grafts to diagonal, OM, RPDA, RPL branch) occluded at aortotomy site. · Subtotal mid RCA occlusion with NICO 0-I flow, some collaterals from left coronary system. Native diagonal 85% stenosis, native OM 85% stenosis.     Possible options included continued medical therapy, redo bypass surgery, or selective angioplasty. Angioplasty and stent will be difficult in light of diffuse disease, left main, and heavy calcification. Today, he states that he is feeling somewhat better when compared to when he spoke with Dr. Mxa Velazco in mid-April. He states that the only time he notices the shortness of breath is when he is moving around. He is not short of breath at rest.  His oxygen saturation at rest has been in the 90's and was 92% today. He has been using oxygen at 3 to 4 liters and occasionally, 5 liters when he feels more short of breath. He uses oxygen at home. Denies chest pain, tightness, heaviness, and palpitations. Denies shortness of breath at rest, dyspnea on exertion, orthopnea and PND. Denies abdominal bloating. Denies lightheadedness, dizziness, and syncope. Denies lower extremity edema and claudication. Denies nausea, vomiting, diarrhea, melena, hematochezia. Denies hematuria, urgency, frequency. Denies fever, chills. He states that he has been taking his temperature. He reports that his temperature this morning was 98.7. PMH:  Past Medical History:   Diagnosis Date    AICD (automatic cardioverter/defibrillator) present 10/13/2019    Alcohol abuse     quit February 2011    Binocular visual disturbance 10/31/2013    CAD (coronary artery disease)     CAD (coronary artery disease), native coronary artery 1/3/05    LAD 70-80% mid; Cx-diffuse 80%; OM1-70% prox; RCA 40-50% mid; 100% RPLB with collaterals    Cardiac cath 08/19/2013    Severe, diffuse calcification. dLM 30%. mLAD 80%. o/pD1 80%. o/pRamus 80%. dCX 85%. dRCA 80%.  m-dRPLB 75%. EF 50%. Mod to severe inferobasal hypk.  Cardiac echocardiogram 01/19/2011    EF 50-55%. Gr 1 DDfx. RVSP 30 mmHg.  Cardiac nuclear imaging test 06/27/2011    No evidence of ischemia or previous infarction. EF 60%. Neg EKG on max EST. Ex time 6 mins.     Chronic kidney disease     Chronic lung disease     [facility] obstructive pulmonary disease ([facility]     Coronary artery disease     Diastolic CHF, chronic (HCC)     LV EF 55% (Echo Jan 2011)    Diverticula of colon 12/17/2013    Emphysematous COPD ([facility]     PFTs March 2009; mild-mode obstruction, + bronchodil response; decreased DLCO    GERD (gastroesophageal reflux disease)     with erosive esophagitis history    Heart failure ([address]     History of renal failure     2 years ago, which completely resolved    Hypercholesterolemia     Hypertension     Hypertensive heart disease with CHF ([facility]     MI (myocardial infarction) ([facility]     Inferior wall    Paresthesia and pain of both upper extremities 10/31/2013    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 Sathya Campuzano, 4/9/14.  Tobacco abuse     Unspecified hereditary and idiopathic peripheral neuropathy 12/4/2013       PSH:  Past Surgical History:   Procedure Laterality Date    HX CORONARY ARTERY BYPASS GRAFT  about 2013    HX HEART CATHETERIZATION  11/2013    KS INSJ/RPLCMT PERM DFB W/TRNSVNS LDS 1/DUAL CHMBR Left 9/30/2019    INSERT ICD DUAL performed by Pooja Yepez MD at [facility] C[facility]       MEDS:  Current Outpatient Medications   Medication Sig    synthroid 25 mcg tablet Take 25 mcg by mouth daily.  liothyronine (CYTOMEL) 25 mcg tablet Take 25 mcg by mouth daily.  amLODIPine (NORVASC) 5 mg tablet Take 5 mg by mouth daily.  albuterol (PROVENTIL HFA, VENTOLIN HFA, PROAIR HFA) 90 mcg/actuation inhaler Take 1 Puff by inhalation every six (6) hours as needed for Shortness of Breath or Wheezing.  furosemide (LASIX) 20 mg tablet 2 tablets po daily for 5 days then 1 tablet po daily  Indications: fluid in the lungs due to chronic heart failure (Patient taking differently: 40 mg daily.  2 tablets po daily for 5 days then 1 tablet po daily  Indications: fluid in the lungs due to chronic heart failure)    OTHER BMP on 2/7/20 morning  Call report to PCP   Reason: CHF    OXYGEN-AIR DELIVERY SYSTEMS 3 L/min by Nasal route continuous.  acetaminophen (TYLENOL EXTRA STRENGTH) 500 mg tablet Take 500 mg by mouth every six (6) hours as needed for Pain. 1 pill poq 6 hours as needed prn pain    amiodarone (CORDARONE) 200 mg tablet Take 1 Tab by mouth daily. Indications: ventricular fibrillation, a heart rhythm disorder    losartan (COZAAR) 50 mg tablet Take 1 Tab by mouth nightly. 1 pill po daily  Indications: chronic heart failure, high blood pressure (Patient taking differently: Take 100 mg by mouth nightly. 1 pill po daily  Indications: chronic heart failure, high blood pressure)    albuterol (PROVENTIL VENTOLIN) 2.5 mg /3 mL (0.083 %) nebu 3 mL by Nebulization route every four (4) hours as needed (cough, wheezing, more shortness of breath). Indications: a chronic lung disease where the airways become partially blocked with mucus called COPD    pantoprazole (PROTONIX) 40 mg tablet Take 40 mg by mouth daily. 1 pill daily 30 minutes before breakfast.  Indications: indigestion    atorvastatin (LIPITOR) 40 mg tablet Take 40 mg by mouth nightly. 1 pill daily    tiotropium-olodaterol (STIOLTO RESPIMAT) 2.5-2.5 mcg/actuation inhaler Take 2 Puffs by inhalation daily.  clopidogrel (PLAVIX) 75 mg tab Take 1 Tab by mouth daily.  isosorbide mononitrate ER (IMDUR) 30 mg tablet Take 1 Tab by mouth every morning.  nitroglycerin (NITROSTAT) 0.4 mg SL tablet 1 Tab by SubLINGual route every five (5) minutes as needed for Chest Pain. Up to 3 doses.  aspirin delayed-release 81 mg tablet Take 81 mg by mouth daily. 1 pill po daily    metoprolol succinate (TOPROL-XL) 25 mg XL tablet Take 25 mg by mouth daily.  1 pill po daily    albuterol (VENTOLIN HFA) 90 mcg/Actuation inhaler Take 1 Puff by inhalation as needed for Shortness of Breath or Wheezing.  CYANOCOBALAMIN/FA/PYRIDOXINE (FOLBEE PO) Take 1 Tab by mouth daily.  multivitamin (ONE A DAY) tablet Take 1 Tab by mouth daily. No current facility-administered medications for this visit. Allergies and Sensitivities:  Allergies   Allergen Reactions    Vicodin [Hydrocodone-Acetaminophen] Rash       Family History:  Family History   Problem Relation Age of Onset    Heart Disease Maternal Uncle     Diabetes Father     Hypertension Father        Social History:  He  reports that he quit smoking about 16 months ago. His smoking use included cigarettes. He started smoking about 64 years ago. He has a 27.50 pack-year smoking history. He has never used smokeless tobacco.  He  reports current alcohol use. Physical:  Visit Vitals  /64 (BP 1 Location: Right arm, BP Patient Position: Sitting)   Pulse 76   Ht 5' 5\" (1.651 m)   Wt 46.7 kg (103 lb)   SpO2 92%   BMI 17.14 kg/m²     TEMP:  98.7 taken at home this AM      Exam:  Neck:  Supple, no JVD, no carotid bruits  CV:  Normal S1 and  S2, no murmurs, rubs, or gallops noted  Lungs:  Clear to ausculation throughout but slightly diminished, no wheezes or rales  Abd:  Soft, non-tender, non-distended with good bowel sounds.   No hepatosplenomegaly  Extremities:  No edema      Data:  EKG:        LABS:  Lab Results   Component Value Date/Time    Sodium 137 02/07/2020 11:05 AM    Potassium 3.7 02/07/2020 11:05 AM    Chloride 98 (L) 02/07/2020 11:05 AM    CO2 32 02/07/2020 11:05 AM    Glucose 97 02/07/2020 11:05 AM    BUN 25 (H) 02/07/2020 11:05 AM    Creatinine 1.48 (H) 02/07/2020 11:05 AM     Lab Results   Component Value Date/Time    Cholesterol, total 144 05/03/2019 10:26 AM    HDL Cholesterol 86 (H) 05/03/2019 10:26 AM    LDL, calculated 18 05/03/2019 10:26 AM    Triglyceride 200 (H) 05/03/2019 10:26 AM    CHOL/HDL Ratio 1.7 05/03/2019 10:26 AM     Lab Results   Component Value Date/Time    ALT (SGPT) 16 01/30/2020 07:32 PM         Impression/Plan:  1.  CAD, s/p CABG x 5 in April 2014  2. Hypercholesterolemia, on atorvastatin 40 mg  3. Chronic systolic heart failure, appears compensated  4. HCVD, blood pressure controlled  5. COPD    Mr. Andrei Fuchs was seen today for a one month follow-up. When seen by Dr. Reina Martin last month via virtual visit, he complained of increased shortness of breath and decreased oxygen saturations even while using his oxygen. Dr. Reina Martin wanted him to have a CBC, BMP, and COVID-19 testing done. The testing was not ordered and therefore, he did not have it done. Since that time, Mr. Andrei Fuchs states that he has been feeling somewhat better. He denies fever, chills, and has been monitoring his temperature at home. This morning it was 98.7. He states that he does not feel short of breath at rest but it is more noticeable with activity. His oxygen has been between 3 and 5 liters. His oxygen saturation on room air in the office today was 92%. He has not reported his increased shortness of breath to pulmonology and has not seen his pulmonologist for follow-up. He was advised to schedule an appointment. His case was discussed with Dr. Reina Martin and he would still like for Mr. Andrei Fuchs to have a CBC, BMP, and COVID-19 testing done and they have been ordered. Mr. Andrei Fuchs states that he has been asymptomatic for COVID-19 except for the shortness of breath. His blood pressure and heart rate are well controlled and he does not exhibit any signs of volume overload. EKG shows sinus rhythm. Congestive heart failure teaching reinforced today. Advised to limit sodium intake to no more than 2000mg per day and to also limit fluid intake to 48 ounces per day (unless otherwise specified). Advised to weigh daily every morning and record weights.   Instructed to call our office if progressive weight gain is noted over a 2 to 3 day period of time, shortness of breath increases, or if abdominal bloating, nausea, fatigue, or increased lower extremity edema is noted. He will follow-up with Dr. Jaqueline Douglas as scheduled and as needed. Rosalina Price MSN, FNP-BC    Please note:  Portions of this chart were created with Dragon medical speech to text program.  Unrecognized errors may be present.

## 2020-05-18 ENCOUNTER — OFFICE VISIT (OUTPATIENT)
Dept: CARDIOLOGY CLINIC | Age: 80
End: 2020-05-18

## 2020-05-18 VITALS
WEIGHT: 103 LBS | BODY MASS INDEX: 17.16 KG/M2 | HEIGHT: 65 IN | DIASTOLIC BLOOD PRESSURE: 64 MMHG | SYSTOLIC BLOOD PRESSURE: 130 MMHG | HEART RATE: 76 BPM | OXYGEN SATURATION: 92 %

## 2020-05-18 DIAGNOSIS — R06.02 SHORTNESS OF BREATH: ICD-10-CM

## 2020-05-18 DIAGNOSIS — R06.09 DOE (DYSPNEA ON EXERTION): ICD-10-CM

## 2020-05-18 DIAGNOSIS — Z95.810 AICD (AUTOMATIC CARDIOVERTER/DEFIBRILLATOR) PRESENT: ICD-10-CM

## 2020-05-18 DIAGNOSIS — Z95.1 S/P CABG X 5: ICD-10-CM

## 2020-05-18 DIAGNOSIS — E78.00 HYPERCHOLESTEROLEMIA: ICD-10-CM

## 2020-05-18 DIAGNOSIS — I11.9 HYPERTENSIVE HEART DISEASE WITHOUT HEART FAILURE: Primary | ICD-10-CM

## 2020-05-18 RX ORDER — LIOTHYRONINE SODIUM 25 UG/1
25 TABLET ORAL DAILY
COMMUNITY
Start: 2020-05-02

## 2020-05-18 RX ORDER — LEVOTHYROXINE SODIUM 25 UG/1
25 TABLET ORAL DAILY
COMMUNITY
Start: 2020-05-02 | End: 2022-01-31

## 2020-05-18 NOTE — PROGRESS NOTES
Nettie Obrien presents today for   Chief Complaint   Patient presents with    Cardiomyopathy     1 month follow up per Didier Mendoza     Shortness of Breath     exertion    Palpitations     racing       Nettie Obrien preferred language for health care discussion is english/other. Is someone accompanying this pt? no    Is the patient using any DME equipment during [address] [employer]     Depression Screening:  3 most recent PHQ Screens 5/18/2020   Little interest or pleasure in doing things Not at all   Feeling down, depressed, irritable, or hopeless Not at all   Total Score PHQ 2 0       Learning Assessment:  Learning Assessment 3/12/2014   PRIMARY LEARNER Patient   HIGHEST LEVEL OF EDUCATION - PRIMARY LEARNER  [facility] [id number] [facility] CAREGIVER No   PRIMARY LANGUAGE ENGLISH    NEED No   LEARNER PREFERENCE PRIMARY OTHER (COMMENT)   ANSWERED BY Patient   RELATIONSHIP SELF       Abuse Screening:  Abuse Screening Questionnaire 5/18/2020   Do you ever feel afraid of your partner? N   Are you in a relationship with someone who physically or mentally threatens you? N   Is it safe for you to go home? Y       Fall Risk  Fall Risk Assessment, last 12 mths 5/18/2020   Able to walk? Yes   Fall in past 12 months? No   Fall with injury? -   Number of falls in past 12 months -   Fall Risk Score -       Pt currently taking Anticoagulant therapy? ASA 81mg every day and Plavix     Coordination of Care:  1. Have you been to the ER, urgent care clinic since your last visit? Hospitalized since your last visit? no    2. Have you seen or consulted any other health care providers outside of the [address] [place] [address] since your last visit? Include any pap smears or colon screening.  no

## 2020-05-18 NOTE — PATIENT INSTRUCTIONS
CBC, BMP, and COVID-19 Continue present medication regimen Follow-up with Dr Adore Arellano as scheduled and as needed Please schedule follow-up with your pulmonologist (lung doctor)

## 2020-06-04 ENCOUNTER — HOSPITAL ENCOUNTER (OUTPATIENT)
Dept: VASCULAR SURGERY | Age: 80
Discharge: HOME | End: 2020-06-04
Attending: INTERNAL MEDICINE
Payer: MEDICARE

## 2020-06-04 DIAGNOSIS — I70.219: ICD-10-CM

## 2020-06-04 DIAGNOSIS — I73.9 CLAUDICATION (HCC): ICD-10-CM

## 2020-06-04 LAB
LEFT ABI: 1.08
LEFT ANTERIOR TIBIAL: 167 MMHG
LEFT ARM BP: 154 MMHG
LEFT CALF PRESSURE: 170 MMHG
LEFT POSTERIOR TIBIAL: 163 MMHG
RIGHT ABI: 1.11
RIGHT ANTERIOR TIBIAL: 171 MMHG
RIGHT ARM BP: 151 MMHG
RIGHT CALF PRESSURE: 172 MMHG
RIGHT POSTERIOR TIBIAL: 168 MMHG

## 2020-06-04 PROCEDURE — 93923 UPR/LXTR ART STDY 3+ LVLS: CPT

## 2020-07-29 ENCOUNTER — OFFICE VISIT (OUTPATIENT)
Dept: CARDIOLOGY CLINIC | Age: 80
End: 2020-07-29

## 2020-07-29 DIAGNOSIS — R00.0 WIDE-COMPLEX TACHYCARDIA: Primary | ICD-10-CM

## 2020-07-29 DIAGNOSIS — Z95.810 AICD (AUTOMATIC CARDIOVERTER/DEFIBRILLATOR) PRESENT: ICD-10-CM

## 2020-07-29 DIAGNOSIS — I50.9 ACUTE ON CHRONIC CONGESTIVE HEART FAILURE, UNSPECIFIED HEART FAILURE TYPE (HCC): ICD-10-CM

## 2020-08-01 NOTE — PROGRESS NOTES
He has a CBC and BMP scheduled to be done in the future and I would add BNP. Please add to his labs.  ES

## 2020-09-03 ENCOUNTER — TELEPHONE (OUTPATIENT)
Dept: CARDIOLOGY CLINIC | Age: 80
End: 2020-09-03

## 2020-09-03 DIAGNOSIS — I50.9 ACUTE ON CHRONIC CONGESTIVE HEART FAILURE, UNSPECIFIED HEART FAILURE TYPE (HCC): ICD-10-CM

## 2020-09-03 DIAGNOSIS — R06.09 DOE (DYSPNEA ON EXERTION): Primary | ICD-10-CM

## 2020-09-03 NOTE — TELEPHONE ENCOUNTER
----- Message from [place], DO sent at 8/1/2020 10:45 AM EDT ----- He has a CBC and BMP scheduled to be done in the future and I would add BNP. Please add to his labs.  ES

## 2020-09-03 NOTE — TELEPHONE ENCOUNTER
Called patient to tell him he needs to do his lab work and schedule a follow up appointment. I added the Pro BNP lab order as per Dr. Mago Brown  Patient states he will do his labs by next week. I will have  call him and set up his follow up. I have no access to [place] at this time.

## 2021-06-11 ENCOUNTER — TELEPHONE (OUTPATIENT)
Dept: CARDIOLOGY CLINIC | Age: 81
End: 2021-06-11

## 2021-06-11 NOTE — TELEPHONE ENCOUNTER
Called and left a voicemail for the patient to call the office at [phone] to schedule an overdue follow up  appointment with Dr. Elan Cabrera.        Amy Petersen, JOÃO, CET, CPT    [facility]

## 2021-07-02 NOTE — TELEPHONE ENCOUNTER
Called the patient to set up an overdue follow up since he has not seen any other Doctor since Dr. Karey Favre retired. Appt made for 07- at 9:00 AM with Dr. Jeanmarie Amaya.       Jayleen Baker, JAYDENA, CET, CPT    MA [facility] \[facility]\""

## 2021-07-19 ENCOUNTER — OFFICE VISIT (OUTPATIENT)
Dept: CARDIOLOGY CLINIC | Age: 81
End: 2021-07-19
Payer: MEDICARE

## 2021-07-19 VITALS
HEART RATE: 70 BPM | BODY MASS INDEX: 17.16 KG/M2 | SYSTOLIC BLOOD PRESSURE: 186 MMHG | OXYGEN SATURATION: 100 % | WEIGHT: 103 LBS | HEIGHT: 65 IN | DIASTOLIC BLOOD PRESSURE: 94 MMHG

## 2021-07-19 DIAGNOSIS — Z91.14 HISTORY OF MEDICATION NONCOMPLIANCE: ICD-10-CM

## 2021-07-19 DIAGNOSIS — Z72.0 TOBACCO ABUSE: ICD-10-CM

## 2021-07-19 DIAGNOSIS — I25.5 ISCHEMIC CARDIOMYOPATHY: ICD-10-CM

## 2021-07-19 DIAGNOSIS — I10 ESSENTIAL HYPERTENSION: ICD-10-CM

## 2021-07-19 DIAGNOSIS — Z95.810 AICD (AUTOMATIC CARDIOVERTER/DEFIBRILLATOR) PRESENT: ICD-10-CM

## 2021-07-19 DIAGNOSIS — R00.0 WIDE-COMPLEX TACHYCARDIA: ICD-10-CM

## 2021-07-19 DIAGNOSIS — E78.00 HYPERCHOLESTEROLEMIA: ICD-10-CM

## 2021-07-19 DIAGNOSIS — J43.9 PULMONARY EMPHYSEMA, UNSPECIFIED EMPHYSEMA TYPE (HCC): ICD-10-CM

## 2021-07-19 DIAGNOSIS — Z95.1 S/P CABG X 5: ICD-10-CM

## 2021-07-19 DIAGNOSIS — I25.10 CORONARY ARTERY DISEASE INVOLVING NATIVE CORONARY ARTERY OF NATIVE HEART WITHOUT ANGINA PECTORIS: Primary | ICD-10-CM

## 2021-07-19 PROCEDURE — 1101F PT FALLS ASSESS-DOCD LE1/YR: CPT | Performed by: INTERNAL MEDICINE

## 2021-07-19 PROCEDURE — 93283 PRGRMG EVAL IMPLANTABLE DFB: CPT | Performed by: INTERNAL MEDICINE

## 2021-07-19 PROCEDURE — G8510 SCR DEP NEG, NO PLAN REQD: HCPCS | Performed by: INTERNAL MEDICINE

## 2021-07-19 PROCEDURE — 99215 OFFICE O/P EST HI 40 MIN: CPT | Performed by: INTERNAL MEDICINE

## 2021-07-19 PROCEDURE — G8427 DOCREV CUR MEDS BY ELIG CLIN: HCPCS | Performed by: INTERNAL MEDICINE

## 2021-07-19 PROCEDURE — G8536 NO DOC ELDER MAL SCRN: HCPCS | Performed by: INTERNAL MEDICINE

## 2021-07-19 PROCEDURE — G8419 CALC BMI OUT NRM PARAM NOF/U: HCPCS | Performed by: INTERNAL MEDICINE

## 2021-07-19 RX ORDER — SACUBITRIL AND VALSARTAN 49; 51 MG/1; MG/1
1 TABLET, FILM COATED ORAL 2 TIMES DAILY
Qty: 180 TABLET | Refills: 3 | Status: SHIPPED | OUTPATIENT
Start: 2021-07-19 | End: 2022-01-31 | Stop reason: SDUPTHER

## 2021-07-19 RX ORDER — ATORVASTATIN CALCIUM 40 MG/1
40 TABLET, FILM COATED ORAL
Qty: 90 TABLET | Refills: 3 | Status: SHIPPED | OUTPATIENT
Start: 2021-07-19

## 2021-07-19 RX ORDER — NITROGLYCERIN 0.4 MG/1
0.4 TABLET SUBLINGUAL
Qty: 1 BOTTLE | Refills: 3 | Status: SHIPPED | OUTPATIENT
Start: 2021-07-19

## 2021-07-19 RX ORDER — METOPROLOL SUCCINATE 25 MG/1
25 TABLET, EXTENDED RELEASE ORAL DAILY
Qty: 90 TABLET | Refills: 3 | Status: SHIPPED
Start: 2021-07-19 | End: 2022-01-31 | Stop reason: DRUGHIGH

## 2021-07-19 RX ORDER — ASPIRIN 81 MG/1
81 TABLET ORAL DAILY
Qty: 90 TABLET | Refills: 3 | Status: SHIPPED | OUTPATIENT
Start: 2021-07-19

## 2021-07-19 NOTE — PATIENT INSTRUCTIONS
Follow up with Dr Agus Bee with EKG in 6 months with device check  Follow up with Trang Perez NP in 3 months with device check  Start Entresto 49/51mg twice a day  Continue Aspirin, Metoprolol, Atorvastatin  Stop: Isosorbide Mononitrate, Losartan, Amlodipine, Amiodarone  Lab work: CMP, lipid panel - to be done in 2 months

## 2021-07-19 NOTE — PROGRESS NOTES
HISTORY OF PRESENT ILLNESS  Laura Michael is a 79 y.o. male. HPI    Patient presents for an overdue follow-up office visit. He was previously followed by Dr. Leone Lombard up until his shelter at the end of 2020. The patient has not been seen in our office in over a year. He was last hospitalized for congestive heart failure in February 2020. He has a longstanding history of coronary artery disease status post 5 vessel CABG in 2014 with a more recent cardiac catheterization in 2018 demonstrate a patent LIMA to LAD graft, and all other vein grafts occluded. His native RCA had a subtotal mid vessel occlusion with collaterals from the left system. His native diagonal branch of 85% stenosis in the native OM branch that an 85% stenosis. His LV function has remained severely depressed. His most recent echocardiogram from February 2020 showed an ejection fraction of 20 to 25% with mildly elevated PA pressures at 41 mmHg. He had no significant valvular heart disease. Lastly, he has a history of wide-complex tachycardia suspicious for ventricular tachycardia, so underwent implantation of a dual-chamber AICD in September 2019. Patient admits that he does not take a lot of his medications regularly and in fact is not taking any cardiac medications for several weeks now. He is now on home oxygen 4 L most days. He was a longtime smoker but quit smoking almost 2 years ago. Patient denies having any recurrent chest pain, no worsening shortness of breath of his baseline, no leg swelling, no orthopnea, no PND. No heart palpitations, dizziness, syncope or AICD shocks.     Past Medical History:   Diagnosis Date    AICD (automatic cardioverter/defibrillator) present 10/13/2019    [employer] dual-chamber MRI compatible    Alcohol abuse     quit February 2011    Binocular visual disturbance 10/31/2013    CAD (coronary artery disease)     CAD (coronary artery disease), native coronary artery 1/3/05    LAD 70-80% mid; Cx-diffuse 80%; OM1-70% prox; RCA 40-50% mid; 100% RPLB with collaterals    Cardiac cath 08/19/2013    Severe, diffuse calcification. dLM 30%. mLAD 80%. o/pD1 80%. o/pRamus 80%. dCX 85%. dRCA 80%.  m-dRPLB 75%. EF 50%. Mod to severe inferobasal hypk.  Cardiac nuclear imaging test 06/27/2011    No evidence of ischemia or previous infarction. EF 60%. Neg EKG on max EST. Ex time 6 mins.  Chronic kidney disease     Chronic lung disease     [facility] obstructive pulmonary disease ([facility]     Coronary artery disease     Diverticula of colon 12/17/2013    Emphysematous COPD ([facility]     PFTs March 2009; mild-mode obstruction, + bronchodil response; decreased DLCO    GERD (gastroesophageal reflux disease)     with erosive esophagitis history    Heart failure (HCC)     History of renal failure     2 years ago, which completely resolved    Hypercholesterolemia     Hypertension     Hypertensive heart disease with CHF ([facility]     Ischemic cardiomyopathy 02/2020    EF 20 to 25%, PASP 41 mmHg    MI (myocardial infarction) ([facility])     Inferior wall    Paresthesia and pain of both upper extremities 10/31/2013    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 Kirsten Wasserman, 4/9/14.  Tobacco abuse     Unspecified hereditary and idiopathic peripheral neuropathy 12/4/2013     Current Outpatient Medications   Medication Sig Dispense Refill    sacubitriL-valsartan (Entresto) 49-51 mg tab tablet Take 1 Tablet by mouth two (2) times a day. 180 Tablet 3    metoprolol succinate (TOPROL-XL) 25 mg XL tablet Take 1 Tablet by mouth daily. 1 pill po daily 90 Tablet 3    atorvastatin (LIPITOR) 40 mg tablet Take 1 Tablet by mouth nightly.  1 pill daily 90 Tablet 3    aspirin delayed-release 81 mg tablet Take 1 Tablet by mouth daily. 1 pill po daily 90 Tablet 3    nitroglycerin (NITROSTAT) 0.4 mg SL tablet 1 Tablet by SubLINGual route every five (5) minutes as needed for Chest Pain. Up to 3 doses. 1 Bottle 3    synthroid 25 mcg tablet Take 25 mcg by mouth daily.  liothyronine (CYTOMEL) 25 mcg tablet Take 25 mcg by mouth daily.  albuterol (PROVENTIL HFA, VENTOLIN HFA, PROAIR HFA) 90 mcg/actuation inhaler Take 1 Puff by inhalation every six (6) hours as needed for Shortness of Breath or Wheezing.  OTHER BMP on 20 morning  Call report to PCP   Reason: CHF 1 Each 0    OXYGEN-AIR DELIVERY SYSTEMS 3 L/min by Nasal route continuous.  acetaminophen (TYLENOL EXTRA STRENGTH) 500 mg tablet Take 500 mg by mouth every six (6) hours as needed for Pain. 1 pill poq 6 hours as needed prn pain      albuterol (PROVENTIL VENTOLIN) 2.5 mg /3 mL (0.083 %) nebu 3 mL by Nebulization route every four (4) hours as needed (cough, wheezing, more shortness of breath). Indications: a chronic lung disease where the airways become partially blocked with mucus called COPD 30 Nebule 3    tiotropium-olodaterol (STIOLTO RESPIMAT) 2.5-2.5 mcg/actuation inhaler Take 2 Puffs by inhalation daily. 1 Inhaler 0    clopidogrel (PLAVIX) 75 mg tab Take 1 Tab by mouth daily. 30 Tab 3    albuterol (VENTOLIN HFA) 90 mcg/Actuation inhaler Take 1 Puff by inhalation as needed for Shortness of Breath or Wheezing.  CYANOCOBALAMIN/FA/PYRIDOXINE (FOLBEE PO) Take 1 Tab by mouth daily.  multivitamin (ONE A DAY) tablet Take 1 Tab by mouth daily.        Allergies   Allergen Reactions    Vicodin [Hydrocodone-Acetaminophen] Rash      Social History     Tobacco Use    Smoking status: Former Smoker     Packs/day: 0.50     Years: 55.00     Pack years: 27.50     Types: Cigarettes     Start date:      Quit date: 2019     Years since quittin.5    Smokeless tobacco: Never Used   Substance Use Topics    Alcohol use: Yes     Comment: every other day drinker    Drug use: No     Family History   Problem Relation Age of Onset    Heart Disease Maternal Uncle     Diabetes Father     Hypertension Father          Review of Systems   Constitutional: Negative for chills, fever and weight loss. HENT: Negative for nosebleeds. Eyes: Negative for blurred vision and double vision. Respiratory: Positive for shortness of breath. Negative for cough and wheezing. Cardiovascular: Negative for chest pain, palpitations, orthopnea, claudication, leg swelling and PND. Gastrointestinal: Negative for abdominal pain, heartburn, nausea and vomiting. Genitourinary: Negative for dysuria and hematuria. Musculoskeletal: Negative for falls and myalgias. Skin: Negative for rash. Neurological: Negative for dizziness, focal weakness and headaches. Endo/Heme/Allergies: Does not bruise/bleed easily. Psychiatric/Behavioral: Negative for substance abuse. Visit Vitals  BP (!) 186/94 (BP 1 Location: Left upper arm, BP Patient Position: Sitting, BP Cuff Size: Adult)   Pulse 70   Ht 5' 5\" (1.651 m)   Wt 46.7 kg (103 lb)   SpO2 100%   BMI 17.14 kg/m²     Repeat blood pressure 146/100    Physical Exam  Constitutional:       Appearance: He is well-developed. HENT:      Head: Normocephalic and atraumatic. Eyes:      Conjunctiva/sclera: Conjunctivae normal.   Neck:      Vascular: No carotid bruit or JVD. Cardiovascular:      Rate and Rhythm: Normal rate and regular rhythm. Pulses: Normal pulses. Heart sounds: S1 normal and S2 normal. No murmur heard. No gallop. No S3 sounds. Pulmonary:      Breath sounds: Normal breath sounds. Decreased air movement present. No wheezing or rales. Abdominal:      General: Bowel sounds are normal.      Palpations: Abdomen is soft. Tenderness: There is no abdominal tenderness. Musculoskeletal:         General: No swelling or deformity. Cervical back: Neck supple.    Skin:     General: Skin is warm and dry. Findings: Erythema present. No bruising. Neurological:      General: No focal deficit present. Mental Status: He is alert and oriented to person, place, and time. Psychiatric:         Mood and Affect: Mood normal.         Behavior: Behavior normal.       EKG: Significant artifact, probable atrial pacing, inferior Q waves consistent with prior infarction, poor R wave progression, nonspecific ST-T abnormality. No change compared to the previous EKG. AICD interrogation: Battery at beginning of life estimate longevity at 9 years. No prolonged arrhythmias. Pacing in the atrium 14% in the ventricle less than 1%. For activity tolerance chronically, increased OptiVol fluid level earlier this year which has since returned to baseline. Scanned document for details. ASSESSMENT and PLAN  Encounter Diagnoses   Name Primary?  Coronary artery disease involving native coronary artery of native heart without angina pectoris Yes    Wide-complex tachycardia (HCC)     AICD (automatic cardioverter/defibrillator) present     S/P CABG x 5     Ischemic cardiomyopathy     Pulmonary emphysema, unspecified emphysema type ([address]     History of medication noncompliance     Tobacco abuse     Hypercholesterolemia     Essential hypertension      Coronary artery disease. Patient underwent a 5 vessel bypass in 2014, unfortunately all of his bypass grafts had closed on a cardiac catheterization in 2018 with exception of a LANCASTER to LAD. He has been treated medically since that time. At a minimum I have recommended taking an aspirin, low-dose beta-blocker and potent statin. These medications will be prescribed for the patient. He has a long history of medication noncompliance. He denies any new anginal type symptoms. Ischemic cardiomyopathy. EF 20 to 25% last assessed by echocardiogram in February 2020. He currently appears to be euvolemic.   I recommended starting him on low-dose metoprolol and Entresto. At this point I do not think he needs regular diuretic therapy since he has never taken that regularly in the past.  If he remains compliant with his medications, we could consider adding spironolactone to his regimen. Wide-complex tachycardia. Patient underwent implantation of a dual-chamber AICD in September 2019 for presumably VT. He was previously treated with amiodarone but admits he has not taken his medication in probably a year. Since he has had no recurrent arrhythmias, I would simply discontinue this medication. He can remain on his beta-blocker. Essential hypertension. Patient blood pressure is elevated today in the office. I suspect once we start his heart failure medications his blood pressure will improve. Dyslipidemia. Patient should be restarted on atorvastatin 40 mg daily. His goal LDL should be less than 70. Labs can be checked prior to his next follow-up office visit. COPD. Patient uses 4 L of oxygen. He does have a long history of tobacco use but did quit smoking 2 years ago. He was encouraged remain tobacco free. Follow-up in 3 months with our nurse practitioner for medication adjustments and device check. I would like to see him back in 6 months, sooner as needed.

## 2021-07-19 NOTE — PROGRESS NOTES
Laurarekha Michael presents today for   Chief Complaint   Patient presents with    Follow-up     overdue follow up; prev skillen patient        Laura Michael preferred language for health care discussion is english/other. Is someone accompanying this pt? no    Is the patient using any DME equipment during [address]? [place]     Depression Screening:  3 most recent PHQ Screens 7/19/2021   Little interest or pleasure in doing things Not at all   Feeling down, depressed, irritable, or hopeless Not at all   Total Score PHQ 2 0       Learning Assessment:  Learning Assessment 3/12/2014   PRIMARY LEARNER Patient   HIGHEST LEVEL OF EDUCATION - PRIMARY LEARNER  [facility] [id number] [facility] CAREGIVER No   PRIMARY LANGUAGE ENGLISH    NEED No   LEARNER PREFERENCE PRIMARY OTHER (COMMENT)   ANSWERED BY Patient   RELATIONSHIP SELF       Abuse Screening:  Abuse Screening Questionnaire 7/19/2021   Do you ever feel afraid of your partner? N   Are you in a relationship with someone who physically or mentally threatens you? N   Is it safe for you to go home? Y       Fall Risk  Fall Risk Assessment, last 12 mths 7/19/2021   Able to walk? Yes   Fall in past 12 months? 0   Do you feel unsteady? 0   Are you worried about falling 0   Number of falls in past 12 months -   Fall with injury? -       Pt currently taking Anticoagulant therapy? no    Coordination of Care:  1. Have you been to the ER, urgent care clinic since your last visit? Hospitalized since your last visit? no    2. Have you seen or consulted any other health care providers outside of the [address] [place] [address] since your last visit? Include any pap smears or colon screening.  no

## 2021-07-28 ENCOUNTER — DOCUMENTATION ONLY (OUTPATIENT)
Dept: CARDIOLOGY CLINIC | Age: 81
End: 2021-07-28

## 2021-07-28 NOTE — PROGRESS NOTES
Prior auth done for Darryle Helling via [employer]. Medication approved thru 12/31/2099.  Patient made aware of medication approval.

## 2021-08-03 PROBLEM — I50.9 CONGESTIVE HEART FAILURE (CHF) (HCC): Status: RESOLVED | Noted: 2019-09-24 | Resolved: 2021-08-03

## 2021-10-05 ENCOUNTER — HOSPITAL ENCOUNTER (OUTPATIENT)
Dept: LAB | Age: 81
Discharge: HOME | End: 2021-10-05
Payer: MEDICARE

## 2021-10-05 DIAGNOSIS — I25.10 CORONARY ARTERY DISEASE INVOLVING NATIVE CORONARY ARTERY OF NATIVE HEART WITHOUT ANGINA PECTORIS: ICD-10-CM

## 2021-10-05 LAB
ALBUMIN SERPL-MCNC: 3 G/DL (ref 3.4–5)
ALBUMIN/GLOB SERPL: 0.6 {RATIO} (ref 0.8–1.7)
ALP SERPL-CCNC: 191 U/L (ref 45–117)
ALT SERPL-CCNC: 58 U/L (ref 16–61)
ANION GAP SERPL CALC-SCNC: 8 MMOL/L (ref 3–18)
AST SERPL-CCNC: 145 U/L (ref 10–38)
BILIRUB SERPL-MCNC: 0.7 MG/DL (ref 0.2–1)
BUN SERPL-MCNC: 13 MG/DL (ref 7–18)
BUN/CREAT SERPL: 13 (ref 12–20)
CALCIUM SERPL-MCNC: 8.4 MG/DL (ref 8.5–10.1)
CHLORIDE SERPL-SCNC: 106 MMOL/L (ref 100–111)
CHOLEST SERPL-MCNC: 177 MG/DL
CO2 SERPL-SCNC: 29 MMOL/L (ref 21–32)
CREAT SERPL-MCNC: 1.04 MG/DL (ref 0.6–1.3)
GLOBULIN SER CALC-MCNC: 4.9 G/DL (ref 2–4)
GLUCOSE SERPL-MCNC: 90 MG/DL (ref 74–99)
HDLC SERPL-MCNC: 86 MG/DL (ref 40–60)
HDLC SERPL: 2.1 {RATIO} (ref 0–5)
LDLC SERPL CALC-MCNC: ABNORMAL MG/DL (ref 0–100)
LIPID PROFILE,FLP: ABNORMAL
POTASSIUM SERPL-SCNC: 3.8 MMOL/L (ref 3.5–5.5)
PROT SERPL-MCNC: 7.9 G/DL (ref 6.4–8.2)
SODIUM SERPL-SCNC: 143 MMOL/L (ref 136–145)
TRIGL SERPL-MCNC: 425 MG/DL (ref ?–150)
VLDLC SERPL CALC-MCNC: ABNORMAL MG/DL

## 2021-10-05 PROCEDURE — 80061 LIPID PANEL: CPT

## 2021-10-05 PROCEDURE — 36415 COLL VENOUS BLD VENIPUNCTURE: CPT

## 2021-10-05 PROCEDURE — 83721 ASSAY OF BLOOD LIPOPROTEIN: CPT

## 2021-10-05 PROCEDURE — 80053 COMPREHEN METABOLIC PANEL: CPT

## 2021-10-05 NOTE — PROGRESS NOTES
Per your note -Dyslipidemia. Patient should be restarted on atorvastatin 40 mg daily. His goal LDL should be less than 70. Labs can be checked prior to his next follow-up office visit.

## 2021-10-06 ENCOUNTER — DOCUMENTATION ONLY (OUTPATIENT)
Dept: CARDIOLOGY CLINIC | Age: 81
End: 2021-10-06

## 2021-10-06 DIAGNOSIS — E78.00 HYPERCHOLESTEROLEMIA: Primary | ICD-10-CM

## 2021-10-06 NOTE — PROGRESS NOTES
Request for direct LDL faxed to [facility] lab to add to labs done yesterday. Confirmation fax received.

## 2021-10-07 LAB — LDLC SERPL DIRECT ASSAY-MCNC: 58 MG/DL (ref 0–100)

## 2021-10-07 NOTE — PROGRESS NOTES
Per your note - Will need to add a direct LDL to his lab since his triglycerides were still elevated this could not be calculated. I can discuss the findings during his follow-up visit.

## 2021-10-12 NOTE — PROGRESS NOTES
Britt Chan presents today for a 3 month follow-up. He was seen by Dr. Diana Knox in [address] [place] [address] July 2021. Today, he states that he is doing \"about the same. \"  He complains of shortness of breath and has his oxygen with him but not using it. He also did not bring a current list of medications and admitted that he has not yet taken his medications this morning. He told me that he received a letter about approval for [facility] but he never received the medication. He is an [de-identified]year old male with history of HCVD, hypercholesterolemia, chronic diastolic heart failure, and CAD (s/p CABG x 5 in April 2014). His last echo was done in Jan. 2019 and it showed an EF of 46-50%, abnormal wall motion, and grade 2 diastolic dysfunction. His last stress test was performed in December 2018 and it was markedly abnormal.  It showed 2 areas of possible ischemia and he had positive TID at 1.28. His calculated ejection fraction at that time was 33%. He underwent cardiac catheterization on February 1, 2019 and it demonstrated the following:  ·  Severe native CAD with distal left main 90%, heavily calcified. · LIMA to LAD is patent. · 2 sequential vein grafts (All 4 vein grafts to diagonal, OM, RPDA, RPL branch) occluded at aortotomy site. · Subtotal mid RCA occlusion with NICO 0-I flow, some collaterals from left coronary system. Native diagonal 85% stenosis, native OM 85% stenosis. Possible options included continued medical therapy, redo bypass surgery, or selective angioplasty. Angioplasty and stent will be difficult in light of diffuse disease, left main, and heavy calcification. He was hospitalized in Sept 2019 for bundle branch VT. He underwent dual chamber AICD implant. In Oct. 2019, he was hospitalized again for acute on chronic heart failure. His last echo was done on 12/10/19 and it showed an EF of 21-25% (which was significantly decreased compared to previous echo), abnormal LV wall motion. He had a limited echo done in Feb. 2020 and it showed an EF of 20-25%, LV global hypokinesis, and PASP of 41 mmHg. He uses oxygen as needed. Denies chest pain, tightness, heaviness, and palpitations. Denies shortness of breath at rest, admits to some dyspnea on exertion, denies orthopnea and PND. Denies abdominal bloating. Denies lightheadedness, dizziness, and syncope. Denies lower extremity edema and claudication. Denies nausea, vomiting, diarrhea, melena, hematochezia. Denies hematuria, urgency, frequency. Denies fever, chills. He states that his appetite has not been good. PMH:  Past Medical History:   Diagnosis Date    AICD (automatic cardioverter/defibrillator) present 10/13/2019    [employer] dual-chamber MRI compatible    Alcohol abuse     quit February 2011    Binocular visual disturbance 10/31/2013    CAD (coronary artery disease)     CAD (coronary artery disease), native coronary artery 1/3/05    LAD 70-80% mid; Cx-diffuse 80%; OM1-70% prox; RCA 40-50% mid; 100% RPLB with collaterals    Cardiac cath 08/19/2013    Severe, diffuse calcification. dLM 30%. mLAD 80%. o/pD1 80%. o/pRamus 80%. dCX 85%. dRCA 80%.  m-dRPLB 75%. EF 50%. Mod to severe inferobasal hypk.  Cardiac nuclear imaging test 06/27/2011    No evidence of ischemia or previous infarction. EF 60%. Neg EKG on max EST. Ex time 6 mins.     Chronic kidney disease     Chronic lung disease     Chronic obstructive pulmonary disease ([address]     Coronary artery disease     Diverticula of colon 12/17/2013    Emphysematous COPD ([address]     PFTs March 2009; mild-mode obstruction, + bronchodil response; decreased DLCO    GERD (gastroesophageal reflux disease)     with erosive esophagitis history    Heart failure (HCC)     History of renal failure     2 years ago, which completely resolved    Hypercholesterolemia     Hypertension     Hypertensive heart disease with CHF ([address]     Ischemic cardiomyopathy 02/2020 EF 20 to 25%, PASP 41 mmHg    MI (myocardial infarction) (HCC)     Inferior wall    Paresthesia and pain of both upper extremities 10/31/2013    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 Ashley Oconnor, 4/9/14.  Tobacco abuse     Unspecified hereditary and idiopathic peripheral neuropathy 12/4/2013       PSH:  Past Surgical History:   Procedure Laterality Date    HX CORONARY ARTERY BYPASS GRAFT  about 2013    HX HEART CATHETERIZATION  11/2013    ME INSJ/RPLCMT PERM DFB W/TRNSVNS LDS 1/DUAL CHMBR Left 9/30/2019    INSERT ICD DUAL performed by Shawn Rand MD at [facility] CATH LAB       MEDS:  Current Outpatient Medications   Medication Sig    sacubitriL-valsartan (Entresto) 49-51 mg tab tablet Take 1 Tablet by mouth two (2) times a day.  metoprolol succinate (TOPROL-XL) 25 mg XL tablet Take 1 Tablet by mouth daily. 1 pill po daily    atorvastatin (LIPITOR) 40 mg tablet Take 1 Tablet by mouth nightly. 1 pill daily    aspirin delayed-release 81 mg tablet Take 1 Tablet by mouth daily. 1 pill po daily    nitroglycerin (NITROSTAT) 0.4 mg SL tablet 1 Tablet by SubLINGual route every five (5) minutes as needed for Chest Pain. Up to 3 doses.  synthroid 25 mcg tablet Take 25 mcg by mouth daily.  liothyronine (CYTOMEL) 25 mcg tablet Take 25 mcg by mouth daily.  albuterol (PROVENTIL HFA, VENTOLIN HFA, PROAIR HFA) 90 mcg/actuation inhaler Take 1 Puff by inhalation every six (6) hours as needed for Shortness of Breath or Wheezing.  OXYGEN-AIR DELIVERY SYSTEMS 3 L/min by Nasal route continuous.  acetaminophen (TYLENOL EXTRA STRENGTH) 500 mg tablet Take 500 mg by mouth every six (6) hours as needed for Pain.  1 pill poq 6 hours as needed prn pain    albuterol (PROVENTIL VENTOLIN) 2.5 mg /3 mL (0.083 %) nebu 3 mL by Nebulization route every four (4) hours as needed (cough, wheezing, more shortness of breath). Indications: a chronic lung disease where the airways become partially blocked with mucus called COPD    tiotropium-olodaterol (STIOLTO RESPIMAT) 2.5-2.5 mcg/actuation inhaler Take 2 Puffs by inhalation daily.  clopidogrel (PLAVIX) 75 mg tab Take 1 Tab by mouth daily.  albuterol (VENTOLIN HFA) 90 mcg/Actuation inhaler Take 1 Puff by inhalation as needed for Shortness of Breath or Wheezing.  multivitamin (ONE A DAY) tablet Take 1 Tab by mouth daily. No current facility-administered medications for this visit. Allergies and Sensitivities:  Allergies   Allergen Reactions    Vicodin [Hydrocodone-Acetaminophen] Rash       Family History:  Family History   Problem Relation Age of Onset    Heart Disease Maternal Uncle     Diabetes Father     Hypertension Father        Social History:  He  reports that he quit smoking about 2 years ago. His smoking use included cigarettes. He started smoking about 65 years ago. He has a 27.50 pack-year smoking history. He has never used smokeless tobacco.  He  reports current alcohol use. Physical:  Visit Vitals  BP (!) 152/92   Pulse 82   Ht 5' 5\" (1.651 m)   Wt 47.3 kg (104 lb 3.2 oz)   SpO2 99%   BMI 17.34 kg/m²     He is up 1 pound since his last appointment  He has not taken his medications yet today    Exam:  Neck:  Supple, no JVD, no carotid bruits  CV:  Normal S1 and  S2, no murmurs, rubs, or gallops noted  Lungs:  Clear to ausculation throughout but slightly diminished, no wheezes or rales  Abd:  Soft, non-tender, non-distended with good bowel sounds.   No hepatosplenomegaly  Extremities:  No edema      Data:  EKG:  Not done today      LABS:  Lab Results   Component Value Date/Time    Sodium 143 10/05/2021 08:58 AM    Potassium 3.8 10/05/2021 08:58 AM    Chloride 106 10/05/2021 08:58 AM    CO2 29 10/05/2021 08:58 AM    Glucose 90 10/05/2021 08:58 AM    BUN 13 10/05/2021 08:58 AM    Creatinine 1.04 10/05/2021 08:58 AM     Lab Results   Component Value Date/Time    Cholesterol, total 177 10/05/2021 08:58 AM    HDL Cholesterol 86 (H) 10/05/2021 08:58 AM    LDL,Direct 58 10/05/2021 08:58 AM    LDL, calculated  10/05/2021 08:58 AM     LDL AND VLDL CHOLESTEROL NOT CALCULATED WHEN TRIGLYCERIDES >400 MG/DL OR HDL CHOLESTEROL <20 MG/DL    Triglyceride 425 (H) 10/05/2021 08:58 AM    CHOL/HDL Ratio 2.1 10/05/2021 08:58 AM     Lab Results   Component Value Date/Time    ALT (SGPT) 58 10/05/2021 08:58 AM         Impression/Plan:  1.  CAD, s/p CABG x 5 in April 2014  2. Hypercholesterolemia, on atorvastatin 40 mg  3. Chronic systolic heart failure, appears compensated, Opti-Vol well below threshold  4. HCVD, blood pressure elevated and suboptimally controlled, has not taken medications yet  5. COPD, uses O2 at 3L/min as needed    Mr. Hiro Parekh was seen today for a 3 month follow-up and device check. Overall, he states that he feels \"about the same\" but offers no cardiac complaints except for some shortness of breath with activity. He has his oxygen with him but is not using it. His O2 sat on room air was 99%. Breath sounds are clear but slightly diminished. He does not exhibit any signs of volume overload. His blood pressure is elevated and he states that he has not taken his medications yet today. He states that he has no set time and takes it whenever but states that he does take them for the most part. He did tell me that he is not taking the [facility] as he states that he received a letter about it being approved but he has not received any medication. Will have staff look into this to see if he is enrolled in patient assistance. His device was interrogated today and it showed that it has 9 years of battery life left. He has had 468 episodes of non-sustained VT with 4 events being terminated with ATP. The longest episode was 41 seconds. He is unaware of the arrhythmias and denies palpitations. His Opti-Vol level is well below threshold. Dr. Chinmay Talavera will review the device interrogation report. During his last visit, Mr. Cherrie Wolf told Dr. Chinmay Talavera that he had not taken his amiodarone for some time so it was discontinued. Congestive heart failure teaching reinforced today. Advised to limit sodium intake to no more than 2000mg per day and to also limit fluid intake to 48 ounces per day (unless otherwise specified). Advised to weigh daily every morning and record weights. Instructed to call our office if progressive weight gain is noted over a 2 to 3 day period of time, shortness of breath increases, or if abdominal bloating, nausea, fatigue, or increased lower extremity edema is noted. He will follow-up with Dr. Chinmay Talavera as scheduled and as needed. Shon Atkinson MSN, FNP-BC    Please note:  Portions of this chart were created with Dragon medical speech to text program.  Unrecognized errors may be present.

## 2021-10-18 ENCOUNTER — CLINICAL SUPPORT (OUTPATIENT)
Dept: CARDIOLOGY CLINIC | Age: 81
End: 2021-10-18

## 2021-10-18 ENCOUNTER — OFFICE VISIT (OUTPATIENT)
Dept: CARDIOLOGY CLINIC | Age: 81
End: 2021-10-18
Payer: MEDICARE

## 2021-10-18 VITALS
SYSTOLIC BLOOD PRESSURE: 152 MMHG | WEIGHT: 104.2 LBS | BODY MASS INDEX: 17.36 KG/M2 | HEIGHT: 65 IN | DIASTOLIC BLOOD PRESSURE: 92 MMHG | OXYGEN SATURATION: 99 % | HEART RATE: 82 BPM

## 2021-10-18 DIAGNOSIS — Z95.810 AICD (AUTOMATIC CARDIOVERTER/DEFIBRILLATOR) PRESENT: ICD-10-CM

## 2021-10-18 DIAGNOSIS — R00.0 WIDE-COMPLEX TACHYCARDIA: ICD-10-CM

## 2021-10-18 DIAGNOSIS — Z95.1 S/P CABG X 5: ICD-10-CM

## 2021-10-18 DIAGNOSIS — I25.10 CORONARY ARTERY DISEASE INVOLVING NATIVE CORONARY ARTERY OF NATIVE HEART WITHOUT ANGINA PECTORIS: Primary | ICD-10-CM

## 2021-10-18 DIAGNOSIS — I25.5 ISCHEMIC CARDIOMYOPATHY: ICD-10-CM

## 2021-10-18 DIAGNOSIS — E78.00 HYPERCHOLESTEROLEMIA: ICD-10-CM

## 2021-10-18 DIAGNOSIS — Z95.810 AICD (AUTOMATIC CARDIOVERTER/DEFIBRILLATOR) PRESENT: Primary | ICD-10-CM

## 2021-10-18 PROCEDURE — G8419 CALC BMI OUT NRM PARAM NOF/U: HCPCS | Performed by: NURSE PRACTITIONER

## 2021-10-18 PROCEDURE — G8536 NO DOC ELDER MAL SCRN: HCPCS | Performed by: NURSE PRACTITIONER

## 2021-10-18 PROCEDURE — 99213 OFFICE O/P EST LOW 20 MIN: CPT | Performed by: NURSE PRACTITIONER

## 2021-10-18 PROCEDURE — G8432 DEP SCR NOT DOC, RNG: HCPCS | Performed by: NURSE PRACTITIONER

## 2021-10-18 PROCEDURE — G8427 DOCREV CUR MEDS BY ELIG CLIN: HCPCS | Performed by: NURSE PRACTITIONER

## 2021-10-18 PROCEDURE — 1101F PT FALLS ASSESS-DOCD LE1/YR: CPT | Performed by: NURSE PRACTITIONER

## 2021-10-18 PROCEDURE — 93283 PRGRMG EVAL IMPLANTABLE DFB: CPT | Performed by: INTERNAL MEDICINE

## 2021-10-18 NOTE — PATIENT INSTRUCTIONS
Please confirm/verify medication list and call us with any changes that need to be made  Follow-up with Dr. Serene Putnam as scheduled and as needed.

## 2021-10-26 NOTE — PROGRESS NOTES
dual AICD. 9 years left on battery. Lead impedance and threshold WNL. A paced 6.9 %, V paced 4.0 %. OP Vol WNL. 4 paced terminated VT episodes. 58 total VT episodes monitored. 410 nonsustained VT. Will forward to Dr Hector Jeffries.

## 2021-10-27 NOTE — PROGRESS NOTES
Device check personally reviewed by me. Normal device function on interrogation. VT pace terminated episodes noted. Appropriate therapies delivered. See scanned interrogation document for complete details.

## 2022-01-31 ENCOUNTER — OFFICE VISIT (OUTPATIENT)
Dept: CARDIOLOGY CLINIC | Age: 82
End: 2022-01-31
Payer: MEDICARE

## 2022-01-31 VITALS
BODY MASS INDEX: 17.49 KG/M2 | WEIGHT: 105 LBS | OXYGEN SATURATION: 100 % | SYSTOLIC BLOOD PRESSURE: 152 MMHG | HEART RATE: 81 BPM | HEIGHT: 65 IN | DIASTOLIC BLOOD PRESSURE: 98 MMHG

## 2022-01-31 DIAGNOSIS — I25.5 ISCHEMIC CARDIOMYOPATHY: ICD-10-CM

## 2022-01-31 DIAGNOSIS — I25.10 CORONARY ARTERY DISEASE INVOLVING NATIVE CORONARY ARTERY OF NATIVE HEART WITHOUT ANGINA PECTORIS: Primary | ICD-10-CM

## 2022-01-31 DIAGNOSIS — R00.0 WIDE-COMPLEX TACHYCARDIA: ICD-10-CM

## 2022-01-31 DIAGNOSIS — Z95.810 AICD (AUTOMATIC CARDIOVERTER/DEFIBRILLATOR) PRESENT: ICD-10-CM

## 2022-01-31 DIAGNOSIS — I10 PRIMARY HYPERTENSION: ICD-10-CM

## 2022-01-31 DIAGNOSIS — Z95.1 S/P CABG X 5: ICD-10-CM

## 2022-01-31 DIAGNOSIS — E78.00 HYPERCHOLESTEROLEMIA: ICD-10-CM

## 2022-01-31 PROCEDURE — G8510 SCR DEP NEG, NO PLAN REQD: HCPCS | Performed by: INTERNAL MEDICINE

## 2022-01-31 PROCEDURE — G8753 SYS BP > OR = 140: HCPCS | Performed by: INTERNAL MEDICINE

## 2022-01-31 PROCEDURE — G8427 DOCREV CUR MEDS BY ELIG CLIN: HCPCS | Performed by: INTERNAL MEDICINE

## 2022-01-31 PROCEDURE — G8755 DIAS BP > OR = 90: HCPCS | Performed by: INTERNAL MEDICINE

## 2022-01-31 PROCEDURE — 93283 PRGRMG EVAL IMPLANTABLE DFB: CPT | Performed by: INTERNAL MEDICINE

## 2022-01-31 PROCEDURE — 99215 OFFICE O/P EST HI 40 MIN: CPT | Performed by: INTERNAL MEDICINE

## 2022-01-31 PROCEDURE — 1101F PT FALLS ASSESS-DOCD LE1/YR: CPT | Performed by: INTERNAL MEDICINE

## 2022-01-31 PROCEDURE — G8536 NO DOC ELDER MAL SCRN: HCPCS | Performed by: INTERNAL MEDICINE

## 2022-01-31 PROCEDURE — G8419 CALC BMI OUT NRM PARAM NOF/U: HCPCS | Performed by: INTERNAL MEDICINE

## 2022-01-31 RX ORDER — SACUBITRIL AND VALSARTAN 49; 51 MG/1; MG/1
1 TABLET, FILM COATED ORAL 2 TIMES DAILY
Qty: 180 TABLET | Refills: 3 | Status: SHIPPED | OUTPATIENT
Start: 2022-01-31

## 2022-01-31 RX ORDER — METOPROLOL SUCCINATE 50 MG/1
50 TABLET, EXTENDED RELEASE ORAL DAILY
Qty: 90 TABLET | Refills: 3 | Status: SHIPPED | OUTPATIENT
Start: 2022-01-31

## 2022-01-31 NOTE — PROGRESS NOTES
HISTORY OF PRESENT ILLNESS  Anup Muller is a [de-identified] y.o. male. Follow-up  Associated symptoms include shortness of breath. Pertinent negatives include no chest pain, no abdominal pain and no headaches. Patient presents for an overdue follow-up office visit. He was previously followed by Dr. Ramos Sam up until his correction at the end of 2020. The patient has not been seen in our office in over a year. He was last hospitalized for congestive heart failure in February 2020. He has a longstanding history of coronary artery disease status post 5 vessel CABG in 2014 with a more recent cardiac catheterization in 2018 demonstrate a patent LIMA to LAD graft, and all other vein grafts occluded. His native RCA had a subtotal mid vessel occlusion with collaterals from the left system. His native diagonal branch of 85% stenosis in the native OM branch that an 85% stenosis. His LV function has remained severely depressed. His most recent echocardiogram from February 2020 showed an ejection fraction of 20 to 25% with mildly elevated PA pressures at 41 mmHg. He had no significant valvular heart disease. Lastly, he has a history of wide-complex tachycardia suspicious for ventricular tachycardia, so underwent implantation of a dual-chamber AICD in September 2019. Patient admits that he does not take a lot of his medications regularly and in fact is not taking any cardiac medications for several weeks now. He is now on home oxygen 4 L most days. He was a longtime smoker but quit smoking almost 2 years ago. Patient denies having any recurrent chest pain, no worsening shortness of breath of his baseline, no leg swelling, no orthopnea, no PND. No heart palpitations, dizziness, syncope or AICD shocks.     Past Medical History:   Diagnosis Date    AICD (automatic cardioverter/defibrillator) present 10/13/2019    [employer] dual-chamber MRI compatible    Alcohol abuse     quit February 2011    Binocular visual disturbance 10/31/2013    CAD (coronary artery disease)     CAD (coronary artery disease), native coronary artery 1/3/05    LAD 70-80% mid; Cx-diffuse 80%; OM1-70% prox; RCA 40-50% mid; 100% RPLB with collaterals    Cardiac cath 08/19/2013    Severe, diffuse calcification. dLM 30%. mLAD 80%. o/pD1 80%. o/pRamus 80%. dCX 85%. dRCA 80%.  m-dRPLB 75%. EF 50%. Mod to severe inferobasal hypk.  Cardiac nuclear imaging test 06/27/2011    No evidence of ischemia or previous infarction. EF 60%. Neg EKG on max EST. Ex time 6 mins.  Chronic kidney disease     Chronic lung disease     Chronic obstructive pulmonary disease [facility]     Coronary artery disease     Diverticula of colon 12/17/2013    Emphysematous COPD ([facility]     PFTs March 2009; mild-mode obstruction, + bronchodil response; decreased DLCO    GERD (gastroesophageal reflux disease)     with erosive esophagitis history    Heart failure (HCC)     History of renal failure     2 years ago, which completely resolved    Hypercholesterolemia     Hypertension     Hypertensive heart disease with CHF ([facility]     Ischemic cardiomyopathy 02/2020    EF 20 to 25%, PASP 41 mmHg    MI [facility]ocardial infarction) [facility])     Inferior wall    Paresthesia and pain of both upper extremities 10/31/2013    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 Rene Schrader, 4/9/14.  Tobacco abuse     Unspecified hereditary and idiopathic peripheral neuropathy 12/4/2013     Current Outpatient Medications   Medication Sig Dispense Refill    sacubitriL-valsartan (Entresto) 49-51 mg tab tablet Take 1 Tablet by mouth two (2) times a day. 180 Tablet 3    metoprolol succinate (TOPROL-XL) 50 mg XL tablet Take 1 Tablet by mouth daily.  90 Tablet 3    nitroglycerin (NITROSTAT) 0.4 mg SL tablet 1 Tablet by SubLINGual route every five (5) minutes as needed for Chest Pain. Up to 3 doses. 1 Bottle 3    liothyronine (CYTOMEL) 25 mcg tablet Take 25 mcg by mouth daily.  albuterol (PROVENTIL HFA, VENTOLIN HFA, PROAIR HFA) 90 mcg/actuation inhaler Take 1 Puff by inhalation every six (6) hours as needed for Shortness of Breath or Wheezing.  OXYGEN-AIR DELIVERY SYSTEMS 3 L/min by Nasal route continuous.  acetaminophen (TYLENOL EXTRA STRENGTH) 500 mg tablet Take 500 mg by mouth every six (6) hours as needed for Pain. 1 pill poq 6 hours as needed prn pain      albuterol (PROVENTIL VENTOLIN) 2.5 mg /3 mL (0.083 %) nebu 3 mL by Nebulization route every four (4) hours as needed (cough, wheezing, more shortness of breath). Indications: a chronic lung disease where the airways become partially blocked with mucus called COPD 30 Nebule 3    tiotropium-olodaterol (STIOLTO RESPIMAT) 2.5-2.5 mcg/actuation inhaler Take 2 Puffs by inhalation daily. 1 Inhaler 0    albuterol (VENTOLIN HFA) 90 mcg/Actuation inhaler Take 1 Puff by inhalation as needed for Shortness of Breath or Wheezing.  atorvastatin (LIPITOR) 40 mg tablet Take 1 Tablet by mouth nightly. 1 pill daily (Patient not taking: Reported on 1/31/2022) 90 Tablet 3    aspirin delayed-release 81 mg tablet Take 1 Tablet by mouth daily. 1 pill po daily (Patient not taking: Reported on 1/31/2022) 90 Tablet 3    clopidogrel (PLAVIX) 75 mg tab Take 1 Tab by mouth daily. (Patient not taking: Reported on 1/31/2022) 30 Tab 3    multivitamin (ONE A DAY) tablet Take 1 Tab by mouth daily.  (Patient not taking: Reported on 1/31/2022)       Allergies   Allergen Reactions    Vicodin [Hydrocodone-Acetaminophen] Rash      Social History     Tobacco Use    Smoking status: Former Smoker     Packs/day: 0.50     Years: 55.00     Pack years: 27.50     Types: Cigarettes     Start date: 27     Quit date: 2019     Years since quitting: 3.0  Smokeless tobacco: Never Used   Substance Use Topics    Alcohol use: Yes     Comment: every other day drinker    Drug use: No     Family History   Problem Relation Age of Onset    Heart Disease Maternal Uncle     Diabetes Father     Hypertension Father          Review of Systems   Constitutional: Negative for chills, fever and weight loss. HENT: Negative for nosebleeds. Eyes: Negative for blurred vision and double vision. Respiratory: Positive for shortness of breath. Negative for cough and wheezing. Cardiovascular: Negative for chest pain, palpitations, orthopnea, claudication, leg swelling and PND. Gastrointestinal: Negative for abdominal pain, heartburn, nausea and vomiting. Genitourinary: Negative for dysuria and hematuria. Musculoskeletal: Negative for falls and myalgias. Skin: Negative for rash. Neurological: Negative for dizziness, focal weakness and headaches. Endo/Heme/Allergies: Does not bruise/bleed easily. Psychiatric/Behavioral: Negative for substance abuse. Visit Vitals  BP (!) 152/98 (BP 1 Location: Left upper arm, BP Patient Position: Sitting, BP Cuff Size: Adult)   Pulse 81   Ht 5' 5\" (1.651 m)   Wt 47.6 kg (105 lb)   SpO2 100%   BMI 17.47 kg/m²     Repeat blood pressure 146/100    Physical Exam  Constitutional:       Appearance: He is well-developed. HENT:      Head: Normocephalic and atraumatic. Eyes:      Conjunctiva/sclera: Conjunctivae normal.   Neck:      Vascular: No carotid bruit or JVD. Cardiovascular:      Rate and Rhythm: Normal rate and regular rhythm. Pulses: Normal pulses. Heart sounds: S1 normal and S2 normal. No murmur heard. No gallop. No S3 sounds. Pulmonary:      Breath sounds: Normal breath sounds. Decreased air movement present. No wheezing or rales. Abdominal:      General: Bowel sounds are normal.      Palpations: Abdomen is soft. Tenderness: There is no abdominal tenderness.    Musculoskeletal:         General: No swelling or deformity. Cervical back: Neck supple. Skin:     General: Skin is warm and dry. Findings: No bruising or erythema. Neurological:      General: No focal deficit present. Mental Status: He is alert and oriented to person, place, and time. Psychiatric:         Mood and Affect: Mood normal.         Behavior: Behavior normal.       EKG: Normal sinus rhythm, inferior Q waves consistent with prior infarction, poor R wave progression, nonspecific ST-T abnormality. No change compared to the previous EKG. AICD interrogation: Battery at beginning of life estimate longevity at 8.8 years. Pacing in the atrium 6 % in the ventricle 3%. Several paced terminated episodes of ventricular tachycardia, last episode occurring 1 month ago. Several short runs of atrial fibrillation/atrial flutter. Most episodes lasting several minutes or last in duration. Scanned document for details. ASSESSMENT and PLAN  Encounter Diagnoses   Name Primary?  Coronary artery disease involving native coronary artery of native heart without angina pectoris Yes    Ischemic cardiomyopathy     AICD (automatic cardioverter/defibrillator) present     Hypercholesterolemia     S/P CABG x 5     Primary hypertension     Wide-complex tachycardia (HCC)      Coronary artery disease. Patient underwent a 5 vessel bypass in 2014, unfortunately all of his bypass grafts had closed on a cardiac catheterization in 2018 with exception of a [place] to LAD. He has been treated medically since that time. At a minimum I have recommended taking an aspirin, low-dose beta-blocker and potent statin. These medications will be prescribed for the patient. He has a long history of medication noncompliance. He denies any new anginal type symptoms. Ischemic cardiomyopathy. EF 20 to 25% last assessed by echocardiogram in February 2020. He currently appears to be euvolemic.   I recommended increasing his metoprolol to 50 mg daily and resuming Entresto. At this point I do not think he needs regular diuretic therapy since he has never taken that regularly in the past.  If he remains compliant with his medications, we could consider adding spironolactone to his regimen. Wide-complex tachycardia. Patient underwent implantation of a dual-chamber AICD in September 2019 for presumably VT. He was previously treated with amiodarone but admits he has not taken his medication in probably a year. I would not restart amiodarone unless he has recurrent AICD shocks. He has had several paced terminated episodes of VT. Hopefully if he is compliant with his metoprolol dosing this will be much less frequent. Paroxysmal atrial fibrillation/flutter. Several short episodes documented on device check. No indication for anticoagulation at this point. Patient is likely not a good candidate for anticoagulation since he is noncompliant with his medications. Essential hypertension. Patient blood pressure is elevated today in the office. I suspect once we start his heart failure medications his blood pressure will improve. Dyslipidemia. Patient should be restarted on atorvastatin 40 mg daily. His goal LDL should be less than 70. Labs can be checked prior to his next follow-up office visit. COPD. Patient uses 4 L of oxygen. He does have a long history of tobacco use but did quit smoking 2 years ago. He was encouraged remain tobacco free. [facility] device check in 3 months.   Follow-up in the office in 6 months, sooner if needed

## 2022-03-18 PROBLEM — I25.5 ISCHEMIC CARDIOMYOPATHY: Status: ACTIVE | Noted: 2021-07-19

## 2022-03-19 PROBLEM — Z95.810 AICD (AUTOMATIC CARDIOVERTER/DEFIBRILLATOR) PRESENT: Status: ACTIVE | Noted: 2019-10-13

## 2022-03-19 PROBLEM — N39.0 UTI (URINARY TRACT INFECTION): Status: ACTIVE | Noted: 2020-01-31

## 2022-03-19 PROBLEM — J18.9 RIGHT LOWER LOBE PNEUMONIA: Status: ACTIVE | Noted: 2020-01-31

## 2022-03-19 PROBLEM — I50.9 CHF EXACERBATION (HCC): Status: ACTIVE | Noted: 2019-10-13

## 2022-03-19 PROBLEM — R00.0 WIDE-COMPLEX TACHYCARDIA: Status: ACTIVE | Noted: 2019-09-24

## 2022-03-20 PROBLEM — I50.43 ACUTE ON CHRONIC COMBINED SYSTOLIC AND DIASTOLIC ACC/AHA STAGE C CONGESTIVE HEART FAILURE (HCC): Status: ACTIVE | Noted: 2019-09-24

## 2022-03-20 PROBLEM — Z91.148 HISTORY OF MEDICATION NONCOMPLIANCE: Status: ACTIVE | Noted: 2021-07-19

## 2022-03-20 PROBLEM — I50.40 COMBINED CONGESTIVE SYSTOLIC AND DIASTOLIC HEART FAILURE (HCC): Status: ACTIVE | Noted: 2020-01-31

## 2022-04-13 ENCOUNTER — OFFICE VISIT (OUTPATIENT)
Dept: CARDIOLOGY CLINIC | Age: 82
End: 2022-04-13
Payer: MEDICARE

## 2022-04-13 DIAGNOSIS — I25.5 ISCHEMIC CARDIOMYOPATHY: Primary | ICD-10-CM

## 2022-04-13 DIAGNOSIS — Z95.810 AICD (AUTOMATIC CARDIOVERTER/DEFIBRILLATOR) PRESENT: ICD-10-CM

## 2022-04-18 PROCEDURE — 93295 DEV INTERROG REMOTE 1/2/MLT: CPT | Performed by: INTERNAL MEDICINE

## 2022-04-18 PROCEDURE — 93296 REM INTERROG EVL PM/IDS: CPT | Performed by: INTERNAL MEDICINE

## 2022-04-18 NOTE — PROGRESS NOTES
Dual AICD. 8.6 years left on battery. Lead impedance and threshold WNL. A paced 5.5 %, V paced 3.3 %. OP Vol WNL.  0 events since last device check on 1/31/2022

## 2022-06-30 ENCOUNTER — OFFICE VISIT (OUTPATIENT)
Dept: CARDIOLOGY CLINIC | Age: 82
End: 2022-06-30
Payer: MEDICARE

## 2022-06-30 VITALS
OXYGEN SATURATION: 98 % | DIASTOLIC BLOOD PRESSURE: 78 MMHG | WEIGHT: 100 LBS | BODY MASS INDEX: 16.66 KG/M2 | HEART RATE: 90 BPM | HEIGHT: 65 IN | SYSTOLIC BLOOD PRESSURE: 122 MMHG

## 2022-06-30 DIAGNOSIS — Z91.14 HISTORY OF MEDICATION NONCOMPLIANCE: ICD-10-CM

## 2022-06-30 DIAGNOSIS — E78.00 HYPERCHOLESTEROLEMIA: ICD-10-CM

## 2022-06-30 DIAGNOSIS — R00.0 WIDE-COMPLEX TACHYCARDIA: ICD-10-CM

## 2022-06-30 DIAGNOSIS — Z95.810 AICD (AUTOMATIC CARDIOVERTER/DEFIBRILLATOR) PRESENT: ICD-10-CM

## 2022-06-30 DIAGNOSIS — I10 PRIMARY HYPERTENSION: ICD-10-CM

## 2022-06-30 DIAGNOSIS — I25.10 CORONARY ARTERY DISEASE INVOLVING NATIVE CORONARY ARTERY OF NATIVE HEART WITHOUT ANGINA PECTORIS: Primary | ICD-10-CM

## 2022-06-30 DIAGNOSIS — Z95.1 S/P CABG X 5: ICD-10-CM

## 2022-06-30 DIAGNOSIS — J43.9 PULMONARY EMPHYSEMA, UNSPECIFIED EMPHYSEMA TYPE (HCC): ICD-10-CM

## 2022-06-30 DIAGNOSIS — I25.5 ISCHEMIC CARDIOMYOPATHY: ICD-10-CM

## 2022-06-30 PROCEDURE — G8510 SCR DEP NEG, NO PLAN REQD: HCPCS | Performed by: INTERNAL MEDICINE

## 2022-06-30 PROCEDURE — 1123F ACP DISCUSS/DSCN MKR DOCD: CPT | Performed by: INTERNAL MEDICINE

## 2022-06-30 PROCEDURE — G8754 DIAS BP LESS 90: HCPCS | Performed by: INTERNAL MEDICINE

## 2022-06-30 PROCEDURE — 93289 INTERROG DEVICE EVAL HEART: CPT | Performed by: INTERNAL MEDICINE

## 2022-06-30 PROCEDURE — 99214 OFFICE O/P EST MOD 30 MIN: CPT | Performed by: INTERNAL MEDICINE

## 2022-06-30 PROCEDURE — 93000 ELECTROCARDIOGRAM COMPLETE: CPT | Performed by: INTERNAL MEDICINE

## 2022-06-30 PROCEDURE — G8427 DOCREV CUR MEDS BY ELIG CLIN: HCPCS | Performed by: INTERNAL MEDICINE

## 2022-06-30 PROCEDURE — G8536 NO DOC ELDER MAL SCRN: HCPCS | Performed by: INTERNAL MEDICINE

## 2022-06-30 PROCEDURE — 1101F PT FALLS ASSESS-DOCD LE1/YR: CPT | Performed by: INTERNAL MEDICINE

## 2022-06-30 PROCEDURE — G8419 CALC BMI OUT NRM PARAM NOF/U: HCPCS | Performed by: INTERNAL MEDICINE

## 2022-06-30 PROCEDURE — G8752 SYS BP LESS 140: HCPCS | Performed by: INTERNAL MEDICINE

## 2022-06-30 NOTE — PROGRESS NOTES
Radha Murry presents today for   Chief Complaint   Patient presents with    Follow-up     6 month    Pacemaker Check    Palpitations     corrine Murry preferred language for health care discussion is english/other. Is someone accompanying this pt? no    Is the patient using any DME equipment during [address]? cane    Depression Screening:  3 most recent PHQ Screens 6/30/2022   Little interest or pleasure in doing things Not at all   Feeling down, depressed, irritable, or hopeless Not at all   Total Score PHQ 2 0       Learning Assessment:  Learning Assessment 6/30/2022   PRIMARY LEARNER Patient   HIGHEST LEVEL OF EDUCATION - PRIMARY LEARNER  -   [address] [place] [address]    NEED -   LEARNER PREFERENCE PRIMARY DEMONSTRATION   ANSWERED BY patient   RELATIONSHIP SELF       Abuse Screening:  Abuse Screening Questionnaire 6/30/2022   Do you ever feel afraid of your partner? N   Are you in a relationship with someone who physically or mentally threatens you? N   Is it safe for you to go home? Y       Fall Risk  Fall Risk Assessment, last 12 mths 6/30/2022   Able to walk? Yes   Fall in past 12 months? 0   Do you feel unsteady? 0   Are you worried about falling 0   Number of falls in past 12 months -   Fall with injury? -           Pt currently taking Anticoagulant therapy? no    Pt currently taking Antiplatelet therapy ? Aspirin 81 mg daily and plavix 75 mg daily      Coordination of Care:  1. Have you been to the ER, urgent care clinic since your last visit? Hospitalized since your last visit? no    2. Have you seen or consulted any other health care providers outside of the [address] [place] [address] since your last visit? Include any pap smears or colon screening.  no

## 2022-06-30 NOTE — PROGRESS NOTES
HISTORY OF PRESENT ILLNESS  Tod Doran is a [de-identified] y.o. male. Follow-up  Associated symptoms include shortness of breath. Pertinent negatives include no chest pain, no abdominal pain and no headaches. Patient presents for a follow-up office visit. He was previously followed by Dr. Paris Candelario up until his intermediate at the end of 2020. The patient has not been seen in our office in over a year. He was last hospitalized for congestive heart failure in February 2020. He has a longstanding history of coronary artery disease status post 5 vessel CABG in 2014 with a more recent cardiac catheterization in 2018 demonstrate a patent LIMA to LAD graft, and all other vein grafts occluded. His native RCA had a subtotal mid vessel occlusion with collaterals from the left system. His native diagonal branch of 85% stenosis in the native OM branch that an 85% stenosis. His LV function has remained severely depressed. His most recent echocardiogram from February 2020 showed an ejection fraction of 20 to 25% with mildly elevated PA pressures at 41 mmHg. He had no significant valvular heart disease. Lastly, he has a history of wide-complex tachycardia suspicious for ventricular tachycardia, so underwent implantation of a dual-chamber AICD in September 2019. Patient admits that he still does not take a lot of his medications regularly and in fact is not taking any cardiac medications for quite some time now. He is now on home oxygen 4 L most days. He was a longtime smoker but quit smoking almost 2+ years ago. He does complain of shortness of breath with any physical activity which will improve with rest and when he wears his oxygen. He denies any orthopnea, PND, or leg swelling. No new chest pain or pressure. No heart palpitations, dizzy spells, syncope or AICD shocks.     Past Medical History:   Diagnosis Date    AICD (automatic cardioverter/defibrillator) present 10/13/2019    [employer] dual-chamber MRI compatible    Alcohol abuse     quit February 2011    Binocular visual disturbance 10/31/2013    CAD (coronary artery disease)     CAD (coronary artery disease), native coronary artery 1/3/05    LAD 70-80% mid; Cx-diffuse 80%; OM1-70% prox; RCA 40-50% mid; 100% RPLB with collaterals    Cardiac cath 08/19/2013    Severe, diffuse calcification. dLM 30%. mLAD 80%. o/pD1 80%. o/pRamus 80%. dCX 85%. dRCA 80%.  m-dRPLB 75%. EF 50%. Mod to severe inferobasal hypk.  Cardiac nuclear imaging test 06/27/2011    No evidence of ischemia or previous infarction. EF 60%. Neg EKG on max EST. Ex time 6 mins.  Chronic kidney disease     Chronic lung disease     Chronic obstructive pulmonary disease ([facility]     Coronary artery disease     Diverticula of colon 12/17/2013    Emphysematous COPD ([facility] [address]     PFTs March 2009; mild-mode obstruction, + bronchodil response; decreased DLCO    GERD (gastroesophageal reflux disease)     with erosive esophagitis history    Heart failure (HCC)     History of renal failure     2 years ago, which completely resolved    Hypercholesterolemia     Hypertension     Hypertensive heart disease with CHF ([address]     Ischemic cardiomyopathy 02/2020    EF 20 to 25%, PASP 41 mmHg    MI (myocardial infarction) (formerly [facility])     Inferior wall    Paresthesia and pain of both upper extremities 10/31/2013    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 Qian Garland, 4/9/14.  Tobacco abuse     Unspecified hereditary and idiopathic peripheral neuropathy 12/4/2013     Current Outpatient Medications   Medication Sig Dispense Refill    sacubitriL-valsartan (Entresto) 49-51 mg tab tablet Take 1 Tablet by mouth two (2) times a day.  (Patient not taking: Reported on 6/30/2022) 180 Tablet 3    metoprolol succinate (TOPROL-XL) 50 mg XL tablet Take 1 Tablet by mouth daily. (Patient not taking: Reported on 6/30/2022) 90 Tablet 3    atorvastatin (LIPITOR) 40 mg tablet Take 1 Tablet by mouth nightly. 1 pill daily (Patient not taking: Reported on 1/31/2022) 90 Tablet 3    aspirin delayed-release 81 mg tablet Take 1 Tablet by mouth daily. 1 pill po daily (Patient not taking: Reported on 1/31/2022) 90 Tablet 3    nitroglycerin (NITROSTAT) 0.4 mg SL tablet 1 Tablet by SubLINGual route every five (5) minutes as needed for Chest Pain. Up to 3 doses. (Patient not taking: Reported on 6/30/2022) 1 Bottle 3    liothyronine (CYTOMEL) 25 mcg tablet Take 25 mcg by mouth daily. (Patient not taking: Reported on 6/30/2022)      albuterol (PROVENTIL HFA, VENTOLIN HFA, PROAIR HFA) 90 mcg/actuation inhaler Take 1 Puff by inhalation every six (6) hours as needed for Shortness of Breath or Wheezing. (Patient not taking: Reported on 6/30/2022)      OXYGEN-AIR DELIVERY SYSTEMS 3 L/min by Nasal route continuous. (Patient not taking: Reported on 6/30/2022)      acetaminophen (TYLENOL EXTRA STRENGTH) 500 mg tablet Take 500 mg by mouth every six (6) hours as needed for Pain. 1 pill poq 6 hours as needed prn pain (Patient not taking: Reported on 6/30/2022)      albuterol (PROVENTIL VENTOLIN) 2.5 mg /3 mL (0.083 %) nebu 3 mL by Nebulization route every four (4) hours as needed (cough, wheezing, more shortness of breath). Indications: a chronic lung disease where the airways become partially blocked with mucus called COPD (Patient not taking: Reported on 6/30/2022) 30 Nebule 3    tiotropium-olodaterol (STIOLTO RESPIMAT) 2.5-2.5 mcg/actuation inhaler Take 2 Puffs by inhalation daily. (Patient not taking: Reported on 6/30/2022) 1 Inhaler 0    clopidogrel (PLAVIX) 75 mg tab Take 1 Tab by mouth daily.  (Patient not taking: Reported on 1/31/2022) 30 Tab 3    albuterol (VENTOLIN HFA) 90 mcg/Actuation inhaler Take 1 Puff by inhalation as needed for Shortness of Breath or Wheezing. (Patient not taking: Reported on 6/30/2022)      multivitamin (ONE A DAY) tablet Take 1 Tab by mouth daily. (Patient not taking: Reported on 1/31/2022)       Allergies   Allergen Reactions    Vicodin [Hydrocodone-Acetaminophen] Rash      Social History     Tobacco Use    Smoking status: Former Smoker     Packs/day: 0.50     Years: 55.00     Pack years: 27.50     Types: Cigarettes     Start date: 1956     Quit date: 2019     Years since quitting: 3.4    Smokeless tobacco: Never Used   Vaping Use    Vaping Use: Never used   Substance Use Topics    Alcohol use: Yes     Comment: every other day drinker    Drug use: No     Family History   Problem Relation Age of Onset    Heart Disease Maternal Uncle     Diabetes Father     Hypertension Father          Review of Systems   Constitutional: Negative for chills, fever and weight loss. HENT: Negative for nosebleeds. Eyes: Negative for blurred vision and double vision. Respiratory: Positive for shortness of breath. Negative for cough and wheezing. Cardiovascular: Negative for chest pain, palpitations, orthopnea, claudication, leg swelling and PND. Gastrointestinal: Negative for abdominal pain, heartburn, nausea and vomiting. Genitourinary: Negative for dysuria and hematuria. Musculoskeletal: Negative for falls and myalgias. Skin: Negative for rash. Neurological: Negative for dizziness, focal weakness and headaches. Endo/Heme/Allergies: Does not bruise/bleed easily. Psychiatric/Behavioral: Negative for substance abuse. Visit Vitals  /78 (BP 1 Location: Left upper arm, BP Patient Position: Sitting, BP Cuff Size: Adult)   Pulse 90   Ht 5' 5\" (1.651 m)   Wt 45.4 kg (100 lb)   SpO2 98%   BMI 16.64 kg/m²       Physical Exam  Constitutional:       Appearance: He is well-developed. HENT:      Head: Normocephalic and atraumatic.    Eyes:      Conjunctiva/sclera: Conjunctivae normal. Neck:      Vascular: No carotid bruit or JVD. Cardiovascular:      Rate and Rhythm: Normal rate and regular rhythm. Pulses: Normal pulses. Heart sounds: S1 normal and S2 normal. No murmur heard. No gallop. No S3 sounds. Pulmonary:      Breath sounds: Decreased air movement present. Examination of the right-lower field reveals decreased breath sounds. Examination of the left-lower field reveals decreased breath sounds. Decreased breath sounds present. No wheezing, rhonchi or rales. Abdominal:      General: Bowel sounds are normal.      Palpations: Abdomen is soft. Tenderness: There is no abdominal tenderness. Musculoskeletal:         General: No swelling or deformity. Cervical back: Neck supple. Skin:     General: Skin is warm and dry. Findings: No bruising or erythema. Neurological:      General: No focal deficit present. Mental Status: He is alert and oriented to person, place, and time. Psychiatric:         Mood and Affect: Mood normal.         Behavior: Behavior normal.       EKG: Normal sinus rhythm, inferior Q waves consistent with prior infarction, poor R wave progression, diffuse nonspecific ST-T abnormality. Occasional PVC. Compared to the previous EKG, PVC is now present. AICD interrogation: Battery at beginning of life estimate longevity at 8.4 years. Pacing in the atrium 9 % in the ventricle 3%. No significant arrhythmias since last device check. Activity tolerance remains poor less than 1 hour/day. Transient increase in OptiVol fluid level which is since returned to baseline. Scanned document for details. ASSESSMENT and PLAN    Coronary artery disease. Patient underwent a 5 vessel bypass in 2014, unfortunately all of his bypass grafts had closed on a cardiac catheterization in 2018 with exception of a LAN[place]STER to LAD. He has been treated medically since that time.   At a minimum I have recommended taking an aspirin, low-dose beta-blocker and potent statin. He has a long history of medication noncompliance. He denies any new anginal type symptoms. He admits he is unlikely to take any medications. Ischemic cardiomyopathy. EF 20 to 25% last assessed by echocardiogram in February 2020. Patient may have small bilateral pleural effusions on exam today. I advised him to take his medications as prescribed, though I am not hopeful he will. Wide-complex tachycardia. Patient underwent implantation of a dual-chamber AICD in September 2019 for presumably VT. He was previously treated with amiodarone but admits he has not taken his medication in probably a year. I would not restart amiodarone unless he has recurrent AICD shocks. He has had several paced terminated episodes of VT. Hopefully if he is compliant with his metoprolol dosing this will be much less frequent. Paroxysmal atrial fibrillation/flutter. No recurrence over the past 6 months. Patient is likely not a good candidate for anticoagulation since he is noncompliant with his medications. Essential hypertension. Patient blood pressure is currently well controlled without any regular medical therapy. Dyslipidemia. Patient should be restarted on atorvastatin 40 mg daily. His goal LDL should be less than 70. COPD. Patient uses 4 L of oxygen. He does have a long history of tobacco use but did quit smoking 2+ years ago. He was encouraged remain tobacco free.       Follow-up in the office in 6 months, sooner if needed

## 2022-10-05 ENCOUNTER — OFFICE VISIT (OUTPATIENT)
Dept: CARDIOLOGY CLINIC | Age: 82
End: 2022-10-05
Payer: MEDICARE

## 2022-10-05 DIAGNOSIS — Z95.810 AICD (AUTOMATIC CARDIOVERTER/DEFIBRILLATOR) PRESENT: ICD-10-CM

## 2022-10-05 DIAGNOSIS — I25.5 ISCHEMIC CARDIOMYOPATHY: Primary | ICD-10-CM

## 2022-10-06 PROCEDURE — 93296 REM INTERROG EVL PM/IDS: CPT | Performed by: INTERNAL MEDICINE

## 2022-10-06 PROCEDURE — 93295 DEV INTERROG REMOTE 1/2/MLT: CPT | Performed by: INTERNAL MEDICINE

## 2022-10-06 NOTE — PROGRESS NOTES
Device check personally reviewed by me. Normal device function on interrogation. Episodes of ventricular tachycardia noted. Patient has refused all his cardiac medications in the past.  Would simply continue to monitor. See scanned interrogation document for complete details.

## 2022-10-06 NOTE — PROGRESS NOTES
Dual AICD. 8 years left on battery. Lead impedance and threshold WNL. A paced 11.2 %, V paced 2.8 %. OP Vol upward climb but has not crossed threshold. 7 VT episodes August 11th and July 7th are the days of the events. and 14 nonsustained VT.  7 VT treated with ATP.  Next appt with Dr. Chintan Pena in Dec

## 2023-02-02 ENCOUNTER — DOCUMENTATION ONLY (OUTPATIENT)
Dept: ORTHOPEDIC SURGERY | Age: 83
End: 2023-02-02

## 2023-02-10 ENCOUNTER — OFFICE VISIT (OUTPATIENT)
Dept: CARDIOLOGY CLINIC | Age: 83
End: 2023-02-10

## 2023-02-10 VITALS
DIASTOLIC BLOOD PRESSURE: 60 MMHG | SYSTOLIC BLOOD PRESSURE: 122 MMHG | HEIGHT: 65 IN | WEIGHT: 95 LBS | HEART RATE: 85 BPM | OXYGEN SATURATION: 99 % | BODY MASS INDEX: 15.83 KG/M2

## 2023-02-10 DIAGNOSIS — I10 PRIMARY HYPERTENSION: ICD-10-CM

## 2023-02-10 DIAGNOSIS — I47.20 VENTRICULAR TACHYCARDIA: ICD-10-CM

## 2023-02-10 DIAGNOSIS — I25.10 CORONARY ARTERY DISEASE INVOLVING NATIVE CORONARY ARTERY OF NATIVE HEART WITHOUT ANGINA PECTORIS: ICD-10-CM

## 2023-02-10 DIAGNOSIS — I10 ESSENTIAL HYPERTENSION: ICD-10-CM

## 2023-02-10 DIAGNOSIS — I25.5 ISCHEMIC CARDIOMYOPATHY: Primary | ICD-10-CM

## 2023-02-10 DIAGNOSIS — Z95.1 S/P CABG X 5: ICD-10-CM

## 2023-02-10 DIAGNOSIS — Z95.810 AICD (AUTOMATIC CARDIOVERTER/DEFIBRILLATOR) PRESENT: ICD-10-CM

## 2023-02-10 DIAGNOSIS — Z91.14 HISTORY OF MEDICATION NONCOMPLIANCE: ICD-10-CM

## 2023-02-10 DIAGNOSIS — E78.00 HYPERCHOLESTEROLEMIA: ICD-10-CM

## 2023-02-10 DIAGNOSIS — J43.9 PULMONARY EMPHYSEMA, UNSPECIFIED EMPHYSEMA TYPE (HCC): ICD-10-CM

## 2023-02-10 NOTE — PROGRESS NOTES
HISTORY OF PRESENT ILLNESS  Renetta Funk is a 81 y.o. male. Follow-up  Associated symptoms include shortness of breath. Pertinent negatives include no chest pain, no abdominal pain and no headaches. Patient presents for a follow-up office visit. He was previously followed by Dr. Amarjit Marquez up until his detention at the end of 2020. The patient has not been seen in our office in over a year. He was last hospitalized for congestive heart failure in February 2020. He has a longstanding history of coronary artery disease status post 5 vessel CABG in 2014 with a more recent cardiac catheterization in 2018 demonstrate a patent LIMA to LAD graft, and all other vein grafts occluded. His native RCA had a subtotal mid vessel occlusion with collaterals from the left system. His native diagonal branch of 85% stenosis in the native OM branch that an 85% stenosis. His LV function has remained severely depressed. His most recent echocardiogram from February 2020 showed an ejection fraction of 20 to 25% with mildly elevated PA pressures at 41 mmHg. He had no significant valvular heart disease. Lastly, he has a history of ventricular tachycardia, so underwent implantation of a dual-chamber AICD in September 2019. Patient still admits to not taking any of his cardiac medications. He is now on home oxygen 4 L most days. He was a longtime smoker but quit smoking almost nearly 3 years ago. He does complain of shortness of breath with any physical activity which will improve with rest and when he wears his oxygen. He denies any orthopnea, PND, or leg swelling. No new chest pain or pressure. No heart palpitations, dizzy spells, syncope or AICD shocks.     Past Medical History:   Diagnosis Date    AICD (automatic cardioverter/defibrillator) present 10/13/2019    Medtronic dual-chamber MRI compatible    Alcohol abuse     quit February 2011    Binocular visual disturbance 10/31/2013    CAD (coronary artery disease)     CAD (coronary artery disease), native coronary artery 1/3/05    LAD 70-80% mid; Cx-diffuse 80%; OM1-70% prox; RCA 40-50% mid; 100% RPLB with collaterals    Cardiac cath 08/19/2013    Severe, diffuse calcification. dLM 30%. mLAD 80%. o/pD1 80%. o/pRamus 80%. dCX 85%. dRCA 80%.  m-dRPLB 75%. EF 50%. Mod to severe inferobasal hypk. Cardiac nuclear imaging test 06/27/2011    No evidence of ischemia or previous infarction. EF 60%. Neg EKG on max EST. Ex time 6 mins. 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[facility]     PFTs March 2009; mild-mode obstruction, + bronchodil response; decreased DLCO    GERD (gastroesophageal reflux disease)     with erosive esophagitis history    Heart failure ([facility])     History of renal failure     2 years ago, which completely resolved    Hypercholesterolemia     Hypertension     Hypertensive heart disease with CHF ([facility]     Ischemic cardiomyopathy 02/2020    EF 20 to 25%, PASP 41 mmHg    MI (myocardial infarction) ([facility])     Inferior wall    Paresthesia and pain of both upper extremities 10/31/2013    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 Papo De La Rosa, 4/9/14. Tobacco abuse     Unspecified hereditary and idiopathic peripheral neuropathy 12/4/2013     Current Outpatient Medications   Medication Sig Dispense Refill    sacubitriL-valsartan (Entresto) 49-51 mg tab tablet Take 1 Tablet by mouth two (2) times a day. (Patient not taking: No sig reported) 180 Tablet 3    metoprolol succinate (TOPROL-XL) 50 mg XL tablet Take 1 Tablet by mouth daily.  (Patient not taking: No sig reported) 90 Tablet 3 atorvastatin (LIPITOR) 40 mg tablet Take 1 Tablet by mouth nightly. 1 pill daily (Patient not taking: No sig reported) 90 Tablet 3    aspirin delayed-release 81 mg tablet Take 1 Tablet by mouth daily. 1 pill po daily (Patient not taking: No sig reported) 90 Tablet 3    nitroglycerin (NITROSTAT) 0.4 mg SL tablet 1 Tablet by SubLINGual route every five (5) minutes as needed for Chest Pain. Up to 3 doses. (Patient not taking: No sig reported) 1 Bottle 3    liothyronine (CYTOMEL) 25 mcg tablet Take 25 mcg by mouth daily. (Patient not taking: No sig reported)      albuterol (PROVENTIL HFA, VENTOLIN HFA, PROAIR HFA) 90 mcg/actuation inhaler Take 1 Puff by inhalation every six (6) hours as needed for Shortness of Breath or Wheezing. (Patient not taking: No sig reported)      OXYGEN-AIR DELIVERY SYSTEMS 3 L/min by Nasal route continuous. (Patient not taking: No sig reported)      acetaminophen (TYLENOL) 500 mg tablet Take 500 mg by mouth every six (6) hours as needed for Pain. 1 pill poq 6 hours as needed prn pain (Patient not taking: No sig reported)      albuterol (PROVENTIL VENTOLIN) 2.5 mg /3 mL (0.083 %) nebu 3 mL by Nebulization route every four (4) hours as needed (cough, wheezing, more shortness of breath). Indications: a chronic lung disease where the airways become partially blocked with mucus called COPD (Patient not taking: No sig reported) 30 Nebule 3    tiotropium-olodaterol (STIOLTO RESPIMAT) 2.5-2.5 mcg/actuation inhaler Take 2 Puffs by inhalation daily. (Patient not taking: No sig reported) 1 Inhaler 0    clopidogrel (PLAVIX) 75 mg tab Take 1 Tab by mouth daily. (Patient not taking: No sig reported) 30 Tab 3    albuterol (PROVENTIL HFA, VENTOLIN HFA, PROAIR HFA) 90 mcg/actuation inhaler Take 1 Puff by inhalation as needed for Shortness of Breath or Wheezing. (Patient not taking: No sig reported)      multivitamin (ONE A DAY) tablet Take 1 Tab by mouth daily.  (Patient not taking: No sig reported) Allergies   Allergen Reactions    Vicodin [Hydrocodone-Acetaminophen] Rash      Social History     Tobacco Use    Smoking status: Former     Packs/day: 0.50     Years: 55.00     Pack years: 27.50     Types: Cigarettes     Start date:      Quit date: 2019     Years since quittin.1    Smokeless tobacco: Never   Vaping Use    Vaping Use: Never used   Substance Use Topics    Alcohol use: Yes     Comment: every other day drinker    Drug use: No     Family History   Problem Relation Age of Onset    Heart Disease Maternal Uncle     Diabetes Father     Hypertension Father          Review of Systems   Constitutional:  Negative for chills, fever and weight loss. HENT:  Negative for nosebleeds. Eyes:  Negative for blurred vision and double vision. Respiratory:  Positive for shortness of breath. Negative for cough and wheezing. Cardiovascular:  Negative for chest pain, palpitations, orthopnea, claudication, leg swelling and PND. Gastrointestinal:  Negative for abdominal pain, heartburn, nausea and vomiting. Genitourinary:  Negative for dysuria and hematuria. Musculoskeletal:  Negative for falls and myalgias. Skin:  Negative for rash. Neurological:  Negative for dizziness, focal weakness and headaches. Endo/Heme/Allergies:  Does not bruise/bleed easily. Psychiatric/Behavioral:  Negative for substance abuse. Visit Vitals  /60 (BP 1 Location: Left upper arm, BP Patient Position: Sitting, BP Cuff Size: Adult)   Pulse 85   Ht 5' 5\" (1.651 m)   Wt 43.1 kg (95 lb)   SpO2 99%   BMI 15.81 kg/m²       Physical Exam  Constitutional:       Appearance: He is well-developed. HENT:      Head: Normocephalic and atraumatic. Eyes:      Conjunctiva/sclera: Conjunctivae normal.   Neck:      Vascular: No carotid bruit or JVD. Cardiovascular:      Rate and Rhythm: Normal rate and regular rhythm. Pulses: Normal pulses. Heart sounds: S1 normal and S2 normal. No murmur heard. No gallop.  No S3 sounds. Pulmonary:      Breath sounds: Decreased air movement present. Examination of the right-lower field reveals decreased breath sounds. Examination of the left-lower field reveals decreased breath sounds. Decreased breath sounds present. No wheezing, rhonchi or rales. Abdominal:      General: Bowel sounds are normal.      Palpations: Abdomen is soft. Tenderness: There is no abdominal tenderness. Musculoskeletal:         General: No swelling or deformity. Cervical back: Neck supple. Skin:     General: Skin is warm and dry. Findings: No bruising or erythema. Neurological:      General: No focal deficit present. Mental Status: He is alert and oriented to person, place, and time. Psychiatric:         Mood and Affect: Mood normal.         Behavior: Behavior normal.     EKG: Normal sinus rhythm, inferior Q waves consistent with prior infarction, poor R wave progression, diffuse nonspecific ST-T abnormality. Compared to the previous EKG, PVCs are no longer present. AICD interrogation: Battery at beginning of life estimate longevity at 7.6 years. Pacing in the atrium 10 % in the ventricle 4.5 %. Several episodes of ventricular tachycardia in July and August 2022 which were paced terminated. No defibrillation required. Activity tolerance remains poor less than 1 hour/day. Transient increase in OptiVol fluid level which is since returned to baseline. Scanned document for details. ASSESSMENT and PLAN    Coronary artery disease. Patient underwent a 5 vessel bypass in 2014, unfortunately all of his bypass grafts had closed on a cardiac catheterization in 2018 with exception of a LIMA to LAD. At a minimum I have recommended taking an aspirin, low-dose beta-blocker and potent statin. Patient denies any angina. He states he is still not willing to start any medications. He has a long history of medication noncompliance. Ischemic cardiomyopathy.   EF 20 to 25% last assessed by echocardiogram in February 2020. I advised him to take his medications as prescribed, though I am not hopeful he will. Ventricular tachycardia. Patient underwent implantation of a dual-chamber AICD in September 2019 for presumably VT. He was previously treated with amiodarone but admits he has not taken his medication in probably a year. I would not restart amiodarone unless he has recurrent AICD shocks. He continues to have several paced terminated episodes of VT. Hopefully if he is compliant with his metoprolol dosing this will be much less frequent. Paroxysmal atrial fibrillation/flutter. No recurrence over the past 6 months. Patient is likely not a good candidate for anticoagulation since he is noncompliant with his medications. Essential hypertension. Patient blood pressure is currently well controlled without any regular medical therapy. Dyslipidemia. Patient should be restarted on atorvastatin 40 mg daily. His goal LDL should be less than 70. COPD. Patient uses 4 L of oxygen. He does have a long history of tobacco use but did quit smoking 3 years ago. He was encouraged remain tobacco free.       Follow-up in the office in 6 months, sooner if needed

## 2023-02-10 NOTE — PROGRESS NOTES
Patience Talavera presents today for   Chief Complaint   Patient presents with    Follow-up     6 month    Shortness of Breath       Patience Talavera preferred language for health care discussion is english/other. Is someone accompanying this pt? yes    Is the patient using any DME equipment during OV? Cane and wheelchair    Depression Screening:  3 most recent PHQ Screens 2/10/2023   Little interest or pleasure in doing things Not at all   Feeling down, depressed, irritable, or hopeless Not at all   Total Score PHQ 2 0       Learning Assessment:  Learning Assessment 2/10/2023   PRIMARY LEARNER Patient   HIGHEST LEVEL OF EDUCATION - PRIMARY LEARNER  -   [address] [place] [address]    NEED -   LEARNER PREFERENCE PRIMARY DEMONSTRATION   ANSWERED BY patient   RELATIONSHIP SELF       Abuse Screening:  Abuse Screening Questionnaire 2/10/2023   Do you ever feel afraid of your partner? N   Are you in a relationship with someone who physically or mentally threatens you? N   Is it safe for you to go home? Y       Fall Risk  Fall Risk Assessment, last 12 mths 2/10/2023   Able to walk? Yes   Fall in past 12 months? 0   Do you feel unsteady? 0   Are you worried about falling 0   Number of falls in past 12 months -   Fall with injury? -           Pt currently taking Anticoagulant therapy? no    Pt currently taking Antiplatelet therapy ? Aspirin 81 mg daily      Coordination of Care:  1. Have you been to the ER, urgent care clinic since your last visit? Hospitalized since your last visit? no    2. Have you seen or consulted any other health care providers outside of the [address] [place] [address] since your last visit? Include any pap smears or colon screening.  no

## 2023-06-06 ENCOUNTER — TELEPHONE (OUTPATIENT)
Age: 83
End: 2023-06-06

## 2023-06-07 NOTE — TELEPHONE ENCOUNTER
After speaking with device Nurse she wants pt to be scheduled for device check tomorrow. Daughter made aware and pt will be seen on 6/8/23.

## 2023-06-08 ENCOUNTER — OFFICE VISIT (OUTPATIENT)
Age: 83
End: 2023-06-08
Payer: MEDICARE

## 2023-06-08 VITALS
HEART RATE: 76 BPM | BODY MASS INDEX: 16.33 KG/M2 | SYSTOLIC BLOOD PRESSURE: 182 MMHG | OXYGEN SATURATION: 100 % | HEIGHT: 65 IN | WEIGHT: 98 LBS | DIASTOLIC BLOOD PRESSURE: 98 MMHG

## 2023-06-08 DIAGNOSIS — I10 ESSENTIAL (PRIMARY) HYPERTENSION: ICD-10-CM

## 2023-06-08 DIAGNOSIS — I25.5 ISCHEMIC CARDIOMYOPATHY: ICD-10-CM

## 2023-06-08 DIAGNOSIS — I49.01 VF (VENTRICULAR FIBRILLATION) (HCC): Primary | ICD-10-CM

## 2023-06-08 DIAGNOSIS — Z91.148 HISTORY OF MEDICATION NONCOMPLIANCE: ICD-10-CM

## 2023-06-08 DIAGNOSIS — Z72.0 TOBACCO USE: ICD-10-CM

## 2023-06-08 DIAGNOSIS — J43.9 PULMONARY EMPHYSEMA, UNSPECIFIED EMPHYSEMA TYPE (HCC): ICD-10-CM

## 2023-06-08 DIAGNOSIS — Z95.810 PRESENCE OF AUTOMATIC (IMPLANTABLE) CARDIAC DEFIBRILLATOR: ICD-10-CM

## 2023-06-08 DIAGNOSIS — I25.10 ATHEROSCLEROSIS OF NATIVE CORONARY ARTERY OF NATIVE HEART WITHOUT ANGINA PECTORIS: ICD-10-CM

## 2023-06-08 DIAGNOSIS — E78.00 PURE HYPERCHOLESTEROLEMIA, UNSPECIFIED: ICD-10-CM

## 2023-06-08 PROCEDURE — G8419 CALC BMI OUT NRM PARAM NOF/U: HCPCS | Performed by: INTERNAL MEDICINE

## 2023-06-08 PROCEDURE — 3023F SPIROM DOC REV: CPT | Performed by: INTERNAL MEDICINE

## 2023-06-08 PROCEDURE — 1123F ACP DISCUSS/DSCN MKR DOCD: CPT | Performed by: INTERNAL MEDICINE

## 2023-06-08 PROCEDURE — 3077F SYST BP >= 140 MM HG: CPT | Performed by: INTERNAL MEDICINE

## 2023-06-08 PROCEDURE — G8427 DOCREV CUR MEDS BY ELIG CLIN: HCPCS | Performed by: INTERNAL MEDICINE

## 2023-06-08 PROCEDURE — 3080F DIAST BP >= 90 MM HG: CPT | Performed by: INTERNAL MEDICINE

## 2023-06-08 PROCEDURE — 99215 OFFICE O/P EST HI 40 MIN: CPT | Performed by: INTERNAL MEDICINE

## 2023-06-08 PROCEDURE — 93000 ELECTROCARDIOGRAM COMPLETE: CPT | Performed by: INTERNAL MEDICINE

## 2023-06-08 PROCEDURE — 1036F TOBACCO NON-USER: CPT | Performed by: INTERNAL MEDICINE

## 2023-06-08 RX ORDER — ATORVASTATIN CALCIUM 40 MG/1
40 TABLET, FILM COATED ORAL DAILY
Qty: 90 TABLET | Refills: 3 | Status: SHIPPED | OUTPATIENT
Start: 2023-06-08

## 2023-06-08 RX ORDER — SACUBITRIL AND VALSARTAN 49; 51 MG/1; MG/1
1 TABLET, FILM COATED ORAL 2 TIMES DAILY
Qty: 180 TABLET | Refills: 3 | Status: SHIPPED | OUTPATIENT
Start: 2023-06-08

## 2023-06-08 RX ORDER — MULTIVITAMIN
1 TABLET ORAL DAILY
COMMUNITY

## 2023-06-08 RX ORDER — METOPROLOL SUCCINATE 50 MG/1
50 TABLET, EXTENDED RELEASE ORAL DAILY
Qty: 90 TABLET | Refills: 3 | Status: SHIPPED | OUTPATIENT
Start: 2023-06-08

## 2023-06-08 ASSESSMENT — PATIENT HEALTH QUESTIONNAIRE - PHQ9
SUM OF ALL RESPONSES TO PHQ QUESTIONS 1-9: 0
SUM OF ALL RESPONSES TO PHQ QUESTIONS 1-9: 0
SUM OF ALL RESPONSES TO PHQ9 QUESTIONS 1 & 2: 0
SUM OF ALL RESPONSES TO PHQ QUESTIONS 1-9: 0
2. FEELING DOWN, DEPRESSED OR HOPELESS: 0
SUM OF ALL RESPONSES TO PHQ QUESTIONS 1-9: 0
1. LITTLE INTEREST OR PLEASURE IN DOING THINGS: 0

## 2023-06-08 ASSESSMENT — ENCOUNTER SYMPTOMS
SHORTNESS OF BREATH: 1
VOMITING: 0
NAUSEA: 0
SORE THROAT: 0
ABDOMINAL DISTENTION: 0
COUGH: 0
ABDOMINAL PAIN: 0

## 2023-06-08 NOTE — PROGRESS NOTES
Alexa Teague presents today for   Chief Complaint   Patient presents with    Follow-up     Add On - Device Check Turned Office Visit    Device Check     Medtronic - Was Shocked 9 Times       Alexa Teague preferred language for health care discussion is english/other. Is someone accompanying this pt? yes    Is the patient using any DME equipment during OV? cane    Depression Screening:  Depression: Not at risk    PHQ-2 Score: 0        Learning Assessment:  Who is the primary learner? Patient    What is the preferred language for health care of the primary learner? ENGLISH    How does the primary learner prefer to learn new concepts? DEMONSTRATION    Answered By patient    Relationship to Learner SELF           Pt currently taking Anticoagulant therapy? no    Pt currently taking Antiplatelet therapy ? Patient Not Taking Any Moedications - Supposed to take Plavix 75 mg once a day and ASA 81 mg once a day      Coordination of Care:  1. Have you been to the ER, urgent care clinic since your last visit? Hospitalized since your last visit? no    2. Have you seen or consulted any other health care providers outside of the [address] [place] [address] since your last visit? Include any pap smears or colon screening.  no
starts taking his metoprolol and Entresto his blood pressure will improve. Dyslipidemia. Patient should be restarted on atorvastatin 40 mg daily. His goal LDL should be less than 70. COPD. Patient uses 4 L of oxygen. He does have a long history of tobacco use but did quit smoking 3+ years ago. He was encouraged remain tobacco free.          Follow-up in the office in 3 months, sooner if needed           Favio Mcintosh MD

## 2023-09-08 ENCOUNTER — OFFICE VISIT (OUTPATIENT)
Age: 83
End: 2023-09-08

## 2023-09-08 VITALS
DIASTOLIC BLOOD PRESSURE: 82 MMHG | HEART RATE: 72 BPM | OXYGEN SATURATION: 99 % | WEIGHT: 96 LBS | HEIGHT: 65 IN | SYSTOLIC BLOOD PRESSURE: 136 MMHG | BODY MASS INDEX: 15.99 KG/M2

## 2023-09-08 DIAGNOSIS — Z95.810 PRESENCE OF AUTOMATIC (IMPLANTABLE) CARDIAC DEFIBRILLATOR: ICD-10-CM

## 2023-09-08 DIAGNOSIS — Z91.148 HISTORY OF MEDICATION NONCOMPLIANCE: ICD-10-CM

## 2023-09-08 DIAGNOSIS — E78.00 PURE HYPERCHOLESTEROLEMIA, UNSPECIFIED: ICD-10-CM

## 2023-09-08 DIAGNOSIS — I25.10 ATHEROSCLEROSIS OF NATIVE CORONARY ARTERY OF NATIVE HEART WITHOUT ANGINA PECTORIS: ICD-10-CM

## 2023-09-08 DIAGNOSIS — I10 ESSENTIAL (PRIMARY) HYPERTENSION: ICD-10-CM

## 2023-09-08 DIAGNOSIS — I25.5 ISCHEMIC CARDIOMYOPATHY: Primary | ICD-10-CM

## 2023-09-08 DIAGNOSIS — J43.9 PULMONARY EMPHYSEMA, UNSPECIFIED EMPHYSEMA TYPE (HCC): ICD-10-CM

## 2023-09-08 DIAGNOSIS — I49.01 VF (VENTRICULAR FIBRILLATION) (HCC): ICD-10-CM

## 2023-09-08 ASSESSMENT — ANXIETY QUESTIONNAIRES
GAD7 TOTAL SCORE: 0
2. NOT BEING ABLE TO STOP OR CONTROL WORRYING: 0
5. BEING SO RESTLESS THAT IT IS HARD TO SIT STILL: 0
1. FEELING NERVOUS, ANXIOUS, OR ON EDGE: 0
6. BECOMING EASILY ANNOYED OR IRRITABLE: 0
3. WORRYING TOO MUCH ABOUT DIFFERENT THINGS: 0
7. FEELING AFRAID AS IF SOMETHING AWFUL MIGHT HAPPEN: 0
4. TROUBLE RELAXING: 0

## 2023-09-08 ASSESSMENT — PATIENT HEALTH QUESTIONNAIRE - PHQ9
2. FEELING DOWN, DEPRESSED OR HOPELESS: 0
SUM OF ALL RESPONSES TO PHQ9 QUESTIONS 1 & 2: 0
1. LITTLE INTEREST OR PLEASURE IN DOING THINGS: 0
SUM OF ALL RESPONSES TO PHQ QUESTIONS 1-9: 0

## 2023-09-08 ASSESSMENT — ENCOUNTER SYMPTOMS
SHORTNESS OF BREATH: 1
ABDOMINAL PAIN: 0
NAUSEA: 0
VOMITING: 0
COUGH: 0
SORE THROAT: 0
ABDOMINAL DISTENTION: 0

## 2023-09-08 NOTE — PROGRESS NOTES
Darby Padgett presents today for   Chief Complaint   Patient presents with    Follow-up     3 month       Darby Padgett preferred language for health care discussion is english/other. Is someone accompanying this pt? yes    Is the patient using any DME equipment during OV? no    Depression Screening:  Depression: Not at risk    PHQ-2 Score: 0        Learning Assessment:  Who is the primary learner? Patient    What is the preferred language for health care of the primary learner? ENGLISH    How does the primary learner prefer to learn new concepts? DEMONSTRATION    Answered By patient    Relationship to Learner SELF           Pt currently taking Anticoagulant therapy? no    Pt currently taking Antiplatelet therapy ? Aspirin 81 mg daily      Coordination of Care:  1. Have you been to the ER, urgent care clinic since your last visit? Hospitalized since your last visit? no    2. Have you seen or consulted any other health care providers outside of the [address] [place] [address] since your last visit? Include any pap smears or colon screening.  no

## 2023-09-08 NOTE — PROGRESS NOTES
09/08/23     Efrain Celeste  is a 81 y.o. male     Chief Complaint   Patient presents with    Follow-up     3 month       HPI    Patient presents for a follow-up office visit. He was previously followed by Dr. Case Hernandez up until his [facility] at the end of 2020. The patient has not been seen in our office in over a year. He was last hospitalized for congestive heart failure in  February 2020. He has a longstanding history of coronary artery disease status post 5 vessel CABG in 2014 with a more recent cardiac catheterization in 2018 demonstrate a patent LIMA to LAD graft, and all other vein grafts occluded. His native RCA had  a subtotal mid vessel occlusion with collaterals from the left system. His native diagonal branch of 85% stenosis in the native OM branch that an 85% stenosis. His LV function has remained severely depressed. His most recent echocardiogram from February 2020 showed an ejection fraction of 20-25% with mildly elevated PA pressures at 41 mmHg. He had no significant valvular heart disease. Lastly, he has a history of ventricular tachycardia, so underwent implantation of a dual-chamber AICD in September 2019. He was last seen in our office 3 months ago. At last visit, he was complaining of recurrent AICD shocks and was found to have multiple recurrent appropriate shocks for a VT/VF storm. Some of the shocks were unsuccessful in his defibrillator was reprogrammed. Since last visit, he started taking all his medications as prescribed. He had no recurrent AICD shocks. He still complains of shortness of breath with any physical activity. He still uses oxygen as needed. He denies any leg swelling, no orthopnea, no PND.     Past Medical History:   Diagnosis Date    AICD (automatic cardioverter/defibrillator) present 10/13/2019    Medtronic dual-chamber MRI compatible    Alcohol abuse     quit February 2011    Binocular visual disturbance 10/31/2013    CAD (coronary artery

## 2024-06-14 ENCOUNTER — OFFICE VISIT (OUTPATIENT)
Age: 84
End: 2024-06-14

## 2024-06-14 VITALS
HEIGHT: 65 IN | DIASTOLIC BLOOD PRESSURE: 68 MMHG | BODY MASS INDEX: 15.49 KG/M2 | SYSTOLIC BLOOD PRESSURE: 120 MMHG | WEIGHT: 93 LBS | OXYGEN SATURATION: 94 % | HEART RATE: 59 BPM

## 2024-06-14 DIAGNOSIS — E78.00 PURE HYPERCHOLESTEROLEMIA, UNSPECIFIED: ICD-10-CM

## 2024-06-14 DIAGNOSIS — I25.10 ATHEROSCLEROSIS OF NATIVE CORONARY ARTERY OF NATIVE HEART WITHOUT ANGINA PECTORIS: ICD-10-CM

## 2024-06-14 DIAGNOSIS — I10 ESSENTIAL (PRIMARY) HYPERTENSION: ICD-10-CM

## 2024-06-14 DIAGNOSIS — Z95.810 PRESENCE OF AUTOMATIC (IMPLANTABLE) CARDIAC DEFIBRILLATOR: ICD-10-CM

## 2024-06-14 DIAGNOSIS — J43.9 PULMONARY EMPHYSEMA, UNSPECIFIED EMPHYSEMA TYPE (HCC): ICD-10-CM

## 2024-06-14 DIAGNOSIS — I49.01 VF (VENTRICULAR FIBRILLATION) (HCC): ICD-10-CM

## 2024-06-14 DIAGNOSIS — I25.5 ISCHEMIC CARDIOMYOPATHY: Primary | ICD-10-CM

## 2024-06-14 DIAGNOSIS — Z91.148 HISTORY OF MEDICATION NONCOMPLIANCE: ICD-10-CM

## 2024-06-14 ASSESSMENT — ANXIETY QUESTIONNAIRES
1. FEELING NERVOUS, ANXIOUS, OR ON EDGE: NOT AT ALL
6. BECOMING EASILY ANNOYED OR IRRITABLE: NOT AT ALL
3. WORRYING TOO MUCH ABOUT DIFFERENT THINGS: NOT AT ALL
4. TROUBLE RELAXING: NOT AT ALL
5. BEING SO RESTLESS THAT IT IS HARD TO SIT STILL: NOT AT ALL
2. NOT BEING ABLE TO STOP OR CONTROL WORRYING: NOT AT ALL
GAD7 TOTAL SCORE: 0
7. FEELING AFRAID AS IF SOMETHING AWFUL MIGHT HAPPEN: NOT AT ALL

## 2024-06-14 ASSESSMENT — PATIENT HEALTH QUESTIONNAIRE - PHQ9
SUM OF ALL RESPONSES TO PHQ QUESTIONS 1-9: 0
SUM OF ALL RESPONSES TO PHQ QUESTIONS 1-9: 0
2. FEELING DOWN, DEPRESSED OR HOPELESS: NOT AT ALL
SUM OF ALL RESPONSES TO PHQ QUESTIONS 1-9: 0
1. LITTLE INTEREST OR PLEASURE IN DOING THINGS: NOT AT ALL
SUM OF ALL RESPONSES TO PHQ9 QUESTIONS 1 & 2: 0
SUM OF ALL RESPONSES TO PHQ QUESTIONS 1-9: 0

## 2024-06-14 ASSESSMENT — ENCOUNTER SYMPTOMS
ABDOMINAL DISTENTION: 0
SHORTNESS OF BREATH: 1
NAUSEA: 0
ABDOMINAL PAIN: 0
VOMITING: 0
COUGH: 0
SORE THROAT: 0

## 2024-06-14 NOTE — PROGRESS NOTES
06/14/24     Adilson Cuello  is a 83 y.o. male     Chief Complaint   Patient presents with    Follow-up     6 month    Procedure     6/18/24: Tube shunt and patch raft with Mtomycin - ahmed valve ST with tutoplast and mitomycin - C with Dr. Adams       HPI    Patient presents for a follow-up office visit.  He was previously followed by Dr. Upton up until his [facility] at the end of 2020.  The patient has not been seen in our office in over a year.  He was last hospitalized for congestive heart failure in  February 2020.  He has a longstanding history of coronary artery disease status post 5 vessel CABG in 2014 with a more recent cardiac catheterization in 2018 demonstrate a patent LIMA to LAD graft, and all other vein grafts occluded.  His native RCA had  a subtotal mid vessel occlusion with collaterals from the left system.  His native diagonal branch of 85% stenosis in the native OM branch that an 85% stenosis.  His LV function has remained severely depressed.  His most recent echocardiogram from February 2020 showed an ejection fraction of 20-25% with mildly elevated PA pressures at 41 mmHg.  He had no significant valvular heart disease.     Lastly, he has a history of ventricular tachycardia, so underwent implantation of a dual-chamber AICD in September 2019.     He sustained recurrent AICD shocks in March 2023 for a VT/VF storm.  Some of the shocks were unsuccessful in his defibrillator was reprogrammed.      He was last seen in our office 9 months ago.  Since last visit, he still does not take any cardiac medications.  He reports no recurrent AICD shocks.  He continues to have progressive memory loss.  He denies any shortness of breath, leg swelling or major change in his activity tolerance.  He continues to lose a little weight due to not eating much.    Past Medical History:   Diagnosis Date    AICD (automatic cardioverter/defibrillator) present 10/13/2019    Medtronic dual-chamber MRI compatible

## 2024-06-14 NOTE — PROGRESS NOTES
Adilson Cuello presents today for   Chief Complaint   Patient presents with    Follow-up     6 month    Procedure     6/18/24: Tube shunt and patch raft with Mtomycin - ahmed valve ST with tutashley and mitomycin - C with Dr. Angie Cuello preferred language for health care discussion is english/other.    Is someone accompanying this pt? yes    Is the patient using any DME equipment during OV? walker    Depression Screening:  Depression: Not at risk (6/14/2024)    PHQ-2     PHQ-2 Score: 0        Learning Assessment:  Who is the primary learner? Patient    What is the preferred language for health care of the primary learner? ENGLISH    How does the primary learner prefer to learn new concepts? DEMONSTRATION    Answered By patient    Relationship to Learner SELF           Pt currently taking Anticoagulant therapy? no    Pt currently taking Antiplatelet therapy ? no      Coordination of Care:  1. Have you been to the ER, urgent care clinic since your last visit? Hospitalized since your last visit? no    2. Have you seen or consulted any other health care providers outside of the [facility] since your last visit? Include any pap smears or colon screening. no

## 2024-12-16 ENCOUNTER — OFFICE VISIT (OUTPATIENT)
Age: 84
End: 2024-12-16
Payer: MEDICARE

## 2024-12-16 VITALS
BODY MASS INDEX: 16.83 KG/M2 | DIASTOLIC BLOOD PRESSURE: 84 MMHG | OXYGEN SATURATION: 95 % | HEART RATE: 67 BPM | HEIGHT: 65 IN | WEIGHT: 101 LBS | SYSTOLIC BLOOD PRESSURE: 132 MMHG

## 2024-12-16 DIAGNOSIS — J43.9 PULMONARY EMPHYSEMA, UNSPECIFIED EMPHYSEMA TYPE (HCC): ICD-10-CM

## 2024-12-16 DIAGNOSIS — I49.01 VF (VENTRICULAR FIBRILLATION): ICD-10-CM

## 2024-12-16 DIAGNOSIS — I25.5 ISCHEMIC CARDIOMYOPATHY: Primary | ICD-10-CM

## 2024-12-16 DIAGNOSIS — E78.00 PURE HYPERCHOLESTEROLEMIA, UNSPECIFIED: ICD-10-CM

## 2024-12-16 DIAGNOSIS — I10 ESSENTIAL (PRIMARY) HYPERTENSION: ICD-10-CM

## 2024-12-16 DIAGNOSIS — Z91.148 HISTORY OF MEDICATION NONCOMPLIANCE: ICD-10-CM

## 2024-12-16 DIAGNOSIS — Z95.810 PRESENCE OF AUTOMATIC (IMPLANTABLE) CARDIAC DEFIBRILLATOR: ICD-10-CM

## 2024-12-16 DIAGNOSIS — I25.10 ATHEROSCLEROSIS OF NATIVE CORONARY ARTERY OF NATIVE HEART WITHOUT ANGINA PECTORIS: ICD-10-CM

## 2024-12-16 PROCEDURE — 1126F AMNT PAIN NOTED NONE PRSNT: CPT | Performed by: INTERNAL MEDICINE

## 2024-12-16 PROCEDURE — 3023F SPIROM DOC REV: CPT | Performed by: INTERNAL MEDICINE

## 2024-12-16 PROCEDURE — 3079F DIAST BP 80-89 MM HG: CPT | Performed by: INTERNAL MEDICINE

## 2024-12-16 PROCEDURE — G8427 DOCREV CUR MEDS BY ELIG CLIN: HCPCS | Performed by: INTERNAL MEDICINE

## 2024-12-16 PROCEDURE — 1160F RVW MEDS BY RX/DR IN RCRD: CPT | Performed by: INTERNAL MEDICINE

## 2024-12-16 PROCEDURE — 3075F SYST BP GE 130 - 139MM HG: CPT | Performed by: INTERNAL MEDICINE

## 2024-12-16 PROCEDURE — G8419 CALC BMI OUT NRM PARAM NOF/U: HCPCS | Performed by: INTERNAL MEDICINE

## 2024-12-16 PROCEDURE — G8484 FLU IMMUNIZE NO ADMIN: HCPCS | Performed by: INTERNAL MEDICINE

## 2024-12-16 PROCEDURE — 93000 ELECTROCARDIOGRAM COMPLETE: CPT | Performed by: INTERNAL MEDICINE

## 2024-12-16 PROCEDURE — 1036F TOBACCO NON-USER: CPT | Performed by: INTERNAL MEDICINE

## 2024-12-16 PROCEDURE — 99214 OFFICE O/P EST MOD 30 MIN: CPT | Performed by: INTERNAL MEDICINE

## 2024-12-16 PROCEDURE — 1159F MED LIST DOCD IN RCRD: CPT | Performed by: INTERNAL MEDICINE

## 2024-12-16 PROCEDURE — 1123F ACP DISCUSS/DSCN MKR DOCD: CPT | Performed by: INTERNAL MEDICINE

## 2024-12-16 RX ORDER — LATANOPROST 50 UG/ML
1 SOLUTION/ DROPS OPHTHALMIC NIGHTLY
COMMUNITY
Start: 2024-12-12

## 2024-12-16 RX ORDER — ATROPINE SULFATE 10 MG/ML
1 SOLUTION/ DROPS OPHTHALMIC 3 TIMES DAILY
COMMUNITY
Start: 2024-10-17

## 2024-12-16 RX ORDER — MIRTAZAPINE 15 MG/1
15 TABLET, FILM COATED ORAL NIGHTLY
COMMUNITY

## 2024-12-16 ASSESSMENT — PATIENT HEALTH QUESTIONNAIRE - PHQ9
SUM OF ALL RESPONSES TO PHQ9 QUESTIONS 1 & 2: 0
2. FEELING DOWN, DEPRESSED OR HOPELESS: NOT AT ALL
1. LITTLE INTEREST OR PLEASURE IN DOING THINGS: NOT AT ALL
SUM OF ALL RESPONSES TO PHQ QUESTIONS 1-9: 0

## 2024-12-16 ASSESSMENT — ENCOUNTER SYMPTOMS
SHORTNESS OF BREATH: 1
ABDOMINAL PAIN: 0
COUGH: 0
SORE THROAT: 0
NAUSEA: 0
VOMITING: 0
ABDOMINAL DISTENTION: 0

## 2024-12-16 ASSESSMENT — ANXIETY QUESTIONNAIRES
4. TROUBLE RELAXING: NOT AT ALL
2. NOT BEING ABLE TO STOP OR CONTROL WORRYING: NOT AT ALL
6. BECOMING EASILY ANNOYED OR IRRITABLE: NOT AT ALL
3. WORRYING TOO MUCH ABOUT DIFFERENT THINGS: NOT AT ALL
GAD7 TOTAL SCORE: 0
1. FEELING NERVOUS, ANXIOUS, OR ON EDGE: NOT AT ALL
7. FEELING AFRAID AS IF SOMETHING AWFUL MIGHT HAPPEN: NOT AT ALL
5. BEING SO RESTLESS THAT IT IS HARD TO SIT STILL: NOT AT ALL

## 2024-12-16 NOTE — PROGRESS NOTES
Adilson Cuello presents today for   Chief Complaint   Patient presents with    Follow-up     6 month       Adilson Cuello preferred language for health care discussion is english/other.    Is someone accompanying this pt? yes    Is the patient using any DME equipment during OV? cane    Depression Screening:  Depression: Not at risk (12/16/2024)    PHQ-2     PHQ-2 Score: 0        Learning Assessment:  Who is the primary learner? Patient    What is the preferred language for health care of the primary learner? ENGLISH    How does the primary learner prefer to learn new concepts? DEMONSTRATION    Answered By patient    Relationship to Learner SELF           Pt currently taking Anticoagulant therapy? no    Pt currently taking Antiplatelet therapy ? no      Coordination of Care:  1. Have you been to the ER, urgent care clinic since your last visit? Hospitalized since your last visit? no    2. Have you seen or consulted any other health care providers outside of the [facility] since your last visit? Include any pap smears or colon screening. no    
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  EF 60%.  Neg EKG on max EST.  Ex time 6 mins.    History of renal failure     2 years ago, which completely resolved    Hypercholesterolemia     Hypertension     Hypertensive heart disease with CHF ([facility])     Ischemic cardiomyopathy 02/2020    EF 20 to 25%, PASP 41 mmHg    MI (myocardial infarction) ([facility])     Inferior wall    Paresthesia and pain of both upper extremities 10/31/2013    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 Edmond, 4/9/14.      S/P cardiac cath 08/19/2013    Severe, diffuse calcification.  dLM 30%.  mLAD 80%.  o/pD1 80%.  o/pRamus 80%.  dCX 85%.  dRCA 80%.  m-dRPLB 75%.  EF 50%.  Mod to severe inferobasal hypk.      Tobacco abuse     Unspecified hereditary and idiopathic peripheral neuropathy 12/4/2013     Current Outpatient Medications   Medication Sig Dispense Refill    mirtazapine (REMERON) 15 MG tablet Take 1 tablet by mouth nightly      atropine 1 % ophthalmic solution Place 1 drop into both eyes 3 times daily      latanoprost (XALATAN) 0.005 % ophthalmic solution Place 1 drop into both eyes nightly      OXYGEN Inhale 3 L into the lungs daily      acetaminophen (TYLENOL) 500 MG tablet Take 1 tablet by mouth every 6 hours as needed      albuterol sulfate HFA (PROVENTIL;VENTOLIN;PROAIR) 108 (90 Base) MCG/ACT inhaler Inhale 1 puff into the lungs every 6 hours as needed

## 2025-05-23 ENCOUNTER — TELEPHONE (OUTPATIENT)
Age: 85
End: 2025-05-23

## 2025-06-19 ENCOUNTER — APPOINTMENT (OUTPATIENT)
Facility: HOSPITAL | Age: 85
DRG: 391 | End: 2025-06-19
Payer: MEDICARE

## 2025-06-19 ENCOUNTER — HOSPITAL ENCOUNTER (INPATIENT)
Facility: HOSPITAL | Age: 85
LOS: 4 days | Discharge: HOME HEALTH | DRG: 391 | End: 2025-06-23
Attending: STUDENT IN AN ORGANIZED HEALTH CARE EDUCATION/TRAINING PROGRAM | Admitting: HEALTH CARE PROVIDER
Payer: MEDICARE

## 2025-06-19 DIAGNOSIS — I16.1 HYPERTENSIVE EMERGENCY: ICD-10-CM

## 2025-06-19 DIAGNOSIS — I50.22 CHRONIC SYSTOLIC CONGESTIVE HEART FAILURE (HCC): ICD-10-CM

## 2025-06-19 DIAGNOSIS — R07.9 CHEST PAIN, UNSPECIFIED TYPE: Primary | ICD-10-CM

## 2025-06-19 DIAGNOSIS — K20.90 ESOPHAGITIS: ICD-10-CM

## 2025-06-19 LAB
ALBUMIN SERPL-MCNC: 3.6 G/DL (ref 3.4–5)
ALBUMIN/GLOB SERPL: 1 (ref 0.8–1.7)
ALP SERPL-CCNC: 110 U/L (ref 45–117)
ALT SERPL-CCNC: 63 U/L (ref 10–50)
AMPHET UR QL SCN: NEGATIVE
ANION GAP SERPL CALC-SCNC: 15 MMOL/L (ref 3–18)
AST SERPL-CCNC: 136 U/L (ref 10–38)
BARBITURATES UR QL SCN: NEGATIVE
BASOPHILS # BLD: 0.07 K/UL (ref 0–0.1)
BASOPHILS NFR BLD: 1.1 % (ref 0–2)
BENZODIAZ UR QL: NEGATIVE
BILIRUB SERPL-MCNC: 0.8 MG/DL (ref 0.2–1)
BUN SERPL-MCNC: 28 MG/DL (ref 6–23)
BUN/CREAT SERPL: 18 (ref 12–20)
CALCIUM SERPL-MCNC: 9.1 MG/DL (ref 8.5–10.1)
CANNABINOIDS UR QL SCN: NEGATIVE
CHLORIDE SERPL-SCNC: 100 MMOL/L (ref 98–107)
CO2 SERPL-SCNC: 25 MMOL/L (ref 21–32)
COCAINE UR QL SCN: NEGATIVE
CREAT SERPL-MCNC: 1.51 MG/DL (ref 0.6–1.3)
DIFFERENTIAL METHOD BLD: ABNORMAL
EKG ATRIAL RATE: 89 BPM
EKG DIAGNOSIS: NORMAL
EKG P AXIS: 90 DEGREES
EKG P-R INTERVAL: 198 MS
EKG Q-T INTERVAL: 366 MS
EKG QRS DURATION: 94 MS
EKG QTC CALCULATION (BAZETT): 445 MS
EKG R AXIS: -3 DEGREES
EKG T AXIS: 264 DEGREES
EKG VENTRICULAR RATE: 89 BPM
EOSINOPHIL # BLD: 0.05 K/UL (ref 0–0.4)
EOSINOPHIL NFR BLD: 0.8 % (ref 0–5)
ERYTHROCYTE [DISTWIDTH] IN BLOOD BY AUTOMATED COUNT: 14.3 % (ref 11.6–14.5)
FENTANYL: NEGATIVE
GLOBULIN SER CALC-MCNC: 3.8 G/DL (ref 2–4)
GLUCOSE SERPL-MCNC: 134 MG/DL (ref 74–108)
HCT VFR BLD AUTO: 44.7 % (ref 36–48)
HGB BLD-MCNC: 15.6 G/DL (ref 13–16)
IMM GRANULOCYTES # BLD AUTO: 0.01 K/UL (ref 0–0.04)
IMM GRANULOCYTES NFR BLD AUTO: 0.2 % (ref 0–0.5)
LYMPHOCYTES # BLD: 2.36 K/UL (ref 0.9–3.6)
LYMPHOCYTES NFR BLD: 36 % (ref 21–52)
Lab: NORMAL
MCH RBC QN AUTO: 33 PG (ref 24–34)
MCHC RBC AUTO-ENTMCNC: 34.9 G/DL (ref 31–37)
MCV RBC AUTO: 94.5 FL (ref 78–100)
METHADONE UR QL: NEGATIVE
MONOCYTES # BLD: 0.37 K/UL (ref 0.05–1.2)
MONOCYTES NFR BLD: 5.6 % (ref 3–10)
NEUTS SEG # BLD: 3.7 K/UL (ref 1.8–8)
NEUTS SEG NFR BLD: 56.3 % (ref 40–73)
NRBC # BLD: 0 K/UL (ref 0–0.01)
NRBC BLD-RTO: 0 PER 100 WBC
NT PRO BNP: 1291 PG/ML (ref 36–1800)
OPIATES UR QL: NEGATIVE
OXYCODONE UR QL SCN: NEGATIVE
PLATELET # BLD AUTO: 213 K/UL (ref 135–420)
PMV BLD AUTO: 9 FL (ref 9.2–11.8)
POTASSIUM SERPL-SCNC: 4.3 MMOL/L (ref 3.5–5.5)
PROT SERPL-MCNC: 7.3 G/DL (ref 6.4–8.2)
RBC # BLD AUTO: 4.73 M/UL (ref 4.35–5.65)
SODIUM SERPL-SCNC: 140 MMOL/L (ref 136–145)
TROPONIN T SERPL HS-MCNC: 31.7 NG/L (ref 0–22)
TROPONIN T SERPL HS-MCNC: 37 NG/L (ref 0–22)
WBC # BLD AUTO: 6.6 K/UL (ref 4.6–13.2)

## 2025-06-19 PROCEDURE — 96376 TX/PRO/DX INJ SAME DRUG ADON: CPT

## 2025-06-19 PROCEDURE — 6360000002 HC RX W HCPCS: Performed by: NURSE PRACTITIONER

## 2025-06-19 PROCEDURE — 74174 CTA ABD&PLVS W/CONTRAST: CPT

## 2025-06-19 PROCEDURE — 93010 ELECTROCARDIOGRAM REPORT: CPT | Performed by: INTERNAL MEDICINE

## 2025-06-19 PROCEDURE — 80053 COMPREHEN METABOLIC PANEL: CPT

## 2025-06-19 PROCEDURE — 80307 DRUG TEST PRSMV CHEM ANLYZR: CPT

## 2025-06-19 PROCEDURE — 6360000004 HC RX CONTRAST MEDICATION: Performed by: PHYSICIAN ASSISTANT

## 2025-06-19 PROCEDURE — 71046 X-RAY EXAM CHEST 2 VIEWS: CPT

## 2025-06-19 PROCEDURE — 6360000002 HC RX W HCPCS: Performed by: PHYSICIAN ASSISTANT

## 2025-06-19 PROCEDURE — 99223 1ST HOSP IP/OBS HIGH 75: CPT | Performed by: INTERNAL MEDICINE

## 2025-06-19 PROCEDURE — 6360000002 HC RX W HCPCS: Performed by: STUDENT IN AN ORGANIZED HEALTH CARE EDUCATION/TRAINING PROGRAM

## 2025-06-19 PROCEDURE — 94761 N-INVAS EAR/PLS OXIMETRY MLT: CPT

## 2025-06-19 PROCEDURE — 99285 EMERGENCY DEPT VISIT HI MDM: CPT

## 2025-06-19 PROCEDURE — 2500000003 HC RX 250 WO HCPCS: Performed by: PHYSICIAN ASSISTANT

## 2025-06-19 PROCEDURE — 85014 HEMATOCRIT: CPT

## 2025-06-19 PROCEDURE — 36415 COLL VENOUS BLD VENIPUNCTURE: CPT

## 2025-06-19 PROCEDURE — 84484 ASSAY OF TROPONIN QUANT: CPT

## 2025-06-19 PROCEDURE — 2140000001 HC CVICU INTERMEDIATE R&B

## 2025-06-19 PROCEDURE — 85025 COMPLETE CBC W/AUTO DIFF WBC: CPT

## 2025-06-19 PROCEDURE — 93005 ELECTROCARDIOGRAM TRACING: CPT | Performed by: STUDENT IN AN ORGANIZED HEALTH CARE EDUCATION/TRAINING PROGRAM

## 2025-06-19 PROCEDURE — 2580000003 HC RX 258: Performed by: NURSE PRACTITIONER

## 2025-06-19 PROCEDURE — 96375 TX/PRO/DX INJ NEW DRUG ADDON: CPT

## 2025-06-19 PROCEDURE — 85018 HEMOGLOBIN: CPT

## 2025-06-19 PROCEDURE — 99223 1ST HOSP IP/OBS HIGH 75: CPT | Performed by: NURSE PRACTITIONER

## 2025-06-19 PROCEDURE — 2500000003 HC RX 250 WO HCPCS: Performed by: NURSE PRACTITIONER

## 2025-06-19 PROCEDURE — 96374 THER/PROPH/DIAG INJ IV PUSH: CPT

## 2025-06-19 PROCEDURE — 83880 ASSAY OF NATRIURETIC PEPTIDE: CPT

## 2025-06-19 RX ORDER — ONDANSETRON 2 MG/ML
4 INJECTION INTRAMUSCULAR; INTRAVENOUS EVERY 6 HOURS PRN
Status: DISCONTINUED | OUTPATIENT
Start: 2025-06-19 | End: 2025-06-23 | Stop reason: HOSPADM

## 2025-06-19 RX ORDER — LORAZEPAM 2 MG/ML
1 INJECTION INTRAMUSCULAR
Status: DISCONTINUED | OUTPATIENT
Start: 2025-06-19 | End: 2025-06-23 | Stop reason: HOSPADM

## 2025-06-19 RX ORDER — LORAZEPAM 1 MG/1
1 TABLET ORAL
Status: DISCONTINUED | OUTPATIENT
Start: 2025-06-19 | End: 2025-06-23 | Stop reason: HOSPADM

## 2025-06-19 RX ORDER — IOPAMIDOL 755 MG/ML
100 INJECTION, SOLUTION INTRAVASCULAR
Status: COMPLETED | OUTPATIENT
Start: 2025-06-19 | End: 2025-06-19

## 2025-06-19 RX ORDER — SODIUM CHLORIDE 9 MG/ML
INJECTION, SOLUTION INTRAVENOUS PRN
Status: DISCONTINUED | OUTPATIENT
Start: 2025-06-19 | End: 2025-06-23 | Stop reason: HOSPADM

## 2025-06-19 RX ORDER — ACETAMINOPHEN 325 MG/1
650 TABLET ORAL EVERY 6 HOURS PRN
Status: CANCELLED | OUTPATIENT
Start: 2025-06-19

## 2025-06-19 RX ORDER — SODIUM CHLORIDE 9 MG/ML
INJECTION, SOLUTION INTRAVENOUS CONTINUOUS
Status: DISCONTINUED | OUTPATIENT
Start: 2025-06-19 | End: 2025-06-23 | Stop reason: HOSPADM

## 2025-06-19 RX ORDER — SODIUM CHLORIDE 0.9 % (FLUSH) 0.9 %
5-40 SYRINGE (ML) INJECTION EVERY 12 HOURS SCHEDULED
Status: DISCONTINUED | OUTPATIENT
Start: 2025-06-19 | End: 2025-06-23 | Stop reason: HOSPADM

## 2025-06-19 RX ORDER — ONDANSETRON 2 MG/ML
4 INJECTION INTRAMUSCULAR; INTRAVENOUS
Status: COMPLETED | OUTPATIENT
Start: 2025-06-19 | End: 2025-06-19

## 2025-06-19 RX ORDER — ONDANSETRON 4 MG/1
4 TABLET, ORALLY DISINTEGRATING ORAL EVERY 8 HOURS PRN
Status: DISCONTINUED | OUTPATIENT
Start: 2025-06-19 | End: 2025-06-23 | Stop reason: HOSPADM

## 2025-06-19 RX ORDER — POLYETHYLENE GLYCOL 3350 17 G/17G
17 POWDER, FOR SOLUTION ORAL DAILY PRN
Status: DISCONTINUED | OUTPATIENT
Start: 2025-06-19 | End: 2025-06-23 | Stop reason: HOSPADM

## 2025-06-19 RX ORDER — METOCLOPRAMIDE HYDROCHLORIDE 5 MG/ML
10 INJECTION INTRAMUSCULAR; INTRAVENOUS
Status: COMPLETED | OUTPATIENT
Start: 2025-06-19 | End: 2025-06-19

## 2025-06-19 RX ORDER — LORAZEPAM 1 MG/1
3 TABLET ORAL
Status: DISCONTINUED | OUTPATIENT
Start: 2025-06-19 | End: 2025-06-23 | Stop reason: HOSPADM

## 2025-06-19 RX ORDER — HYDRALAZINE HYDROCHLORIDE 20 MG/ML
5 INJECTION INTRAMUSCULAR; INTRAVENOUS EVERY 8 HOURS
Status: DISCONTINUED | OUTPATIENT
Start: 2025-06-19 | End: 2025-06-19

## 2025-06-19 RX ORDER — LORAZEPAM 2 MG/ML
2 INJECTION INTRAMUSCULAR
Status: DISCONTINUED | OUTPATIENT
Start: 2025-06-19 | End: 2025-06-23 | Stop reason: HOSPADM

## 2025-06-19 RX ORDER — SODIUM CHLORIDE 0.9 % (FLUSH) 0.9 %
5-40 SYRINGE (ML) INJECTION PRN
Status: DISCONTINUED | OUTPATIENT
Start: 2025-06-19 | End: 2025-06-23 | Stop reason: HOSPADM

## 2025-06-19 RX ORDER — LORAZEPAM 2 MG/ML
3 INJECTION INTRAMUSCULAR
Status: DISCONTINUED | OUTPATIENT
Start: 2025-06-19 | End: 2025-06-23 | Stop reason: HOSPADM

## 2025-06-19 RX ORDER — ACETAMINOPHEN 650 MG/1
650 SUPPOSITORY RECTAL EVERY 6 HOURS PRN
Status: CANCELLED | OUTPATIENT
Start: 2025-06-19

## 2025-06-19 RX ORDER — LABETALOL HYDROCHLORIDE 5 MG/ML
10 INJECTION, SOLUTION INTRAVENOUS
Status: DISCONTINUED | OUTPATIENT
Start: 2025-06-19 | End: 2025-06-19

## 2025-06-19 RX ORDER — NITROGLYCERIN 20 MG/100ML
10 INJECTION INTRAVENOUS CONTINUOUS
Status: DISCONTINUED | OUTPATIENT
Start: 2025-06-19 | End: 2025-06-19

## 2025-06-19 RX ORDER — LORAZEPAM 1 MG/1
2 TABLET ORAL
Status: DISCONTINUED | OUTPATIENT
Start: 2025-06-19 | End: 2025-06-23 | Stop reason: HOSPADM

## 2025-06-19 RX ORDER — LORAZEPAM 2 MG/ML
4 INJECTION INTRAMUSCULAR
Status: DISCONTINUED | OUTPATIENT
Start: 2025-06-19 | End: 2025-06-23 | Stop reason: HOSPADM

## 2025-06-19 RX ORDER — LORAZEPAM 1 MG/1
4 TABLET ORAL
Status: DISCONTINUED | OUTPATIENT
Start: 2025-06-19 | End: 2025-06-23 | Stop reason: HOSPADM

## 2025-06-19 RX ORDER — THIAMINE HYDROCHLORIDE 100 MG/ML
100 INJECTION, SOLUTION INTRAMUSCULAR; INTRAVENOUS DAILY
Status: DISCONTINUED | OUTPATIENT
Start: 2025-06-19 | End: 2025-06-23 | Stop reason: HOSPADM

## 2025-06-19 RX ORDER — LABETALOL HYDROCHLORIDE 5 MG/ML
10 INJECTION, SOLUTION INTRAVENOUS
Status: COMPLETED | OUTPATIENT
Start: 2025-06-19 | End: 2025-06-19

## 2025-06-19 RX ADMIN — THIAMINE HYDROCHLORIDE 100 MG: 100 INJECTION, SOLUTION INTRAMUSCULAR; INTRAVENOUS at 21:28

## 2025-06-19 RX ADMIN — LABETALOL HYDROCHLORIDE 10 MG: 5 INJECTION, SOLUTION INTRAVENOUS at 09:12

## 2025-06-19 RX ADMIN — ONDANSETRON 4 MG: 2 INJECTION, SOLUTION INTRAMUSCULAR; INTRAVENOUS at 09:12

## 2025-06-19 RX ADMIN — METOCLOPRAMIDE 10 MG: 5 INJECTION, SOLUTION INTRAMUSCULAR; INTRAVENOUS at 12:09

## 2025-06-19 RX ADMIN — SODIUM CHLORIDE 40 MG: 9 INJECTION INTRAMUSCULAR; INTRAVENOUS; SUBCUTANEOUS at 12:09

## 2025-06-19 RX ADMIN — SODIUM CHLORIDE: 0.9 INJECTION, SOLUTION INTRAVENOUS at 16:38

## 2025-06-19 RX ADMIN — IOPAMIDOL 100 ML: 755 INJECTION, SOLUTION INTRAVENOUS at 09:34

## 2025-06-19 RX ADMIN — ONDANSETRON 4 MG: 2 INJECTION, SOLUTION INTRAMUSCULAR; INTRAVENOUS at 11:14

## 2025-06-19 RX ADMIN — NITROGLYCERIN 10 MCG/MIN: 20 INJECTION INTRAVENOUS at 11:17

## 2025-06-19 RX ADMIN — SODIUM CHLORIDE, PRESERVATIVE FREE 10 ML: 5 INJECTION INTRAVENOUS at 21:32

## 2025-06-19 RX ADMIN — HYDRALAZINE HYDROCHLORIDE 5 MG: 20 INJECTION INTRAMUSCULAR; INTRAVENOUS at 18:19

## 2025-06-19 ASSESSMENT — PAIN SCALES - GENERAL
PAINLEVEL_OUTOF10: 0
PAINLEVEL_OUTOF10: 5

## 2025-06-19 ASSESSMENT — PAIN DESCRIPTION - LOCATION: LOCATION: CHEST

## 2025-06-19 ASSESSMENT — PAIN DESCRIPTION - DESCRIPTORS: DESCRIPTORS: BURNING

## 2025-06-19 ASSESSMENT — PAIN - FUNCTIONAL ASSESSMENT: PAIN_FUNCTIONAL_ASSESSMENT: 0-10

## 2025-06-19 ASSESSMENT — PAIN DESCRIPTION - ORIENTATION: ORIENTATION: MID

## 2025-06-19 NOTE — ED TRIAGE NOTES
Pt complaining of L sided chest pain started this AM does not radiate. Pt has defibrillator. Denies SOB

## 2025-06-19 NOTE — CONSULTS
[facility] - Consult Note    Cardiology consultation request from ER MD for evaluation and management/treatment of chest pain    Date of  Admission: 6/19/2025  8:40 AM   Primary Care Physician:  Manolo Renee MD    Attending Cardiologist: Dr. Boo       Assessment:     - Coffee ground emesis concerning for esophagitis. GI consulted by primary team  - Atypical chest pain with minimally elevated troponin likely secondary to above.  EKG with wave inversions in inferior and anterolateral leads which have been seen on prior EKGs  - Ischemic Cardiomyopathy  Echo 2020 EF 20-25%   AICD with device check done in the ER.  Patient has had several nonsustained VT episodes.  No shocks or ATP.  Battery life around 3 years.  Patient not on GDMT due to noncompliance.  - CAD s/p CABG x5 in 2014. [facility] 2012 with occluded graft except LIMA to  LAD. Recommend aspirin, HI statin, and BB; however, patient is noncompliant with these medications.   - Hx of VF/VT   - pAF with no recurrence in several years. Not on AC.  - HTN  - Dyslipidemia.   - GERD  - COPD. On 4 L of O2 as needed at home   - Tobacco use hx. Quit smoking several years ago   - Medication noncompliance     Primary cardiologist: Dr. Rodriguez     Plan:   - Patient seen and examined independently.  Patient developed coffee-ground emesis as noted with plans for GI evaluation.  Patient has known severe CAD with a history of noncompliance followed by Dr. Rodriguez.  No significant cardiac biomarker rise.  Echo on order.  Agree with assessment and plan per ROSEY Kong MD    - Echo ordered will review once available.  - Patient has not been on any cardiac medications in the past due to refusal.  Would at the least recommend high intensity statin and metoprolol xl with CAD history once able to tolerate p.o. Would also recommend aspirin 81mg once safe from GI standpoint.  - Possible esophagitis workup per GI/primary team.     History of  Multilead left-sided AICD. No focal lung  consolidation, pleural effusion or pneumothorax.    Impression  Emphysematous changes. No acute findings.    Electronically signed by Nate Salgado      Signed By: VIKTORIA Jimenez - NP     June 19, 2025      12w

## 2025-06-19 NOTE — PROGRESS NOTES
1815 TRANSFER - IN REPORT:    Verbal report received from April RN on Adilson Cuello  being received from ED for routine progression of patient care      Report consisted of patient's Situation, Background, Assessment and   Recommendations(SBAR).     Information from the following report(s) Nurse Handoff Report was reviewed with the receiving nurse.    Opportunity for questions and clarification was provided.      1845 Assessment completed upon patient's arrival to unit and care assumed. Patient attached to telemetry, V/S taken, Patient advised on NPO and not to get out of bed by himself. Skin intact except for dry scab on both lower extremities.    1934 Bedside and Verbal shift change report given to Bethanie SANCHEZ (oncoming nurse). Report included the following information Nurse Handoff Report.

## 2025-06-19 NOTE — ED NOTES
Pt had x2 episodes of coffee ground emesis. FITO Lee notified, new orders received and meds given.

## 2025-06-19 NOTE — ED PROVIDER NOTES
0.9 % injection 5-40 mL  5-40 mL IntraVENous PRN Katie Delgado ACNP       • 0.9 % sodium chloride infusion   IntraVENous PRN Katie Delgado ACNP       • thiamine (B-1) injection 100 mg  100 mg IntraVENous Daily Katie Delgado ACNP       • LORazepam (ATIVAN) tablet 1 mg  1 mg Oral Q1H PRN Katie Delgado ACNP        Or   • LORazepam (ATIVAN) injection 1 mg  1 mg IntraVENous Q1H PRN Katie Delgado ACNP        Or   • LORazepam (ATIVAN) tablet 2 mg  2 mg Oral Q1H PRN Katie Delgado ACNP        Or   • LORazepam (ATIVAN) injection 2 mg  2 mg IntraVENous Q1H PRN Katie Delgado ACNP        Or   • LORazepam (ATIVAN) tablet 3 mg  3 mg Oral Q1H PRN Katie Delgado ACNP        Or   • LORazepam (ATIVAN) injection 3 mg  3 mg IntraVENous Q1H PRN Katie Delgado ACNP        Or   • LORazepam (ATIVAN) tablet 4 mg  4 mg Oral Q1H PRN Katie Delgado ACNP        Or   • LORazepam (ATIVAN) injection 4 mg  4 mg IntraVENous Q1H PRN Katie Delgado ACNP       • 0.9 % sodium chloride infusion   IntraVENous Continuous Katie Delgado ACNP         Current Outpatient Medications   Medication Sig Dispense Refill   • atropine 1 % ophthalmic solution Place 1 drop into both eyes 3 times daily     • latanoprost (XALATAN) 0.005 % ophthalmic solution Place 1 drop into both eyes nightly     • mirtazapine (REMERON) 15 MG tablet Take 1 tablet by mouth nightly     • OXYGEN Inhale 3 L into the lungs daily     • acetaminophen (TYLENOL) 500 MG tablet Take 1 tablet by mouth every 6 hours as needed     • albuterol sulfate HFA (PROVENTIL;VENTOLIN;PROAIR) 108 (90 Base) MCG/ACT inhaler Inhale 1 puff into the lungs every 6 hours as needed     • albuterol (PROVENTIL) (2.5 MG/3ML) 0.083% nebulizer solution Take 3 mLs by nebulization every 4 hours as needed     • nitroGLYCERIN (NITROSTAT) 0.4 MG SL tablet Place 1 tablet under the tongue every 5 minutes as needed         Past History     Past Medical  %    Basophils % 1.1 0.0 - 2.0 %    Immature Granulocytes % 0.2 0.0 - 0.5 %    Neutrophils Absolute 3.70 1.80 - 8.00 K/UL    Lymphocytes Absolute 2.36 0.90 - 3.60 K/UL    Monocytes Absolute 0.37 0.05 - 1.20 K/UL    Eosinophils Absolute 0.05 0.00 - 0.40 K/UL    Basophils Absolute 0.07 0.00 - 0.10 K/UL    Immature Granulocytes Absolute 0.01 0.00 - 0.04 K/UL    Differential Type AUTOMATED     Troponin    Collection Time: 06/19/25  8:40 AM   Result Value Ref Range    Troponin T 37.0 (H) 0 - 22 ng/L   Comprehensive Metabolic Panel    Collection Time: 06/19/25  8:40 AM   Result Value Ref Range    Sodium 140 136 - 145 mmol/L    Potassium 4.3 3.5 - 5.5 mmol/L    Chloride 100 98 - 107 mmol/L    CO2 25 21 - 32 mmol/L    Anion Gap 15 3.0 - 18.0 mmol/L    Glucose 134 (H) 74 - 108 mg/dL    BUN 28 (H) 6 - 23 MG/DL    Creatinine 1.51 (H) 0.6 - 1.3 MG/DL    BUN/Creatinine Ratio 18 12 - 20      Est, Glom Filt Rate 46 (L) >60 ml/min/1.73m2    Calcium 9.1 8.5 - 10.1 MG/DL    Total Bilirubin 0.8 0.2 - 1.0 MG/DL    ALT 63 (H) 10 - 50 U/L     (H) 10 - 38 U/L    Alk Phosphatase 110 45 - 117 U/L    Total Protein 7.3 6.4 - 8.2 g/dL    Albumin 3.6 3.4 - 5.0 g/dL    Globulin 3.8 2.0 - 4.0 g/dL    Albumin/Globulin Ratio 1.0 0.8 - 1.7     Brain Natriuretic Peptide    Collection Time: 06/19/25  8:40 AM   Result Value Ref Range    NT Pro-BNP 1,291 36 - 1,800 PG/ML   Troponin    Collection Time: 06/19/25  9:45 AM   Result Value Ref Range    Troponin T 31.7 (H) 0 - 22 ng/L      Labs Reviewed   CBC WITH AUTO DIFFERENTIAL - Abnormal; Notable for the following components:       Result Value    MPV 9.0 (*)     All other components within normal limits   TROPONIN - Abnormal; Notable for the following components:    Troponin T 37.0 (*)     All other components within normal limits   COMPREHENSIVE METABOLIC PANEL - Abnormal; Notable for the following components:    Glucose 134 (*)     BUN 28 (*)     Creatinine 1.51 (*)     Est, Glom Filt Rate 46

## 2025-06-19 NOTE — H&P
Hospitalist Admission History and Physical    NAME:  Adilson Cuello   :   1940   MRN:   [id number]     PCP:  Manolo Renee MD  Date/Time:  2025 3:26 PM    Subjective:   CHIEF COMPLAINT: Chest pain    HISTORY OF PRESENT ILLNESS:     Adilson is a 84 y.o.   male with PMH of hypertension, CHF with last known EF of 20%, CAD status post CABG x 5, medical noncompliance alcohol abuse, history of tobacco abuse who presents with midsternal chest pain that patient states feels like \"heartburn\".  He states symptoms started approximately 6 PM last night while he was laying down getting ready for bed has been happening on and off since yesterday and he ultimately told his sister (with whom patient lives) this morning and subsequently presented to ER for further evaluation.  Denies any nausea/vomiting until this morning.  Nursing states patient had chronic coffee-ground emesis x 2, patient relates he believes vomit is colored this way because he had barbecue chicken last night.  He also indicates he has been having chronic dark stools but this is nothing new for him.  He is without other complaints at this time including no fevers chills, no shortness of breath.  He indicates he has not taken any of his oral medications for the past approximate 1 year, has only taken eyedrops during this time.  Patient states he was just on all these medicines for such a long time that he did not want to take them any longer.    Patient Active Problem List   Diagnosis    Paresthesia and pain of both upper extremities    Ischemic cardiomyopathy    Tobacco use    Hypertensive heart disease with CHF (HCC)    Alcohol abuse    CHF exacerbation (HCC)    Wide-complex tachycardia    Presence of automatic (implantable) cardiac defibrillator    Binocular visual disturbance    Atherosclerosis of native coronary artery of native heart without angina pectoris    Right lower lobe pneumonia    UTI (urinary tract infection)

## 2025-06-20 ENCOUNTER — APPOINTMENT (OUTPATIENT)
Facility: HOSPITAL | Age: 85
DRG: 391 | End: 2025-06-20
Payer: MEDICARE

## 2025-06-20 PROBLEM — E43 SEVERE PROTEIN-CALORIE MALNUTRITION: Status: ACTIVE | Noted: 2025-06-20

## 2025-06-20 LAB
ANION GAP SERPL CALC-SCNC: 13 MMOL/L (ref 3–18)
BASOPHILS # BLD: 0.03 K/UL (ref 0–0.1)
BASOPHILS NFR BLD: 0.3 % (ref 0–2)
BUN SERPL-MCNC: 23 MG/DL (ref 6–23)
BUN/CREAT SERPL: 16 (ref 12–20)
CALCIUM SERPL-MCNC: 8.5 MG/DL (ref 8.5–10.1)
CHLORIDE SERPL-SCNC: 105 MMOL/L (ref 98–107)
CO2 SERPL-SCNC: 23 MMOL/L (ref 21–32)
CREAT SERPL-MCNC: 1.44 MG/DL (ref 0.6–1.3)
DIFFERENTIAL METHOD BLD: ABNORMAL
ECHO AO ASC DIAM: 3 CM
ECHO AO ASCENDING AORTA INDEX: 2.03 CM/M2
ECHO AO ROOT DIAM: 3 CM
ECHO AO ROOT INDEX: 2.03 CM/M2
ECHO AV AREA PEAK VELOCITY: 2.8 CM2
ECHO AV AREA VTI: 3.3 CM2
ECHO AV AREA/BSA PEAK VELOCITY: 1.9 CM2/M2
ECHO AV AREA/BSA VTI: 2.2 CM2/M2
ECHO AV MEAN GRADIENT: 3 MMHG
ECHO AV MEAN VELOCITY: 0.9 M/S
ECHO AV PEAK GRADIENT: 6 MMHG
ECHO AV PEAK VELOCITY: 1.3 M/S
ECHO AV VELOCITY RATIO: 0.85
ECHO AV VTI: 22.8 CM
ECHO BSA: 1.45 M2
ECHO EST RA PRESSURE: 3 MMHG
ECHO LA VOL A-L A2C: 43 ML (ref 18–58)
ECHO LA VOL A-L A4C: 40 ML (ref 18–58)
ECHO LA VOL BP: 38 ML (ref 18–58)
ECHO LA VOL MOD A2C: 38 ML (ref 18–58)
ECHO LA VOL MOD A4C: 36 ML (ref 18–58)
ECHO LA VOL/BSA BIPLANE: 26 ML/M2 (ref 16–34)
ECHO LA VOLUME AREA LENGTH: 43 ML
ECHO LA VOLUME INDEX A-L A2C: 29 ML/M2 (ref 16–34)
ECHO LA VOLUME INDEX A-L A4C: 27 ML/M2 (ref 16–34)
ECHO LA VOLUME INDEX AREA LENGTH: 29 ML/M2 (ref 16–34)
ECHO LA VOLUME INDEX MOD A2C: 26 ML/M2 (ref 16–34)
ECHO LA VOLUME INDEX MOD A4C: 24 ML/M2 (ref 16–34)
ECHO LV E' LATERAL VELOCITY: 9.09 CM/S
ECHO LV E' SEPTAL VELOCITY: 3.31 CM/S
ECHO LV EDV A2C: 93 ML
ECHO LV EDV A4C: 97 ML
ECHO LV EDV BP: 95 ML (ref 67–155)
ECHO LV EDV INDEX A4C: 66 ML/M2
ECHO LV EDV INDEX BP: 64 ML/M2
ECHO LV EDV NDEX A2C: 63 ML/M2
ECHO LV EF PHYSICIAN: 40 %
ECHO LV EJECTION FRACTION A2C: 33 %
ECHO LV EJECTION FRACTION A4C: 43 %
ECHO LV EJECTION FRACTION BIPLANE: 37 % (ref 55–100)
ECHO LV ESV A2C: 63 ML
ECHO LV ESV A4C: 56 ML
ECHO LV ESV BP: 60 ML (ref 22–58)
ECHO LV ESV INDEX A2C: 43 ML/M2
ECHO LV ESV INDEX A4C: 38 ML/M2
ECHO LV ESV INDEX BP: 41 ML/M2
ECHO LV FRACTIONAL SHORTENING: 20 % (ref 28–44)
ECHO LV INTERNAL DIMENSION DIASTOLE INDEX: 2.77 CM/M2
ECHO LV INTERNAL DIMENSION DIASTOLIC: 4.1 CM (ref 4.2–5.9)
ECHO LV INTERNAL DIMENSION SYSTOLIC INDEX: 2.23 CM/M2
ECHO LV INTERNAL DIMENSION SYSTOLIC: 3.3 CM
ECHO LV IVSD: 0.9 CM (ref 0.6–1)
ECHO LV MASS 2D: 114.1 G (ref 88–224)
ECHO LV MASS INDEX 2D: 77.1 G/M2 (ref 49–115)
ECHO LV POSTERIOR WALL DIASTOLIC: 0.9 CM (ref 0.6–1)
ECHO LV RELATIVE WALL THICKNESS RATIO: 0.44
ECHO LVOT AREA: 3.1 CM2
ECHO LVOT AV VTI INDEX: 1.09
ECHO LVOT DIAM: 2 CM
ECHO LVOT MEAN GRADIENT: 3 MMHG
ECHO LVOT PEAK GRADIENT: 5 MMHG
ECHO LVOT PEAK VELOCITY: 1.1 M/S
ECHO LVOT STROKE VOLUME INDEX: 52.6 ML/M2
ECHO LVOT SV: 77.9 ML
ECHO LVOT VTI: 24.8 CM
ECHO MV A VELOCITY: 1.32 M/S
ECHO MV AREA VTI: 3 CM2
ECHO MV E DECELERATION TIME (DT): 143.8 MS
ECHO MV E VELOCITY: 0.97 M/S
ECHO MV E/A RATIO: 0.73
ECHO MV E/E' LATERAL: 10.67
ECHO MV E/E' RATIO (AVERAGED): 19.99
ECHO MV E/E' SEPTAL: 29.31
ECHO MV LVOT VTI INDEX: 1.05
ECHO MV MAX VELOCITY: 1.1 M/S
ECHO MV MEAN GRADIENT: 2 MMHG
ECHO MV MEAN VELOCITY: 0.7 M/S
ECHO MV PEAK GRADIENT: 5 MMHG
ECHO MV VTI: 26 CM
ECHO PV MAX VELOCITY: 1.1 M/S
ECHO PV PEAK GRADIENT: 5 MMHG
ECHO RA AREA 4C: 10.9 CM2
ECHO RA END SYSTOLIC VOLUME APICAL 4 CHAMBER INDEX BSA: 16 ML/M2
ECHO RA VOLUME AREA LENGTH APICAL 4 CHAMBER: 24 ML
ECHO RA VOLUME: 23 ML
ECHO RIGHT VENTRICULAR SYSTOLIC PRESSURE (RVSP): 32 MMHG
ECHO RV BASAL DIMENSION: 3.3 CM
ECHO RV TAPSE: 1.5 CM (ref 1.7–?)
ECHO TV REGURGITANT MAX VELOCITY: 2.71 M/S
ECHO TV REGURGITANT PEAK GRADIENT: 29 MMHG
EOSINOPHIL # BLD: 0.01 K/UL (ref 0–0.4)
EOSINOPHIL NFR BLD: 0.1 % (ref 0–5)
ERYTHROCYTE [DISTWIDTH] IN BLOOD BY AUTOMATED COUNT: 14.2 % (ref 11.6–14.5)
GLUCOSE SERPL-MCNC: 97 MG/DL (ref 74–108)
HCT VFR BLD AUTO: 34.4 % (ref 36–48)
HCT VFR BLD AUTO: 36.8 % (ref 36–48)
HCT VFR BLD AUTO: 38.5 % (ref 36–48)
HCT VFR BLD AUTO: 38.8 % (ref 36–48)
HGB BLD-MCNC: 11.9 G/DL (ref 13–16)
HGB BLD-MCNC: 12.7 G/DL (ref 13–16)
HGB BLD-MCNC: 13.1 G/DL (ref 13–16)
HGB BLD-MCNC: 13.4 G/DL (ref 13–16)
IMM GRANULOCYTES # BLD AUTO: 0.04 K/UL (ref 0–0.04)
IMM GRANULOCYTES NFR BLD AUTO: 0.4 % (ref 0–0.5)
LV EF: 40 ML
LYMPHOCYTES # BLD: 2.05 K/UL (ref 0.9–3.6)
LYMPHOCYTES NFR BLD: 20.2 % (ref 21–52)
MCH RBC QN AUTO: 32.5 PG (ref 24–34)
MCHC RBC AUTO-ENTMCNC: 34.5 G/DL (ref 31–37)
MCV RBC AUTO: 94.2 FL (ref 78–100)
MONOCYTES # BLD: 0.84 K/UL (ref 0.05–1.2)
MONOCYTES NFR BLD: 8.3 % (ref 3–10)
NEUTS SEG # BLD: 7.18 K/UL (ref 1.8–8)
NEUTS SEG NFR BLD: 70.7 % (ref 40–73)
NRBC # BLD: 0 K/UL (ref 0–0.01)
NRBC BLD-RTO: 0 PER 100 WBC
PLATELET # BLD AUTO: 173 K/UL (ref 135–420)
PMV BLD AUTO: 9.7 FL (ref 9.2–11.8)
POTASSIUM SERPL-SCNC: 4 MMOL/L (ref 3.5–5.5)
RBC # BLD AUTO: 4.12 M/UL (ref 4.35–5.65)
SODIUM SERPL-SCNC: 140 MMOL/L (ref 136–145)
WBC # BLD AUTO: 10.2 K/UL (ref 4.6–13.2)

## 2025-06-20 PROCEDURE — 99233 SBSQ HOSP IP/OBS HIGH 50: CPT | Performed by: STUDENT IN AN ORGANIZED HEALTH CARE EDUCATION/TRAINING PROGRAM

## 2025-06-20 PROCEDURE — 6360000004 HC RX CONTRAST MEDICATION: Performed by: NURSE PRACTITIONER

## 2025-06-20 PROCEDURE — 6360000002 HC RX W HCPCS: Performed by: NURSE PRACTITIONER

## 2025-06-20 PROCEDURE — 74230 X-RAY XM SWLNG FUNCJ C+: CPT

## 2025-06-20 PROCEDURE — 92611 MOTION FLUOROSCOPY/SWALLOW: CPT

## 2025-06-20 PROCEDURE — 97166 OT EVAL MOD COMPLEX 45 MIN: CPT

## 2025-06-20 PROCEDURE — 85014 HEMATOCRIT: CPT

## 2025-06-20 PROCEDURE — 2500000003 HC RX 250 WO HCPCS: Performed by: NURSE PRACTITIONER

## 2025-06-20 PROCEDURE — 85025 COMPLETE CBC W/AUTO DIFF WBC: CPT

## 2025-06-20 PROCEDURE — 2140000001 HC CVICU INTERMEDIATE R&B

## 2025-06-20 PROCEDURE — C8929 TTE W OR WO FOL WCON,DOPPLER: HCPCS

## 2025-06-20 PROCEDURE — 2500000003 HC RX 250 WO HCPCS: Performed by: PHYSICIAN ASSISTANT

## 2025-06-20 PROCEDURE — 36415 COLL VENOUS BLD VENIPUNCTURE: CPT

## 2025-06-20 PROCEDURE — 97535 SELF CARE MNGMENT TRAINING: CPT

## 2025-06-20 PROCEDURE — 97116 GAIT TRAINING THERAPY: CPT

## 2025-06-20 PROCEDURE — 97530 THERAPEUTIC ACTIVITIES: CPT

## 2025-06-20 PROCEDURE — 85018 HEMOGLOBIN: CPT

## 2025-06-20 PROCEDURE — 93306 TTE W/DOPPLER COMPLETE: CPT | Performed by: INTERNAL MEDICINE

## 2025-06-20 PROCEDURE — 99232 SBSQ HOSP IP/OBS MODERATE 35: CPT | Performed by: INTERNAL MEDICINE

## 2025-06-20 PROCEDURE — 92526 ORAL FUNCTION THERAPY: CPT

## 2025-06-20 PROCEDURE — 6360000002 HC RX W HCPCS: Performed by: FAMILY MEDICINE

## 2025-06-20 PROCEDURE — 2580000003 HC RX 258: Performed by: NURSE PRACTITIONER

## 2025-06-20 PROCEDURE — 97162 PT EVAL MOD COMPLEX 30 MIN: CPT

## 2025-06-20 PROCEDURE — 80048 BASIC METABOLIC PNL TOTAL CA: CPT

## 2025-06-20 RX ORDER — LATANOPROST 50 UG/ML
1 SOLUTION/ DROPS OPHTHALMIC NIGHTLY
Status: DISCONTINUED | OUTPATIENT
Start: 2025-06-20 | End: 2025-06-23 | Stop reason: HOSPADM

## 2025-06-20 RX ORDER — METOPROLOL SUCCINATE 25 MG/1
25 TABLET, EXTENDED RELEASE ORAL DAILY
Status: DISCONTINUED | OUTPATIENT
Start: 2025-06-20 | End: 2025-06-23 | Stop reason: HOSPADM

## 2025-06-20 RX ORDER — HALOPERIDOL DECANOATE 50 MG/ML
5 INJECTION INTRAMUSCULAR EVERY 12 HOURS PRN
Status: DISCONTINUED | OUTPATIENT
Start: 2025-06-20 | End: 2025-06-20 | Stop reason: ALTCHOICE

## 2025-06-20 RX ORDER — HYDRALAZINE HYDROCHLORIDE 20 MG/ML
10 INJECTION INTRAMUSCULAR; INTRAVENOUS EVERY 4 HOURS PRN
Status: DISCONTINUED | OUTPATIENT
Start: 2025-06-20 | End: 2025-06-23 | Stop reason: HOSPADM

## 2025-06-20 RX ORDER — ATROPINE SULFATE 10 MG/ML
1 SOLUTION/ DROPS OPHTHALMIC 3 TIMES DAILY
Status: DISCONTINUED | OUTPATIENT
Start: 2025-06-20 | End: 2025-06-20

## 2025-06-20 RX ORDER — HALOPERIDOL 5 MG/ML
5 INJECTION INTRAMUSCULAR EVERY 12 HOURS PRN
Status: DISCONTINUED | OUTPATIENT
Start: 2025-06-20 | End: 2025-06-23 | Stop reason: HOSPADM

## 2025-06-20 RX ORDER — NEPHROCAP 1 MG
1 CAP ORAL DAILY
Status: DISCONTINUED | OUTPATIENT
Start: 2025-06-20 | End: 2025-06-23 | Stop reason: HOSPADM

## 2025-06-20 RX ADMIN — THIAMINE HYDROCHLORIDE 100 MG: 100 INJECTION, SOLUTION INTRAMUSCULAR; INTRAVENOUS at 10:58

## 2025-06-20 RX ADMIN — SULFUR HEXAFLUORIDE 2 ML: KIT at 09:57

## 2025-06-20 RX ADMIN — SODIUM CHLORIDE: 0.9 INJECTION, SOLUTION INTRAVENOUS at 23:50

## 2025-06-20 RX ADMIN — LORAZEPAM 2 MG: 2 INJECTION INTRAMUSCULAR; INTRAVENOUS at 21:43

## 2025-06-20 RX ADMIN — LORAZEPAM 4 MG: 2 INJECTION INTRAMUSCULAR; INTRAVENOUS at 17:29

## 2025-06-20 RX ADMIN — SODIUM CHLORIDE, PRESERVATIVE FREE 10 ML: 5 INJECTION INTRAVENOUS at 08:00

## 2025-06-20 RX ADMIN — SODIUM CHLORIDE: 0.9 INJECTION, SOLUTION INTRAVENOUS at 05:21

## 2025-06-20 RX ADMIN — BARIUM SULFATE 15 ML: 400 PASTE ORAL at 12:39

## 2025-06-20 RX ADMIN — LORAZEPAM 2 MG: 2 INJECTION INTRAMUSCULAR; INTRAVENOUS at 16:29

## 2025-06-20 RX ADMIN — BARIUM SULFATE 1 TABLET: 700 TABLET ORAL at 12:39

## 2025-06-20 RX ADMIN — HYDRALAZINE HYDROCHLORIDE 10 MG: 20 INJECTION INTRAMUSCULAR; INTRAVENOUS at 05:17

## 2025-06-20 RX ADMIN — SODIUM CHLORIDE, PRESERVATIVE FREE 40 MG: 5 INJECTION INTRAVENOUS at 13:13

## 2025-06-20 RX ADMIN — BARIUM SULFATE 60 ML: 960 POWDER, FOR SUSPENSION ORAL at 12:39

## 2025-06-20 RX ADMIN — SODIUM CHLORIDE, PRESERVATIVE FREE 40 MG: 5 INJECTION INTRAVENOUS at 01:41

## 2025-06-20 RX ADMIN — LORAZEPAM 4 MG: 2 INJECTION INTRAMUSCULAR; INTRAVENOUS at 18:29

## 2025-06-20 ASSESSMENT — PAIN SCALES - GENERAL
PAINLEVEL_OUTOF10: 0

## 2025-06-20 NOTE — NURSE NAVIGATOR
Met with patient provided education on Living with Heart Failure, reviewed heart failure zones, reinforced low sodium diet and fluid restrictions. Provided time for questions. Provided scale and BP cuff. Will continue to follow

## 2025-06-20 NOTE — PLAN OF CARE
Problem: Physical Therapy - Adult  Goal: By Discharge: Performs mobility at highest level of function for planned discharge setting.  See evaluation for individualized goals.  Description: Physical Therapy Goals:  Initiated 6/20/2025 to be met within 7-10 days.    1.  Patient will move from supine to sit and sit to supine , scoot up and down, and roll side to side in bed with modified independence in order to safely get into/out of bed.  2.  Patient will transfer from bed to chair and chair to bed with supervision/set-up using RW in order to safely partake in OOB mobility.   3.  Patient will perform sit to stand with supervision/set-up in order to safely partake in OOB mobility.  4.  Patient will ambulate with supervision/set-up for 50 feet using RW in order to safely navigate household distances.       PLOF: Lives with cousin in 1 level house with threshold entry. Independent at baseline using SPC.      Outcome: Progressing     PHYSICAL THERAPY EVALUATION    Patient: Adilson Cuello (84 y.o. male)  Date: 6/20/2025  Primary Diagnosis: Chest pain [R07.9]  Esophagitis [K20.90]  Chronic systolic congestive heart failure (HCC) [I50.22]  Hypertensive emergency [I16.1]  Chest pain, unspecified type [R07.9]       Precautions: General Precautions, Fall Risk, Seizure,  ,  ,  ,  ,  ,  ,      ASSESSMENT :  Patient seen for PT evaluation and treatment. Received supine; A&O x4. Presenting with deficits as listed below, impacting patient's safety and independence mobilizing. Most notably impacted by tremulous movement quality and generalized weakness. Able to complete bed mobility with supervision, transfers with CGA, and gait using handheld assist with CGA. Demonstrates narrow base of support with reduced step length and clearance. Cues provided as listed below, with fair carryover noted. Education provided on benefits of RW use to reduce risk for falls and normalize gait mechanics. Patient to benefit from acute PT services

## 2025-06-20 NOTE — PLAN OF CARE
Problem: Chronic Conditions and Co-morbidities  Goal: Patient's chronic conditions and co-morbidity symptoms are monitored and maintained or improved  6/20/2025 0937 by Dwayne Ardon RN  Outcome: Progressing  6/19/2025 2230 by Bethanie Terry RN  Outcome: Progressing  Flowsheets  Taken 6/19/2025 2230  Care Plan - Patient's Chronic Conditions and Co-Morbidity Symptoms are Monitored and Maintained or Improved:   Monitor and assess patient's chronic conditions and comorbid symptoms for stability, deterioration, or improvement   Collaborate with multidisciplinary team to address chronic and comorbid conditions and prevent exacerbation or deterioration   Update acute care plan with appropriate goals if chronic or comorbid symptoms are exacerbated and prevent overall improvement and discharge  Taken 6/19/2025 2000  Care Plan - Patient's Chronic Conditions and Co-Morbidity Symptoms are Monitored and Maintained or Improved:   Monitor and assess patient's chronic conditions and comorbid symptoms for stability, deterioration, or improvement   Collaborate with multidisciplinary team to address chronic and comorbid conditions and prevent exacerbation or deterioration   Update acute care plan with appropriate goals if chronic or comorbid symptoms are exacerbated and prevent overall improvement and discharge     Problem: Discharge Planning  Goal: Discharge to home or other facility with appropriate resources  6/20/2025 0937 by Dwayne Ardon RN  Outcome: Progressing  6/19/2025 2230 by Bethanie Terry RN  Outcome: Progressing  Flowsheets  Taken 6/19/2025 2230  Discharge to home or other facility with appropriate resources: Identify barriers to discharge with patient and caregiver  Taken 6/19/2025 2000  Discharge to home or other facility with appropriate resources: Identify barriers to discharge with patient and caregiver     Problem: Seizure Precautions  Goal: Remains free of injury related to seizures

## 2025-06-20 NOTE — PROGRESS NOTES
Comprehensive Nutrition Assessment    Type and Reason for Visit:  Initial, Positive nutrition screen    Nutrition Recommendations/Plan:   Continue NPO as tolerated, advance as tolerated when medically feasible.  Order Ensure Plus High Protein (each provides 350 kcal, 20g protein) TID once diet advances  Recommend/order virt-caps supplementation daily, Daily wts.  Continue to monitor readiness for diet advancement or nutrition support, weight, labs, and plan of care during admission.     Malnutrition Assessment:  Malnutrition Status:  Severe malnutrition (06/20/25 1132)    Context:  Chronic Illness     Findings of the 6 clinical characteristics of malnutrition:  Energy Intake:  75% or less estimated energy requirements for 1 month or longer  Weight Loss:  No weight loss     Body Fat Loss:  Severe body fat loss Buccal region, Orbital, Fat Overlying Ribs   Muscle Mass Loss:  Severe muscle mass loss Temples (temporalis), Clavicles (pectoralis & deltoids), Scapula (trapezius)  Fluid Accumulation:  No fluid accumulation     Strength:  Not Performed    Nutrition History and Allergies:      Past Medical History:   Diagnosis Date    AICD (automatic cardioverter/defibrillator) present 10/13/2019    [place] dual-chamber MRI compatible    Alcohol abuse     quit February 2011    Binocular visual disturbance 10/31/2013    CAD (coronary artery disease)     CAD (coronary artery disease), native coronary artery 1/3/05    LAD 70-80% mid; Cx-diffuse 80%; OM1-70% prox; RCA 40-50% mid; 100% RPLB with collaterals    Chronic kidney disease     Chronic lung disease     Chronic obstructive pulmonary disease (HCC)     Coronary artery disease     Diverticula of colon 12/17/2013    Emphysematous COPD ([facility])     PFTs March 2009; mild-mode obstruction, + bronchodil response; decreased DLCO    GERD (gastroesophageal reflux disease)     with erosive esophagitis history    Heart failure ([facility])     History of myocardial perfusion scan 06/27/2011

## 2025-06-20 NOTE — PLAN OF CARE
Problem: Occupational Therapy - Adult  Goal: By Discharge: Performs self-care activities at highest level of function for planned discharge setting.  See evaluation for individualized goals.  Description: Occupational Therapy Goals:  Initiated 6/20/2025 to be met within 7-10 days.    1.  Patient will perform grooming with modified independence while standing at the sink for > 5 min with Good balance.   2.  Patient will perform bathing with modified independence.  3.  Patient will perform lower body dressing with modified independence for seated and standing aspects.  4.  Patient will perform toilet transfers with modified independence.  5.  Patient will perform all aspects of toileting with modified independence.  6.  Patient will participate in upper extremity therapeutic exercise/activities with supervision/set-up for 8 minutes to improve endurance and UB strength needed for ADLs    7.  Patient will utilize energy conservation techniques during functional activities with verbal cues.    PLOF: Pt lives with cousin, reports using cane for functional mobility, independent with ADLs.  Outcome: Progressing  OCCUPATIONAL THERAPY EVALUATION    Patient: Adilson Cuello (84 y.o. male)  Date: 6/20/2025  Primary Diagnosis: Chest pain [R07.9]  Esophagitis [K20.90]  Chronic systolic congestive heart failure (HCC) [I50.22]  Hypertensive emergency [I16.1]  Chest pain, unspecified type [R07.9]       Precautions: Fall Risk, Seizure    ASSESSMENT :  Pt presents in recliner, pulling off SCDs, pulled out IV, very restless, attempting to get up. Pt was able to be redirected to safety with grounding techniques, however, due to impulsivity pt continued to require frequent VCs for safety awareness during all tasks. SBA to change socks and CGA to don underwear in prep for standing ADLs. CGA for functional mobility to the sink for mult grooming tasks with Fair balance. Pt was able to intermittently std unsupported, although benefited

## 2025-06-20 NOTE — PROGRESS NOTES
[facility]    No vascular surgery f/u needed at this time. Will sign off.    MD [facility]  Vascular Surgeon

## 2025-06-20 NOTE — PROGRESS NOTES
[facility] - Progress Note    Admit Date: 6/19/2025  Attending Cardiologist: Dr. Boo     Assessment:     - Coffee ground emesis concerning for esophagitis. GI consulted by primary team  - Atypical chest pain with minimally elevated troponin likely secondary to above.  EKG with wave inversions in inferior and anterolateral leads which have been seen on prior EKGs  - Ischemic Cardiomyopathy:  Echo 6/2025 EF 35-40%, hypokinesis in basal inferoseptal segment, grade I DD. RV with reduced systolic function. EF increased to 20-25% from Echo in 2020  AICD with device check done in the ER 6/19/25.  Patient has had several nonsustained VT episodes.  No shocks or ATP.  Battery life around 3 years.  Patient not on GDMT due to noncompliance.  - CAD s/p CABG x5 in 2014. [facility] 2012 with occluded graft except LIMA to  LAD. Recommend aspirin, HI statin, and BB; however, patient is noncompliant with these medications.   - Hx of VF/VT   - pAF with no recurrence in several years. Not on AC.  - HTN  - Dyslipidemia.   - GERD  - COPD. On 4 L of O2 as needed at home   - Tobacco use hx. Quit smoking several years ago   - ETOH abuse  - Medication noncompliance      Primary cardiologist: Dr. Rodriguez       Plan:   - Patient seen and examined independently.  Some improvement in LV systolic function was noted on echocardiogram yesterday.  GI evaluation underway.  Will have patient follow-up with Dr. Rodriguez.  Agree with assessment and plan per ROSEY Kong MD     - Echo done this morning which shows an EF of 35 to 40% which is improved from EF of 20 to 25% in 2020.   - Patient has not been on any cardiac medications in the past due to refusal.  Would at the least recommend high intensity statin and metoprolol xl with CAD history once able to tolerate p.o. Would also recommend aspirin 81mg once safe from GI standpoint.  - Esophagitis workup per GI/primary team.  - No further cardiac testing planned.  Will

## 2025-06-20 NOTE — PROGRESS NOTES
[facility] Hospitalist Group  Progress Note    Patient: Adilson Cuello Age: 84 y.o. : 1940 MR#: [id number]: [id number]  Date/Time: 2025     Chief Complaint   Patient presents with    Chest Pain       Subjective:   Patient seen and examined. No acute events overnight. In good spirits, no new complaints. Denies chest pain, abdominal pain, shortness of breath.  No further hematemesis, melena or hematochezia.  Biggest complaint is that he is hungry.  Care plan was discussed, GI evaluation discussed.  No additional complaints or concerns  Assessment/Plan:   Coffee-ground emesis likely esophagitis  N.p.o. for now pending GI evaluation.  Continue with gentle IV fluids given HFrEF history, twice daily IV PPI for now.    Continue monitor hemoglobin, transfuse to keep hemoglobin greater than 7.  SCDs avoid chemical prophylaxis    Alcohol abuse  Last drink .  Continue with CIWA.  Multivitamin, thiamine    Atypical chest pain minimally elevated troponin  -Cardiology input appreciated, follow-up TTE.  Do not dissipate further intervention.  Suspect likely demand ischemia, EKG reviewed, asymptomatic.    CAD status post CABG x 5 in   -Unable to tolerate p.o., resume GDMT.  Aspirin, statin metoprolol when able    Hypertension  -Monitor and adjust accordingly.    Medical noncompliance  History of ischemic cardiomyopathy  -He last echo with EF  in  repeat pending.  Was not on GDMT secondary to noncompliance prior to admission    Chronic COPD without acute exacerbation  -History of 4 L nasal cannula as needed, will continue to monitor oxygen needs    Left internal iliac artery occlusion  Evaluated by vascular surgery, no current need for intervention they have signed off.  Appreciate evaluation and assistance in care.         Dispo plan: Home once medically stable versus SNF/ARU evaluation ongoing, anticipated discharge date -    I spent 50 minutes with the

## 2025-06-20 NOTE — CARE COORDINATION
Attempted Case Management initial assessment with pt but he is confused at this time.    Called pt's daughter Sherin [phone].  No answer.            Ava Aguilar, AUGUSTAN RN  Care Management

## 2025-06-20 NOTE — CONSULTS
GASTROENTEROLOGY CONSULT        Impression:   1. Esophagitis with coffee ground emesis - stable without blood loss anemia   - nonobstructive  2. Chest pain, unspecified/atypical - suspect esophagitis etiology  3. Hiatal hernia  4. CHF with 20% EF  5. CAD s/p CABG x 5 (2014)  6. AICD in situ        Plan:     1. No planned invasive endoscopic evaluation at this time.   2. Continue high dose PPI BID for 2 weeks then once daily lifelong.  3. Modified barium swallowing with SLP - order placed to assess swallowing function/upper GI tract.  4. Cardiac diet ordered.  5. Alcohol cessation  6. Medical management as per primary team.    Signed off but available as needed should event occur that may necessitate additional GI evaluation.  Thank you for this consultation and the opportunity to participate in the care of this patient. Please do not hesitate to call with any questions or concerns.        Chief Complaint: coffee ground emesis      HPI:  Adilson Cuello is a 84 y.o. male who I am being asked to see in consultation for an opinion regarding coffee ground emesis. Patient arrived to ED yesterday reporting non-radiating left side chest pain with episodic dark/coffee ground emesis. No associated trouble swallowing, globus sensation, abdominal pain. He has known CAD  s/p CABG x 5 (2014), CHF with 20% EF and AICD. Drinks alcohol. ED work-up with CTA finding of focal distal esophageal thickening, potentially suggestive of advanced  esophagitis and moderate hiatal hernia. H/H 15.6/44.7 initially but 11.9/34.4 last night after couple episodes of coffee ground emesis. Current H/H 13./38.5. He was admitted to CVT Stepdown due to concern for cardiac deterioration. Today, reports resolved chest pain. No recurrent emesis. Has appetite and ready to eat. No abdominal pain, heartburn or acid reflux.      Principal Problem:    Chest pain  Resolved Problems:    * No resolved hospital problems. *      PMH:   Past Medical History:

## 2025-06-20 NOTE — PLAN OF CARE
Problem: SLP Adult - Impaired Swallowing  Goal: By Discharge: Advance to least restrictive diet without signs or symptoms of aspiration for planned discharge setting.  See evaluation for individualized goals.  Description: Patient will:  1. Complete an objective swallow study (i.e., MBSS) to assess swallow integrity, r/o aspiration, and determine of safest LRD, min A as indicated/ordered by MD - met 6/20/25    Rec:     Easy to chew diet with thin liquids  Aspiration precautions  HOB >45 during po intake, remain >30 for 30-45 minutes after po   Small bites/sips; alternate liquid/solid with slow feeding rate   Oral care TID  Meds per pt preference    Outcome: Completed  SPEECH PATHOLOGY MODIFIED BARIUM SWALLOW STUDY & TREATMENT    Patient: Adilson Cuello (84 y.o. male)  Date: 6/20/2025  Primary Diagnosis: Chest pain [R07.9]  Esophagitis [K20.90]  Chronic systolic congestive heart failure (HCC) [I50.22]  Hypertensive emergency [I16.1]  Chest pain, unspecified type [R07.9]       Precautions: Aspiration    ASSESSMENT :  Based on the objective data described below, the patient presents with oropharyngeal swallow fxn that is WNL. Pt tolerated reg solids, thin liquids +/- straw, and 13mm Ba pill with thin liquid wash without penetration/aspiration events. Bolus manipulation, tongue base retraction, laryngeal elevation/excursion, and pharyngeal motility/sensation appeared to be WFL. Pt did have mild valleculae/pyriform residuals cleared with second volitional swallow. Recommend easy to chew solids for ease of mastication with thin liquids, aspiration precautions, oral care TID, and meds as tolerated.      TREATMENT :  Treatment provided post diagnostic testing including oropharyngeal anatomy/physiology, MBS results, diet recommendations and compensatory strategies/positioning.  Pt able to verbalize understanding.  Skilled SLP intervention is no longer indicated. Please re-consult as needed.      PLAN :  Recommendations

## 2025-06-21 PROCEDURE — 2140000001 HC CVICU INTERMEDIATE R&B

## 2025-06-21 PROCEDURE — 6370000000 HC RX 637 (ALT 250 FOR IP): Performed by: STUDENT IN AN ORGANIZED HEALTH CARE EDUCATION/TRAINING PROGRAM

## 2025-06-21 PROCEDURE — 6360000002 HC RX W HCPCS: Performed by: NURSE PRACTITIONER

## 2025-06-21 PROCEDURE — 2580000003 HC RX 258: Performed by: NURSE PRACTITIONER

## 2025-06-21 PROCEDURE — 94761 N-INVAS EAR/PLS OXIMETRY MLT: CPT

## 2025-06-21 PROCEDURE — 2500000003 HC RX 250 WO HCPCS: Performed by: NURSE PRACTITIONER

## 2025-06-21 PROCEDURE — 6360000002 HC RX W HCPCS: Performed by: FAMILY MEDICINE

## 2025-06-21 PROCEDURE — 99232 SBSQ HOSP IP/OBS MODERATE 35: CPT | Performed by: HOSPITALIST

## 2025-06-21 RX ADMIN — SODIUM CHLORIDE, PRESERVATIVE FREE 40 MG: 5 INJECTION INTRAVENOUS at 13:10

## 2025-06-21 RX ADMIN — LATANOPROST 1 DROP: 50 SOLUTION OPHTHALMIC at 20:20

## 2025-06-21 RX ADMIN — SODIUM CHLORIDE, PRESERVATIVE FREE 10 ML: 5 INJECTION INTRAVENOUS at 09:17

## 2025-06-21 RX ADMIN — METOPROLOL SUCCINATE 25 MG: 25 TABLET, FILM COATED, EXTENDED RELEASE ORAL at 09:16

## 2025-06-21 RX ADMIN — Medication 1 MG: at 09:16

## 2025-06-21 RX ADMIN — HYDRALAZINE HYDROCHLORIDE 10 MG: 20 INJECTION INTRAMUSCULAR; INTRAVENOUS at 22:53

## 2025-06-21 RX ADMIN — LORAZEPAM 2 MG: 2 INJECTION INTRAMUSCULAR; INTRAVENOUS at 01:04

## 2025-06-21 RX ADMIN — SODIUM CHLORIDE, PRESERVATIVE FREE 40 MG: 5 INJECTION INTRAVENOUS at 00:55

## 2025-06-21 RX ADMIN — SODIUM CHLORIDE, PRESERVATIVE FREE 10 ML: 5 INJECTION INTRAVENOUS at 20:20

## 2025-06-21 RX ADMIN — HYDRALAZINE HYDROCHLORIDE 10 MG: 20 INJECTION INTRAMUSCULAR; INTRAVENOUS at 00:18

## 2025-06-21 RX ADMIN — SODIUM CHLORIDE, PRESERVATIVE FREE 10 ML: 5 INJECTION INTRAVENOUS at 20:19

## 2025-06-21 RX ADMIN — THIAMINE HYDROCHLORIDE 100 MG: 100 INJECTION, SOLUTION INTRAMUSCULAR; INTRAVENOUS at 09:16

## 2025-06-21 RX ADMIN — SODIUM CHLORIDE, PRESERVATIVE FREE 10 ML: 5 INJECTION INTRAVENOUS at 09:30

## 2025-06-21 RX ADMIN — SODIUM CHLORIDE: 0.9 INJECTION, SOLUTION INTRAVENOUS at 19:25

## 2025-06-21 ASSESSMENT — PAIN SCALES - GENERAL
PAINLEVEL_OUTOF10: 0

## 2025-06-21 NOTE — PROGRESS NOTES
[facility] Hospitalist Group  Progress Note    Patient: Adilson Cuello Age: 84 y.o. : 1940 MR#: [id number] SSN: [id number]  Date/Time: 2025     Chief Complaint   Patient presents with    Chest Pain       Subjective:   Patient seen and examined. No acute events overnight. In good spirits, no new complaints. Denies chest pain, abdominal pain, shortness of breath.  No further hematemesis, melena or hematochezia.  Biggest complaint is that he is hungry.  Care plan was discussed, GI evaluation discussed.  No additional complaints or concerns      -patient seen evaluated, lying in bed, no acute distress.  Patient is thinly built and frail.  Per GI no plans for EGD at this time, continue PPI twice daily.  Appreciate cardiology and vascular surgery input, continue medical management    Assessment/Plan:   Coffee-ground emesis likely esophagitis  N.p.o. for now pending GI evaluation.  Continue with gentle IV fluids given HFrEF history, twice daily IV PPI for now.    Continue monitor hemoglobin, transfuse to keep hemoglobin greater than 7.  SCDs avoid chemical prophylaxis    Alcohol abuse  Last drink .  Continue with CIWA.  Multivitamin, thiamine    Atypical chest pain minimally elevated troponin  -Cardiology input appreciated, follow-up TTE.  Do not dissipate further intervention.  Suspect likely demand ischemia, EKG reviewed, asymptomatic.    CAD status post CABG x 5 in   -Unable to tolerate p.o., resume GDMT.  Aspirin, statin metoprolol when able    Hypertension  -Monitor and adjust accordingly.    Medical noncompliance  History of ischemic cardiomyopathy  -He last echo with EF  in  repeat pending.  Was not on GDMT secondary to noncompliance prior to admission    Chronic COPD without acute exacerbation  -History of 4 L nasal cannula as needed, will continue to monitor oxygen needs    Left internal iliac artery occlusion  Evaluated by vascular surgery, no current  need for intervention they have signed off.  Appreciate evaluation and assistance in care.         Dispo plan: Discharge home in a.m.     I spent 50 minutes with the patient in face-to-face consultation, of which greater than 50% was spent in counseling and coordination of care as described above.    Case discussed with:  [x]Patient  []Family  [x]Nursing  [x]Case Management  DVT Prophylaxis:  []Lovenox  []Hep SQ  [x]SCDs  []Coumadin   []Eliquis/Xarelto     Objective:   VS: BP (!) 169/74   Pulse 79   Temp 97.8 °F (36.6 °C) (Axillary)   Resp 21   Ht 1.651 m (5' 5\")   Wt 48.7 kg (107 lb 5.8 oz)   SpO2 99%   BMI 17.87 kg/m²    Tmax/24hrs: Temp (24hrs), Av.7 °F (36.5 °C), Min:97.3 °F (36.3 °C), Max:98.2 °F (36.8 °C)  IOBRIEF  Intake/Output Summary (Last 24 hours) at 2025 1628  Last data filed at 2025 0917  Gross per 24 hour   Intake 10 ml   Output --   Net 10 ml     General:  Alert, cooperative, no acute distress    HEENT: PERRLA, anicteric sclerae.  Pulmonary:  CTA Bilaterally. No Wheezing/Rales.  Cardiovascular: Regular rate and Rhythm.  GI:  Soft, Non distended, Non tender. + Bowel sounds.  Extremities:  No edema. No calf tenderness.   Psych: Good insight. Not anxious or agitated.  Neurologic: Alert and oriented X 3. Moves all ext.  Medications:   Current Facility-Administered Medications   Medication Dose Route Frequency    hydrALAZINE (APRESOLINE) injection 10 mg  10 mg IntraVENous Q4H PRN    latanoprost (XALATAN) 0.005 % ophthalmic solution 1 drop  1 drop Both Eyes Nightly    Virt-Caps 1 mg  1 capsule Oral Daily    metoprolol succinate (TOPROL XL) extended release tablet 25 mg  25 mg Oral Daily    haloperidol lactate (HALDOL) injection 5 mg  5 mg IntraMUSCular Q12H PRN    pantoprazole (PROTONIX) 40 mg in sodium chloride (PF) 0.9 % 10 mL injection  40 mg IntraVENous Q12H    sodium chloride flush 0.9 % injection 5-40 mL  5-40 mL IntraVENous 2 times per day    sodium chloride flush 0.9 %

## 2025-06-21 NOTE — PLAN OF CARE
Problem: Chronic Conditions and Co-morbidities  Goal: Patient's chronic conditions and co-morbidity symptoms are monitored and maintained or improved  6/21/2025 1851 by Baylee Mae, RN  Outcome: Progressing  Flowsheets (Taken 6/21/2025 0930)  Care Plan - Patient's Chronic Conditions and Co-Morbidity Symptoms are Monitored and Maintained or Improved: Monitor and assess patient's chronic conditions and comorbid symptoms for stability, deterioration, or improvement     Problem: Discharge Planning  Goal: Discharge to home or other facility with appropriate resources  6/21/2025 1851 by Baylee Mae, RN  Outcome: Progressing  Flowsheets (Taken 6/21/2025 0930)  Discharge to home or other facility with appropriate resources: Identify barriers to discharge with patient and caregiver     Problem: Seizure Precautions  Goal: Remains free of injury related to seizures activity  6/21/2025 1851 by Baylee Mae, RN  Outcome: Progressing     Problem: Pain  Goal: Verbalizes/displays adequate comfort level or baseline comfort level  6/21/2025 1851 by Baylee Mae, RN  Outcome: Progressing     Problem: Safety - Adult  Goal: Free from fall injury  6/21/2025 1851 by Baylee Mae, RN  Outcome: Progressing     Problem: ABCDS Injury Assessment  Goal: Absence of physical injury  6/21/2025 1851 by Baylee Mae, RN  Outcome: Progressing     Problem: Nutrition Deficit:  Goal: Optimize nutritional status  6/21/2025 1851 by Baylee Mae, RN  Outcome: Progressing

## 2025-06-22 LAB
ANION GAP SERPL CALC-SCNC: 15 MMOL/L (ref 3–18)
BUN SERPL-MCNC: 19 MG/DL (ref 6–23)
BUN/CREAT SERPL: 15 (ref 12–20)
CALCIUM SERPL-MCNC: 8.8 MG/DL (ref 8.5–10.1)
CHLORIDE SERPL-SCNC: 112 MMOL/L (ref 98–107)
CO2 SERPL-SCNC: 18 MMOL/L (ref 21–32)
CREAT SERPL-MCNC: 1.23 MG/DL (ref 0.6–1.3)
ERYTHROCYTE [DISTWIDTH] IN BLOOD BY AUTOMATED COUNT: 15.1 % (ref 11.6–14.5)
GLUCOSE SERPL-MCNC: 90 MG/DL (ref 74–108)
HCT VFR BLD AUTO: 36.4 % (ref 36–48)
HGB BLD-MCNC: 12.5 G/DL (ref 13–16)
MCH RBC QN AUTO: 33.2 PG (ref 24–34)
MCHC RBC AUTO-ENTMCNC: 34.3 G/DL (ref 31–37)
MCV RBC AUTO: 96.8 FL (ref 78–100)
NRBC # BLD: 0 K/UL (ref 0–0.01)
NRBC BLD-RTO: 0 PER 100 WBC
PLATELET # BLD AUTO: 129 K/UL (ref 135–420)
PMV BLD AUTO: 10 FL (ref 9.2–11.8)
POTASSIUM SERPL-SCNC: 3.7 MMOL/L (ref 3.5–5.5)
RBC # BLD AUTO: 3.76 M/UL (ref 4.35–5.65)
SODIUM SERPL-SCNC: 144 MMOL/L (ref 136–145)
WBC # BLD AUTO: 7.4 K/UL (ref 4.6–13.2)

## 2025-06-22 PROCEDURE — 6360000002 HC RX W HCPCS: Performed by: NURSE PRACTITIONER

## 2025-06-22 PROCEDURE — 85027 COMPLETE CBC AUTOMATED: CPT

## 2025-06-22 PROCEDURE — 6370000000 HC RX 637 (ALT 250 FOR IP): Performed by: NURSE PRACTITIONER

## 2025-06-22 PROCEDURE — 99232 SBSQ HOSP IP/OBS MODERATE 35: CPT | Performed by: HOSPITALIST

## 2025-06-22 PROCEDURE — 94761 N-INVAS EAR/PLS OXIMETRY MLT: CPT

## 2025-06-22 PROCEDURE — 6370000000 HC RX 637 (ALT 250 FOR IP): Performed by: STUDENT IN AN ORGANIZED HEALTH CARE EDUCATION/TRAINING PROGRAM

## 2025-06-22 PROCEDURE — 2500000003 HC RX 250 WO HCPCS: Performed by: NURSE PRACTITIONER

## 2025-06-22 PROCEDURE — 6360000002 HC RX W HCPCS: Performed by: FAMILY MEDICINE

## 2025-06-22 PROCEDURE — 2140000001 HC CVICU INTERMEDIATE R&B

## 2025-06-22 PROCEDURE — 36415 COLL VENOUS BLD VENIPUNCTURE: CPT

## 2025-06-22 PROCEDURE — 80048 BASIC METABOLIC PNL TOTAL CA: CPT

## 2025-06-22 RX ADMIN — SODIUM CHLORIDE, PRESERVATIVE FREE 10 ML: 5 INJECTION INTRAVENOUS at 08:15

## 2025-06-22 RX ADMIN — THIAMINE HYDROCHLORIDE 100 MG: 100 INJECTION, SOLUTION INTRAMUSCULAR; INTRAVENOUS at 08:12

## 2025-06-22 RX ADMIN — HYDRALAZINE HYDROCHLORIDE 10 MG: 20 INJECTION INTRAMUSCULAR; INTRAVENOUS at 04:17

## 2025-06-22 RX ADMIN — Medication 3 MG: at 21:13

## 2025-06-22 RX ADMIN — Medication 1 MG: at 10:10

## 2025-06-22 RX ADMIN — HYDRALAZINE HYDROCHLORIDE 10 MG: 20 INJECTION INTRAMUSCULAR; INTRAVENOUS at 19:35

## 2025-06-22 RX ADMIN — METOPROLOL SUCCINATE 25 MG: 25 TABLET, FILM COATED, EXTENDED RELEASE ORAL at 10:10

## 2025-06-22 RX ADMIN — SODIUM CHLORIDE, PRESERVATIVE FREE 40 MG: 5 INJECTION INTRAVENOUS at 13:17

## 2025-06-22 RX ADMIN — SODIUM CHLORIDE, PRESERVATIVE FREE 10 ML: 5 INJECTION INTRAVENOUS at 10:11

## 2025-06-22 RX ADMIN — SODIUM CHLORIDE, PRESERVATIVE FREE 10 ML: 5 INJECTION INTRAVENOUS at 21:13

## 2025-06-22 RX ADMIN — SODIUM CHLORIDE, PRESERVATIVE FREE 40 MG: 5 INJECTION INTRAVENOUS at 01:23

## 2025-06-22 RX ADMIN — LATANOPROST 1 DROP: 50 SOLUTION OPHTHALMIC at 21:14

## 2025-06-22 ASSESSMENT — PAIN SCALES - GENERAL
PAINLEVEL_OUTOF10: 0
PAINLEVEL_OUTOF10: 0

## 2025-06-22 NOTE — PLAN OF CARE
Problem: Seizure Precautions  Goal: Remains free of injury related to seizures activity  6/21/2025 2153 by Emerita Tran, RN  Outcome: Progressing  Flowsheets (Taken 6/21/2025 2153)  Remains free of injury related to seizure activity:   Maintain airway, patient safety  and administer oxygen as ordered   Monitor patient for seizure activity, document and report duration and description of seizure to Licensed Independent Practitioner  Note: Pt. Monitor for seizure activities.    6/21/2025 1851 by Baylee Mae, RN  Outcome: Progressing     Problem: Safety - Adult  Goal: Free from fall injury  6/21/2025 2153 by Emerita Tran, RN  Outcome: Progressing  Flowsheets (Taken 6/21/2025 2153)  Free From Fall Injury: Instruct family/caregiver on patient safety  Note: Pt. Is unable to understanding education. Bed alarm on.  6/21/2025 1851 by Baylee Mae, RN  Outcome: Progressing     Problem: Nutrition Deficit:  Goal: Optimize nutritional status  6/21/2025 2153 by Emerita Tran, RN  Outcome: Progressing  Flowsheets (Taken 6/21/2025 2153)  Nutrient intake appropriate for improving, restoring, or maintaining nutritional needs:   Assess nutritional status and recommend course of action   Monitor oral intake, labs, and treatment plans  Note: Pt. Monitor for food and fluids intake.  Pt. refused  6/21/2025 1851 by Baylee Mae, RN  Outcome: Progressing

## 2025-06-22 NOTE — PLAN OF CARE
Problem: Chronic Conditions and Co-morbidities  Goal: Patient's chronic conditions and co-morbidity symptoms are monitored and maintained or improved  6/22/2025 0824 by Baylee Mae, RN  Outcome: Progressing     Problem: Discharge Planning  Goal: Discharge to home or other facility with appropriate resources  6/22/2025 0824 by Baylee Mae, RN  Outcome: Progressing     Problem: Seizure Precautions  Goal: Remains free of injury related to seizures activity  6/22/2025 0824 by Baylee Mae, RN  Outcome: Progressing     Problem: Pain  Goal: Verbalizes/displays adequate comfort level or baseline comfort level  6/22/2025 0824 by Baylee Mae RN  Outcome: Progressing     Problem: Safety - Adult  Goal: Free from fall injury  6/22/2025 0824 by Baylee Mae RN  Outcome: Progressing     Problem: ABCDS Injury Assessment  Goal: Absence of physical injury  6/22/2025 0824 by Baylee Mae RN  Outcome: Progressing     Problem: Nutrition Deficit:  Goal: Optimize nutritional status  6/22/2025 0824 by Baylee Mae RN  Outcome: Progressing     Problem: Skin/Tissue Integrity  Goal: Skin integrity remains intact  Description: 1.  Monitor for areas of redness and/or skin breakdown  2.  Assess vascular access sites hourly  3.  Every 4-6 hours minimum:  Change oxygen saturation probe site  4.  Every 4-6 hours:  If on nasal continuous positive airway pressure, respiratory therapy assess nares and determine need for appliance change or resting period  Outcome: Progressing     Problem: Neurosensory - Adult  Goal: Achieves stable or improved neurological status  Outcome: Progressing     Problem: Neurosensory - Adult  Goal: Absence of seizures  Outcome: Progressing     Problem: Neurosensory - Adult  Goal: Achieves maximal functionality and self care  Outcome: Progressing     Problem: Musculoskeletal - Adult  Goal: Return mobility to safest level of function  Outcome: Progressing     Problem: Gastrointestinal -

## 2025-06-22 NOTE — PROGRESS NOTES
[facility] Hospitalist Group  Progress Note    Patient: Adilson Cuello Age: 84 y.o. : 1940 MR#: [id number] SSN: [id number]  Date/Time: 2025     Chief Complaint   Patient presents with    Chest Pain       Subjective:   Patient seen and examined. No acute events overnight. In good spirits, no new complaints. Denies chest pain, abdominal pain, shortness of breath.  No further hematemesis, melena or hematochezia.  Biggest complaint is that he is hungry.  Care plan was discussed, GI evaluation discussed.  No additional complaints or concerns      -patient seen evaluated, lying in bed, no acute distress.  Patient is thinly built and frail.  Per GI no plans for EGD at this time, continue PPI twice daily.  Appreciate cardiology and vascular surgery input, continue medical management    -patient seen and evaluated, lying in bed, no acute distress.  Patient is confused, unable to answer any significant questions, ex-wife at bedside.  Patient has been drinking significant amount of alcohol at home.  Possibly undergoing alcohol withdrawal at this time, continue to monitor    Assessment/Plan:   Coffee-ground emesis likely esophagitis  N.p.o. for now pending GI evaluation.  Continue with gentle IV fluids given HFrEF history, twice daily IV PPI for now.    Continue monitor hemoglobin, transfuse to keep hemoglobin greater than 7.  SCDs avoid chemical prophylaxis    Alcohol abuse  Last drink .  Continue with CIWA.  Multivitamin, thiamine    Atypical chest pain minimally elevated troponin  -Cardiology input appreciated, follow-up TTE.  Do not dissipate further intervention.  Suspect likely demand ischemia, EKG reviewed, asymptomatic.    CAD status post CABG x 5 in   -Unable to tolerate p.o., resume GDMT.  Aspirin, statin metoprolol when able    Hypertension  -Monitor and adjust accordingly.    Medical noncompliance  History of ischemic cardiomyopathy  -He last echo with EF

## 2025-06-23 VITALS
DIASTOLIC BLOOD PRESSURE: 81 MMHG | BODY MASS INDEX: 16.35 KG/M2 | HEART RATE: 81 BPM | TEMPERATURE: 97.7 F | WEIGHT: 98.11 LBS | HEIGHT: 65 IN | RESPIRATION RATE: 20 BRPM | SYSTOLIC BLOOD PRESSURE: 168 MMHG | OXYGEN SATURATION: 97 %

## 2025-06-23 LAB
ANION GAP SERPL CALC-SCNC: 14 MMOL/L (ref 3–18)
BUN SERPL-MCNC: 15 MG/DL (ref 6–23)
BUN/CREAT SERPL: 13 (ref 12–20)
CALCIUM SERPL-MCNC: 8.9 MG/DL (ref 8.5–10.1)
CHLORIDE SERPL-SCNC: 109 MMOL/L (ref 98–107)
CO2 SERPL-SCNC: 19 MMOL/L (ref 21–32)
CREAT SERPL-MCNC: 1.12 MG/DL (ref 0.6–1.3)
ERYTHROCYTE [DISTWIDTH] IN BLOOD BY AUTOMATED COUNT: 14.8 % (ref 11.6–14.5)
GLUCOSE SERPL-MCNC: 83 MG/DL (ref 74–108)
HCT VFR BLD AUTO: 35.5 % (ref 36–48)
HGB BLD-MCNC: 12.2 G/DL (ref 13–16)
MCH RBC QN AUTO: 33.1 PG (ref 24–34)
MCHC RBC AUTO-ENTMCNC: 34.4 G/DL (ref 31–37)
MCV RBC AUTO: 96.2 FL (ref 78–100)
NRBC # BLD: 0 K/UL (ref 0–0.01)
NRBC BLD-RTO: 0 PER 100 WBC
PLATELET # BLD AUTO: 130 K/UL (ref 135–420)
PMV BLD AUTO: 10.2 FL (ref 9.2–11.8)
POTASSIUM SERPL-SCNC: 3.2 MMOL/L (ref 3.5–5.5)
RBC # BLD AUTO: 3.69 M/UL (ref 4.35–5.65)
SODIUM SERPL-SCNC: 143 MMOL/L (ref 136–145)
WBC # BLD AUTO: 7.6 K/UL (ref 4.6–13.2)

## 2025-06-23 PROCEDURE — 6360000002 HC RX W HCPCS: Performed by: STUDENT IN AN ORGANIZED HEALTH CARE EDUCATION/TRAINING PROGRAM

## 2025-06-23 PROCEDURE — 85027 COMPLETE CBC AUTOMATED: CPT

## 2025-06-23 PROCEDURE — 80048 BASIC METABOLIC PNL TOTAL CA: CPT

## 2025-06-23 PROCEDURE — 36415 COLL VENOUS BLD VENIPUNCTURE: CPT

## 2025-06-23 PROCEDURE — 2500000003 HC RX 250 WO HCPCS: Performed by: NURSE PRACTITIONER

## 2025-06-23 PROCEDURE — 6370000000 HC RX 637 (ALT 250 FOR IP): Performed by: STUDENT IN AN ORGANIZED HEALTH CARE EDUCATION/TRAINING PROGRAM

## 2025-06-23 PROCEDURE — 94761 N-INVAS EAR/PLS OXIMETRY MLT: CPT

## 2025-06-23 PROCEDURE — 6360000002 HC RX W HCPCS: Performed by: FAMILY MEDICINE

## 2025-06-23 PROCEDURE — 99239 HOSP IP/OBS DSCHRG MGMT >30: CPT | Performed by: STUDENT IN AN ORGANIZED HEALTH CARE EDUCATION/TRAINING PROGRAM

## 2025-06-23 PROCEDURE — 6360000002 HC RX W HCPCS: Performed by: NURSE PRACTITIONER

## 2025-06-23 RX ORDER — LOSARTAN POTASSIUM 25 MG/1
25 TABLET ORAL DAILY
Qty: 90 TABLET | Refills: 1 | Status: SHIPPED | OUTPATIENT
Start: 2025-06-23

## 2025-06-23 RX ORDER — POTASSIUM CHLORIDE 7.45 MG/ML
10 INJECTION INTRAVENOUS
Status: DISPENSED | OUTPATIENT
Start: 2025-06-23 | End: 2025-06-23

## 2025-06-23 RX ORDER — ATORVASTATIN CALCIUM 40 MG/1
40 TABLET, FILM COATED ORAL DAILY
Qty: 30 TABLET | Refills: 0 | Status: SHIPPED | OUTPATIENT
Start: 2025-06-23

## 2025-06-23 RX ORDER — ASPIRIN 81 MG/1
81 TABLET ORAL DAILY
Qty: 30 TABLET | Refills: 0 | Status: SHIPPED | OUTPATIENT
Start: 2025-06-23

## 2025-06-23 RX ORDER — FOLIC ACID 1 MG/1
1 TABLET ORAL DAILY
Qty: 90 TABLET | Refills: 1 | Status: SHIPPED | OUTPATIENT
Start: 2025-06-23

## 2025-06-23 RX ORDER — PANTOPRAZOLE SODIUM 40 MG/1
40 TABLET, DELAYED RELEASE ORAL
Qty: 60 TABLET | Refills: 5 | Status: SHIPPED | OUTPATIENT
Start: 2025-06-23

## 2025-06-23 RX ORDER — MULTIVIT-MIN/IRON FUM/FOLIC AC 7.5 MG-4
1 TABLET ORAL DAILY
Qty: 30 TABLET | Refills: 3 | Status: SHIPPED | OUTPATIENT
Start: 2025-06-23

## 2025-06-23 RX ORDER — METOPROLOL SUCCINATE 25 MG/1
25 TABLET, EXTENDED RELEASE ORAL DAILY
Qty: 30 TABLET | Refills: 3 | Status: SHIPPED | OUTPATIENT
Start: 2025-06-24

## 2025-06-23 RX ORDER — THIAMINE MONONITRATE (VIT B1) 100 MG
100 TABLET ORAL DAILY
Qty: 30 TABLET | Refills: 3 | Status: SHIPPED | OUTPATIENT
Start: 2025-06-23

## 2025-06-23 RX ADMIN — SODIUM CHLORIDE, PRESERVATIVE FREE 40 MG: 5 INJECTION INTRAVENOUS at 02:01

## 2025-06-23 RX ADMIN — HYDRALAZINE HYDROCHLORIDE 10 MG: 20 INJECTION INTRAMUSCULAR; INTRAVENOUS at 08:20

## 2025-06-23 RX ADMIN — POTASSIUM CHLORIDE 10 MEQ: 7.45 INJECTION INTRAVENOUS at 09:25

## 2025-06-23 RX ADMIN — Medication 1 MG: at 08:18

## 2025-06-23 RX ADMIN — METOPROLOL SUCCINATE 25 MG: 25 TABLET, FILM COATED, EXTENDED RELEASE ORAL at 08:18

## 2025-06-23 RX ADMIN — POTASSIUM CHLORIDE 10 MEQ: 7.45 INJECTION INTRAVENOUS at 08:31

## 2025-06-23 RX ADMIN — THIAMINE HYDROCHLORIDE 100 MG: 100 INJECTION, SOLUTION INTRAMUSCULAR; INTRAVENOUS at 08:18

## 2025-06-23 NOTE — CARE COORDINATION
06/23/25 1105   IMM Letter   IMM Letter given to Patient/Family/Significant other/Guardian/POA/by: Ava Aguilar   IMM Letter date given: 06/23/25   IMM Letter time given: 1039       Important Message from Medicare\" reviewed and explained with the patient and/or representative pt's sister Isidra Gong at bedside and signature was obtained. A signed copy provided to patient/representative. Original signed document placed in patient's chart.     Pt's family waved 4 hours right to appeal discharge.        Ava Aguilar, AUGUSTAN RN  Care Management

## 2025-06-23 NOTE — PLAN OF CARE
Problem: Chronic Conditions and Co-morbidities  Goal: Patient's chronic conditions and co-morbidity symptoms are monitored and maintained or improved  Outcome: Completed     Problem: Discharge Planning  Goal: Discharge to home or other facility with appropriate resources  Outcome: Completed     Problem: Seizure Precautions  Goal: Remains free of injury related to seizures activity  6/23/2025 1103 by Lynnette Maki GN  Outcome: Completed  6/23/2025 0302 by Kurt James RN  Outcome: Progressing  Flowsheets (Taken 6/23/2025 0302)  Remains free of injury related to seizure activity:   Maintain airway, patient safety  and administer oxygen as ordered   Monitor patient for seizure activity, document and report duration and description of seizure to Licensed Independent Practitioner   If seizure occurs, turn patient to side and suction secretions as needed   Seizure pads on all 4 side rails   Instruct patient/family to call for assistance with activity based on assessment     Problem: Pain  Goal: Verbalizes/displays adequate comfort level or baseline comfort level  6/23/2025 1103 by Lynnette Maki GN  Outcome: Completed  6/23/2025 0302 by Kurt James RN  Outcome: Progressing  Flowsheets (Taken 6/23/2025 0302)  Verbalizes/displays adequate comfort level or baseline comfort level: Encourage patient to monitor pain and request assistance     Problem: Safety - Adult  Goal: Free from fall injury  6/23/2025 1103 by Lynnette Maki GN  Outcome: Completed  6/23/2025 0302 by Kurt James RN  Outcome: Progressing  Flowsheets (Taken 6/23/2025 0302)  Free From Fall Injury: Instruct family/caregiver on patient safety     Problem: ABCDS Injury Assessment  Goal: Absence of physical injury  Outcome: Completed     Problem: Nutrition Deficit:  Goal: Optimize nutritional status  Outcome: Completed     Problem: Skin/Tissue Integrity  Goal: Skin integrity remains intact  Description: 1.  Monitor for areas of redness

## 2025-06-23 NOTE — CARE COORDINATION
06/23/25 1111   Service Assessment   Patient Orientation Other (see comment)  (confused)   Cognition Alert   History Provided By Child/Family  (pt's sister Isidra Gong)   Support Systems Family Members;Children   Patient's Healthcare Decision Maker is: Named in Scanned ACP Document   PCP Verified by CM Yes   Last Visit to PCP Within last 3 months   Prior Functional Level Assistance with the following:;Housework;Cooking;Shopping   Current Functional Level Assistance with the following:;Bathing;Toileting;Mobility   Can patient return to prior living arrangement Yes   Ability to make needs known: Poor   Family able to assist with home care needs: Yes   Would you like for me to discuss the discharge plan with any other family members/significant others, and if so, who? Yes  (sister and niece)   Financial Resources Medicaid;Other (Comment)  ()   Community Resources None   CM/SW Referral Other (see comment)  (d/c planning)   Social/Functional History   Lives With Family  (lives with sister Isidra and Isidra daughter)   Type of Home House   Home Layout One level   Home Access   (threshold)   Bathroom Shower/Tub Tub/Shower unit   Bathroom Toilet Standard   Bathroom Equipment Shower chair   Bathroom Accessibility Accessible   Home Equipment Cane;Walker - Rolling   Receives Help From Family   Prior Level of Assist for ADLs Needs assistance   Prior Level of Assist for Homemaking Needs assistance   Ambulation Assistance Independent   Prior Level of Assist for Transfers Independent   Active  No   Patient's  Info sister/niece   Mode of Transportation Car   Occupation Retired   Discharge Planning   Type of Residence House   Living Arrangements Family Members   Current Services Prior To Admission Durable Medical Equipment   Current DME Prior to Arrival Cane;Walker   Potential Assistance Needed Home Care   DME Ordered? No   Potential Assistance Purchasing Medications No   Type of Home Care Services None

## 2025-06-23 NOTE — PLAN OF CARE
Problem: Seizure Precautions  Goal: Remains free of injury related to seizures activity  Outcome: Progressing  Flowsheets (Taken 6/23/2025 0302)  Remains free of injury related to seizure activity:   Maintain airway, patient safety  and administer oxygen as ordered   Monitor patient for seizure activity, document and report duration and description of seizure to Licensed Independent Practitioner   If seizure occurs, turn patient to side and suction secretions as needed   Seizure pads on all 4 side rails   Instruct patient/family to call for assistance with activity based on assessment     Problem: Pain  Goal: Verbalizes/displays adequate comfort level or baseline comfort level  Outcome: Progressing  Flowsheets (Taken 6/23/2025 0302)  Verbalizes/displays adequate comfort level or baseline comfort level: Encourage patient to monitor pain and request assistance     Problem: Safety - Adult  Goal: Free from fall injury  Outcome: Progressing  Flowsheets (Taken 6/23/2025 0302)  Free From Fall Injury: Instruct family/caregiver on patient safety     Problem: Skin/Tissue Integrity  Goal: Skin integrity remains intact  Description: 1.  Monitor for areas of redness and/or skin breakdown  2.  Assess vascular access sites hourly  3.  Every 4-6 hours minimum:  Change oxygen saturation probe site  4.  Every 4-6 hours:  If on nasal continuous positive airway pressure, respiratory therapy assess nares and determine need for appliance change or resting period  Outcome: Progressing  Flowsheets (Taken 6/23/2025 0302)  Skin Integrity Remains Intact:   Monitor for areas of redness and/or skin breakdown   Turn and reposition as indicated   Positioning devices   Monitor skin under medical devices

## 2025-06-23 NOTE — PROGRESS NOTES
Patient was provided with discharge instructions and education. The patient verbalized understanding of the discharge information. Time for questions was provided. No questions voiced at this time. The patient was discharged home via wheelchair with sister.

## 2025-06-23 NOTE — DISCHARGE SUMMARY
lean, gallbladder, pancreas appear normal. Adrenal glands and bilateral kidneys are normal. Visualized bowel is normal in caliber. Scattered colonic diverticula. There is some scattered radiodensity in the lumen of the colon/diverticula which was also seen on 3 contrast images (image 158, series 2).     1. Negative CTA of the chest, abdomen, and pelvis without evidence of aortic dissection, pulmonary embolus or significant extravasation. 2. Extensive atherosclerosis with plaque and moderate stenoses noted in multiple vessels, occlusion of the left external iliac artery. 3. Moderate hiatal hernia with distal esophageal thickening. 4. Cardiomegaly. 5. Moderate emphysema in bilateral lungs and left apical scarring. Preliminary report was made available on 6/19/2025 9:50 AM. Final report status is indicated by radiologist signature. Electronically signed by Sonya Ken    XR CHEST (2 VW)  Result Date: 6/19/2025  INDICATION: Chest Pain TECHNIQUE: Two views of the chest COMPARISON: CT chest 19 June 2025 FINDINGS: Emphysematous changes. Ectatic aorta with atherosclerotic disease. Cardiac silhouette is normal. Multilead left-sided AICD. No focal lung consolidation, pleural effusion or pneumothorax.     Emphysematous changes. No acute findings. Electronically signed by Nate Salgado    Procedures:  None    Discharge Medications:     Current Discharge Medication List        START taking these medications    Details   metoprolol succinate (TOPROL XL) 25 MG extended release tablet Take 1 tablet by mouth daily  Qty: 30 tablet, Refills: 3      folic acid (FOLVITE) 1 MG tablet Take 1 tablet by mouth daily  Qty: 90 tablet, Refills: 1      vitamin B-1 (THIAMINE) 100 MG tablet Take 1 tablet by mouth daily  Qty: 30 tablet, Refills: 3      Multiple Vitamins-Minerals (MULTIVITAMIN WITH MINERALS) tablet Take 1 tablet by mouth daily  Qty: 30 tablet, Refills: 3      pantoprazole (PROTONIX) 40 MG tablet Take 1 tablet by mouth 2  contact me or call our department.       Jose L Sumner DO  6/23/2025 1:39 PM

## 2025-06-23 NOTE — CARE COORDINATION
Per pt's sister Isidra, pt's daughter  Sherin is out of the country to [place] for missionary services.        Discharge order noted for today. Pt has been accepted to [facility]. Met with patient and his sister Isidra  and are agreeable to the transition plan today. Transport has been arranged through pt's sister. Patient's discharge summary and home health  orders have been forwarded to [facility] via  [facility] . Updated bedside RN, ,  to the transition plan.  Discharge information has been documented on the AVS.             AUGUSTA MartinezN RN  Care Management

## 2025-06-23 NOTE — DISCHARGE INSTRUCTIONS
Take your medications as prescribed as below  Follow-up with your primary care provider  Recommend follow-up with GI as outpatient to discuss further management of esophagitis  Continue with Protonix twice daily until July 7 followed by once daily until follow-up with PCP, will defer further to their evaluation  Continue with multivitamin folic acid thiamine  Very important that you stop drinking, please do not start smoking or use illicit drugs.   Started on guideline directed medical therapy for your coronary artery disease  Recommend close follow-up as well with a cardiologist (heart doctor), please discuss this with your PCP for referral.  Continue with oxygen as needed for your COPD, recommend to small to follow-up with a lung doctor (pulmonologist) for further evaluation and testing.

## 2025-07-01 NOTE — PROGRESS NOTES
Physician Progress Note      PATIENT:               ALBINO ROLLINS  [facility] #:                  [id number]  :                       1940  ADMIT DATE:       2025 8:40 AM  DISCH DATE:        2025 2:44 PM  RESPONDING  PROVIDER #:        Jose L Sumner DO          QUERY TEXT:    Chest pain is documented in the medical record on 2025 in discharge   summary per Dr. Jose L Sumner DO. Please specify the underlying cause:    The clinical indicators include:  2025, PN, Dr. Jose L Sumner, DO:  \"Coffee-ground emesis likely   esophagitis  N.p.o. for now pending GI evaluation.  Continue with gentle IV fluids given   HFrEF history, twice daily IV PPI for now.\"  2025, PN, Dr. Umair Boo MD:  \"Atypical chest pain with   minimally elevated troponin likely secondary to above.  EKG with wave   inversions in inferior and anterolateral leads which have been seen on prior   EKGs  Ischemic Cardiomyopathy:  Echo 2025 EF 35-40%, hypokinesis in basal inferoseptal segment, grade I DD.   RV with reduced systolic function. EF increased to 20-25% from Echo in .\"  2025, discharge summary per Dr. Jose L Sumner, DO:  \"  Patient also   evaluated by cardiology for chest pain found likely to be secondary to   coronary artery disease and history of noncompliance.  Echo was obtained which   revealed a EF of 20-25% with ischemic cardiomyopathy.  Patient was initiated   on guideline directed medical therapy.\"    Thank you,  MADELINE Ly RN, CDS  [email or web address]  Options provided:  -- Chest pain related to CAD  -- Chest pain related to esophagitis  -- Other - I will add my own diagnosis  -- Disagree - Not applicable / Not valid  -- Disagree - Clinically unable to determine / Unknown  -- Refer to Clinical Documentation Reviewer    PROVIDER RESPONSE TEXT:    This patient has chest pain related to CAD.    Query created by: Makayla Ly on 2025 12:42 PM      Electronically signed by:

## (undated) DEVICE — PROCEDURE KIT FLUID MGMT 10 FR CUST MAINFOLD

## (undated) DEVICE — SUTURE ABSORBABLE BRAIDED 2-0 CT-1 27 IN UD VICRYL J259H

## (undated) DEVICE — SUTURE MCRYL SZ 4 0 L18IN ABSRB VLT PS 1 L24MM 3 8 CIR REV Y682H

## (undated) DEVICE — SUTURE PERMA HND SZ 0 L18IN NONABSORBABLE BLK L30MM PSL REV 580H

## (undated) DEVICE — DRSG AQUACEL SURG 3.5X6IN -- CONVERT TO ITEM 369227

## (undated) DEVICE — PENCIL ES CRD L10FT ROCK SWCH S STL HEX LOK BLDE ELECTRD

## (undated) DEVICE — CABLE PACE ALGTR CLP SAF 12FT --

## (undated) DEVICE — CATHETER DIAG AD L100CM DIA6FR STD JUDKINS L 4 POLYUR COR

## (undated) DEVICE — PRESSURE MONITORING SET: Brand: TRUWAVE

## (undated) DEVICE — CATHETER ANGIO RCB 038 AD 6 FRX100 CM SUPER TORQUE +

## (undated) DEVICE — PACK PROCEDURE SURG VASC CATH 161 MMC LF

## (undated) DEVICE — MEDI-TRACE CADENCE ADULT, DEFIBRILLATION ELECTRODE -RTS  (10 PR/PK) - PHYSIO-CONTROL: Brand: MEDI-TRACE CADENCE

## (undated) DEVICE — DRAPE SURG W25XL50IN E OPN CIR BND BG

## (undated) DEVICE — 3M™ IOBAN™ 2 ANTIMICROBIAL INCISE DRAPE 6640EZ: Brand: IOBAN™ 2

## (undated) DEVICE — COVADERM: Brand: DEROYAL

## (undated) DEVICE — SET FLD ADMIN 3 W STPCOCK FIX FEM L BOR 1IN

## (undated) DEVICE — INTRODUCER SHTH 6FR CANN L11CM DIL TIP 35MM GRN TUNGSTEN

## (undated) DEVICE — Device

## (undated) DEVICE — INTRO SHTH 7FR 13X20CM -- TEARAWAY

## (undated) DEVICE — KENDALL RADIOLUCENT FOAM MONITORING ELECTRODE RECTANGULAR SHAPE: Brand: KENDALL

## (undated) DEVICE — DRAPE STRL ANGIO W/ 2 FLD COLLCTN PCH 86X135 218X343 CM 2

## (undated) DEVICE — CATHETER ANGIO JR4 STD 0.038 IN 6 FRX100 CM SUPER TORQUE +

## (undated) DEVICE — CATHETER ANGIO IMA 038 AD 6 FRX100 CM SUPER TORQUE +

## (undated) DEVICE — LIMB HOLDER, WRIST/ANKLE: Brand: DEROYAL

## (undated) DEVICE — INTRO SHTH 9FR 13X20CM -- USE ITEM# 341577

## (undated) DEVICE — REM POLYHESIVE ADULT PATIENT RETURN ELECTRODE: Brand: VALLEYLAB